# Patient Record
Sex: MALE | Race: WHITE | NOT HISPANIC OR LATINO | Employment: UNEMPLOYED | URBAN - METROPOLITAN AREA
[De-identification: names, ages, dates, MRNs, and addresses within clinical notes are randomized per-mention and may not be internally consistent; named-entity substitution may affect disease eponyms.]

---

## 2021-09-28 ENCOUNTER — APPOINTMENT (OUTPATIENT)
Dept: WOUND CARE | Facility: HOSPITAL | Age: 41
End: 2021-09-28

## 2021-10-06 ENCOUNTER — OFFICE VISIT (OUTPATIENT)
Dept: INTERNAL MEDICINE | Facility: CLINIC | Age: 41
End: 2021-10-06

## 2021-10-06 VITALS
HEART RATE: 64 BPM | DIASTOLIC BLOOD PRESSURE: 70 MMHG | SYSTOLIC BLOOD PRESSURE: 130 MMHG | OXYGEN SATURATION: 98 % | RESPIRATION RATE: 18 BRPM | TEMPERATURE: 97.9 F

## 2021-10-06 DIAGNOSIS — Z79.899 HIGH RISK MEDICATION USE: ICD-10-CM

## 2021-10-06 DIAGNOSIS — S32.019A: ICD-10-CM

## 2021-10-06 DIAGNOSIS — G89.4 CHRONIC PAIN SYNDROME: ICD-10-CM
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
## 2021-10-06 DIAGNOSIS — R32 BOWEL AND BLADDER INCONTINENCE: Primary | ICD-10-CM

## 2021-10-06 DIAGNOSIS — L89.109 PRESSURE INJURY OF SKIN OF BACK, UNSPECIFIED INJURY STAGE: ICD-10-CM

## 2021-10-06 DIAGNOSIS — R15.9 BOWEL AND BLADDER INCONTINENCE: Primary | ICD-10-CM

## 2021-10-06 DIAGNOSIS — Z98.890 HISTORY OF UROSTOMY: ICD-10-CM

## 2021-10-06 DIAGNOSIS — G82.20 PARALYSIS OF BOTH LOWER LIMBS (HCC): ICD-10-CM

## 2021-10-06 DIAGNOSIS — G89.29 CHRONIC ABDOMINAL PAIN: ICD-10-CM

## 2021-10-06 DIAGNOSIS — B36.0 TINEA VERSICOLOR: ICD-10-CM

## 2021-10-06 DIAGNOSIS — R10.9 CHRONIC ABDOMINAL PAIN: ICD-10-CM

## 2021-10-06 DIAGNOSIS — S24.103A: ICD-10-CM

## 2021-10-06 DIAGNOSIS — S91.309A WOUND OF FOOT: ICD-10-CM

## 2021-10-06 DIAGNOSIS — S22.089A: ICD-10-CM

## 2021-10-06 PROCEDURE — 99204 OFFICE O/P NEW MOD 45 MIN: CPT | Performed by: NURSE PRACTITIONER

## 2021-10-06 RX ORDER — GABAPENTIN 600 MG/1
600 TABLET ORAL 3 TIMES DAILY
COMMUNITY
End: 2021-10-06 | Stop reason: SDUPTHER

## 2021-10-06 RX ORDER — VENLAFAXINE HYDROCHLORIDE 75 MG/1
75 CAPSULE, EXTENDED RELEASE ORAL DAILY
COMMUNITY
End: 2021-10-07 | Stop reason: SDUPTHER

## 2021-10-06 RX ORDER — GABAPENTIN 600 MG/1
600 TABLET ORAL 3 TIMES DAILY
Qty: 30 TABLET | Refills: 0 | Status: SHIPPED | OUTPATIENT
Start: 2021-10-06 | End: 2021-10-14 | Stop reason: SDUPTHER

## 2021-10-06 RX ORDER — AMITRIPTYLINE HYDROCHLORIDE 75 MG/1
TABLET, FILM COATED ORAL 2 TIMES DAILY
COMMUNITY
End: 2021-10-07 | Stop reason: SDUPTHER
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
## 2021-10-07 ENCOUNTER — TELEPHONE (OUTPATIENT)
Dept: INTERNAL MEDICINE | Facility: CLINIC | Age: 41
End: 2021-10-07

## 2021-10-07 LAB
ALBUMIN SERPL-MCNC: 4.4 G/DL (ref 3.5–5.2)
ALBUMIN/GLOB SERPL: 1.3 G/DL
ALP SERPL-CCNC: 103 U/L (ref 39–117)
ALT SERPL-CCNC: 18 U/L (ref 1–41)
AST SERPL-CCNC: 16 U/L (ref 1–40)
BASOPHILS # BLD AUTO: 0.04 10*3/MM3 (ref 0–0.2)
BASOPHILS NFR BLD AUTO: 0.7 % (ref 0–1.5)
BILIRUB SERPL-MCNC: 0.3 MG/DL (ref 0–1.2)
BUN SERPL-MCNC: 15 MG/DL (ref 6–20)
BUN/CREAT SERPL: 24.6 (ref 7–25)
CALCIUM SERPL-MCNC: 9.3 MG/DL (ref 8.6–10.5)
CHLORIDE SERPL-SCNC: 103 MMOL/L (ref 98–107)
CO2 SERPL-SCNC: 25.2 MMOL/L (ref 22–29)
CREAT SERPL-MCNC: 0.61 MG/DL (ref 0.76–1.27)
EOSINOPHIL # BLD AUTO: 0.04 10*3/MM3 (ref 0–0.4)
EOSINOPHIL NFR BLD AUTO: 0.7 % (ref 0.3–6.2)
ERYTHROCYTE [DISTWIDTH] IN BLOOD BY AUTOMATED COUNT: 12.8 % (ref 12.3–15.4)
GLOBULIN SER CALC-MCNC: 3.5 GM/DL
GLUCOSE SERPL-MCNC: 89 MG/DL (ref 65–99)
HCT VFR BLD AUTO: 42.1 % (ref 37.5–51)
HGB BLD-MCNC: 14.4 G/DL (ref 13–17.7)
IMM GRANULOCYTES # BLD AUTO: 0.01 10*3/MM3 (ref 0–0.05)
IMM GRANULOCYTES NFR BLD AUTO: 0.2 % (ref 0–0.5)
LYMPHOCYTES # BLD AUTO: 1.71 10*3/MM3 (ref 0.7–3.1)
LYMPHOCYTES NFR BLD AUTO: 29.2 % (ref 19.6–45.3)
MCH RBC QN AUTO: 30 PG (ref 26.6–33)
MCHC RBC AUTO-ENTMCNC: 34.2 G/DL (ref 31.5–35.7)
MCV RBC AUTO: 87.7 FL (ref 79–97)
MONOCYTES # BLD AUTO: 0.44 10*3/MM3 (ref 0.1–0.9)
MONOCYTES NFR BLD AUTO: 7.5 % (ref 5–12)
NEUTROPHILS # BLD AUTO: 3.62 10*3/MM3 (ref 1.7–7)
NEUTROPHILS NFR BLD AUTO: 61.7 % (ref 42.7–76)
NRBC BLD AUTO-RTO: 0 /100 WBC (ref 0–0.2)
PLATELET # BLD AUTO: 332 10*3/MM3 (ref 140–450)
POTASSIUM SERPL-SCNC: 4.7 MMOL/L (ref 3.5–5.2)
PROT SERPL-MCNC: 7.9 G/DL (ref 6–8.5)
RBC # BLD AUTO: 4.8 10*6/MM3 (ref 4.14–5.8)
SODIUM SERPL-SCNC: 140 MMOL/L (ref 136–145)
WBC # BLD AUTO: 5.86 10*3/MM3 (ref 3.4–10.8)

## 2021-10-07 RX ORDER — VENLAFAXINE HYDROCHLORIDE 75 MG/1
75 CAPSULE, EXTENDED RELEASE ORAL DAILY
Qty: 30 CAPSULE | Refills: 2 | Status: SHIPPED | OUTPATIENT
Start: 2021-10-07 | End: 2022-03-09 | Stop reason: SDUPTHER

## 2021-10-07 RX ORDER — AMITRIPTYLINE HYDROCHLORIDE 75 MG/1
75 TABLET, FILM COATED ORAL 2 TIMES DAILY
Qty: 60 TABLET | Refills: 2 | Status: SHIPPED | OUTPATIENT
Start: 2021-10-07 | End: 2022-03-09 | Stop reason: SDUPTHER

## 2021-10-07 NOTE — TELEPHONE ENCOUNTER
PATIENT CALLING BECAUSE MEDICATIONS FROM YESTERDAYS APPT WERE NOT SENT TO THE PHARMACY. [facility] #[id number] - [place], [place] - [address] AT [facility] [address] [facility] - [phone] [facility] [phone]   [phone]    PLEASE LET HIM KNOW WHEN THEY ARE BEING SENT.   [phone]

## 2021-10-07 NOTE — TELEPHONE ENCOUNTER
----- Message from NELLY Velasquez sent at 10/7/2021  8:37 AM EDT -----  Please notify patient labs look good , UDS is pending

## 2021-10-08 ENCOUNTER — TELEPHONE (OUTPATIENT)
Dept: INTERNAL MEDICINE | Facility: CLINIC | Age: 41
End: 2021-10-08

## 2021-10-08 NOTE — TELEPHONE ENCOUNTER
Caller: JAJA MOTTA    Relationship: Emergency Contact    Best call back number: [phone]     What is the best time to reach you: ANY    Who are you requesting to speak with (clinical staff, provider,  specific staff member): CLINICAL    What was the call regarding:   PATIENTS WIFE STATED THAT [facility] ON [address] WAS IN THE PROCESS OF FILLING DIAZEPAM.  HOWEVER THE [facility] ON [address] IS CLOSED.  THEY ARE REQUESTING THE MEDICATION BE TRANSFERRED TO:[facility] #[id number] - [place], [place] - [address]  AT [facility] [phone] [facility] [phone] FX    PLEASE NOTE I DO NOT SHOW THIS MEDICATION IN PATIENTS PROFILE.     Do you require a callback: YES

## 2021-10-08 NOTE — TELEPHONE ENCOUNTER
PATIENTS WIFE STATES THAT [facility] #[id number] - [place], [place] - [address] AT [facility] OF [address] [facility] - [phone] [facility] [phone] FX    IS CLOSED TODAY.  PATIENT NEEDS THE FOLLOWING MEDICATION [facility].    gabapentin (NEURONTIN) 600 MG tablet [[id number]] (Order [id number])    PLEASE SEND TO :  [facility] #[id number] - [place], [place] - [address] [facility] AT [facility] [address] - [phone] [facility] [phone] FX    PATIENT CALL BACK: [phone]

## 2021-10-08 NOTE — TELEPHONE ENCOUNTER
Called and spoke with Carl.  Diazepam was not on his medication list, they did not mention him taking it when he was here.   It's a controlled substance and [facility] can not write for it.     She understood.

## 2021-10-13 ENCOUNTER — TELEPHONE (OUTPATIENT)
Dept: INTERNAL MEDICINE | Facility: CLINIC | Age: 41
End: 2021-10-13

## 2021-10-13 LAB
AMPHETAMINES UR QL SCN: NEGATIVE NG/ML
BARBITURATES UR QL SCN: NEGATIVE NG/ML
BENZODIAZ UR QL SCN: NEGATIVE NG/ML
BZE UR QL SCN: NEGATIVE NG/ML
CANNABINOIDS UR QL SCN: POSITIVE NG/ML
CREAT UR-MCNC: 81.3 MG/DL (ref 20–300)
LABORATORY COMMENT REPORT: ABNORMAL
METHADONE UR QL SCN: NEGATIVE NG/ML
OPIATES UR QL SCN: NEGATIVE NG/ML
OXYCODONE+OXYMORPHONE UR QL SCN: NEGATIVE NG/ML
PCP UR QL: NEGATIVE NG/ML
PH UR: 8.9 [PH] (ref 4.5–8.9)
PROPOXYPH UR QL SCN: NEGATIVE NG/ML

## 2021-10-13 NOTE — TELEPHONE ENCOUNTER
LEVI FROM [facility] IS CALLING IN SHE STATES SHE NEEDS TO GET     OFFICE NOTES FROM 10/06/21 VISIT   AND ORDER THAT SHE FAXED OVER FOR PT EVALUATION VISIT.        CALLBACK NUMBER IS  [phone]      EXT:[id number]

## 2021-10-13 NOTE — TELEPHONE ENCOUNTER
FORM PRINTED AND OFFICE NOTE FAXED WITH IT.   PATIENT WAS NOT HERE FOR MOBILITY EXAM.     MAY NEED ANOTHER APPOINTMENT SINCE IT WAS NOT ADDRESSED.

## 2021-10-14 ENCOUNTER — TELEPHONE (OUTPATIENT)
Dept: INTERNAL MEDICINE | Facility: CLINIC | Age: 41
End: 2021-10-14

## 2021-10-14 DIAGNOSIS — G89.4 CHRONIC PAIN SYNDROME: ICD-10-CM

## 2021-10-14 RX ORDER — GABAPENTIN 600 MG/1
600 TABLET ORAL 3 TIMES DAILY
Qty: 180 TABLET | Refills: 1 | Status: SHIPPED | OUTPATIENT
Start: 2021-10-14 | End: 2022-03-09

## 2021-10-14 NOTE — TELEPHONE ENCOUNTER
Patient called and stated that the Gabapentin dosing was written incorrectly. He takes this medication 2-3 times daily. He also stated that he is almost out because when he picked up the prescription he only had 10 pills in the bottle.   Please advise

## 2021-10-14 NOTE — TELEPHONE ENCOUNTER
CALLED AND SPOKE WITH PATIENT.  ADVISED HIM RX WAS SENT FOR 10 DAYS BECAUSE WE WERE AWAITING RESULTS ON URINE DRUG SCREEN.  NEW RX WAS SENT TO HIS PHARMACY TODAY FOR 1 MONTH SUPPLY.  HE WAS ALSO NOTIFIED THAT HE WILL NEED AN APPOINTMENT FOR NEW WHEELCHAIR AS THEY REQUIRE MOBILITY EXAM AND THAT WAS NOT ADDRESSED AT PREVIOUS OFFICE VISIT.   PATIENT UNDERSTOOD/

## 2021-10-14 NOTE — TELEPHONE ENCOUNTER
Please call the patient and let him know that I gave him 10 days until his drug screen came back since he was out. I sent a new rx into the pharmacy

## 2021-10-14 NOTE — TELEPHONE ENCOUNTER
PATIENT WOULD LIKE A SMART DRIVE WHEELCHAIR AS THAT WOULD BE EASIER ON HIS SHOULDERS THE ORDERS FOR WHEEL CHAIR NEED TO BE MORE DETAILED. [employer] IS THE COMPANY FOR THE WHEELCHAIR.     PATIENT CALLBACK: [phone]

## 2021-10-19 ENCOUNTER — TELEPHONE (OUTPATIENT)
Dept: INTERNAL MEDICINE | Facility: CLINIC | Age: 41
End: 2021-10-19

## 2021-10-19 NOTE — TELEPHONE ENCOUNTER
Caller: Pk May    Relationship: Self    Best call back number: [phone]    Who are you requesting to speak with (clinical staff, provider,  specific staff member): NURSE    What was the call regarding: PATIENT ONLY RECEIVED 30 GABAPENTIN AND HE TAKES 6 PER DAY. HE CAN'T GET THRU TO THE PHARMACY TO SEE IF IT'S AN ERROR OR NOT. PLEASE CALL AND ADVISE.     Do you require a callback: YES    [facility] #[id number] - [facility] [address] [facility] [facility] - [phone] [facility] [phone]   [phone]

## 2021-10-20 ENCOUNTER — OFFICE VISIT (OUTPATIENT)
Dept: INTERNAL MEDICINE | Facility: CLINIC | Age: 41
End: 2021-10-20

## 2021-10-20 VITALS
HEART RATE: 96 BPM | OXYGEN SATURATION: 98 % | DIASTOLIC BLOOD PRESSURE: 100 MMHG | SYSTOLIC BLOOD PRESSURE: 130 MMHG | WEIGHT: 200 LBS | RESPIRATION RATE: 18 BRPM | TEMPERATURE: 97.5 F

## 2021-10-20 DIAGNOSIS — S24.103A: ICD-10-CM

## 2021-10-20 DIAGNOSIS — Z99.3 DEPENDENT FOR WHEELCHAIR MOBILITY: ICD-10-CM

## 2021-10-20 DIAGNOSIS — G82.20 PARALYSIS OF BOTH LOWER LIMBS (HCC): Primary | ICD-10-CM

## 2021-10-20 PROCEDURE — 99213 OFFICE O/P EST LOW 20 MIN: CPT | Performed by: NURSE PRACTITIONER
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
## 2021-10-20 NOTE — TELEPHONE ENCOUNTER
Patient has appointment today.   We will address.   I spoke with his girlfriend  days ago and notified her that the rx was sent for #180

## 2021-11-01 ENCOUNTER — OFFICE VISIT (OUTPATIENT)
Dept: WOUND CARE | Facility: HOSPITAL | Age: 41
End: 2021-11-01

## 2021-11-01 ENCOUNTER — LAB REQUISITION (OUTPATIENT)
Dept: LAB | Facility: HOSPITAL | Age: 41
End: 2021-11-01

## 2021-11-01 DIAGNOSIS — T81.32XA DISRUPTION OF INTERNAL OPERATION (SURGICAL) WOUND, NOT ELSEWHERE CLASSIFIED, INITIAL ENCOUNTER: ICD-10-CM

## 2021-11-01 PROCEDURE — 87070 CULTURE OTHR SPECIMN AEROBIC: CPT | Performed by: NURSE PRACTITIONER

## 2021-11-01 PROCEDURE — 87205 SMEAR GRAM STAIN: CPT | Performed by: NURSE PRACTITIONER

## 2021-11-01 PROCEDURE — G0463 HOSPITAL OUTPT CLINIC VISIT: HCPCS

## 2021-11-01 PROCEDURE — 87186 SC STD MICRODIL/AGAR DIL: CPT | Performed by: NURSE PRACTITIONER

## 2021-11-01 PROCEDURE — 87075 CULTR BACTERIA EXCEPT BLOOD: CPT | Performed by: NURSE PRACTITIONER

## 2021-11-01 PROCEDURE — 87147 CULTURE TYPE IMMUNOLOGIC: CPT | Performed by: NURSE PRACTITIONER

## 2021-11-05 LAB
BACTERIA SPEC AEROBE CULT: ABNORMAL
BACTERIA SPEC AEROBE CULT: ABNORMAL
GRAM STN SPEC: ABNORMAL

## 2021-11-06 LAB — BACTERIA SPEC ANAEROBE CULT: NORMAL

## 2021-11-08 ENCOUNTER — TRANSCRIBE ORDERS (OUTPATIENT)
Dept: LAB | Facility: HOSPITAL | Age: 41
End: 2021-11-08

## 2021-11-08 ENCOUNTER — OFFICE VISIT (OUTPATIENT)
Dept: WOUND CARE | Facility: HOSPITAL | Age: 41
End: 2021-11-08

## 2021-11-08 ENCOUNTER — LAB (OUTPATIENT)
Dept: LAB | Facility: HOSPITAL | Age: 41
End: 2021-11-08

## 2021-11-08 DIAGNOSIS — T81.32XA DEEP DISRUPTION OF OPERATION WOUND, INITIAL ENCOUNTER: ICD-10-CM

## 2021-11-08 DIAGNOSIS — T81.32XA DEEP DISRUPTION OF OPERATION WOUND, INITIAL ENCOUNTER: Primary | ICD-10-CM

## 2021-11-08 LAB
ALBUMIN SERPL-MCNC: 4.2 G/DL (ref 3.5–5.2)
ALBUMIN/GLOB SERPL: 1 G/DL
ALP SERPL-CCNC: 121 U/L (ref 39–117)
ALT SERPL W P-5'-P-CCNC: 20 U/L (ref 1–41)
ANION GAP SERPL CALCULATED.3IONS-SCNC: 9 MMOL/L (ref 5–15)
AST SERPL-CCNC: 18 U/L (ref 1–40)
BASOPHILS # BLD AUTO: 0.06 10*3/MM3 (ref 0–0.2)
BASOPHILS NFR BLD AUTO: 0.6 % (ref 0–1.5)
BILIRUB SERPL-MCNC: 0.2 MG/DL (ref 0–1.2)
BUN SERPL-MCNC: 11 MG/DL (ref 6–20)
BUN/CREAT SERPL: 16.4 (ref 7–25)
CALCIUM SPEC-SCNC: 9.2 MG/DL (ref 8.6–10.5)
CHLORIDE SERPL-SCNC: 100 MMOL/L (ref 98–107)
CO2 SERPL-SCNC: 29 MMOL/L (ref 22–29)
CREAT SERPL-MCNC: 0.67 MG/DL (ref 0.76–1.27)
CRP SERPL-MCNC: 0.91 MG/DL (ref 0–0.5)
DEPRECATED RDW RBC AUTO: 42 FL (ref 37–54)
EOSINOPHIL # BLD AUTO: 0.08 10*3/MM3 (ref 0–0.4)
EOSINOPHIL NFR BLD AUTO: 0.9 % (ref 0.3–6.2)
ERYTHROCYTE [DISTWIDTH] IN BLOOD BY AUTOMATED COUNT: 12.8 % (ref 12.3–15.4)
ERYTHROCYTE [SEDIMENTATION RATE] IN BLOOD: 58 MM/HR (ref 0–15)
GFR SERPL CREATININE-BSD FRML MDRD: 132 ML/MIN/1.73
GLOBULIN UR ELPH-MCNC: 4.3 GM/DL
GLUCOSE SERPL-MCNC: 99 MG/DL (ref 65–99)
HBA1C MFR BLD: 5.04 % (ref 4.8–5.6)
HCT VFR BLD AUTO: 44.3 % (ref 37.5–51)
HGB BLD-MCNC: 14.5 G/DL (ref 13–17.7)
IMM GRANULOCYTES # BLD AUTO: 0.03 10*3/MM3 (ref 0–0.05)
IMM GRANULOCYTES NFR BLD AUTO: 0.3 % (ref 0–0.5)
LYMPHOCYTES # BLD AUTO: 1.9 10*3/MM3 (ref 0.7–3.1)
LYMPHOCYTES NFR BLD AUTO: 20.2 % (ref 19.6–45.3)
MCH RBC QN AUTO: 29.1 PG (ref 26.6–33)
MCHC RBC AUTO-ENTMCNC: 32.7 G/DL (ref 31.5–35.7)
MCV RBC AUTO: 89 FL (ref 79–97)
MONOCYTES # BLD AUTO: 0.53 10*3/MM3 (ref 0.1–0.9)
MONOCYTES NFR BLD AUTO: 5.6 % (ref 5–12)
NEUTROPHILS NFR BLD AUTO: 6.81 10*3/MM3 (ref 1.7–7)
NEUTROPHILS NFR BLD AUTO: 72.4 % (ref 42.7–76)
NRBC BLD AUTO-RTO: 0 /100 WBC (ref 0–0.2)
PLATELET # BLD AUTO: 398 10*3/MM3 (ref 140–450)
PMV BLD AUTO: 9.1 FL (ref 6–12)
POTASSIUM SERPL-SCNC: 3.4 MMOL/L (ref 3.5–5.2)
PREALB SERPL-MCNC: 21.7 MG/DL (ref 20–40)
PROT SERPL-MCNC: 8.5 G/DL (ref 6–8.5)
RBC # BLD AUTO: 4.98 10*6/MM3 (ref 4.14–5.8)
SODIUM SERPL-SCNC: 138 MMOL/L (ref 136–145)
WBC # BLD AUTO: 9.41 10*3/MM3 (ref 3.4–10.8)

## 2021-11-08 PROCEDURE — 85025 COMPLETE CBC W/AUTO DIFF WBC: CPT

## 2021-11-08 PROCEDURE — 80053 COMPREHEN METABOLIC PANEL: CPT

## 2021-11-08 PROCEDURE — 36415 COLL VENOUS BLD VENIPUNCTURE: CPT

## 2021-11-08 PROCEDURE — G0463 HOSPITAL OUTPT CLINIC VISIT: HCPCS

## 2021-11-08 PROCEDURE — 86140 C-REACTIVE PROTEIN: CPT

## 2021-11-08 PROCEDURE — 83036 HEMOGLOBIN GLYCOSYLATED A1C: CPT

## 2021-11-08 PROCEDURE — 84134 ASSAY OF PREALBUMIN: CPT

## 2021-11-08 PROCEDURE — 85652 RBC SED RATE AUTOMATED: CPT

## 2021-11-15 ENCOUNTER — APPOINTMENT (OUTPATIENT)
Dept: WOUND CARE | Facility: HOSPITAL | Age: 41
End: 2021-11-15

## 2021-11-22 ENCOUNTER — HOSPITAL ENCOUNTER (OUTPATIENT)
Dept: GENERAL RADIOLOGY | Facility: HOSPITAL | Age: 41
Discharge: HOME | End: 2021-11-22

## 2021-11-22 ENCOUNTER — OFFICE VISIT (OUTPATIENT)
Dept: WOUND CARE | Facility: HOSPITAL | Age: 41
End: 2021-11-22

## 2021-11-22 DIAGNOSIS — T81.32XD DEHISCENCE OF INTERNAL SURGICAL WOUND, SUBSEQUENT ENCOUNTER: ICD-10-CM

## 2021-11-22 PROCEDURE — 97602 WOUND(S) CARE NON-SELECTIVE: CPT

## 2021-11-22 PROCEDURE — 72072 X-RAY EXAM THORAC SPINE 3VWS: CPT

## 2021-11-23 ENCOUNTER — TRANSCRIBE ORDERS (OUTPATIENT)
Dept: ADMINISTRATIVE | Facility: HOSPITAL | Age: 41
End: 2021-11-23

## 2021-11-23 DIAGNOSIS — T81.32XA DISRUPTION OF INTERNAL OPERATION (SURGICAL) WOUND, NOT ELSEWHERE CLASSIFIED, INITIAL ENCOUNTER: Primary | ICD-10-CM

## 2021-11-29 ENCOUNTER — APPOINTMENT (OUTPATIENT)
Dept: CT IMAGING | Facility: HOSPITAL | Age: 41
End: 2021-11-29

## 2021-11-30 ENCOUNTER — HOSPITAL ENCOUNTER (OUTPATIENT)
Dept: CT IMAGING | Facility: HOSPITAL | Age: 41
Discharge: HOME | End: 2021-11-30
Admitting: SURGERY

## 2021-11-30 DIAGNOSIS — T81.32XA DISRUPTION OF INTERNAL OPERATION (SURGICAL) WOUND, NOT ELSEWHERE CLASSIFIED, INITIAL ENCOUNTER: ICD-10-CM

## 2021-11-30 PROCEDURE — 72128 CT CHEST SPINE W/O DYE: CPT

## 2021-11-30 PROCEDURE — 72131 CT LUMBAR SPINE W/O DYE: CPT

## 2021-12-06 ENCOUNTER — APPOINTMENT (OUTPATIENT)
Dept: WOUND CARE | Facility: HOSPITAL | Age: 41
End: 2021-12-06

## 2021-12-17 ENCOUNTER — TELEPHONE (OUTPATIENT)
Dept: INTERNAL MEDICINE | Facility: CLINIC | Age: 41
End: 2021-12-17

## 2021-12-17 NOTE — TELEPHONE ENCOUNTER
Caller: JAJA MOTTA ()    Relationship: SPOUSE     Best call back number: [phone] (H)    What is the best time to reach you: ANY TIME     Who are you requesting to speak with (clinical staff, provider,  specific staff member): CLINICAL STAFF     What was the call regarding: PATIENT'S GIRLFRIEND CALLED STATING THEY WENT TO [facility] RECENTLY AND THEY ARE TRYING TO GET SET UP FOR A SCHOLARSHIP THERE TO A HANDICAP GYM.    [facility] SENT OVER A FAX FOR THE PATEINT FOR A MEDICAL RELEASE, YESTERDAY- AND THEY ARE JUST FOLLOWING UP ABOUT THIS.      Do you require a callback: YES PLEASE

## 2021-12-17 NOTE — TELEPHONE ENCOUNTER
CALLED PATIENT'S SPOUSE AND NOTIFIED HER THAT WE GOT THE FAX THIS MORNING. RASHIDA JUST SIGNED OFF ON THEM AND I WILL FAX BACK SHORTLY. SHE UNDERSTOOD.

## 2022-01-10 ENCOUNTER — TELEMEDICINE (OUTPATIENT)
Dept: INTERNAL MEDICINE | Facility: CLINIC | Age: 42
End: 2022-01-10

## 2022-01-10 DIAGNOSIS — K08.89 PAIN, DENTAL: Primary | ICD-10-CM

## 2022-01-10 PROCEDURE — 99212 OFFICE O/P EST SF 10 MIN: CPT | Performed by: INTERNAL MEDICINE

## 2022-01-10 RX ORDER — AMOXICILLIN 500 MG/1
CAPSULE ORAL
Qty: 4 CAPSULE | Refills: 11 | Status: SHIPPED | OUTPATIENT
Start: 2022-01-10 | End: 2022-01-24
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
## 2022-01-11 ENCOUNTER — TELEPHONE (OUTPATIENT)
Dept: INTERNAL MEDICINE | Facility: CLINIC | Age: 42
End: 2022-01-11

## 2022-01-11 DIAGNOSIS — K04.7 ABSCESSED TOOTH: Primary | ICD-10-CM

## 2022-01-11 RX ORDER — AMOXICILLIN AND CLAVULANATE POTASSIUM 875; 125 MG/1; MG/1
1 TABLET, FILM COATED ORAL 2 TIMES DAILY
Qty: 20 TABLET | Refills: 0 | Status: SHIPPED | OUTPATIENT
Start: 2022-01-11 | End: 2022-01-21

## 2022-01-11 NOTE — TELEPHONE ENCOUNTER
PATIENTS WIFE CALLED STATED THAT PATIENT HAS SEVERE PAIN AND SWELLING IN HIS FACE DUE TO AN ABCESS IN HIS MOUTH.  REQUESTING URGENT REFERRAL [facility].  PLEASE ADVISE    CALL BACK #: [phone]

## 2022-01-11 NOTE — TELEPHONE ENCOUNTER
Call patient.  I called in Augmentin for tooth abscess.  The other is to take one hour prior to dental procedures.  Referral is placed.  ANTONY

## 2022-01-12 ENCOUNTER — APPOINTMENT (OUTPATIENT)
Dept: WOUND CARE | Facility: HOSPITAL | Age: 42
End: 2022-01-12

## 2022-01-24 ENCOUNTER — OFFICE VISIT (OUTPATIENT)
Dept: INTERNAL MEDICINE | Facility: CLINIC | Age: 42
End: 2022-01-24

## 2022-01-24 VITALS
HEART RATE: 113 BPM | SYSTOLIC BLOOD PRESSURE: 122 MMHG | RESPIRATION RATE: 16 BRPM | DIASTOLIC BLOOD PRESSURE: 70 MMHG | OXYGEN SATURATION: 98 %

## 2022-01-24 DIAGNOSIS — M54.2 NECK PAIN: ICD-10-CM
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
## 2022-01-24 PROCEDURE — 99213 OFFICE O/P EST LOW 20 MIN: CPT | Performed by: NURSE PRACTITIONER
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
## 2022-03-09 DIAGNOSIS — G89.4 CHRONIC PAIN SYNDROME: ICD-10-CM

## 2022-03-09 RX ORDER — VENLAFAXINE HYDROCHLORIDE 75 MG/1
75 CAPSULE, EXTENDED RELEASE ORAL DAILY
Qty: 30 CAPSULE | Refills: 2 | Status: SHIPPED | OUTPATIENT
Start: 2022-03-09 | End: 2022-04-20 | Stop reason: SDUPTHER

## 2022-03-09 RX ORDER — GABAPENTIN 600 MG/1
TABLET ORAL
Qty: 180 TABLET | Refills: 0 | Status: SHIPPED | OUTPATIENT
Start: 2022-03-09 | End: 2022-06-03 | Stop reason: SDUPTHER

## 2022-03-09 RX ORDER — AMITRIPTYLINE HYDROCHLORIDE 75 MG/1
75 TABLET, FILM COATED ORAL NIGHTLY
Qty: 60 TABLET | Refills: 0 | Status: SHIPPED | OUTPATIENT
Start: 2022-03-09 | End: 2022-04-20 | Stop reason: SDUPTHER

## 2022-03-09 NOTE — TELEPHONE ENCOUNTER
Caller: JAJA MOTTA    Relationship: Emergency Contact    Best call back number: [phone]    Requested Prescriptions:   Requested Prescriptions     Pending Prescriptions Disp Refills   • amitriptyline (ELAVIL) 75 MG tablet 60 tablet 2     Sig: Take 1 tablet by mouth.   • venlafaxine XR (EFFEXOR-XR) 75 MG 24 hr capsule 30 capsule 2     Sig: Take 1 capsule by mouth Daily.        Pharmacy where request should be sent: [id number] [place] [address] [address] AT [facility] [facility] - [phone] [facility] [phone] FX     Additional details provided by patient: PATIENT HAS 2 DAYS LEFT    Does the patient have less than a 3 day supply:  [x] Yes  [] No    Surendra Pizano Rep   03/09/22 13:15 EST

## 2022-04-20 ENCOUNTER — TELEPHONE (OUTPATIENT)
Dept: INTERNAL MEDICINE | Facility: CLINIC | Age: 42
End: 2022-04-20

## 2022-04-20 RX ORDER — VENLAFAXINE HYDROCHLORIDE 75 MG/1
75 CAPSULE, EXTENDED RELEASE ORAL DAILY
Qty: 90 CAPSULE | Refills: 0 | Status: SHIPPED | OUTPATIENT
Start: 2022-04-20 | End: 2022-05-11

## 2022-04-20 RX ORDER — AMITRIPTYLINE HYDROCHLORIDE 75 MG/1
75 TABLET, FILM COATED ORAL NIGHTLY
Qty: 90 TABLET | Refills: 0 | Status: SHIPPED | OUTPATIENT
Start: 2022-04-20 | End: 2022-09-08

## 2022-05-11 ENCOUNTER — TELEMEDICINE (OUTPATIENT)
Dept: INTERNAL MEDICINE | Facility: CLINIC | Age: 42
End: 2022-05-11

## 2022-05-11 DIAGNOSIS — F41.9 ANXIETY: ICD-10-CM

## 2022-05-11 DIAGNOSIS — F32.9 REACTIVE DEPRESSION: Primary | ICD-10-CM

## 2022-05-11 DIAGNOSIS — R45.4 IRRITABILITY AND ANGER: ICD-10-CM

## 2022-05-11 PROBLEM — F43.10 POSTTRAUMATIC STRESS DISORDER: Status: ACTIVE | Noted: 2022-05-09

## 2022-05-11 PROBLEM — M84.48XA PATHOLOGICAL FRACTURE OF VERTEBRA: Status: ACTIVE | Noted: 2022-05-09

## 2022-05-11 PROBLEM — B36.0 PITYRIASIS VERSICOLOR: Status: ACTIVE | Noted: 2022-05-09

## 2022-05-11 PROBLEM — G82.20 PARAPLEGIA: Status: ACTIVE | Noted: 2021-10-06

## 2022-05-11 PROBLEM — S22.089A: Status: ACTIVE | Noted: 2021-10-06

## 2022-05-11 PROBLEM — K59.2 NEUROGENIC BOWEL: Status: ACTIVE | Noted: 2022-03-02

## 2022-05-11 PROBLEM — S32.019A: Status: ACTIVE | Noted: 2021-10-06

## 2022-05-11 PROBLEM — N31.9 NEUROGENIC BLADDER: Status: ACTIVE | Noted: 2022-03-02

## 2022-05-11 PROCEDURE — 99214 OFFICE O/P EST MOD 30 MIN: CPT | Performed by: NURSE PRACTITIONER

## 2022-05-11 RX ORDER — VENLAFAXINE HYDROCHLORIDE 150 MG/1
150 CAPSULE, EXTENDED RELEASE ORAL DAILY
Qty: 90 CAPSULE | Refills: 1 | Status: SHIPPED | OUTPATIENT
Start: 2022-05-11

## 2022-05-11 NOTE — PROGRESS NOTES
Subjective   Pk May is a 41 y.o. male. Patient is here today for   Chief Complaint   Patient presents with   • Anxiety          There were no vitals filed for this visit.  There is no height or weight on file to calculate BMI.  The following portions of the patient's history were reviewed and updated as appropriate: allergies, current medications, past family history, past medical history, past social history, past surgical history and problem list.    Past Medical History:   Diagnosis Date   • Paraplegia (HCC)       Allergies   Allergen Reactions   • Codeine Itching      Social History     Socioeconomic History   • Marital status: Significant Other   Tobacco Use   • Smoking status: Current Every Day Smoker     Packs/day: 1.00     Years: 24.00     Pack years: 24.00     Types: Cigarettes   Substance and Sexual Activity   • Alcohol use: Not Currently   • Drug use: Defer        Current Outpatient Medications:   •  venlafaxine XR (EFFEXOR-XR) 150 MG 24 hr capsule, Take 1 capsule by mouth Daily., Disp: 90 capsule, Rfl: 1  •  amitriptyline (ELAVIL) 75 MG tablet, Take 1 tablet by mouth Every Night., Disp: 90 tablet, Rfl: 0  •  gabapentin (NEURONTIN) 600 MG tablet, TAKE 1 TABLET BY MOUTH THREE TIMES DAILY, Disp: 180 tablet, Rfl: 0     Luciano Whittington is a 41 year old male patient who is being seen via telemedicine for anxiety. Both the patient and the provider are located in [place], [place]. He c/o worsening anxiety and depression. He has is on venlafaxine . He would also like a referral to psychiatry . He denies self harm, SI, or HI.        Review of Systems   Respiratory: Negative.    Cardiovascular: Negative.    Psychiatric/Behavioral: Positive for agitation and dysphoric mood. Negative for self-injury, sleep disturbance and suicidal ideas. The patient is nervous/anxious.        Physical Exam  Constitutional:       General: He is not in acute distress.     Appearance: Normal appearance.   Neurological:       Mental Status: He is alert and oriented to person, place, and time.   Psychiatric:         Mood and Affect: Mood normal.         Speech: Speech normal.         Behavior: Behavior normal.         ASSESSMENT     Problems Addressed this Visit    None     Visit Diagnoses     Reactive depression    -  Primary    Relevant Medications    venlafaxine XR (EFFEXOR-XR) 150 MG 24 hr capsule    Other Relevant Orders    Ambulatory Referral to Psychiatry    Anxiety        Relevant Orders    Ambulatory Referral to Psychiatry    Irritability and anger        Relevant Orders    Ambulatory Referral to Psychiatry      Diagnoses       Codes Comments    Reactive depression    -  Primary ICD-10-CM: F32.9  ICD-9-CM: 300.4     Anxiety     ICD-10-CM: F41.9  ICD-9-CM: 300.00     Irritability and anger     ICD-10-CM: R45.4  ICD-9-CM: 799.22           PLAN    Will increase venlafaxine to 150mg daily. Pt advised to go to the ER if SI or HI.   Will refer to psychiatry for further evaluation  Follow up as needed and for continual care

## 2022-06-03 ENCOUNTER — OFFICE VISIT (OUTPATIENT)
Dept: INTERNAL MEDICINE | Facility: CLINIC | Age: 42
End: 2022-06-03

## 2022-06-03 VITALS — RESPIRATION RATE: 16 BRPM | SYSTOLIC BLOOD PRESSURE: 122 MMHG | WEIGHT: 210 LBS | DIASTOLIC BLOOD PRESSURE: 80 MMHG

## 2022-06-03 DIAGNOSIS — G89.4 CHRONIC PAIN SYNDROME: ICD-10-CM

## 2022-06-03 DIAGNOSIS — G82.20 PARALYSIS OF BOTH LOWER LIMBS: Primary | ICD-10-CM

## 2022-06-03 DIAGNOSIS — S24.103A: ICD-10-CM

## 2022-06-03 DIAGNOSIS — G82.20 PARAPLEGIA: ICD-10-CM

## 2022-06-03 PROCEDURE — 99213 OFFICE O/P EST LOW 20 MIN: CPT | Performed by: NURSE PRACTITIONER

## 2022-06-03 RX ORDER — BACLOFEN 10 MG/1
10 TABLET ORAL 3 TIMES DAILY
Qty: 90 TABLET | Refills: 1 | Status: SHIPPED | OUTPATIENT
Start: 2022-06-03 | End: 2022-07-06

## 2022-06-03 RX ORDER — CYCLOBENZAPRINE HCL 5 MG
TABLET ORAL
COMMUNITY
Start: 2022-04-26 | End: 2022-06-03 | Stop reason: ALTCHOICE

## 2022-06-03 RX ORDER — GABAPENTIN 600 MG/1
600 TABLET ORAL 3 TIMES DAILY
Qty: 180 TABLET | Refills: 3 | Status: SHIPPED | OUTPATIENT
Start: 2022-06-03 | End: 2022-07-15
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
## 2022-07-04 RX ORDER — VENLAFAXINE HYDROCHLORIDE 75 MG/1
CAPSULE, EXTENDED RELEASE ORAL
Qty: 90 CAPSULE | Refills: 1 | OUTPATIENT
Start: 2022-07-04

## 2022-07-06 RX ORDER — BACLOFEN 10 MG/1
TABLET ORAL
Qty: 90 TABLET | Refills: 1 | Status: SHIPPED | OUTPATIENT
Start: 2022-07-06 | End: 2022-08-29

## 2022-07-15 ENCOUNTER — TELEMEDICINE (OUTPATIENT)
Dept: INTERNAL MEDICINE | Facility: CLINIC | Age: 42
End: 2022-07-15

## 2022-07-15 DIAGNOSIS — G89.4 CHRONIC PAIN SYNDROME: ICD-10-CM

## 2022-07-15 PROCEDURE — 99213 OFFICE O/P EST LOW 20 MIN: CPT | Performed by: NURSE PRACTITIONER

## 2022-07-15 RX ORDER — GABAPENTIN 600 MG/1
1200 TABLET ORAL 3 TIMES DAILY
Qty: 180 TABLET | Refills: 2 | Status: SHIPPED | OUTPATIENT
Start: 2022-07-15 | End: 2022-11-14

## 2022-07-15 RX ORDER — AMOXICILLIN 875 MG/1
875 TABLET, COATED ORAL 2 TIMES DAILY
Qty: 20 TABLET | Refills: 0 | Status: SHIPPED | OUTPATIENT
Start: 2022-07-15 | End: 2022-07-25

## 2022-07-15 NOTE — PROGRESS NOTES
Subjective   Pk May is a 41 y.o. male.   Chief Complaint   Patient presents with   • Dental Pain   • Med Refill     There were no vitals filed for this visit.  No LMP for male patient.    Pk is a 41 year old male patient who is being seen via telemedicine for an acute visit. Both the patient and the provider are located in [place] .  He c/o dental pain . He also would like to discuss increasing gabapentin. He states before he moved to [place] he was on 1200 mg three times daily which worked well for him. He has been on 600 mg three times daily    Dental Pain   This is a new problem. The current episode started in the past 7 days. The problem occurs constantly. The problem has been unchanged. The pain is moderate. Associated symptoms include facial pain and thermal sensitivity. Pertinent negatives include no difficulty swallowing, fever, oral bleeding or sinus pressure. He has tried acetaminophen for the symptoms. The treatment provided no relief.        The following portions of the patient's history were reviewed and updated as appropriate: allergies, current medications, past family history, past medical history, past social history, past surgical history and problem list.    Review of Systems   Constitutional: Negative for fever.   HENT: Negative for sinus pressure.        Objective   Physical Exam  Vitals reviewed.   Constitutional:       General: He is not in acute distress.     Appearance: Normal appearance. He is well-developed and well-groomed.   Pulmonary:      Effort: Pulmonary effort is normal.   Skin:     General: Skin is warm and dry.         Assessment & Plan   Diagnoses and all orders for this visit:    1. Chronic pain syndrome  -     gabapentin (NEURONTIN) 600 MG tablet; Take 2 tablets by mouth 3 (Three) Times a Day for 30 days.  Dispense: 180 tablet; Refill: 2    Other orders  -     amoxicillin (AMOXIL) 875 MG tablet; Take 1 tablet by mouth 2 (Two) Times a Day for 10 days.  Dispense: 20 tablet;  Refill: 0      Start amoxil   Tylenol as needed for pain  He will schedule an appt with a dentist for recheck   Discussed good oral hygiene   Will increase lali nesbitt reviewed  Follow up as needed and for continual care

## 2022-08-29 RX ORDER — BACLOFEN 10 MG/1
TABLET ORAL
Qty: 90 TABLET | Refills: 1 | Status: SHIPPED | OUTPATIENT
Start: 2022-08-29 | End: 2023-01-23 | Stop reason: SDUPTHER

## 2022-09-08 RX ORDER — AMITRIPTYLINE HYDROCHLORIDE 75 MG/1
75 TABLET, FILM COATED ORAL NIGHTLY
Qty: 90 TABLET | Refills: 0 | Status: SHIPPED | OUTPATIENT
Start: 2022-09-08 | End: 2022-09-30

## 2022-09-30 RX ORDER — AMITRIPTYLINE HYDROCHLORIDE 75 MG/1
75 TABLET, FILM COATED ORAL NIGHTLY
Qty: 60 TABLET | Refills: 1 | Status: SHIPPED | OUTPATIENT
Start: 2022-09-30 | End: 2022-09-30 | Stop reason: SDUPTHER

## 2022-09-30 RX ORDER — AMITRIPTYLINE HYDROCHLORIDE 75 MG/1
75 TABLET, FILM COATED ORAL NIGHTLY
Qty: 60 TABLET | Refills: 1 | Status: SHIPPED | OUTPATIENT
Start: 2022-09-30 | End: 2022-10-12

## 2022-09-30 NOTE — TELEPHONE ENCOUNTER
Caller: JAJA MOTTA    Relationship: Emergency Contact    Best call back number: [phone]     Requested Prescriptions:   Requested Prescriptions     Pending Prescriptions Disp Refills   • amitriptyline (ELAVIL) 75 MG tablet 60 tablet 1     Sig: Take 1 tablet by mouth Every Night.        Pharmacy where request should be sent: [facility] #[id number] [place] [address] [address] AT [facility] [facility] - [phone] [facility] [phone] FX     Additional details provided by patient:     Does the patient have less than a 3 day supply:  [x] Yes  [] No    Surendra Petersen Rep   09/30/22 15:40 EDT

## 2022-10-11 ENCOUNTER — TELEPHONE (OUTPATIENT)
Dept: INTERNAL MEDICINE | Facility: CLINIC | Age: 42
End: 2022-10-11

## 2022-10-11 NOTE — TELEPHONE ENCOUNTER
Patient called and stated that he has not received his amitriptyline (ELAVIL). Can you please re-send to pharmacy.

## 2022-10-11 NOTE — TELEPHONE ENCOUNTER
Caller: Pk May    Relationship: Self    Best call back number: [phone] (Mobile)    Requested Prescriptions:   amitriptyline (ELAVIL) 75 MG tablet     Pharmacy where request should be sent:  [facility] #[id number] - [place] [address] [address] AT [facility] - [phone]  - [phone] FX      Additional details provided by patient: PATIENT CALLED TO REQUEST A MEDICATION REFILL ON HIS MEDICATION. PATIENT STATES THAT HE IS COMPLETELY OUT MEDICATION. PATIENT STATES THAT RASHIDA CALDERON ADVISED HIM TO START TAKING 2 PILLS PER DAY. PATIENT STATES THAT HE HAS NOT HAD HIS MEDICATION FOR OVER A WEEK NOW AND HE STATES THAT HE IS STARTING TO LOSE HIS MIND, BECOMING MEAN AND ANGRY DUE TO HIM NOT HAVING HIS MEDICATION. PATIENT STATES THAT HE IS PARA       Does the patient have less than a 3 day supply:  [] Yes  [] No    Surendra Leggett Rep   10/11/22 11:14 EDT         THANKS

## 2022-10-12 ENCOUNTER — TELEMEDICINE (OUTPATIENT)
Dept: INTERNAL MEDICINE | Facility: CLINIC | Age: 42
End: 2022-10-12

## 2022-10-12 ENCOUNTER — TELEPHONE (OUTPATIENT)
Dept: INTERNAL MEDICINE | Facility: CLINIC | Age: 42
End: 2022-10-12

## 2022-10-12 DIAGNOSIS — K12.0 APHTHOUS ULCER: Primary | ICD-10-CM

## 2022-10-12 PROBLEM — F63.81 INTERMITTENT EXPLOSIVE DISORDER: Status: ACTIVE | Noted: 2022-10-12

## 2022-10-12 PROCEDURE — 99213 OFFICE O/P EST LOW 20 MIN: CPT | Performed by: NURSE PRACTITIONER

## 2022-10-12 RX ORDER — TRIAMCINOLONE ACETONIDE 0.1 %
PASTE (GRAM) DENTAL
Qty: 5 G | Refills: 1 | Status: SHIPPED | OUTPATIENT
Start: 2022-10-12

## 2022-10-12 RX ORDER — GABAPENTIN 600 MG/1
1200 TABLET ORAL 3 TIMES DAILY
COMMUNITY
Start: 2022-04-19 | End: 2023-01-04 | Stop reason: SDUPTHER

## 2022-10-12 RX ORDER — AMITRIPTYLINE HYDROCHLORIDE 75 MG/1
75 TABLET, FILM COATED ORAL 2 TIMES DAILY
Qty: 180 TABLET | Refills: 3 | Status: SHIPPED | OUTPATIENT
Start: 2022-10-12

## 2022-10-12 NOTE — TELEPHONE ENCOUNTER
Fredy from [employer] calling regarding a form they had faxed over for this pt. - Fredy states she needs the CMN sent back because the dates that are prescribed can't be before the pt has been seen last; it can be on the same day or after but not before the last visit. - Also, right above the dates prescribed the ambulatory inside home or outside of the home needs to be checked( one or both)- Any corrections that are made also need to be initialed.    Best call back number for Fredy is [phone]

## 2022-10-12 NOTE — PROGRESS NOTES
Subjective   Pk May is a 41 y.o. male.   Chief Complaint   Patient presents with   • Oral Pain     There were no vitals filed for this visit.  No LMP for male patient.  Mode of Visit: Video  Location of patient: home  Location of provider: [facility]  You have chosen to receive care through a telehealth visit.  Does the patient consent to use a video/audio connection their medical care today? yes  The visit included audio and video interaction. No technical issues occurred during this visit.     History of Present Illness   Pk is a 41 year old male patient who is being seen via telemedicine for an acute visit. He is unable to leave his house due to mobility and transportation.   He has an ulcer on his tongue that has been there for a few weeks. He has used otc oragel, mouth wash and salt water gargles. He has a broken tooth that has been rubbing against his tongue and is planning on scheduling an appt with a dentist   He also has been taking two amitriptyline daily instead of one and ran out. He needs a refill. He has an appt set up through [address] [place] [address] for psychiatry     The following portions of the patient's history were reviewed and updated as appropriate: allergies, current medications, past family history, past medical history, past social history, past surgical history and problem list.    Review of Systems   Constitutional: Negative for fever.   HENT: Positive for dental problem and mouth sores. Negative for sore throat.    Respiratory: Negative.    Cardiovascular: Negative.        Objective   Physical Exam  HENT:      Mouth/Throat:      Comments: Ulcer on tongue noted   Neurological:      Mental Status: He is alert.         Assessment & Plan   Diagnoses and all orders for this visit:    1. Aphthous ulcer (Primary)    Other orders  -     amitriptyline (ELAVIL) 75 MG tablet; Take 1 tablet by mouth 2 (Two) Times a Day.  Dispense: 180 tablet; Refill: 3  -     triamcinolone (KENALOG) 0.1 % paste; Apply  to oral ulcer twice daily until healed  Dispense: 5 g; Refill: 1      He will schedule an appt with dentistry   Kenalog paste twice daily   Follow up in no improvement  Can continue using oragel as needed and using salt water gargles  New rx sent in for amitriptyline

## 2022-10-19 ENCOUNTER — TELEPHONE (OUTPATIENT)
Dept: INTERNAL MEDICINE | Facility: CLINIC | Age: 42
End: 2022-10-19

## 2022-10-19 NOTE — TELEPHONE ENCOUNTER
Caller: Pk May    Relationship: Self    Best call back number: [phone]    What medication are you requesting: ANTIBIOTIC TO HELP WITH SYMPTOMS    What are your current symptoms: TOOTH PAIN WITH SWELLING    How long have you been experiencing symptoms: A FEW DAYS    Have you had these symptoms before:    [] Yes  [x] No    Have you been treated for these symptoms before:   [] Yes  [x] No    If a prescription is needed, what is your preferred pharmacy and phone number: [facility] #[id number] - [facility] [address] AT [facility] [facility] - [phone] [facility] [phone]      Additional notes: PATIENT STATED HIS DENTIST WILL NOT TOUCH THIS TOOTH UNTIL HE IS TAKING AN ANTIBIOTIC.    PATIENT STATED HE HAS AN APPOINTMENT WITH HIS DENTIST ON Monday 10/24/22, BUT HE CANNOT WAIT THAT LONG IN PAIN.

## 2022-10-19 NOTE — TELEPHONE ENCOUNTER
Called and spoke with patient.   He has an appointment with Dentist on Monday, he will see if he can get transportation and schedule an office visit or go to Urgent care./

## 2022-11-14 DIAGNOSIS — G89.4 CHRONIC PAIN SYNDROME: ICD-10-CM

## 2022-11-14 RX ORDER — GABAPENTIN 600 MG/1
TABLET ORAL
Qty: 180 TABLET | Refills: 0 | Status: ON HOLD | OUTPATIENT
Start: 2022-11-14 | End: 2022-12-29

## 2022-12-29 ENCOUNTER — HOSPITAL ENCOUNTER (OUTPATIENT)
Facility: HOSPITAL | Age: 42
LOS: 2 days | Discharge: HOME | End: 2022-12-31
Attending: EMERGENCY MEDICINE | Admitting: INTERNAL MEDICINE
Payer: MEDICARE

## 2022-12-29 ENCOUNTER — APPOINTMENT (OUTPATIENT)
Dept: GENERAL RADIOLOGY | Facility: HOSPITAL | Age: 42
End: 2022-12-29
Payer: MEDICARE

## 2022-12-29 DIAGNOSIS — L03.116 CELLULITIS OF LEFT LOWER EXTREMITY: Primary | ICD-10-CM

## 2022-12-29 DIAGNOSIS — G82.20 PARAPLEGIA: ICD-10-CM

## 2022-12-29 PROBLEM — Z98.890 HISTORY OF UROSTOMY: Status: ACTIVE | Noted: 2022-12-29

## 2022-12-29 PROBLEM — Z93.3 COLOSTOMY IN PLACE: Status: ACTIVE | Noted: 2022-12-29

## 2022-12-29 PROBLEM — Z72.0 TOBACCO USE: Status: ACTIVE | Noted: 2022-12-29

## 2022-12-29 PROBLEM — S91.302A OPEN WOUND OF LEFT HEEL: Status: ACTIVE | Noted: 2022-12-29

## 2022-12-29 PROBLEM — S91.302A OPEN WOUND OF LEFT FOOT: Status: ACTIVE | Noted: 2022-12-29

## 2022-12-29 PROBLEM — S91.301A OPEN WOUND OF PLANTAR ASPECT OF RIGHT FOOT: Status: ACTIVE | Noted: 2022-12-29

## 2022-12-29 LAB
ALBUMIN SERPL-MCNC: 3.2 G/DL (ref 3.5–5.2)
ALBUMIN/GLOB SERPL: 0.7 G/DL
ALP SERPL-CCNC: 127 U/L (ref 39–117)
ALT SERPL W P-5'-P-CCNC: 18 U/L (ref 1–41)
ANION GAP SERPL CALCULATED.3IONS-SCNC: 5 MMOL/L (ref 5–15)
AST SERPL-CCNC: 14 U/L (ref 1–40)
BASOPHILS # BLD AUTO: 0.05 10*3/MM3 (ref 0–0.2)
BASOPHILS NFR BLD AUTO: 0.8 % (ref 0–1.5)
BILIRUB SERPL-MCNC: 0.2 MG/DL (ref 0–1.2)
BUN SERPL-MCNC: 9 MG/DL (ref 6–20)
BUN/CREAT SERPL: 14.3 (ref 7–25)
CALCIUM SPEC-SCNC: 8.6 MG/DL (ref 8.6–10.5)
CHLORIDE SERPL-SCNC: 105 MMOL/L (ref 98–107)
CO2 SERPL-SCNC: 30 MMOL/L (ref 22–29)
CREAT SERPL-MCNC: 0.63 MG/DL (ref 0.76–1.27)
CRP SERPL-MCNC: 5.17 MG/DL (ref 0–0.5)
D-LACTATE SERPL-SCNC: 1.4 MMOL/L (ref 0.5–2)
DEPRECATED RDW RBC AUTO: 44.8 FL (ref 37–54)
EGFRCR SERPLBLD CKD-EPI 2021: 122.6 ML/MIN/1.73
EOSINOPHIL # BLD AUTO: 0.16 10*3/MM3 (ref 0–0.4)
EOSINOPHIL NFR BLD AUTO: 2.4 % (ref 0.3–6.2)
ERYTHROCYTE [DISTWIDTH] IN BLOOD BY AUTOMATED COUNT: 13.8 % (ref 12.3–15.4)
GLOBULIN UR ELPH-MCNC: 4.4 GM/DL
GLUCOSE SERPL-MCNC: 69 MG/DL (ref 65–99)
HCT VFR BLD AUTO: 35 % (ref 37.5–51)
HGB BLD-MCNC: 11.3 G/DL (ref 13–17.7)
IMM GRANULOCYTES # BLD AUTO: 0.07 10*3/MM3 (ref 0–0.05)
IMM GRANULOCYTES NFR BLD AUTO: 1.1 % (ref 0–0.5)
LYMPHOCYTES # BLD AUTO: 1.51 10*3/MM3 (ref 0.7–3.1)
LYMPHOCYTES NFR BLD AUTO: 23.1 % (ref 19.6–45.3)
MCH RBC QN AUTO: 28.5 PG (ref 26.6–33)
MCHC RBC AUTO-ENTMCNC: 32.3 G/DL (ref 31.5–35.7)
MCV RBC AUTO: 88.4 FL (ref 79–97)
MONOCYTES # BLD AUTO: 0.53 10*3/MM3 (ref 0.1–0.9)
MONOCYTES NFR BLD AUTO: 8.1 % (ref 5–12)
NEUTROPHILS NFR BLD AUTO: 4.23 10*3/MM3 (ref 1.7–7)
NEUTROPHILS NFR BLD AUTO: 64.5 % (ref 42.7–76)
NRBC BLD AUTO-RTO: 0 /100 WBC (ref 0–0.2)
PLATELET # BLD AUTO: 368 10*3/MM3 (ref 140–450)
PMV BLD AUTO: 8.6 FL (ref 6–12)
POTASSIUM SERPL-SCNC: 3.5 MMOL/L (ref 3.5–5.2)
PROT SERPL-MCNC: 7.6 G/DL (ref 6–8.5)
RBC # BLD AUTO: 3.96 10*6/MM3 (ref 4.14–5.8)
SODIUM SERPL-SCNC: 140 MMOL/L (ref 136–145)
WBC NRBC COR # BLD: 6.55 10*3/MM3 (ref 3.4–10.8)

## 2022-12-29 PROCEDURE — 87641 MR-STAPH DNA AMP PROBE: CPT | Performed by: INTERNAL MEDICINE

## 2022-12-29 PROCEDURE — 80053 COMPREHEN METABOLIC PANEL: CPT | Performed by: EMERGENCY MEDICINE

## 2022-12-29 PROCEDURE — 25010000002 PIPERACILLIN SOD-TAZOBACTAM PER 1 G: Performed by: EMERGENCY MEDICINE

## 2022-12-29 PROCEDURE — 99284 EMERGENCY DEPT VISIT MOD MDM: CPT

## 2022-12-29 PROCEDURE — 73630 X-RAY EXAM OF FOOT: CPT

## 2022-12-29 PROCEDURE — 85025 COMPLETE CBC W/AUTO DIFF WBC: CPT | Performed by: EMERGENCY MEDICINE

## 2022-12-29 PROCEDURE — 25010000002 ENOXAPARIN PER 10 MG: Performed by: INTERNAL MEDICINE

## 2022-12-29 PROCEDURE — 96372 THER/PROPH/DIAG INJ SC/IM: CPT

## 2022-12-29 PROCEDURE — 25010000002 VANCOMYCIN 5 G RECONSTITUTED SOLUTION: Performed by: EMERGENCY MEDICINE

## 2022-12-29 PROCEDURE — 83605 ASSAY OF LACTIC ACID: CPT | Performed by: EMERGENCY MEDICINE

## 2022-12-29 PROCEDURE — 36415 COLL VENOUS BLD VENIPUNCTURE: CPT

## 2022-12-29 PROCEDURE — 86140 C-REACTIVE PROTEIN: CPT | Performed by: INTERNAL MEDICINE

## 2022-12-29 PROCEDURE — 87040 BLOOD CULTURE FOR BACTERIA: CPT | Performed by: EMERGENCY MEDICINE

## 2022-12-29 RX ORDER — BACLOFEN 10 MG/1
10 TABLET ORAL 3 TIMES DAILY
Status: DISCONTINUED | OUTPATIENT
Start: 2022-12-30 | End: 2022-12-31 | Stop reason: HOSPADM

## 2022-12-29 RX ORDER — ONDANSETRON 2 MG/ML
4 INJECTION INTRAMUSCULAR; INTRAVENOUS EVERY 6 HOURS PRN
Status: DISCONTINUED | OUTPATIENT
Start: 2022-12-29 | End: 2022-12-31 | Stop reason: HOSPADM

## 2022-12-29 RX ORDER — ONDANSETRON 4 MG/1
4 TABLET, FILM COATED ORAL EVERY 6 HOURS PRN
Status: DISCONTINUED | OUTPATIENT
Start: 2022-12-29 | End: 2022-12-31 | Stop reason: HOSPADM

## 2022-12-29 RX ORDER — SODIUM CHLORIDE 0.9 % (FLUSH) 0.9 %
3 SYRINGE (ML) INJECTION EVERY 12 HOURS SCHEDULED
Status: DISCONTINUED | OUTPATIENT
Start: 2022-12-29 | End: 2022-12-31 | Stop reason: HOSPADM

## 2022-12-29 RX ORDER — FAMOTIDINE 20 MG/1
20 TABLET, FILM COATED ORAL 2 TIMES DAILY PRN
Status: DISCONTINUED | OUTPATIENT
Start: 2022-12-29 | End: 2022-12-31 | Stop reason: HOSPADM

## 2022-12-29 RX ORDER — NICOTINE 21 MG/24HR
1 PATCH, TRANSDERMAL 24 HOURS TRANSDERMAL EVERY 24 HOURS
Status: DISCONTINUED | OUTPATIENT
Start: 2022-12-30 | End: 2022-12-31 | Stop reason: HOSPADM

## 2022-12-29 RX ORDER — POTASSIUM CHLORIDE 750 MG/1
40 TABLET, FILM COATED, EXTENDED RELEASE ORAL AS NEEDED
Status: DISCONTINUED | OUTPATIENT
Start: 2022-12-29 | End: 2022-12-31 | Stop reason: HOSPADM

## 2022-12-29 RX ORDER — SODIUM CHLORIDE 9 MG/ML
40 INJECTION, SOLUTION INTRAVENOUS AS NEEDED
Status: DISCONTINUED | OUTPATIENT
Start: 2022-12-29 | End: 2022-12-31 | Stop reason: HOSPADM

## 2022-12-29 RX ORDER — SODIUM CHLORIDE 0.9 % (FLUSH) 0.9 %
3-10 SYRINGE (ML) INJECTION AS NEEDED
Status: DISCONTINUED | OUTPATIENT
Start: 2022-12-29 | End: 2022-12-31 | Stop reason: HOSPADM

## 2022-12-29 RX ORDER — BISACODYL 10 MG
10 SUPPOSITORY, RECTAL RECTAL DAILY PRN
Status: DISCONTINUED | OUTPATIENT
Start: 2022-12-29 | End: 2022-12-31 | Stop reason: HOSPADM

## 2022-12-29 RX ORDER — POLYETHYLENE GLYCOL 3350 17 G/17G
17 POWDER, FOR SOLUTION ORAL DAILY PRN
Status: DISCONTINUED | OUTPATIENT
Start: 2022-12-29 | End: 2022-12-31 | Stop reason: HOSPADM

## 2022-12-29 RX ORDER — POTASSIUM CHLORIDE 1.5 G/1.77G
40 POWDER, FOR SOLUTION ORAL AS NEEDED
Status: DISCONTINUED | OUTPATIENT
Start: 2022-12-29 | End: 2022-12-31 | Stop reason: HOSPADM

## 2022-12-29 RX ORDER — SODIUM CHLORIDE 0.9 % (FLUSH) 0.9 %
10 SYRINGE (ML) INJECTION AS NEEDED
Status: DISCONTINUED | OUTPATIENT
Start: 2022-12-29 | End: 2022-12-31 | Stop reason: HOSPADM

## 2022-12-29 RX ORDER — ACETAMINOPHEN 160 MG/5ML
650 SOLUTION ORAL EVERY 4 HOURS PRN
Status: DISCONTINUED | OUTPATIENT
Start: 2022-12-29 | End: 2022-12-31 | Stop reason: HOSPADM

## 2022-12-29 RX ORDER — ACETAMINOPHEN 325 MG/1
650 TABLET ORAL EVERY 4 HOURS PRN
Status: DISCONTINUED | OUTPATIENT
Start: 2022-12-29 | End: 2022-12-31 | Stop reason: HOSPADM

## 2022-12-29 RX ORDER — BISACODYL 5 MG/1
5 TABLET, DELAYED RELEASE ORAL DAILY PRN
Status: DISCONTINUED | OUTPATIENT
Start: 2022-12-29 | End: 2022-12-31 | Stop reason: HOSPADM

## 2022-12-29 RX ORDER — VENLAFAXINE HYDROCHLORIDE 150 MG/1
150 CAPSULE, EXTENDED RELEASE ORAL DAILY
Status: DISCONTINUED | OUTPATIENT
Start: 2022-12-30 | End: 2022-12-31 | Stop reason: HOSPADM

## 2022-12-29 RX ORDER — AMOXICILLIN 250 MG
2 CAPSULE ORAL 2 TIMES DAILY
Status: DISCONTINUED | OUTPATIENT
Start: 2022-12-29 | End: 2022-12-31 | Stop reason: HOSPADM

## 2022-12-29 RX ORDER — ENOXAPARIN SODIUM 100 MG/ML
40 INJECTION SUBCUTANEOUS DAILY
Status: DISCONTINUED | OUTPATIENT
Start: 2022-12-29 | End: 2022-12-31 | Stop reason: HOSPADM

## 2022-12-29 RX ORDER — UREA 10 %
3 LOTION (ML) TOPICAL NIGHTLY PRN
Status: DISCONTINUED | OUTPATIENT
Start: 2022-12-29 | End: 2022-12-31 | Stop reason: HOSPADM

## 2022-12-29 RX ORDER — TRAMADOL HYDROCHLORIDE 50 MG/1
50 TABLET ORAL EVERY 8 HOURS PRN
Status: DISCONTINUED | OUTPATIENT
Start: 2022-12-29 | End: 2022-12-31 | Stop reason: HOSPADM

## 2022-12-29 RX ORDER — ACETAMINOPHEN 650 MG/1
650 SUPPOSITORY RECTAL EVERY 4 HOURS PRN
Status: DISCONTINUED | OUTPATIENT
Start: 2022-12-29 | End: 2022-12-31 | Stop reason: HOSPADM

## 2022-12-29 RX ORDER — GABAPENTIN 400 MG/1
1200 CAPSULE ORAL EVERY 8 HOURS SCHEDULED
Status: DISCONTINUED | OUTPATIENT
Start: 2022-12-30 | End: 2022-12-31 | Stop reason: HOSPADM

## 2022-12-29 RX ADMIN — TAZOBACTAM SODIUM AND PIPERACILLIN SODIUM 4.5 G: 500; 4 INJECTION, SOLUTION INTRAVENOUS at 18:56

## 2022-12-29 RX ADMIN — TAZOBACTAM SODIUM AND PIPERACILLIN SODIUM 3.38 G: 375; 3 INJECTION, SOLUTION INTRAVENOUS at 23:39

## 2022-12-29 RX ADMIN — ENOXAPARIN SODIUM 40 MG: 100 INJECTION SUBCUTANEOUS at 23:38

## 2022-12-29 RX ADMIN — Medication 2000 MG: at 20:17

## 2022-12-29 RX ADMIN — Medication 3 ML: at 23:39

## 2022-12-29 RX ADMIN — Medication: at 20:18
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
## 2022-12-29 NOTE — ED NOTES
Presents for non healing and necrotic wound on LEFT foot. Patient is paralyized torso down. Swelling up left leg.

## 2022-12-30 LAB
ANION GAP SERPL CALCULATED.3IONS-SCNC: 5.1 MMOL/L (ref 5–15)
BUN SERPL-MCNC: 9 MG/DL (ref 6–20)
BUN/CREAT SERPL: 15.5 (ref 7–25)
CALCIUM SPEC-SCNC: 8.6 MG/DL (ref 8.6–10.5)
CHLORIDE SERPL-SCNC: 107 MMOL/L (ref 98–107)
CO2 SERPL-SCNC: 25.9 MMOL/L (ref 22–29)
CREAT SERPL-MCNC: 0.58 MG/DL (ref 0.76–1.27)
DEPRECATED RDW RBC AUTO: 44.4 FL (ref 37–54)
EGFRCR SERPLBLD CKD-EPI 2021: 125.7 ML/MIN/1.73
ERYTHROCYTE [DISTWIDTH] IN BLOOD BY AUTOMATED COUNT: 13.7 % (ref 12.3–15.4)
GLUCOSE SERPL-MCNC: 97 MG/DL (ref 65–99)
HCT VFR BLD AUTO: 32.1 % (ref 37.5–51)
HGB BLD-MCNC: 10.1 G/DL (ref 13–17.7)
MCH RBC QN AUTO: 27.9 PG (ref 26.6–33)
MCHC RBC AUTO-ENTMCNC: 31.5 G/DL (ref 31.5–35.7)
MCV RBC AUTO: 88.7 FL (ref 79–97)
MRSA DNA SPEC QL NAA+PROBE: NORMAL
PLATELET # BLD AUTO: 373 10*3/MM3 (ref 140–450)
PMV BLD AUTO: 8.7 FL (ref 6–12)
POTASSIUM SERPL-SCNC: 4 MMOL/L (ref 3.5–5.2)
RBC # BLD AUTO: 3.62 10*6/MM3 (ref 4.14–5.8)
SODIUM SERPL-SCNC: 138 MMOL/L (ref 136–145)
WBC NRBC COR # BLD: 6.78 10*3/MM3 (ref 3.4–10.8)

## 2022-12-30 PROCEDURE — 25010000002 PIPERACILLIN SOD-TAZOBACTAM PER 1 G: Performed by: INTERNAL MEDICINE

## 2022-12-30 PROCEDURE — 25010000002 ENOXAPARIN PER 10 MG: Performed by: INTERNAL MEDICINE

## 2022-12-30 PROCEDURE — 96372 THER/PROPH/DIAG INJ SC/IM: CPT

## 2022-12-30 PROCEDURE — 85027 COMPLETE CBC AUTOMATED: CPT | Performed by: INTERNAL MEDICINE

## 2022-12-30 PROCEDURE — 25010000002 CEFAZOLIN IN DEXTROSE 2-4 GM/100ML-% SOLUTION: Performed by: HOSPITALIST

## 2022-12-30 PROCEDURE — 80048 BASIC METABOLIC PNL TOTAL CA: CPT | Performed by: INTERNAL MEDICINE

## 2022-12-30 PROCEDURE — 25010000002 VANCOMYCIN 10 G RECONSTITUTED SOLUTION: Performed by: INTERNAL MEDICINE

## 2022-12-30 RX ORDER — BUSPIRONE HYDROCHLORIDE 15 MG/1
30 TABLET ORAL
COMMUNITY

## 2022-12-30 RX ORDER — CEFAZOLIN SODIUM 2 G/100ML
2 INJECTION, SOLUTION INTRAVENOUS EVERY 8 HOURS
Status: DISCONTINUED | OUTPATIENT
Start: 2022-12-30 | End: 2022-12-31 | Stop reason: HOSPADM

## 2022-12-30 RX ORDER — ARIPIPRAZOLE 5 MG/1
5 TABLET ORAL DAILY
COMMUNITY

## 2022-12-30 RX ADMIN — VANCOMYCIN HYDROCHLORIDE 1250 MG: 10 INJECTION, POWDER, LYOPHILIZED, FOR SOLUTION INTRAVENOUS at 07:45

## 2022-12-30 RX ADMIN — GABAPENTIN 1200 MG: 400 CAPSULE ORAL at 14:05

## 2022-12-30 RX ADMIN — VENLAFAXINE HYDROCHLORIDE 150 MG: 150 CAPSULE, EXTENDED RELEASE ORAL at 08:03

## 2022-12-30 RX ADMIN — Medication 3 ML: at 08:03

## 2022-12-30 RX ADMIN — CEFAZOLIN SODIUM 2 G: 2 INJECTION, SOLUTION INTRAVENOUS at 16:27

## 2022-12-30 RX ADMIN — BACLOFEN 10 MG: 10 TABLET ORAL at 21:09

## 2022-12-30 RX ADMIN — ENOXAPARIN SODIUM 40 MG: 100 INJECTION SUBCUTANEOUS at 08:02

## 2022-12-30 RX ADMIN — AMITRIPTYLINE HYDROCHLORIDE 75 MG: 50 TABLET, FILM COATED ORAL at 21:09

## 2022-12-30 RX ADMIN — TAZOBACTAM SODIUM AND PIPERACILLIN SODIUM 3.38 G: 375; 3 INJECTION, SOLUTION INTRAVENOUS at 07:46

## 2022-12-30 RX ADMIN — BACLOFEN 10 MG: 10 TABLET ORAL at 08:03

## 2022-12-30 RX ADMIN — GABAPENTIN 1200 MG: 400 CAPSULE ORAL at 05:59

## 2022-12-30 RX ADMIN — AMITRIPTYLINE HYDROCHLORIDE 75 MG: 50 TABLET, FILM COATED ORAL at 08:03

## 2022-12-30 RX ADMIN — GABAPENTIN 1200 MG: 400 CAPSULE ORAL at 21:09

## 2022-12-30 RX ADMIN — Medication 3 ML: at 21:10

## 2022-12-30 RX ADMIN — BACLOFEN 10 MG: 10 TABLET ORAL at 15:05

## 2022-12-30 RX ADMIN — Medication 1 PATCH: at 08:01
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
## 2022-12-30 NOTE — PROGRESS NOTES
[facility] Clinical Pharmacy Services: Vancomycin Pharmacokinetic Initial Consult Note    Pk May is a 42 y.o. male who is on day 1 of pharmacy to dose vancomycin.    Indication: Skin and Soft Tissue  Consulting Provider: Dr. Perez   Planned Duration of Therapy: 7 days  Loading Dose Ordered or Given: 2000 mg on 12/29 at 2017  MRSA PCR performed: no; Result:   Culture/Source: SSTI  Target: -600 mg/L.hr   Other Antimicrobials: Zoyn     Vitals/Labs  Ht: 177.8 cm (70\"); Wt: 94.3 kg (208 lb)  Temp Readings from Last 1 Encounters:   12/29/22 97 °F (36.1 °C) (Oral)    Estimated Creatinine Clearance: 177.9 mL/min (A) (by C-G formula based on SCr of 0.63 mg/dL (L)).        Results from last 7 days   Lab Units 12/29/22  1738   CREATININE mg/dL 0.63*   WBC 10*3/mm3 6.55     Assessment/Plan:    Vancomycin Dose:   1250 mg IV every  12  hours  Predictive AUC level for the dose ordered is 448 mg/L.hr, which is within the target of 400-600 mg/L.hr  Vanc Trough has been ordered for 12/31 at 0730     Pharmacy will follow patient's kidney function and will adjust doses and obtain levels as necessary. Thank you for involving pharmacy in this patient's care. Please contact pharmacy with any questions or concerns.                           Lidia Mike [facility]  Clinical Pharmacist

## 2022-12-30 NOTE — NURSING NOTE
12/30/22 1040   Wound 12/29/22 1729 Left anterior ankle Pressure Injury   Placement Date/Time: 12/29/22 1729   Present on Hospital Admission: Yes  Side: Left  Orientation: anterior  Location: ankle  Primary Wound Type: Pressure Injury   Pressure Injury Stage 3   Base moist;pink;yellow  (wound was debrided of eschar by vascular prior to my arrival)   Yellow (%), Wound Tissue Color 10   Periwound dry;pink;intact   Periwound Temperature warm   Wound Length (cm) 3 cm   Wound Width (cm) 6 cm   Wound Depth (cm) 0.2 cm   Wound Surface Area (cm^2) 18 cm^2   Wound Volume (cm^3) 3.6 cm^3   Drainage Characteristics/Odor serosanguineous;yellow   Drainage Amount scant   Care, Wound cleansed with;sterile normal saline;antimicrobial agent applied   Dressing Care dressing changed  (betadine wet to moist dressing placed, until Silvasorb gel can be obtained from distribution.)   Wound 12/29/22 1731 Left posterior heel Blisters   Placement Date/Time: 12/29/22 1731   Present on Hospital Admission: Yes  Side: Left  Orientation: posterior  Location: heel  Primary Wound Type: Blisters   Pressure Injury Stage 3   Base moist;pink;yellow;slough   Periwound intact;dry   Periwound Temperature warm   Wound Length (cm) 1 cm   Wound Width (cm) 2 cm   Wound Surface Area (cm^2) 2 cm^2   Drainage Characteristics/Odor serosanguineous;yellow   Drainage Amount small   Care, Wound cleansed with;sterile normal saline;antimicrobial agent applied   Dressing Care dressing changed  (betadine wet to moist dressing placed, until Silvasorb gel can be obtained from distribution.)   Wound 12/29/22 2020 Right posterior plantar Blisters   Placement Date/Time: 12/29/22 2020   Side: Right  Orientation: posterior  Location: plantar  Primary Wound Type: Blisters   Base moist;yellow   Periwound intact;dry  (callous)   Periwound Temperature warm   Periwound Skin Turgor soft   Drainage Amount none   Care, Wound cleansed with;sterile normal saline;antimicrobial agent  applied   Dressing Care dressing changed  (betadine wet to moist dressing placed, until Silvasorb gel can be obtained from distribution.)   Periwound Care dry periwound area maintained   Skin Interventions   Pressure Reduction Devices specialty bed utilized   Pressure Reduction Techniques heels elevated off bed;positioned off wounds;pressure points protected     CWON note: pt seen for evaluation of foot wounds POA. Pt with Hx of paraplegia, W/C bound, very active and engaged in care. He is independent in management of his colostomy and urostomy and brought his home supplies. He was seen by Vascular surgery prior to my arrival for debridement of left anterior ankle/ foot wound. He would like to follow up with Dr Davis at [facility] after D/C. Wound assessment as stated above. Will begin Silvasorb gel daily to his wounds. I have discussed with RN and she will obtain from distribution.     Pt is on a low air loss mattress from [employer] for adequate pressure redistribution, his sacral skin is without erythema or breakdown. Wound care and prevention standing orders have been placed into [facility]. Please re-consult for any additional needs.

## 2022-12-30 NOTE — PROGRESS NOTES
Name: Pk May ADMIT: 2022   : 1980  PCP: Chasity Ruggiero APRN    MRN: [id number] LOS: 1 days   AGE/SEX: 42 y.o. male  ROOM: hospitals     Subjective   Subjective   Feels okay.  He cannot feel his foot but says his leg is looking better    Review of Systems     Objective   Objective   Vital Signs  Temp:  [97 °F (36.1 °C)-97.7 °F (36.5 °C)] 97.6 °F (36.4 °C)  Heart Rate:  [] 90  Resp:  [18] 18  BP: (118-145)/(61-80) 118/72  SpO2:  [95 %-100 %] 98 %  on   ;      Body mass index is 29.84 kg/m².  Physical Exam  Vitals reviewed.   Constitutional:       General: He is not in acute distress.     Appearance: He is obese.   HENT:      Head: Normocephalic and atraumatic.   Eyes:      General: No scleral icterus.  Cardiovascular:      Rate and Rhythm: Normal rate and regular rhythm.      Heart sounds: No murmur heard.  Pulmonary:      Effort: No respiratory distress.      Breath sounds: Normal breath sounds.   Abdominal:      Palpations: Abdomen is soft.      Tenderness: There is no abdominal tenderness.   Skin:     General: Skin is warm.      Comments: Erythema left inner thigh and left anterior shin.  Wounds of his foot are dressed.  Dry flaky skin otherwise   Neurological:      Mental Status: He is alert and oriented to person, place, and time.   Psychiatric:         Mood and Affect: Mood normal.       Results Review     I reviewed the patient's new clinical results.  Results from last 7 days   Lab Units 22  0937 22  1738   WBC 10*3/mm3 6.78 6.55   HEMOGLOBIN g/dL 10.1* 11.3*   PLATELETS 10*3/mm3 373 368     Results from last 7 days   Lab Units 22  0937 22  1738   SODIUM mmol/L 138 140   POTASSIUM mmol/L 4.0 3.5   CHLORIDE mmol/L 107 105   CO2 mmol/L 25.9 30.0*   BUN mg/dL 9 9   CREATININE mg/dL 0.58* 0.63*   GLUCOSE mg/dL 97 69   EGFR mL/min/1.73 125.7 122.6     Results from last 7 days   Lab Units 22  1738   ALBUMIN g/dL 3.2*   BILIRUBIN mg/dL 0.2   ALK PHOS U/L  127*   AST (SGOT) U/L 14   ALT (SGPT) U/L 18     Results from last 7 days   Lab Units 12/30/22  0937 12/29/22  1738   CALCIUM mg/dL 8.6 8.6   ALBUMIN g/dL  --  3.2*     Results from last 7 days   Lab Units 12/29/22  1738   LACTATE mmol/L 1.4     No results found for: HGBA1C, POCGLU    No radiology results for the last day  Scheduled Medications  amitriptyline, 75 mg, Oral, BID  baclofen, 10 mg, Oral, TID  ceFAZolin, 2 g, Intravenous, Q8H  enoxaparin, 40 mg, Subcutaneous, Daily  gabapentin, 1,200 mg, Oral, Q8H  nicotine, 1 patch, Transdermal, Q24H  senna-docusate sodium, 2 tablet, Oral, BID  sodium chloride, 3 mL, Intravenous, Q12H  venlafaxine XR, 150 mg, Oral, Daily    Infusions  Pharmacy to dose vancomycin,     Diet  Diet: Regular/House Diet; Texture: Regular Texture (IDDSI 7); Fluid Consistency: Thin (IDDSI 0)       Assessment/Plan     Active Hospital Problems    Diagnosis  POA   • **Cellulitis of left lower extremity [L03.116]  Yes   • History of urostomy [Z98.890]  Not Applicable   • Colostomy in place ([facility]) [Z93.3]  Not Applicable   • Open wound of left dorsal foot, reportedly related to home compression stockings [S91.302A]  Yes   • Open wound of plantar aspect of right foot [S91.301A]  Yes   • Open wound of left heel [S91.302A]  Yes   • Tobacco use [Z72.0]  Yes   • Posttraumatic stress disorder [F43.10]  Yes   • Neurogenic bowel [K59.2]  Yes   • Neurogenic bladder [N31.9]  Yes   • Paraplegia ([facility]) [G82.20]  Yes      Resolved Hospital Problems   No resolved problems to display.       42 y.o. male with history of spinal cord injury/paraplegia admitted with Cellulitis of left lower extremity and small pressure wound left foot status post bedside debridement    Cellulitis apparently improving though still persistent erythema throughout most of the left leg though looks to be fading and swelling going down.  - Continue antibiotics but will transition to cefazolin since MRSA PCR negative.  Not sure there is any  indication for gram-negative coverage  - Wound care as per vascular recs  - Follow-up cultures    Continue chronic management for his paraplegia/colostomy/urostomy etc.    · Lovenox 40 mg SC daily for DVT prophylaxis.  · Disposition: Potentially home over the weekend if cellulitis improved    Andrea Valdez MD  [facility]  12/30/22  15:56 EST

## 2022-12-30 NOTE — SIGNIFICANT NOTE
12/30/22 1330   OTHER   Discipline physical therapist   Rehab Time/Intention   Session Not Performed other (see comments)  (Pt with history of T7 SCI. He completes transfers to his manual WC using a slide board ind at baseline. Pt states he is at his baseline, is mod (I) for all mobility and is without need for skilled PT in acute care setting. PT to sign off at this time.)   Therapy Assessment/Plan (PT)   Criteria for Skilled Interventions Met (PT) no problems identified which require skilled intervention
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
## 2022-12-30 NOTE — SIGNIFICANT NOTE
12/30/22 3835   OTHER   Discipline occupational therapist   Rehab Time/Intention   Session Not Performed other (see comments)  (The pt reports he is independent with ADL's and slide transfers. Declines the need for OT/PT in this hospital setting. Education provded at bedside for pressure relief/skin integrity.)
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
## 2022-12-30 NOTE — NURSING NOTE
Dr Perez at bedside. Rn added photos of x3 wounds to medical record. All 3 painted w iodine and covered w nonadherent bandages, then wrapped in kerlix      Nursing report ED to floor  Pk May  42 y.o.  male    HPI :   Chief Complaint   Patient presents with   • Wound Check     LEFT FOOT       Admitting doctor:   Irvin Perez MD    Admitting diagnosis:   The primary encounter diagnosis was Cellulitis of left lower extremity. A diagnosis of Paraplegia (HCC) was also pertinent to this visit.    Code status:   Current Code Status     Date Active Code Status Order ID Comments User Context       Not on file          Allergies:   Codeine    Isolation:   No active isolations    Intake and Output  No intake or output data in the 24 hours ending 12/29/22 1944    Weight:       12/29/22 1751   Weight: 95.3 kg (210 lb)       Most recent vitals:   Vitals:    12/29/22 1357 12/29/22 1751   BP: 145/80    Pulse: 90    Resp: 16    Temp: 97.5 °F (36.4 °C)    SpO2: 100%    Weight:  95.3 kg (210 lb)   Height:  157.5 cm (62\")       Active LDAs/IV Access:   Lines, Drains & Airways     Active LDAs     Name Placement date Placement time Site Days    Peripheral IV 12/29/22 1750 Right Antecubital 12/29/22 1750  Antecubital  less than 1                Labs (abnormal labs have a star):   Labs Reviewed   COMPREHENSIVE METABOLIC PANEL - Abnormal; Notable for the following components:       Result Value    Creatinine 0.63 (*)     CO2 30.0 (*)     Albumin 3.2 (*)     Alkaline Phosphatase 127 (*)     All other components within normal limits    Narrative:     GFR Normal >60  Chronic Kidney Disease <60  Kidney Failure <15     CBC WITH AUTO DIFFERENTIAL - Abnormal; Notable for the following components:    RBC 3.96 (*)     Hemoglobin 11.3 (*)     Hematocrit 35.0 (*)     Immature Grans % 1.1 (*)     Immature Grans, Absolute 0.07 (*)     All other components within normal limits   LACTIC ACID, PLASMA - Normal   BLOOD CULTURE   BLOOD  CULTURE   CBC AND DIFFERENTIAL    Narrative:     The following orders were created for panel order CBC & Differential.  Procedure                               Abnormality         Status                     ---------                               -----------         ------                     CBC Auto Differential[[id number]]        Abnormal            Final result                 Please view results for these tests on the individual orders.       EKG:   No orders to display       Meds given in ED:   Medications   sodium chloride 0.9 % flush 10 mL (has no administration in time range)   Pharmacy to dose vancomycin (has no administration in time range)   vancomycin 2000 mg/500 mL 0.9% NS IVPB ([facility]) (has no administration in time range)   piperacillin-tazobactam (ZOSYN) 4.5 g in iso-osmotic dextrose 100 mL IVPB (premix) (4.5 g Intravenous New Bag 12/29/22 7233)       Imaging results:  No radiology results for the last day    Ambulatory status:   - w/c, paralysis from waist down    Social issues:   Social History     Socioeconomic History   • Marital status: Significant Other   Tobacco Use   • Smoking status: Every Day     Packs/day: 1.00     Years: 24.00     Pack years: 24.00     Types: Cigarettes   Substance and Sexual Activity   • Alcohol use: Not Currently   • Drug use: Defer       NIH Stroke Scale:         Tony Farah RN  12/29/22 19:44 EST
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
## 2022-12-30 NOTE — CASE MANAGEMENT/SOCIAL WORK
Discharge Planning Assessment  [facility]     Patient Name: Pk May  MRN: [id number]  Today's Date: 12/30/2022    Admit Date: 12/29/2022    Plan: Return home with family   Discharge Needs Assessment     Row Name 12/30/22 1251       Living Environment    People in Home significant other    Current Living Arrangements apartment    Primary Care Provided by self    Provides Primary Care For no one    Family Caregiver if Needed significant other    Family Caregiver Names Carl Santana [phone]    Able to Return to Prior Arrangements yes       Transition Planning    Patient/Family Anticipates Transition to home with family    Patient/Family Anticipated Services at Transition none    Transportation Anticipated health plan transportation       Discharge Needs Assessment    Readmission Within the Last 30 Days no previous admission in last 30 days    Equipment Currently Used at Home wheelchair;other (see comments)  Ostomy supplies    Concerns to be Addressed discharge planning    Anticipated Changes Related to Illness none    Equipment Needed After Discharge none               Discharge Plan     Row Name 12/30/22 1257       Plan    Plan Return home with family    Patient/Family in Agreement with Plan yes    Plan Comments Met with patient at bedside, introduced self and explained CCP role. Verified facesheet and PCP- Chasity Ruggiero. Patient stated he lives at an extended stay hotel with his S.O. and her son. Patient stated he was Ind with ADLs including care of ostomies. Patient has used HH but does not know which company in the past and denies h/o SNF. Patient has a WC and ostomy supplies through [facility]. Patient uses [facility] [address] and reports no difficulty affording medications. Paitent [place] need [place] van at [place]. CCP to follow. Terry BOSTON RN              Continued Care and Services - Admitted Since 12/29/2022    Coordination has not been started for this encounter.          Demographic Summary      Row Name 12/30/22 1251       General Information    Admission Type inpatient    Reason for Consult discharge planning    Preferred Language English               Functional Status     Row Name 12/30/22 1251       Functional Status    Usual Activity Tolerance moderate    Current Activity Tolerance moderate       Functional Status, IADL    Medications independent    Meal Preparation independent    Housekeeping independent    Laundry independent    Shopping independent       Mental Status    General Appearance WDL WDL               Psychosocial    No documentation.                Abuse/Neglect    No documentation.                Legal    No documentation.                Substance Abuse    No documentation.                Patient Forms    No documentation.                   Terry Euceda RN

## 2022-12-31 ENCOUNTER — READMISSION MANAGEMENT (OUTPATIENT)
Dept: CALL CENTER | Facility: HOSPITAL | Age: 42
End: 2022-12-31

## 2022-12-31 VITALS
HEIGHT: 70 IN | HEART RATE: 85 BPM | WEIGHT: 208 LBS | DIASTOLIC BLOOD PRESSURE: 74 MMHG | OXYGEN SATURATION: 99 % | BODY MASS INDEX: 29.78 KG/M2 | RESPIRATION RATE: 16 BRPM | SYSTOLIC BLOOD PRESSURE: 111 MMHG | TEMPERATURE: 97 F

## 2022-12-31 PROCEDURE — 25010000002 ENOXAPARIN PER 10 MG: Performed by: INTERNAL MEDICINE

## 2022-12-31 PROCEDURE — 96365 THER/PROPH/DIAG IV INF INIT: CPT

## 2022-12-31 PROCEDURE — G0378 HOSPITAL OBSERVATION PER HR: HCPCS

## 2022-12-31 PROCEDURE — 96372 THER/PROPH/DIAG INJ SC/IM: CPT

## 2022-12-31 PROCEDURE — 25010000002 CEFAZOLIN IN DEXTROSE 2-4 GM/100ML-% SOLUTION: Performed by: HOSPITALIST

## 2022-12-31 RX ORDER — CEPHALEXIN 500 MG/1
500 CAPSULE ORAL 4 TIMES DAILY
Qty: 28 CAPSULE | Refills: 0 | Status: SHIPPED | OUTPATIENT
Start: 2022-12-31 | End: 2023-01-07

## 2022-12-31 RX ORDER — BUSPIRONE HYDROCHLORIDE 15 MG/1
15 TABLET ORAL
Status: DISCONTINUED | OUTPATIENT
Start: 2022-12-31 | End: 2022-12-31 | Stop reason: HOSPADM

## 2022-12-31 RX ORDER — ARIPIPRAZOLE 5 MG/1
5 TABLET ORAL DAILY
Status: DISCONTINUED | OUTPATIENT
Start: 2022-12-31 | End: 2022-12-31 | Stop reason: HOSPADM

## 2022-12-31 RX ADMIN — Medication 3 ML: at 09:17

## 2022-12-31 RX ADMIN — ENOXAPARIN SODIUM 40 MG: 100 INJECTION SUBCUTANEOUS at 09:18

## 2022-12-31 RX ADMIN — BACLOFEN 10 MG: 10 TABLET ORAL at 09:16

## 2022-12-31 RX ADMIN — CEFAZOLIN SODIUM 2 G: 2 INJECTION, SOLUTION INTRAVENOUS at 09:19

## 2022-12-31 RX ADMIN — AMITRIPTYLINE HYDROCHLORIDE 75 MG: 50 TABLET, FILM COATED ORAL at 09:15

## 2022-12-31 RX ADMIN — Medication 1 PATCH: at 09:17

## 2022-12-31 RX ADMIN — ARIPIPRAZOLE 5 MG: 5 TABLET ORAL at 10:06

## 2022-12-31 RX ADMIN — BUSPIRONE HYDROCHLORIDE 15 MG: 15 TABLET ORAL at 10:06

## 2022-12-31 RX ADMIN — GABAPENTIN 1200 MG: 400 CAPSULE ORAL at 05:53

## 2022-12-31 RX ADMIN — VENLAFAXINE HYDROCHLORIDE 150 MG: 150 CAPSULE, EXTENDED RELEASE ORAL at 09:15

## 2022-12-31 RX ADMIN — CEFAZOLIN SODIUM 2 G: 2 INJECTION, SOLUTION INTRAVENOUS at 01:13

## 2022-12-31 NOTE — PROGRESS NOTES
Continued Stay Note  [facility]     Patient Name: Pk May  MRN: [id number]  Today's Date: 12/31/2022    Admit Date: 12/29/2022    Plan: Pt. will return to [facility] @ [address] [facility] [address], [place] [address]. [employer] special transport ride requested, dispatcher will call nursing unit back with pick-up time.......Charisse RN   Discharge Plan     Row Name 12/31/22 1134       Plan    Plan Pt. will return to [facility] @ [address] [place] [address]. [employer] special transport ride requested, dispatcher will call nursing unit back with pick-up time.......Charisse ROBERTSON    Patient/Family in Agreement with Plan yes    Plan Comments Call from RN requesting transportation for patient to home. S/W Patient, agreeable to [employer] special transport and has his personal standard WC at [facility]sd. S/W Fatimah/[employer] Transport, will call nurse's station back with time for . Update to RN. Voucher to floor.............Charisse ROBERTSON               Discharge Codes    No documentation.               Expected Discharge Date and Time     Expected Discharge Date Expected Discharge Time    Dec 31, 2022             Chasity Venegas, MARCELO

## 2022-12-31 NOTE — OUTREACH NOTE
Prep Survey    Flowsheet Row Responses   [facility] patient discharged from? [place]   Is LACE score < 7 ? No   Eligibility [facility]   Date of Admission 12/29/22   Date of Discharge 12/31/22   Discharge Disposition Home or Self Care   Discharge diagnosis Cellulitis of left lower extremity   Does the patient have one of the following disease processes/diagnoses(primary or secondary)? Other   Does the patient have Home health ordered? No   Is there a DME ordered? No   General alerts for this patient [facility]    Prep survey completed? Yes          DARLIN EWING - Registered Nurse
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
## 2022-12-31 NOTE — PLAN OF CARE
Goal Outcome Evaluation:  Plan of Care Reviewed With: patient        Progress: no change  Outcome Evaluation: VSS, RA, A&O. ABX given. Dressings in place, heel boots worn.

## 2023-01-02 ENCOUNTER — TRANSITIONAL CARE MANAGEMENT TELEPHONE ENCOUNTER (OUTPATIENT)
Dept: CALL CENTER | Facility: HOSPITAL | Age: 43
End: 2023-01-02
Payer: MEDICARE

## 2023-01-02 NOTE — OUTREACH NOTE
Call Center TCM Note    Flowsheet Row Responses   [facility] patient discharged from? [place]   Does the patient have one of the following disease processes/diagnoses(primary or secondary)? Other   TCM attempt successful? No   Unsuccessful attempts Attempt 2          Mariah Snow RN    1/2/2023, 16:48 EST

## 2023-01-02 NOTE — CASE MANAGEMENT/SOCIAL WORK
Case Management Discharge Note      Final Note: DC'd home. Terry BOSTON RN         Selected Continued Care - Discharged on 12/31/2022 Admission date: 12/29/2022 - Discharge disposition: Home or Self Care    Destination    No services have been selected for the patient.              Durable Medical Equipment    No services have been selected for the patient.              Dialysis/Infusion    No services have been selected for the patient.              Home Medical Care    No services have been selected for the patient.              Therapy    No services have been selected for the patient.              Community Resources    No services have been selected for the patient.              Community & DME    No services have been selected for the patient.                  Transportation Services  Private: Car    Final Discharge Disposition Code: 01 - home or self-care

## 2023-01-02 NOTE — OUTREACH NOTE
Call Center TCM Note    Flowsheet Row Responses   [facility] patient discharged from? [place]   Does the patient have one of the following disease processes/diagnoses(primary or secondary)? Other   TCM attempt successful? No   Unsuccessful attempts Attempt 1          Tracy Russell MA    1/2/2023, 14:35 EST

## 2023-01-03 ENCOUNTER — TRANSITIONAL CARE MANAGEMENT TELEPHONE ENCOUNTER (OUTPATIENT)
Dept: CALL CENTER | Facility: HOSPITAL | Age: 43
End: 2023-01-03
Payer: MEDICARE

## 2023-01-03 LAB
BACTERIA SPEC AEROBE CULT: NORMAL
BACTERIA SPEC AEROBE CULT: NORMAL

## 2023-01-03 RX ORDER — GABAPENTIN 600 MG/1
1200 TABLET ORAL 3 TIMES DAILY
Status: CANCELLED | OUTPATIENT
Start: 2023-01-03

## 2023-01-03 NOTE — OUTREACH NOTE
Call Center TCM Note    Flowsheet Row Responses   [facility] [place] patient discharged from? [place]   Does the patient have one of the following disease processes/diagnoses(primary or secondary)? Other   TCM attempt successful? No   Unsuccessful attempts Attempt 3          Nilsa Reece RN    1/3/2023, 14:58 EST

## 2023-01-03 NOTE — TELEPHONE ENCOUNTER
Caller: JAJA MOTTA    Relationship: Emergency Contact    Best call back number: [phone]    Requested Prescriptions:   Requested Prescriptions     Pending Prescriptions Disp Refills   • gabapentin (NEURONTIN) 600 MG tablet       Sig: Take 2 tablets by mouth 3 (Three) Times a Day.        Pharmacy where request should be sent: [id number] [place], [place] - [address] [facility] - [phone]  - [phone] FX     Additional details provided by patient: PATIENTS SPOUSE STATES THE PATIENT HAS 2-3 DAYS LEFT ON HIS CURRENT PRESCRIPTION.     Does the patient have less than a 3 day supply:  [x] Yes  [] No    Would you like a call back once the refill request has been completed: [x] Yes [] No    If the office needs to give you a call back, can they leave a voicemail: [x] Yes [] No    Leatha Gaming, PCT   01/03/23 14:13 EST

## 2023-01-04 DIAGNOSIS — G89.4 CHRONIC PAIN SYNDROME: Primary | ICD-10-CM

## 2023-01-04 RX ORDER — BACLOFEN 10 MG/1
20 TABLET ORAL
COMMUNITY
Start: 2022-11-25 | End: 2023-01-23 | Stop reason: SDUPTHER

## 2023-01-04 RX ORDER — BUSPIRONE HYDROCHLORIDE 10 MG/1
10 TABLET ORAL 2 TIMES DAILY
COMMUNITY
Start: 2022-11-25 | End: 2023-03-06

## 2023-01-04 RX ORDER — GABAPENTIN 600 MG/1
1200 TABLET ORAL 3 TIMES DAILY
Qty: 180 TABLET | Refills: 3 | Status: SHIPPED | OUTPATIENT
Start: 2023-01-04 | End: 2023-03-06

## 2023-01-04 RX ORDER — ARIPIPRAZOLE 5 MG/1
5 TABLET ORAL
COMMUNITY
Start: 2022-11-25

## 2023-01-04 RX ORDER — AMITRIPTYLINE HYDROCHLORIDE 75 MG/1
TABLET, FILM COATED ORAL
COMMUNITY
Start: 2022-11-18 | End: 2023-03-06

## 2023-01-23 RX ORDER — BACLOFEN 10 MG/1
TABLET ORAL
Qty: 90 TABLET | Refills: 1 | Status: SHIPPED | OUTPATIENT
Start: 2023-01-23

## 2023-01-24 ENCOUNTER — OFFICE VISIT (OUTPATIENT)
Dept: WOUND CARE | Facility: HOSPITAL | Age: 43
End: 2023-01-24
Payer: MEDICARE

## 2023-01-24 ENCOUNTER — TRANSCRIBE ORDERS (OUTPATIENT)
Dept: HOME HEALTH SERVICES | Facility: HOME HEALTHCARE | Age: 43
End: 2023-01-24
Payer: MEDICARE

## 2023-01-24 ENCOUNTER — HOME HEALTH ADMISSION (OUTPATIENT)
Dept: HOME HEALTH SERVICES | Facility: HOME HEALTHCARE | Age: 43
End: 2023-01-24
Payer: MEDICARE

## 2023-01-24 DIAGNOSIS — L97.522 ULCER OF LEFT FOOT, WITH FAT LAYER EXPOSED: Primary | ICD-10-CM

## 2023-01-24 PROCEDURE — 97602 WOUND(S) CARE NON-SELECTIVE: CPT

## 2023-01-24 PROCEDURE — G0463 HOSPITAL OUTPT CLINIC VISIT: HCPCS

## 2023-01-24 RX ORDER — SULFAMETHOXAZOLE AND TRIMETHOPRIM 800; 160 MG/1; MG/1
1 TABLET ORAL 2 TIMES DAILY
Qty: 20 TABLET | Refills: 0 | Status: SHIPPED | OUTPATIENT
Start: 2023-01-24 | End: 2023-02-03

## 2023-02-06 ENCOUNTER — OFFICE VISIT (OUTPATIENT)
Dept: WOUND CARE | Facility: HOSPITAL | Age: 43
End: 2023-02-06
Payer: MEDICARE

## 2023-02-06 PROCEDURE — G0463 HOSPITAL OUTPT CLINIC VISIT: HCPCS

## 2023-03-06 ENCOUNTER — APPOINTMENT (OUTPATIENT)
Dept: GENERAL RADIOLOGY | Facility: HOSPITAL | Age: 43
End: 2023-03-06
Payer: MEDICARE

## 2023-03-06 ENCOUNTER — HOSPITAL ENCOUNTER (OUTPATIENT)
Facility: HOSPITAL | Age: 43
Setting detail: OBSERVATION
Discharge: HOME | End: 2023-03-09
Attending: EMERGENCY MEDICINE | Admitting: INTERNAL MEDICINE
Payer: MEDICARE

## 2023-03-06 ENCOUNTER — OFFICE VISIT (OUTPATIENT)
Dept: WOUND CARE | Facility: HOSPITAL | Age: 43
End: 2023-03-06
Payer: MEDICARE

## 2023-03-06 DIAGNOSIS — A41.9 SEPSIS WITHOUT ACUTE ORGAN DYSFUNCTION, DUE TO UNSPECIFIED ORGANISM: ICD-10-CM

## 2023-03-06 DIAGNOSIS — L03.116 CELLULITIS OF LEFT FOOT: Primary | ICD-10-CM

## 2023-03-06 DIAGNOSIS — E87.20 LACTIC ACIDOSIS: ICD-10-CM

## 2023-03-06 LAB
ALBUMIN SERPL-MCNC: 4 G/DL (ref 3.5–5.2)
ALBUMIN/GLOB SERPL: 0.9 G/DL
ALP SERPL-CCNC: 113 U/L (ref 39–117)
ALT SERPL W P-5'-P-CCNC: 21 U/L (ref 1–41)
ANION GAP SERPL CALCULATED.3IONS-SCNC: 9 MMOL/L (ref 5–15)
AST SERPL-CCNC: 26 U/L (ref 1–40)
BASOPHILS # BLD AUTO: 0.03 10*3/MM3 (ref 0–0.2)
BASOPHILS NFR BLD AUTO: 0.5 % (ref 0–1.5)
BILIRUB SERPL-MCNC: 0.3 MG/DL (ref 0–1.2)
BUN SERPL-MCNC: 11 MG/DL (ref 6–20)
BUN/CREAT SERPL: 15.7 (ref 7–25)
CALCIUM SPEC-SCNC: 9.1 MG/DL (ref 8.6–10.5)
CHLORIDE SERPL-SCNC: 106 MMOL/L (ref 98–107)
CO2 SERPL-SCNC: 25 MMOL/L (ref 22–29)
CREAT SERPL-MCNC: 0.7 MG/DL (ref 0.76–1.27)
CRP SERPL-MCNC: 0.41 MG/DL (ref 0–0.5)
D-LACTATE SERPL-SCNC: 2 MMOL/L (ref 0.5–2)
D-LACTATE SERPL-SCNC: 2.9 MMOL/L (ref 0.5–2)
DEPRECATED RDW RBC AUTO: 45.9 FL (ref 37–54)
EGFRCR SERPLBLD CKD-EPI 2021: 118.7 ML/MIN/1.73
EOSINOPHIL # BLD AUTO: 0.13 10*3/MM3 (ref 0–0.4)
EOSINOPHIL NFR BLD AUTO: 2 % (ref 0.3–6.2)
ERYTHROCYTE [DISTWIDTH] IN BLOOD BY AUTOMATED COUNT: 14.2 % (ref 12.3–15.4)
ERYTHROCYTE [SEDIMENTATION RATE] IN BLOOD: 45 MM/HR (ref 0–15)
GLOBULIN UR ELPH-MCNC: 4.4 GM/DL
GLUCOSE SERPL-MCNC: 107 MG/DL (ref 65–99)
HCT VFR BLD AUTO: 38.6 % (ref 37.5–51)
HGB BLD-MCNC: 13.2 G/DL (ref 13–17.7)
HOLD SPECIMEN: NORMAL
IMM GRANULOCYTES # BLD AUTO: 0.02 10*3/MM3 (ref 0–0.05)
IMM GRANULOCYTES NFR BLD AUTO: 0.3 % (ref 0–0.5)
LYMPHOCYTES # BLD AUTO: 1.95 10*3/MM3 (ref 0.7–3.1)
LYMPHOCYTES NFR BLD AUTO: 29.5 % (ref 19.6–45.3)
MCH RBC QN AUTO: 30.1 PG (ref 26.6–33)
MCHC RBC AUTO-ENTMCNC: 34.2 G/DL (ref 31.5–35.7)
MCV RBC AUTO: 87.9 FL (ref 79–97)
MONOCYTES # BLD AUTO: 0.39 10*3/MM3 (ref 0.1–0.9)
MONOCYTES NFR BLD AUTO: 5.9 % (ref 5–12)
NEUTROPHILS NFR BLD AUTO: 4.09 10*3/MM3 (ref 1.7–7)
NEUTROPHILS NFR BLD AUTO: 61.8 % (ref 42.7–76)
NRBC BLD AUTO-RTO: 0 /100 WBC (ref 0–0.2)
PLATELET # BLD AUTO: 266 10*3/MM3 (ref 140–450)
PMV BLD AUTO: 8.7 FL (ref 6–12)
POTASSIUM SERPL-SCNC: 3.8 MMOL/L (ref 3.5–5.2)
PROT SERPL-MCNC: 8.4 G/DL (ref 6–8.5)
RBC # BLD AUTO: 4.39 10*6/MM3 (ref 4.14–5.8)
SODIUM SERPL-SCNC: 140 MMOL/L (ref 136–145)
WBC NRBC COR # BLD: 6.61 10*3/MM3 (ref 3.4–10.8)
WHOLE BLOOD HOLD SPECIMEN: NORMAL

## 2023-03-06 PROCEDURE — 25010000002 CEFAZOLIN IN DEXTROSE 2-4 GM/100ML-% SOLUTION: Performed by: EMERGENCY MEDICINE

## 2023-03-06 PROCEDURE — 86140 C-REACTIVE PROTEIN: CPT | Performed by: EMERGENCY MEDICINE

## 2023-03-06 PROCEDURE — G0463 HOSPITAL OUTPT CLINIC VISIT: HCPCS

## 2023-03-06 PROCEDURE — 85025 COMPLETE CBC W/AUTO DIFF WBC: CPT | Performed by: EMERGENCY MEDICINE

## 2023-03-06 PROCEDURE — G0378 HOSPITAL OBSERVATION PER HR: HCPCS

## 2023-03-06 PROCEDURE — 96365 THER/PROPH/DIAG IV INF INIT: CPT

## 2023-03-06 PROCEDURE — 25010000002 VANCOMYCIN 10 G RECONSTITUTED SOLUTION: Performed by: INTERNAL MEDICINE

## 2023-03-06 PROCEDURE — 99284 EMERGENCY DEPT VISIT MOD MDM: CPT

## 2023-03-06 PROCEDURE — 80053 COMPREHEN METABOLIC PANEL: CPT | Performed by: EMERGENCY MEDICINE

## 2023-03-06 PROCEDURE — 73630 X-RAY EXAM OF FOOT: CPT

## 2023-03-06 PROCEDURE — 87040 BLOOD CULTURE FOR BACTERIA: CPT | Performed by: EMERGENCY MEDICINE

## 2023-03-06 PROCEDURE — 96361 HYDRATE IV INFUSION ADD-ON: CPT

## 2023-03-06 PROCEDURE — 83605 ASSAY OF LACTIC ACID: CPT | Performed by: EMERGENCY MEDICINE

## 2023-03-06 PROCEDURE — 85652 RBC SED RATE AUTOMATED: CPT | Performed by: EMERGENCY MEDICINE

## 2023-03-06 RX ORDER — LOPERAMIDE HYDROCHLORIDE 2 MG/1
2 CAPSULE ORAL ONCE
Status: COMPLETED | OUTPATIENT
Start: 2023-03-06 | End: 2023-03-07

## 2023-03-06 RX ORDER — VENLAFAXINE HYDROCHLORIDE 150 MG/1
150 CAPSULE, EXTENDED RELEASE ORAL DAILY
Status: DISCONTINUED | OUTPATIENT
Start: 2023-03-07 | End: 2023-03-09 | Stop reason: HOSPADM

## 2023-03-06 RX ORDER — BUSPIRONE HYDROCHLORIDE 15 MG/1
30 TABLET ORAL
Status: DISCONTINUED | OUTPATIENT
Start: 2023-03-06 | End: 2023-03-09 | Stop reason: HOSPADM

## 2023-03-06 RX ORDER — CEFAZOLIN SODIUM 2 G/100ML
2 INJECTION, SOLUTION INTRAVENOUS EVERY 8 HOURS
Status: DISCONTINUED | OUTPATIENT
Start: 2023-03-07 | End: 2023-03-09 | Stop reason: HOSPADM

## 2023-03-06 RX ORDER — CEFAZOLIN SODIUM 2 G/100ML
2 INJECTION, SOLUTION INTRAVENOUS ONCE
Status: COMPLETED | OUTPATIENT
Start: 2023-03-06 | End: 2023-03-06

## 2023-03-06 RX ORDER — ARIPIPRAZOLE 5 MG/1
5 TABLET ORAL DAILY
Status: DISCONTINUED | OUTPATIENT
Start: 2023-03-07 | End: 2023-03-09 | Stop reason: HOSPADM

## 2023-03-06 RX ORDER — BACLOFEN 10 MG/1
10 TABLET ORAL 3 TIMES DAILY
Status: DISCONTINUED | OUTPATIENT
Start: 2023-03-06 | End: 2023-03-09 | Stop reason: HOSPADM

## 2023-03-06 RX ORDER — ONDANSETRON 2 MG/ML
4 INJECTION INTRAMUSCULAR; INTRAVENOUS EVERY 6 HOURS PRN
Status: DISCONTINUED | OUTPATIENT
Start: 2023-03-06 | End: 2023-03-09 | Stop reason: HOSPADM

## 2023-03-06 RX ORDER — ACETAMINOPHEN 160 MG/5ML
650 SOLUTION ORAL EVERY 4 HOURS PRN
Status: DISCONTINUED | OUTPATIENT
Start: 2023-03-06 | End: 2023-03-09 | Stop reason: HOSPADM

## 2023-03-06 RX ORDER — ACETAMINOPHEN 650 MG/1
650 SUPPOSITORY RECTAL EVERY 4 HOURS PRN
Status: DISCONTINUED | OUTPATIENT
Start: 2023-03-06 | End: 2023-03-09 | Stop reason: HOSPADM

## 2023-03-06 RX ORDER — GABAPENTIN 300 MG/1
600 CAPSULE ORAL EVERY 8 HOURS SCHEDULED
Status: DISCONTINUED | OUTPATIENT
Start: 2023-03-06 | End: 2023-03-09 | Stop reason: HOSPADM

## 2023-03-06 RX ORDER — ACETAMINOPHEN 325 MG/1
650 TABLET ORAL EVERY 4 HOURS PRN
Status: DISCONTINUED | OUTPATIENT
Start: 2023-03-06 | End: 2023-03-09 | Stop reason: HOSPADM

## 2023-03-06 RX ORDER — SODIUM CHLORIDE 0.9 % (FLUSH) 0.9 %
10 SYRINGE (ML) INJECTION AS NEEDED
Status: DISCONTINUED | OUTPATIENT
Start: 2023-03-06 | End: 2023-03-09 | Stop reason: HOSPADM

## 2023-03-06 RX ORDER — SODIUM CHLORIDE 9 MG/ML
125 INJECTION, SOLUTION INTRAVENOUS CONTINUOUS
Status: DISCONTINUED | OUTPATIENT
Start: 2023-03-06 | End: 2023-03-09 | Stop reason: HOSPADM

## 2023-03-06 RX ADMIN — VANCOMYCIN HYDROCHLORIDE 1750 MG: 10 INJECTION, POWDER, LYOPHILIZED, FOR SOLUTION INTRAVENOUS at 22:36

## 2023-03-06 RX ADMIN — AMITRIPTYLINE HYDROCHLORIDE 75 MG: 50 TABLET, FILM COATED ORAL at 22:40

## 2023-03-06 RX ADMIN — SODIUM CHLORIDE 125 ML/HR: 9 INJECTION, SOLUTION INTRAVENOUS at 17:31

## 2023-03-06 RX ADMIN — CEFAZOLIN SODIUM 2 G: 2 INJECTION, SOLUTION INTRAVENOUS at 17:35

## 2023-03-06 RX ADMIN — BUSPIRONE HYDROCHLORIDE 30 MG: 15 TABLET ORAL at 22:40

## 2023-03-06 RX ADMIN — GABAPENTIN 600 MG: 300 CAPSULE ORAL at 22:40

## 2023-03-06 RX ADMIN — SODIUM CHLORIDE 500 ML: 9 INJECTION, SOLUTION INTRAVENOUS at 17:28

## 2023-03-06 RX ADMIN — BACLOFEN 10 MG: 10 TABLET ORAL at 22:40

## 2023-03-06 RX ADMIN — SODIUM CHLORIDE 125 ML/HR: 9 INJECTION, SOLUTION INTRAVENOUS at 22:42
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
## 2023-03-06 NOTE — ED TRIAGE NOTES
Pt was seen in wound care today and sent to ER - his left foot is red and warm to touch.  He was sent for IV abx    Patient was placed in face mask during first look triage.  Patient was wearing a face mask throughout encounter.  I wore personal protective equipment throughout the encounter.  Hand hygiene was performed before and after patient encounter.

## 2023-03-07 ENCOUNTER — APPOINTMENT (OUTPATIENT)
Dept: CT IMAGING | Facility: HOSPITAL | Age: 43
End: 2023-03-07
Payer: MEDICARE

## 2023-03-07 LAB
ANION GAP SERPL CALCULATED.3IONS-SCNC: 6 MMOL/L (ref 5–15)
BASOPHILS # BLD AUTO: 0.04 10*3/MM3 (ref 0–0.2)
BASOPHILS NFR BLD AUTO: 0.8 % (ref 0–1.5)
BUN SERPL-MCNC: 11 MG/DL (ref 6–20)
BUN/CREAT SERPL: 16.4 (ref 7–25)
CALCIUM SPEC-SCNC: 8.6 MG/DL (ref 8.6–10.5)
CHLORIDE SERPL-SCNC: 110 MMOL/L (ref 98–107)
CO2 SERPL-SCNC: 25 MMOL/L (ref 22–29)
CREAT SERPL-MCNC: 0.67 MG/DL (ref 0.76–1.27)
DEPRECATED RDW RBC AUTO: 44.7 FL (ref 37–54)
EGFRCR SERPLBLD CKD-EPI 2021: 120.3 ML/MIN/1.73
EOSINOPHIL # BLD AUTO: 0.1 10*3/MM3 (ref 0–0.4)
EOSINOPHIL NFR BLD AUTO: 2 % (ref 0.3–6.2)
ERYTHROCYTE [DISTWIDTH] IN BLOOD BY AUTOMATED COUNT: 13.9 % (ref 12.3–15.4)
GLUCOSE SERPL-MCNC: 91 MG/DL (ref 65–99)
HCT VFR BLD AUTO: 34.3 % (ref 37.5–51)
HGB BLD-MCNC: 11.6 G/DL (ref 13–17.7)
IMM GRANULOCYTES # BLD AUTO: 0.01 10*3/MM3 (ref 0–0.05)
IMM GRANULOCYTES NFR BLD AUTO: 0.2 % (ref 0–0.5)
LYMPHOCYTES # BLD AUTO: 1.28 10*3/MM3 (ref 0.7–3.1)
LYMPHOCYTES NFR BLD AUTO: 26 % (ref 19.6–45.3)
MCH RBC QN AUTO: 29.4 PG (ref 26.6–33)
MCHC RBC AUTO-ENTMCNC: 33.8 G/DL (ref 31.5–35.7)
MCV RBC AUTO: 87.1 FL (ref 79–97)
MONOCYTES # BLD AUTO: 0.54 10*3/MM3 (ref 0.1–0.9)
MONOCYTES NFR BLD AUTO: 11 % (ref 5–12)
NEUTROPHILS NFR BLD AUTO: 2.96 10*3/MM3 (ref 1.7–7)
NEUTROPHILS NFR BLD AUTO: 60 % (ref 42.7–76)
NRBC BLD AUTO-RTO: 0 /100 WBC (ref 0–0.2)
PLATELET # BLD AUTO: 254 10*3/MM3 (ref 140–450)
PMV BLD AUTO: 9 FL (ref 6–12)
POTASSIUM SERPL-SCNC: 4.1 MMOL/L (ref 3.5–5.2)
RBC # BLD AUTO: 3.94 10*6/MM3 (ref 4.14–5.8)
SODIUM SERPL-SCNC: 141 MMOL/L (ref 136–145)
WBC NRBC COR # BLD: 4.93 10*3/MM3 (ref 3.4–10.8)

## 2023-03-07 PROCEDURE — 96361 HYDRATE IV INFUSION ADD-ON: CPT

## 2023-03-07 PROCEDURE — 99223 1ST HOSP IP/OBS HIGH 75: CPT | Performed by: INTERNAL MEDICINE

## 2023-03-07 PROCEDURE — 25510000001 IOPAMIDOL 61 % SOLUTION: Performed by: INTERNAL MEDICINE

## 2023-03-07 PROCEDURE — 80048 BASIC METABOLIC PNL TOTAL CA: CPT | Performed by: INTERNAL MEDICINE

## 2023-03-07 PROCEDURE — 96366 THER/PROPH/DIAG IV INF ADDON: CPT

## 2023-03-07 PROCEDURE — G0378 HOSPITAL OBSERVATION PER HR: HCPCS

## 2023-03-07 PROCEDURE — 85025 COMPLETE CBC W/AUTO DIFF WBC: CPT | Performed by: INTERNAL MEDICINE

## 2023-03-07 PROCEDURE — 25010000002 CEFAZOLIN IN DEXTROSE 2-4 GM/100ML-% SOLUTION: Performed by: INTERNAL MEDICINE

## 2023-03-07 PROCEDURE — 36415 COLL VENOUS BLD VENIPUNCTURE: CPT | Performed by: INTERNAL MEDICINE

## 2023-03-07 PROCEDURE — 73701 CT LOWER EXTREMITY W/DYE: CPT

## 2023-03-07 RX ADMIN — CEFAZOLIN SODIUM 2 G: 2 INJECTION, SOLUTION INTRAVENOUS at 00:07

## 2023-03-07 RX ADMIN — CEFAZOLIN SODIUM 2 G: 2 INJECTION, SOLUTION INTRAVENOUS at 12:49

## 2023-03-07 RX ADMIN — AMITRIPTYLINE HYDROCHLORIDE 75 MG: 50 TABLET, FILM COATED ORAL at 22:41

## 2023-03-07 RX ADMIN — BUSPIRONE HYDROCHLORIDE 30 MG: 15 TABLET ORAL at 12:45

## 2023-03-07 RX ADMIN — GABAPENTIN 600 MG: 300 CAPSULE ORAL at 15:37

## 2023-03-07 RX ADMIN — ARIPIPRAZOLE 5 MG: 5 TABLET ORAL at 12:45

## 2023-03-07 RX ADMIN — CEFAZOLIN SODIUM 2 G: 2 INJECTION, SOLUTION INTRAVENOUS at 16:46

## 2023-03-07 RX ADMIN — SODIUM CHLORIDE 125 ML/HR: 9 INJECTION, SOLUTION INTRAVENOUS at 07:57

## 2023-03-07 RX ADMIN — SODIUM CHLORIDE 125 ML/HR: 9 INJECTION, SOLUTION INTRAVENOUS at 05:34

## 2023-03-07 RX ADMIN — BACLOFEN 10 MG: 10 TABLET ORAL at 22:41

## 2023-03-07 RX ADMIN — BACLOFEN 10 MG: 10 TABLET ORAL at 16:46

## 2023-03-07 RX ADMIN — BACLOFEN 10 MG: 10 TABLET ORAL at 12:53

## 2023-03-07 RX ADMIN — GABAPENTIN 600 MG: 300 CAPSULE ORAL at 05:33

## 2023-03-07 RX ADMIN — LOPERAMIDE HYDROCHLORIDE 2 MG: 2 CAPSULE ORAL at 00:06

## 2023-03-07 RX ADMIN — SODIUM CHLORIDE 125 ML/HR: 9 INJECTION, SOLUTION INTRAVENOUS at 17:55

## 2023-03-07 RX ADMIN — IOPAMIDOL 85 ML: 612 INJECTION, SOLUTION INTRAVENOUS at 15:27

## 2023-03-07 RX ADMIN — BUSPIRONE HYDROCHLORIDE 30 MG: 15 TABLET ORAL at 22:41

## 2023-03-07 RX ADMIN — AMITRIPTYLINE HYDROCHLORIDE 75 MG: 50 TABLET, FILM COATED ORAL at 12:45

## 2023-03-07 RX ADMIN — GABAPENTIN 600 MG: 300 CAPSULE ORAL at 22:41

## 2023-03-07 RX ADMIN — ACETAMINOPHEN 650 MG: 325 TABLET, FILM COATED ORAL at 12:45

## 2023-03-07 RX ADMIN — VENLAFAXINE HYDROCHLORIDE 150 MG: 150 CAPSULE, EXTENDED RELEASE ORAL at 12:45

## 2023-03-07 NOTE — NURSING NOTE
"   03/07/23 1046   Colostomy LUQ   Placement date: If unknown, DO NOT use \"Add Comment\" note: (c)    Present on Admission? Select all that apply: Unknown placement date/time  Location: LUQ   Stomal Appliance 2 piece;Clean;Dry;Intact;Drainable   Stoma Appearance   (unable to view/ opaque pouch in place)   Peristomal Assessment VENKAT   Urostomy RUQ   Placement date: If unknown, DO NOT use \"Add Comment\" note: (c)    Present on Admission? Select all that apply: Unknown placement date/time  Location: RUQ   Stomal Appliance 1 piece;Clean;Dry;Intact   Stoma Appearance moist;red   Peristomal Assessment VENKAT     WOCN consult: Patient with urostomy and colostomy. Pouches intact. Urostomy to BSD.  Patient changes pouches per self, xtra supplies taken to room. Is wearing a stealth belt. States sometimes I can go a week without changing pouches. Bilateral feet edematous / pink. Scabs noted left anterior foot and toes. Has previously been treated with betadine. Vascular has been consulted. Will follow prn. Low air loss mattress order for skin prevention.   "
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
## 2023-03-07 NOTE — CASE MANAGEMENT/SOCIAL WORK
Discharge Planning Assessment  [facility]     Patient Name: Pk May  MRN: [id number]  Today's Date: 3/7/2023    Admit Date: 3/6/2023    Plan: Return home with significant other and [facility]   Discharge Needs Assessment     Row Name 03/07/23 1601       Living Environment    People in Home significant other    Name(s) of People in Home Carl CRUZ [phone]    Current Living Arrangements apartment    Potentially Unsafe Housing Conditions none    Primary Care Provided by self;spouse/significant other    Provides Primary Care For no one    Family Caregiver if Needed significant other    Family Caregiver Names Carl Santana -[phone]    Quality of Family Relationships supportive    Able to Return to Prior Arrangements yes       Resource/Environmental Concerns    Resource/Environmental Concerns reliable transportation    Transportation Concerns no car       Discharge Needs Assessment    Readmission Within the Last 30 Days no previous admission in last 30 days    Equipment Currently Used at Home colostomy/ostomy supplies;wheelchair    Concerns to be Addressed discharge planning    Anticipated Changes Related to Illness none    Provided Post Acute Provider List? N/A    N/A Provider List Comment No needs identified at this time    Provided Post Acute Provider Quality & Resource List? N/A    Current Discharge Risk chronically ill;physical impairment               Discharge Plan     Row Name 03/07/23 1606       Plan    Plan Return home with significant other and [facility]    Plan Comments MOON 3/6/2023. Met with patient at bedside. Face sheet verified and updated accordingly. Prior to admission patient was living with Carl Santana -[phone] in their own apartment. He states that he manages his own ADL’s. Patient has wheelchair and ostomy supplies. Patient states he goes to [facility], every other week. Patient uses the [facility] on [address], denies any issues  affording or remembering to take his medications. Patient states he does not have a living will. Discharge plans discussed, plan is to return home with Carl Santana -[phone].  Patient will need wheelchair transport home. Will continue to support patient with discharge planning needs. Kerri MILLER RN CCP              Continued Care and Services - Admitted Since 3/6/2023    Coordination has not been started for this encounter.       Expected Discharge Date and Time     Expected Discharge Date Expected Discharge Time    Mar 9, 2023          Demographic Summary     Row Name 03/07/23 1600       General Information    Admission Type observation    Arrived From emergency department    Required Notices Provided Observation Status Notice    Referral Source admission list    Reason for Consult discharge planning    Preferred Language English       Contact Information    Permission Granted to Share Info With family/designee  Carl Santana -[phone]               Functional Status     Row Name 03/07/23 1600       Functional Status    Usual Activity Tolerance moderate    Current Activity Tolerance moderate       Functional Status, IADL    Medications independent    Meal Preparation assistive person    Housekeeping assistive person    Laundry assistive person    Shopping assistive person       Mental Status    General Appearance WDL WDL       Mental Status Summary    Recent Changes in Mental Status/Cognitive Functioning no changes       Employment/    Employment Status unemployed               Psychosocial    No documentation.                Abuse/Neglect    No documentation.                Legal    No documentation.                Substance Abuse    No documentation.                Patient Forms    No documentation.                   Kerri Swanson RN
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
## 2023-03-07 NOTE — PROGRESS NOTES
"[facility] Clinical Pharmacy Services: Vancomycin Pharmacokinetic Initial Consult Note    Pk May is a 42 y.o. male who is on day 1 of pharmacy to dose vancomycin.    Indication: Skin and Soft Tissue  Consulting Provider: Dr Amezquita  Planned Duration of Therapy: 5 days  Loading Dose Ordered: 1750 mg on 03/06/2023 at 2200  Target: -600 mg/L.hr   Other Antimicrobials: Cefazolin    Vitals/Labs  Ht: 177.8 cm (70\"); Wt: 90.7 kg (200 lb)  Temp Readings from Last 1 Encounters:   03/06/23 97.7 °F (36.5 °C) (Oral)    Estimated Creatinine Clearance: 157.3 mL/min (A) (by C-G formula based on SCr of 0.7 mg/dL (L)).        Results from last 7 days   Lab Units 03/06/23  1614   CREATININE mg/dL 0.70*   WBC 10*3/mm3 6.61     Assessment/Plan:    1. Vancomycin Dose:   1250 mg IV every  12  hours  2. Predictive AUC level for the dose ordered is 448 mg/L.hr, which is within the target of 400-600 mg/L.hr  3. Vanc Trough has been ordered for 03/08/23 at 0900     Pharmacy will follow patient's kidney function and will adjust doses and obtain levels as necessary. Thank you for involving pharmacy in this patient's care. Please contact pharmacy with any questions or concerns.                           Mychal Torres, [facility]  Clinical Pharmacist      "

## 2023-03-07 NOTE — CONSULTS
Name: Pk May ADMIT: 3/6/2023   : 1980  PCP: Chasity Ruggiero APRN    MRN: [id number] LOS: 0 days   AGE/SEX: 42 y.o. male  ROOM: [facility]     Inpatient Vascular Surgery Consult  Consult performed by: Jennifer Parada MD  Consult ordered by: Isatu Amezquita MD Ashlee Ann Vinyard, MD     LOS: 0 days   Patient Care Team:  Chasity Ruggiero APRN as PCP - General (Family Medicine)    Subjective     History of Present Illness  42 y.o. male with a history of paraplegia who presented to the [facility] ER with left foot redness.  He has had left foot wounds for several weeks due to his compression stockings bundling up around his ankle.  He denies fevers and chills.      Review of Systems   Constitutional: Negative for chills and fever.   All other systems reviewed and are negative.      Past Medical History:   Diagnosis Date   • Paraplegia (HCC)    • Wound infection        Past Surgical History:   Procedure Laterality Date   • SPINAL FUSION         Family History   Problem Relation Age of Onset   • No Known Problems Mother    • No Known Problems Father        Social History     Tobacco Use   • Smoking status: Every Day     Packs/day: 1.00     Years: 24.00     Pack years: 24.00     Types: Cigarettes   Vaping Use   • Vaping Use: Every day   • Substances: Nicotine   Substance Use Topics   • Alcohol use: Not Currently   • Drug use: Yes     Types: Marijuana       Allergies: Codeine    Medications Prior to Admission   Medication Sig Dispense Refill Last Dose   • amitriptyline (ELAVIL) 75 MG tablet Take 1 tablet by mouth 2 (Two) Times a Day. 180 tablet 3 3/6/2023   • ARIPiprazole (ABILIFY) 5 MG tablet Take 1 tablet by mouth Daily.   3/6/2023   • ARIPiprazole (ABILIFY) 5 MG tablet 1 tablet.      • baclofen (LIORESAL) 10 MG tablet TAKE 1 TABLET BY MOUTH THREE TIMES DAILY 90 tablet 1 3/6/2023   • busPIRone (BUSPAR) 15 MG tablet Take 2 tablets by mouth 2 (Two) Times a Day.   3/6/2023   •  gabapentin (NEURONTIN) 600 MG tablet Take 2 tablets by mouth 3 (Three) Times a Day for 30 days. 180 tablet 3 3/6/2023   • triamcinolone (KENALOG) 0.1 % paste Apply to oral ulcer twice daily until healed 5 g 1 Past Month   • venlafaxine XR (EFFEXOR-XR) 150 MG 24 hr capsule Take 1 capsule by mouth Daily. 90 capsule 1 3/6/2023     amitriptyline, 75 mg, Oral, BID  ARIPiprazole, 5 mg, Oral, Daily  baclofen, 10 mg, Oral, TID  busPIRone, 30 mg, Oral, BID - RT  ceFAZolin, 2 g, Intravenous, Q8H  gabapentin, 600 mg, Oral, Q8H  vancomycin, 12.5 mg/kg, Intravenous, Q12H  venlafaxine XR, 150 mg, Oral, Daily      Pharmacy to dose vancomycin,   sodium chloride, 125 mL/hr, Last Rate: 125 mL/hr (03/07/23 0534)      •  acetaminophen **OR** acetaminophen **OR** acetaminophen  •  ondansetron  •  Pharmacy to dose vancomycin  •  [COMPLETED] Insert Peripheral IV **AND** sodium chloride      Objective   Temp:  [96.5 °F (35.8 °C)-98.5 °F (36.9 °C)] 97.7 °F (36.5 °C)  Heart Rate:  [] 96  Resp:  [15-18] 16  BP: (110-146)/(74-95) 116/75    No intake/output data recorded.    Physical Exam  Vitals reviewed.   Constitutional:       General: He is not in acute distress.     Appearance: Normal appearance.   HENT:      Head: Normocephalic and atraumatic.      Right Ear: External ear normal.      Left Ear: External ear normal.      Nose: Nose normal.      Mouth/Throat:      Mouth: Mucous membranes are moist.      Pharynx: Oropharynx is clear.   Eyes:      Extraocular Movements: Extraocular movements intact.      Conjunctiva/sclera: Conjunctivae normal.      Pupils: Pupils are equal, round, and reactive to light.   Cardiovascular:      Rate and Rhythm: Normal rate and regular rhythm.      Pulses: Normal pulses.      Comments: Palpable femoral and pedal pulses bilaterally  Pulmonary:      Effort: Pulmonary effort is normal. No respiratory distress.   Abdominal:      General: Abdomen is flat.      Palpations: Abdomen is soft.   Musculoskeletal:          General: Swelling present.      Cervical back: Normal range of motion. No rigidity.      Right lower leg: Edema present.      Comments: Left ankle eschar, left foot cellulitis  Several small right foot ulcerations   Skin:     General: Skin is warm and dry.      Capillary Refill: Capillary refill takes less than 2 seconds.   Neurological:      General: No focal deficit present.      Mental Status: He is alert and oriented to person, place, and time.   Psychiatric:         Mood and Affect: Mood normal.         Behavior: Behavior normal.         Results from last 7 days   Lab Units 03/06/23  1614   WBC 10*3/mm3 6.61   HEMOGLOBIN g/dL 13.2   PLATELETS 10*3/mm3 266     Results from last 7 days   Lab Units 03/06/23  1614   SODIUM mmol/L 140   POTASSIUM mmol/L 3.8   CHLORIDE mmol/L 106   CO2 mmol/L 25.0   BUN mg/dL 11   CREATININE mg/dL 0.70*   GLUCOSE mg/dL 107*   Estimated Creatinine Clearance: 157.3 mL/min (A) (by C-G formula based on SCr of 0.7 mg/dL (L)).      Imaging Studies:  CT scan pending      Active Hospital Problems    Diagnosis  POA   • **Cellulitis of left foot [L03.116]  Yes      Resolved Hospital Problems   No resolved problems to display.     Problem Points:  4:  Patient has a new problem, with additional work-up planned  Total problem points:4 or more    Data Points:  1:  I have reviewed or order clinical lab test  1:  I have reviewed or order radiology test (except heart catheterization or echo)  2:  I have personally and independently review of image, tracing, or specimen  Total data points:4 or more    Risk Points:  Moderate:  Acute compliated injury    [facility] Prob point Data point Risk   SF 1 1 Minimal   Low 2 2 Low   Mod 3 3 Moderate   High 4 4 High     Code MDM History Exam Time   84739 SF/Low Detailed Detailed 30   67752 Mod Comprehensive Comprehensive 50   49385 High Comprehensive Comprehensive 70     Detailed history:  4 elements HPI or status of 3 chronic problems; 2-9 system  ROS  Comprehensive:  4 elements HPI or status of 3 chronic problems;  10 system ROS    Detailed Exam:  12 findings from any organ system  Comprehensive Exam:  2 findings from each of 9 systems.     [place]illin    Assessment & Plan       Cellulitis of left foot        42 y.o. male paraplegic with left foot superficial eschars and cellulitis, normal pulse exam and he is not diabetic.    -ID consulted for management of cellulitis, on antibiotics.  No leukocytosis or fevers  -I will obtain CT scan of the extremities to evaluate for any deep space infection that would require surgical drainage.  If this is negative, would recommend treatment with antibiotics for cellulitis and local wound care    Addendum 3/7/23 at 1850: CT of the lower extremities reviewed, no fluid collection that would require surgical drainage.  Wounds are superficial.  Continue local wound care per wound care nursing while inpatient and outpatient follow up with the wound care center.  Absolutely NO COMPRESSION TO EITHER FOOT while these are healing as these are pressure related injuries.        I discussed the patients findings and my recommendations with patient.  Please call my office with any question: [phone]    Jennifer Parada MD  23  07:50 EST

## 2023-03-07 NOTE — PLAN OF CARE
Alert, vss. 1L NC. Scheduled gabapentin given. No pain noted. IVF infusing. Vanc and ancef, dosing per pharmacy. Colostomy, urostomy. Belt in place. WC at bedside. Vascular and ID consults. Bilateral foot wounds wrapped.      Problem: Infection  Goal: Absence of Infection Signs and Symptoms  Outcome: Ongoing, Progressing     Problem: Adult Inpatient Plan of Care  Goal: Absence of Hospital-Acquired Illness or Injury  Intervention: Identify and Manage Fall Risk  Recent Flowsheet Documentation  Taken 3/7/2023 0145 by Zainab Morales RN  Safety Promotion/Fall Prevention: safety round/check completed  Taken 3/7/2023 0005 by Zainab Morales RN  Safety Promotion/Fall Prevention: safety round/check completed  Taken 3/6/2023 2150 by Zainab Morales RN  Safety Promotion/Fall Prevention: safety round/check completed  Taken 3/6/2023 1942 by Zainab Morales RN  Safety Promotion/Fall Prevention: safety round/check completed  Intervention: Prevent Skin Injury  Recent Flowsheet Documentation  Taken 3/7/2023 0145 by Zainab Morales RN  Body Position:  • turned  • left  Taken 3/7/2023 0005 by Zainab Morales RN  Body Position: sitting up in bed  Taken 3/6/2023 2150 by Zainab Morales RN  Body Position:  • turned  • left  Taken 3/6/2023 1942 by Zainab Morales RN  Body Position: sitting up in bed  Intervention: Prevent and Manage VTE (Venous Thromboembolism) Risk  Recent Flowsheet Documentation  Taken 3/7/2023 0005 by Zainab Morales RN  Activity Management: activity adjusted per tolerance  Taken 3/6/2023 1942 by Zainab Morales RN  Activity Management: activity adjusted per tolerance   Goal Outcome Evaluation:

## 2023-03-07 NOTE — CONSULTS
"Nutrition Services    Patient Name:  Pk May  YOB: 1980  MRN: [id number]  Admit Date:  3/6/2023    Assessment Date:  03/07/23    Comment:  Wound assessment d/t pt with worsening wound/cellulitis of L foot. Per H&P, wound has been present x 2mo with increased redness.   Visited pt in room, who reported good appetite PTA as well as in hospital. When asked about wt hx, pt said he has gained 20# x 6 mo. When asked about his wound, pt stated that it has dried out and he suspects he will need debridement. Pt reported never trying ONS in the past and does not like milk-based products, but was agreeable to Margarito BID, for wound healing; will add today.     RD will continue to follow-up, per protocol.      CLINICAL NUTRITION ASSESSMENT      Reason for Assessment Pressure Injury and/or Non-Healing Wound     Diagnosis/Problem   Cellulitis of L foot   Medical/Surgical History Past Medical History:   Diagnosis Date   • Paraplegia (HCC)    • Wound infection        Past Surgical History:   Procedure Laterality Date   • SPINAL FUSION          Encounter Information        Nutrition History:  Eats well PTA (2 meals/d is normal for pt)   Food Preferences:    Supplements:    Factors Affecting Intake: No factors at this time     Anthropometrics        Current Height  Current Weight  BMI kg/m2 Height: 177.8 cm (70\")  Weight: 90.7 kg (200 lb) (03/06/23 1606)  Body mass index is 28.7 kg/m².   Adjusted BMI (if applicable)        Admission Weight 200#       Ideal Body Weight (IBW) 160#   Adjusted IBW (if applicable)        Usual Body Weight (UBW) 200-210# range   Weight Change/Trend Gain, 20# x 6 mo, per pt report. Fairly stable, per wt hx.        Weight History Wt Readings from Last 30 Encounters:   03/06/23 1606 90.7 kg (200 lb)   12/29/22 2044 94.3 kg (208 lb)   12/29/22 1751 95.3 kg (210 lb)   06/03/22 1544 95.3 kg (210 lb)   10/20/21 1423 90.7 kg (200 lb)             Estimated/Assessed Needs       Energy " "Requirements    Height for Calculation  Height: 177.8 cm (70\")   Weight for Calculation 90.7 kg   Method for Estimation  20 kcal/kg, 22 kcal/kg   EST Needs (kcal/day) 8878-8517 kcal       Protein Requirements    Weight for Calculation 90.7 kg   EST Protein Needs (g/kg) 1.2 - 1.5 gm/kg   EST Daily Needs (g/day) 108-136 g       Fluid Requirements     Method for Estimation 1 mL/kcal    Estimated Needs (mL/day) 0648-5749 mL     Tests/Procedures        Tests/Procedures CT scan being obtained to rule out deep space infection.     Labs       Pertinent Labs    Results from last 7 days   Lab Units 03/07/23  0645 03/06/23  1614   SODIUM mmol/L 141 140   POTASSIUM mmol/L 4.1 3.8   CHLORIDE mmol/L 110* 106   CO2 mmol/L 25.0 25.0   BUN mg/dL 11 11   CREATININE mg/dL 0.67* 0.70*   CALCIUM mg/dL 8.6 9.1   BILIRUBIN mg/dL  --  0.3   ALK PHOS U/L  --  113   ALT (SGPT) U/L  --  21   AST (SGOT) U/L  --  26   GLUCOSE mg/dL 91 107*     Results from last 7 days   Lab Units 03/07/23  0645 03/06/23  1614   HEMOGLOBIN g/dL 11.6* 13.2   HEMATOCRIT % 34.3* 38.6   WBC 10*3/mm3 4.93 6.61   ALBUMIN g/dL  --  4.0     Results from last 7 days   Lab Units 03/07/23  0645 03/06/23  1614   PLATELETS 10*3/mm3 254 266     SARS-CoV-2, PERCY   Date Value Ref Range Status   11/19/2022 NEGATIVE Negative Final     Comment:     The 2019-CoV rRT-PCR Assay is only for use under a Food and Drug Administration Emergency Use Authorization. The performance characteristics of the assay were verified by the Clinical Laboratory at [facility]. Results should be used in   conjunction with the patient's clinical symptoms, medical history, and other clinical/laboratory findings to determine an overall clinical diagnosis. Negative results do not preclude infection with SARS-CoV-2 (COVID-19).    Test parameters have not been validated for screening asymptomatic patients.     Lab Results   Component Value Date    HGBA1C 5.04 11/08/2021          Medications      "      Scheduled Medications amitriptyline, 75 mg, Oral, BID  ARIPiprazole, 5 mg, Oral, Daily  baclofen, 10 mg, Oral, TID  busPIRone, 30 mg, Oral, BID - RT  ceFAZolin, 2 g, Intravenous, Q8H  gabapentin, 600 mg, Oral, Q8H  venlafaxine XR, 150 mg, Oral, Daily       Infusions sodium chloride, 125 mL/hr, Last Rate: 125 mL/hr (03/07/23 6599)       PRN Medications •  acetaminophen **OR** acetaminophen **OR** acetaminophen  •  ondansetron  •  [COMPLETED] Insert Peripheral IV **AND** sodium chloride     Physical Findings          Physical Appearance alert, oriented   Oral/Mouth Cavity WNL   Edema  2+ (mild)   Gastrointestinal colostomy, last bowel movement:3/6   Skin  non-healing wound(s)   Tubes/Drains none   NFPE Not applicable at this time   --  Current Nutrition Orders & Evaluation of Intake       Oral Nutrition     Food Allergies NKFA   Current PO Diet Diet: Regular/House Diet; Texture: Regular Texture (IDDSI 7); Fluid Consistency: Thin (IDDSI 0)   Supplement n/a   PO Evaluation     % PO Intake 100% x 1 snack documented since admission    # of Days Evaluated 1   --  PES STATEMENT / NUTRITION DIAGNOSIS      Nutrition Dx Problem  Problem: Increased Nutrient Needs Protein  Etiology: Medical DiagnosisCellulitis L foot  Signs/Symptoms: Protein and Report/Observation    Comment: Cellulitis of L foot.    --  NUTRITION INTERVENTION / PLAN OF CARE      Intervention Goal(s) Maintain nutrition status, Meet estimated needs, Maintain intake, Maintain weight, No significant weight loss and PO intake goal %: 75%+         RD Intervention/Action Follow Tx Progress and Care plan reviewed         Prescription/Orders:       PO Diet       Supplements Margarito BID mixed with crystal light in water.      Snacks       Enteral Nutrition       Parenteral Nutrition    New Prescription Ordered? Yes   --      Monitor/Evaluation Per protocol, PO intake, Supplement intake, Weight, Symptoms   Discharge Plan/Needs Pending clinical course   Education Will  instruct as appropriate   --    RD to follow per protocol.      Electronically signed by:  Joan Olivas  03/07/23 16:12 EST

## 2023-03-07 NOTE — PLAN OF CARE
Goal Outcome Evaluation:  Plan of Care Reviewed With: patient        Progress: no change  Outcome Evaluation: Pt is alert and oriented x4. Urostomy and colostomy in place. Patient is able to take care of both himself. IV abx given as ordered. x1 tylenol given for c/o headache. NS @ 125. Protective boots applied. Patient is on a specialty bed. CT scan today. No complaints of pain. VSS. Will cont plan of care.
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
## 2023-03-08 LAB
ANION GAP SERPL CALCULATED.3IONS-SCNC: 6 MMOL/L (ref 5–15)
BASOPHILS # BLD AUTO: 0.03 10*3/MM3 (ref 0–0.2)
BASOPHILS NFR BLD AUTO: 0.7 % (ref 0–1.5)
BUN SERPL-MCNC: 6 MG/DL (ref 6–20)
BUN/CREAT SERPL: 11.1 (ref 7–25)
CALCIUM SPEC-SCNC: 8.1 MG/DL (ref 8.6–10.5)
CHLORIDE SERPL-SCNC: 111 MMOL/L (ref 98–107)
CO2 SERPL-SCNC: 26 MMOL/L (ref 22–29)
CREAT SERPL-MCNC: 0.54 MG/DL (ref 0.76–1.27)
DEPRECATED RDW RBC AUTO: 43.9 FL (ref 37–54)
EGFRCR SERPLBLD CKD-EPI 2021: 128.4 ML/MIN/1.73
EOSINOPHIL # BLD AUTO: 0.05 10*3/MM3 (ref 0–0.4)
EOSINOPHIL NFR BLD AUTO: 1.1 % (ref 0.3–6.2)
ERYTHROCYTE [DISTWIDTH] IN BLOOD BY AUTOMATED COUNT: 13.9 % (ref 12.3–15.4)
GLUCOSE SERPL-MCNC: 93 MG/DL (ref 65–99)
HCT VFR BLD AUTO: 32.7 % (ref 37.5–51)
HGB BLD-MCNC: 11 G/DL (ref 13–17.7)
IMM GRANULOCYTES # BLD AUTO: 0.01 10*3/MM3 (ref 0–0.05)
IMM GRANULOCYTES NFR BLD AUTO: 0.2 % (ref 0–0.5)
LYMPHOCYTES # BLD AUTO: 1.33 10*3/MM3 (ref 0.7–3.1)
LYMPHOCYTES NFR BLD AUTO: 29.3 % (ref 19.6–45.3)
MCH RBC QN AUTO: 29.1 PG (ref 26.6–33)
MCHC RBC AUTO-ENTMCNC: 33.6 G/DL (ref 31.5–35.7)
MCV RBC AUTO: 86.5 FL (ref 79–97)
MONOCYTES # BLD AUTO: 0.36 10*3/MM3 (ref 0.1–0.9)
MONOCYTES NFR BLD AUTO: 7.9 % (ref 5–12)
NEUTROPHILS NFR BLD AUTO: 2.76 10*3/MM3 (ref 1.7–7)
NEUTROPHILS NFR BLD AUTO: 60.8 % (ref 42.7–76)
NRBC BLD AUTO-RTO: 0 /100 WBC (ref 0–0.2)
PLATELET # BLD AUTO: 256 10*3/MM3 (ref 140–450)
PMV BLD AUTO: 8.8 FL (ref 6–12)
POTASSIUM SERPL-SCNC: 3.6 MMOL/L (ref 3.5–5.2)
RBC # BLD AUTO: 3.78 10*6/MM3 (ref 4.14–5.8)
SODIUM SERPL-SCNC: 143 MMOL/L (ref 136–145)
WBC NRBC COR # BLD: 4.54 10*3/MM3 (ref 3.4–10.8)

## 2023-03-08 PROCEDURE — 99232 SBSQ HOSP IP/OBS MODERATE 35: CPT | Performed by: INTERNAL MEDICINE

## 2023-03-08 PROCEDURE — 96366 THER/PROPH/DIAG IV INF ADDON: CPT

## 2023-03-08 PROCEDURE — G0378 HOSPITAL OBSERVATION PER HR: HCPCS

## 2023-03-08 PROCEDURE — 96361 HYDRATE IV INFUSION ADD-ON: CPT

## 2023-03-08 PROCEDURE — 25010000002 CEFAZOLIN IN DEXTROSE 2-4 GM/100ML-% SOLUTION: Performed by: INTERNAL MEDICINE

## 2023-03-08 PROCEDURE — 85025 COMPLETE CBC W/AUTO DIFF WBC: CPT | Performed by: INTERNAL MEDICINE

## 2023-03-08 PROCEDURE — 80048 BASIC METABOLIC PNL TOTAL CA: CPT | Performed by: INTERNAL MEDICINE

## 2023-03-08 PROCEDURE — 25010000002 ONDANSETRON PER 1 MG: Performed by: INTERNAL MEDICINE

## 2023-03-08 PROCEDURE — 96375 TX/PRO/DX INJ NEW DRUG ADDON: CPT

## 2023-03-08 RX ORDER — NICOTINE 21 MG/24HR
1 PATCH, TRANSDERMAL 24 HOURS TRANSDERMAL
Status: DISCONTINUED | OUTPATIENT
Start: 2023-03-08 | End: 2023-03-09 | Stop reason: HOSPADM

## 2023-03-08 RX ADMIN — CEFAZOLIN SODIUM 2 G: 2 INJECTION, SOLUTION INTRAVENOUS at 00:36

## 2023-03-08 RX ADMIN — GABAPENTIN 600 MG: 300 CAPSULE ORAL at 15:17

## 2023-03-08 RX ADMIN — CEFAZOLIN SODIUM 2 G: 2 INJECTION, SOLUTION INTRAVENOUS at 09:21

## 2023-03-08 RX ADMIN — VENLAFAXINE HYDROCHLORIDE 150 MG: 150 CAPSULE, EXTENDED RELEASE ORAL at 09:21

## 2023-03-08 RX ADMIN — GABAPENTIN 600 MG: 300 CAPSULE ORAL at 06:03

## 2023-03-08 RX ADMIN — CEFAZOLIN SODIUM 2 G: 2 INJECTION, SOLUTION INTRAVENOUS at 16:22

## 2023-03-08 RX ADMIN — BACLOFEN 10 MG: 10 TABLET ORAL at 20:28

## 2023-03-08 RX ADMIN — AMITRIPTYLINE HYDROCHLORIDE 75 MG: 50 TABLET, FILM COATED ORAL at 20:28

## 2023-03-08 RX ADMIN — SODIUM CHLORIDE 125 ML/HR: 9 INJECTION, SOLUTION INTRAVENOUS at 23:47

## 2023-03-08 RX ADMIN — CEFAZOLIN SODIUM 2 G: 2 INJECTION, SOLUTION INTRAVENOUS at 23:44

## 2023-03-08 RX ADMIN — ARIPIPRAZOLE 5 MG: 5 TABLET ORAL at 09:21

## 2023-03-08 RX ADMIN — NICOTINE 1 PATCH: 21 PATCH, EXTENDED RELEASE TRANSDERMAL at 18:30

## 2023-03-08 RX ADMIN — BUSPIRONE HYDROCHLORIDE 30 MG: 15 TABLET ORAL at 20:28

## 2023-03-08 RX ADMIN — GABAPENTIN 600 MG: 300 CAPSULE ORAL at 21:07

## 2023-03-08 RX ADMIN — ONDANSETRON 4 MG: 2 INJECTION INTRAMUSCULAR; INTRAVENOUS at 00:43

## 2023-03-08 RX ADMIN — BACLOFEN 10 MG: 10 TABLET ORAL at 16:22

## 2023-03-08 RX ADMIN — BACLOFEN 10 MG: 10 TABLET ORAL at 09:21

## 2023-03-08 RX ADMIN — AMITRIPTYLINE HYDROCHLORIDE 75 MG: 50 TABLET, FILM COATED ORAL at 09:21

## 2023-03-08 RX ADMIN — BUSPIRONE HYDROCHLORIDE 30 MG: 15 TABLET ORAL at 09:21

## 2023-03-08 NOTE — PLAN OF CARE
Goal Outcome Evaluation:  Plan of Care Reviewed With: patient        Progress: no changeOutcome Evaluation: Pt is alert and oriented x4. Urostomy and colostomy in place. Patient is able to take care of both himself. IV abx given as ordered. NS @ 125. Protective boots on. Patient is on a specialty bed. No complaints of pain. VSS. Will cont plan of care.
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
## 2023-03-08 NOTE — PLAN OF CARE
Problem: Skin Injury Risk Increased  Goal: Skin Health and Integrity  Outcome: Ongoing, Not Progressing     Problem: Adult Inpatient Plan of Care  Goal: Absence of Hospital-Acquired Illness or Injury  Outcome: Ongoing, Progressing  Intervention: Identify and Manage Fall Risk  Recent Flowsheet Documentation  Taken 3/7/2023 2210 by Adam Alvarado, RN  Safety Promotion/Fall Prevention: safety round/check completed  Goal: Optimal Comfort and Wellbeing  Outcome: Ongoing, Progressing  Goal: Readiness for Transition of Care  Outcome: Ongoing, Progressing   Goal Outcome Evaluation:

## 2023-03-09 ENCOUNTER — READMISSION MANAGEMENT (OUTPATIENT)
Dept: CALL CENTER | Facility: HOSPITAL | Age: 43
End: 2023-03-09
Payer: MEDICARE

## 2023-03-09 VITALS
HEART RATE: 85 BPM | SYSTOLIC BLOOD PRESSURE: 121 MMHG | TEMPERATURE: 98 F | DIASTOLIC BLOOD PRESSURE: 76 MMHG | OXYGEN SATURATION: 96 % | RESPIRATION RATE: 20 BRPM | WEIGHT: 200 LBS | BODY MASS INDEX: 28.63 KG/M2 | HEIGHT: 70 IN

## 2023-03-09 LAB
ANION GAP SERPL CALCULATED.3IONS-SCNC: 8.4 MMOL/L (ref 5–15)
BASOPHILS # BLD AUTO: 0.03 10*3/MM3 (ref 0–0.2)
BASOPHILS NFR BLD AUTO: 0.6 % (ref 0–1.5)
BUN SERPL-MCNC: 5 MG/DL (ref 6–20)
BUN/CREAT SERPL: 9.6 (ref 7–25)
CALCIUM SPEC-SCNC: 8.5 MG/DL (ref 8.6–10.5)
CHLORIDE SERPL-SCNC: 109 MMOL/L (ref 98–107)
CO2 SERPL-SCNC: 24.6 MMOL/L (ref 22–29)
CREAT SERPL-MCNC: 0.52 MG/DL (ref 0.76–1.27)
DEPRECATED RDW RBC AUTO: 46.3 FL (ref 37–54)
EGFRCR SERPLBLD CKD-EPI 2021: 129.9 ML/MIN/1.73
EOSINOPHIL # BLD AUTO: 0.07 10*3/MM3 (ref 0–0.4)
EOSINOPHIL NFR BLD AUTO: 1.4 % (ref 0.3–6.2)
ERYTHROCYTE [DISTWIDTH] IN BLOOD BY AUTOMATED COUNT: 14.5 % (ref 12.3–15.4)
GLUCOSE SERPL-MCNC: 97 MG/DL (ref 65–99)
HCT VFR BLD AUTO: 31.7 % (ref 37.5–51)
HGB BLD-MCNC: 10.9 G/DL (ref 13–17.7)
IMM GRANULOCYTES # BLD AUTO: 0.08 10*3/MM3 (ref 0–0.05)
IMM GRANULOCYTES NFR BLD AUTO: 1.6 % (ref 0–0.5)
LYMPHOCYTES # BLD AUTO: 1.38 10*3/MM3 (ref 0.7–3.1)
LYMPHOCYTES NFR BLD AUTO: 27.9 % (ref 19.6–45.3)
MCH RBC QN AUTO: 30.2 PG (ref 26.6–33)
MCHC RBC AUTO-ENTMCNC: 34.4 G/DL (ref 31.5–35.7)
MCV RBC AUTO: 87.8 FL (ref 79–97)
MONOCYTES # BLD AUTO: 0.4 10*3/MM3 (ref 0.1–0.9)
MONOCYTES NFR BLD AUTO: 8.1 % (ref 5–12)
NEUTROPHILS NFR BLD AUTO: 2.99 10*3/MM3 (ref 1.7–7)
NEUTROPHILS NFR BLD AUTO: 60.4 % (ref 42.7–76)
NRBC BLD AUTO-RTO: 0.2 /100 WBC (ref 0–0.2)
PLATELET # BLD AUTO: 273 10*3/MM3 (ref 140–450)
PMV BLD AUTO: 9.1 FL (ref 6–12)
POTASSIUM SERPL-SCNC: 4.4 MMOL/L (ref 3.5–5.2)
RBC # BLD AUTO: 3.61 10*6/MM3 (ref 4.14–5.8)
SODIUM SERPL-SCNC: 142 MMOL/L (ref 136–145)
WBC NRBC COR # BLD: 4.95 10*3/MM3 (ref 3.4–10.8)

## 2023-03-09 PROCEDURE — 96366 THER/PROPH/DIAG IV INF ADDON: CPT

## 2023-03-09 PROCEDURE — 96361 HYDRATE IV INFUSION ADD-ON: CPT

## 2023-03-09 PROCEDURE — 85025 COMPLETE CBC W/AUTO DIFF WBC: CPT | Performed by: INTERNAL MEDICINE

## 2023-03-09 PROCEDURE — 80048 BASIC METABOLIC PNL TOTAL CA: CPT | Performed by: INTERNAL MEDICINE

## 2023-03-09 PROCEDURE — G0378 HOSPITAL OBSERVATION PER HR: HCPCS

## 2023-03-09 PROCEDURE — 25010000002 CEFAZOLIN IN DEXTROSE 2-4 GM/100ML-% SOLUTION: Performed by: INTERNAL MEDICINE

## 2023-03-09 RX ORDER — CEPHALEXIN 500 MG/1
1000 CAPSULE ORAL EVERY 8 HOURS
Qty: 42 CAPSULE | Refills: 0 | Status: SHIPPED | OUTPATIENT
Start: 2023-03-09 | End: 2023-03-16

## 2023-03-09 RX ADMIN — VENLAFAXINE HYDROCHLORIDE 150 MG: 150 CAPSULE, EXTENDED RELEASE ORAL at 08:46

## 2023-03-09 RX ADMIN — BUSPIRONE HYDROCHLORIDE 30 MG: 15 TABLET ORAL at 08:46

## 2023-03-09 RX ADMIN — AMITRIPTYLINE HYDROCHLORIDE 75 MG: 50 TABLET, FILM COATED ORAL at 08:46

## 2023-03-09 RX ADMIN — ARIPIPRAZOLE 5 MG: 5 TABLET ORAL at 08:46

## 2023-03-09 RX ADMIN — BACLOFEN 10 MG: 10 TABLET ORAL at 08:46

## 2023-03-09 RX ADMIN — GABAPENTIN 600 MG: 300 CAPSULE ORAL at 13:54

## 2023-03-09 RX ADMIN — NICOTINE 1 PATCH: 21 PATCH, EXTENDED RELEASE TRANSDERMAL at 08:46

## 2023-03-09 RX ADMIN — GABAPENTIN 600 MG: 300 CAPSULE ORAL at 05:47

## 2023-03-09 RX ADMIN — CEFAZOLIN SODIUM 2 G: 2 INJECTION, SOLUTION INTRAVENOUS at 08:46

## 2023-03-09 NOTE — PROGRESS NOTES
Case Management Discharge Note      Final Note: Home with [employer] transportation and he is to followup with the [facility]. NIKO Borden RN CCP.    Provided Post Acute Provider List?: N/A  N/A Provider List Comment: No needs identified at this time  Provided Post Acute Provider Quality & Resource List?: N/A    Selected Continued Care - Admitted Since 3/6/2023     Destination    No services have been selected for the patient.              Durable Medical Equipment    No services have been selected for the patient.              Dialysis/Infusion    No services have been selected for the patient.              Home Medical Care    No services have been selected for the patient.              Therapy    No services have been selected for the patient.              Community Resources    No services have been selected for the patient.              Community & DME    No services have been selected for the patient.                  Transportation Services  W/C Van: Other ([employer])    Final Discharge Disposition Code: 01 - home or self-care

## 2023-03-09 NOTE — PLAN OF CARE
Goal Outcome Evaluation:  Pt is alert and oriented x4. Urostomy and colostomy in place. Patient is able to take care of both himself. IV abx given as ordered.NS @ 125. Protective boots applied. Patient is on a specialty bed.  No complaints of pain. VSS. Will cont plan of care.
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
## 2023-03-09 NOTE — NURSING NOTE
03/09/23 1010   Wound 12/29/22 1731 Left posterior heel Blisters   Placement Date/Time: 12/29/22 1731   Present on Hospital Admission: Yes  Side: Left  Orientation: posterior  Location: heel  Primary Wound Type: Blisters   Base black eschar   Drainage Amount none   Care, Wound cleansed with;sterile normal saline   Dressing Care   (therahoney, hydrofiber, gauze, kerlix)   Wound 12/29/22 2020 Right posterior plantar Blisters   Placement Date/Time: 12/29/22 2020   Side: Right  Orientation: posterior  Location: plantar  Primary Wound Type: Blisters   Base black eschar   Drainage Amount none   Care, Wound cleansed with;sterile normal saline   Dressing Care dressing applied  (therahoney, hydrofiber, gauze, kerlix)   Wound 12/29/22 1729 Left anterior ankle Pressure Injury   Placement Date/Time: 12/29/22 1729   Present on Hospital Admission: Yes  Side: Left  Orientation: anterior  Location: ankle  Primary Wound Type: Pressure Injury   Base black eschar   Drainage Amount none   Care, Wound cleansed with;sterile normal saline   Dressing Care   (therahoney, hydrofiber, gauze, ABD pad, kerlix)   Wound 03/09/23 Right posterior heel Pressure Injury   Placement Date: 03/09/23   Present on Hospital Admission: Yes  Side: Right  Orientation: posterior  Location: heel  Primary Wound Type: Pressure Injury   Base black eschar   Drainage Amount none   Care, Wound cleansed with;sterile normal saline   Dressing Care dressing applied  (therahoney, hydrofiber, gauze, kerlix)   Wound 03/09/23 Right great toe Pressure Injury   Placement Date: 03/09/23   Present on Hospital Admission: Yes  Side: Right  Location: great toe  Primary Wound Type: Pressure Injury   Base black eschar   Drainage Amount none   Care, Wound cleansed with;sterile normal saline   Dressing Care dressing applied  (therahoney, hydrofiber, gauze, kerlix)   Wound 03/09/23 Left great toe Pressure Injury   Placement Date: 03/09/23   Present on Hospital Admission: Yes  Side:  Left  Location: great toe  Primary Wound Type: Pressure Injury   Base black eschar   Drainage Amount none   Care, Wound cleansed with;sterile normal saline   Dressing Care dressing applied  (therahoney, hydrofiber, gauze, kerlix)   Skin Interventions   Pressure Reduction Devices heel offloading device utilized     Wound/ostomy - vascular has seen patient and is not recommending any change in treatment to the wounds to patient's feet. Patient is current with [facility], first appt there was on 1/24. Patient has a wound to anterior L ankle area caused by compression hose, scattered wounds to feet/heels related to pressure and trauma to toes from times when foot falls off of w/c and  has dragged on pavement. Discussed no compression hose to be worn at this time, always keep feet protected, he reports that he usually wears crocks shoes and they work well for him but he needs a new pair that has padding on the inside. Note reviewed from [facility] and wounds are treated with therahoney and alginate and wrapped with kerlix every other day. Wound care completed using same regimen as [facility] with exception of hydrofiber due to hospital formulary. Feet and toes were covered and protected with kerlix and bariatric socks applied. Patient and girlfriend is indep with wound care and will continue at home. Wounds stable, no redness or drainage, patient hopes to d/c home today

## 2023-03-10 ENCOUNTER — TRANSITIONAL CARE MANAGEMENT TELEPHONE ENCOUNTER (OUTPATIENT)
Dept: CALL CENTER | Facility: HOSPITAL | Age: 43
End: 2023-03-10
Payer: MEDICARE

## 2023-03-10 NOTE — OUTREACH NOTE
Prep Survey    Flowsheet Row Responses   List of hospitals in [place] facility patient discharged from? [facility]   Is LACE score < 7 ? No   Eligibility [facility]   Date of Admission 03/06/23   Date of Discharge 03/09/23   Discharge Disposition Home or Self Care   Discharge diagnosis Cellulitis of left foot  Paralysis of both lower limbs    Does the patient have one of the following disease processes/diagnoses(primary or secondary)? Other   Does the patient have Home health ordered? No   Is there a DME ordered? No   Prep survey completed? Yes          JULIA PAYNE - Registered Nurse

## 2023-03-10 NOTE — PROGRESS NOTES
I returned call to the patient.  He declines follow up at this time.  He does have an appointment with wound care on 3/13/2023

## 2023-03-10 NOTE — OUTREACH NOTE
Call Center TCM Note    Flowsheet Row Responses   [facility] [place] patient discharged from? [facility]   Does the patient have one of the following disease processes/diagnoses(primary or secondary)? Other   TCM attempt successful? Yes   Call start time 1041   Call end time 1046   Discharge diagnosis Cellulitis of left foot  Paralysis of both lower limbs    Meds reviewed with patient/caregiver? Yes   Is the patient having any side effects they believe may be caused by any medication additions or changes? No   Does the patient have all medications ordered at discharge? Yes   Is the patient taking all medications as directed (includes completed medication regime)? Yes   Does the patient have an appointment with their PCP within 7 days of discharge? No   Nursing Interventions Patient declined scheduling/rescheduling appointment at this time, Routed TCM call to PCP office   Has home health visited the patient within 72 hours of discharge? N/A   Psychosocial issues? No   Did the patient receive a copy of their discharge instructions? Yes   Nursing interventions Reviewed instructions with patient   What is the patient's perception of their health status since discharge? Improving   Is the patient/caregiver able to teach back signs and symptoms related to disease process for when to call PCP? Yes   Is the patient/caregiver able to teach back signs and symptoms related to disease process for when to call 911? Yes   Is the patient/caregiver able to teach back the hierarchy of who to call/visit for symptoms/problems? PCP, Specialist, Home health nurse, Urgent Care, ED, 911 Yes   If the patient is a current smoker, are they able to teach back resources for cessation? Smoking cessation medications  [Patient vapes]   TCM call completed? Yes   Wrap up additional comments Patient doing well, has wound care appt 3/13/23.   Call end time 1046   Would this patient benefit from a Referral to [facility] Social Work? No   Is the patient  interested in additional calls from an ambulatory ?  NOTE:  applies to high risk patients requiring additional follow-up. Marlin Ramirez RN    3/10/2023, 10:47 EST

## 2023-03-11 LAB
BACTERIA SPEC AEROBE CULT: NORMAL
BACTERIA SPEC AEROBE CULT: NORMAL

## 2023-03-13 ENCOUNTER — OFFICE VISIT (OUTPATIENT)
Dept: WOUND CARE | Facility: HOSPITAL | Age: 43
End: 2023-03-13
Payer: MEDICARE

## 2023-03-27 ENCOUNTER — OFFICE VISIT (OUTPATIENT)
Dept: WOUND CARE | Facility: HOSPITAL | Age: 43
End: 2023-03-27
Payer: MEDICARE

## 2023-04-17 ENCOUNTER — OFFICE VISIT (OUTPATIENT)
Dept: WOUND CARE | Facility: HOSPITAL | Age: 43
End: 2023-04-17
Payer: MEDICARE

## 2023-05-03 ENCOUNTER — TELEPHONE (OUTPATIENT)
Dept: INTERNAL MEDICINE | Facility: CLINIC | Age: 43
End: 2023-05-03
Payer: MEDICARE

## 2023-05-03 NOTE — TELEPHONE ENCOUNTER
Caller: AUNDREA JOLLEY    Best call back number: [phone]    Who are you requesting to speak with (clinical staff, provider,  specific staff member): CLINICAL STAFF     What was the call regarding: CALLING FOR STATUS OF PAPERWORK SENT OVER FOR WHEELCHAIR REPAIR     Do you require a callback:YES

## 2023-05-08 ENCOUNTER — OFFICE VISIT (OUTPATIENT)
Dept: WOUND CARE | Facility: HOSPITAL | Age: 43
End: 2023-05-08
Payer: MEDICARE

## 2023-05-08 PROCEDURE — G0463 HOSPITAL OUTPT CLINIC VISIT: HCPCS

## 2023-05-16 ENCOUNTER — TELEMEDICINE (OUTPATIENT)
Dept: FAMILY MEDICINE CLINIC | Facility: TELEHEALTH | Age: 43
End: 2023-05-16
Payer: MEDICARE

## 2023-05-16 DIAGNOSIS — U07.1 COVID-19: Primary | ICD-10-CM

## 2023-05-16 PROBLEM — M84.48XA PATHOLOGICAL FRACTURE OF VERTEBRA: Status: ACTIVE | Noted: 2023-05-16

## 2023-05-16 PROBLEM — F43.10 PTSD (POST-TRAUMATIC STRESS DISORDER): Status: ACTIVE | Noted: 2023-05-16

## 2023-05-16 RX ORDER — BUSPIRONE HYDROCHLORIDE 30 MG/1
TABLET ORAL
COMMUNITY
Start: 2023-04-04

## 2023-05-17 VITALS — WEIGHT: 210 LBS | BODY MASS INDEX: 30.13 KG/M2 | TEMPERATURE: 100 F

## 2023-05-17 NOTE — PROGRESS NOTES
You have chosen to receive care through a telehealth visit.  Do you consent to use a video/audio connection for your medical care today? Yes     CHIEF COMPLAINT  Chief Complaint   Patient presents with    covid 19         HPI  Pk May is a 42 y.o. male  presents with complaint of  had a positive COVID test. Reports symptoms started today. Reports he test 2 hours ago positive with an at home test/ Reports he has had a fever Tmax 101. + chills and sweats. No nausea or vomiting. No CP or SOA. Denies loss of taste or smell. Reports he is paraplegic and is interested in taking antiviral.     Review of Systems   Constitutional:  Positive for chills, diaphoresis and fever.   HENT:  Negative for congestion, ear pain, sinus pain, sneezing and sore throat.    Respiratory:  Negative for cough, chest tightness, shortness of breath and wheezing.    Gastrointestinal:  Negative for abdominal pain, nausea and vomiting.   Musculoskeletal:  Negative for back pain and myalgias.     Past Medical History:   Diagnosis Date    Paraplegia     Wound infection        Family History   Problem Relation Age of Onset    No Known Problems Mother     No Known Problems Father        Social History     Socioeconomic History    Marital status: Significant Other   Tobacco Use    Smoking status: Every Day     Packs/day: 1.00     Years: 24.00     Pack years: 24.00     Types: Cigarettes   Vaping Use    Vaping Use: Every day    Substances: Nicotine   Substance and Sexual Activity    Alcohol use: Not Currently    Drug use: Yes     Types: Marijuana       Pk May  reports that he has been smoking cigarettes. He has a 24.00 pack-year smoking history. He does not have any smokeless tobacco history on file.. I have educated him on the risk of diseases from using tobacco products such as cancer, COPD, and heart disease.     Stop vaping.     I spent  1  minutes counseling the patient.              Wt 95.3 kg (210 lb)   BMI 30.13 kg/m²     PHYSICAL  EXAM  Physical Exam   Constitutional: He is oriented to person, place, and time. He appears well-developed and well-nourished. No distress.   HENT:   Head: Normocephalic and atraumatic.   Right Ear: Hearing normal.   Left Ear: Hearing normal.   Nose: No congestion. Right sinus exhibits no maxillary sinus tenderness. Left sinus exhibits no maxillary sinus tenderness.   Mouth/Throat: Mouth/Lips are normal.Oropharynx is clear and moist.   Patient directed exam   Eyes: Conjunctivae and lids are normal.   Pulmonary/Chest: Effort normal.  No respiratory distress.  Lymphadenopathy:        Right cervical: No anterior cervical adenopathy present.       Left cervical: No anterior cervical adenopathy present.   Neurological: He is alert and oriented to person, place, and time.   Paraplegic no movement to BLE   Psychiatric: He has a normal mood and affect. His speech is normal and behavior is normal.     Results for orders placed or performed during the hospital encounter of 03/06/23   Blood Culture - Blood, Arm, Right    Specimen: Arm, Right; Blood   Result Value Ref Range    Blood Culture No growth at 5 days    Blood Culture - Blood, Arm, Left    Specimen: Arm, Left; Blood   Result Value Ref Range    Blood Culture No growth at 5 days    Comprehensive Metabolic Panel    Specimen: Blood   Result Value Ref Range    Glucose 107 (H) 65 - 99 mg/dL    BUN 11 6 - 20 mg/dL    Creatinine 0.70 (L) 0.76 - 1.27 mg/dL    Sodium 140 136 - 145 mmol/L    Potassium 3.8 3.5 - 5.2 mmol/L    Chloride 106 98 - 107 mmol/L    CO2 25.0 22.0 - 29.0 mmol/L    Calcium 9.1 8.6 - 10.5 mg/dL    Total Protein 8.4 6.0 - 8.5 g/dL    Albumin 4.0 3.5 - 5.2 g/dL    ALT (SGPT) 21 1 - 41 U/L    AST (SGOT) 26 1 - 40 U/L    Alkaline Phosphatase 113 39 - 117 U/L    Total Bilirubin 0.3 0.0 - 1.2 mg/dL    Globulin 4.4 gm/dL    A/G Ratio 0.9 g/dL    BUN/Creatinine Ratio 15.7 7.0 - 25.0    Anion Gap 9.0 5.0 - 15.0 mmol/L    eGFR 118.7 >60.0 mL/min/1.73   Lactic Acid,  Plasma    Specimen: Blood   Result Value Ref Range    Lactate 2.9 (C) 0.5 - 2.0 mmol/L   CBC Auto Differential    Specimen: Blood   Result Value Ref Range    WBC 6.61 3.40 - 10.80 10*3/mm3    RBC 4.39 4.14 - 5.80 10*6/mm3    Hemoglobin 13.2 13.0 - 17.7 g/dL    Hematocrit 38.6 37.5 - 51.0 %    MCV 87.9 79.0 - 97.0 fL    MCH 30.1 26.6 - 33.0 pg    MCHC 34.2 31.5 - 35.7 g/dL    RDW 14.2 12.3 - 15.4 %    RDW-SD 45.9 37.0 - 54.0 fl    MPV 8.7 6.0 - 12.0 fL    Platelets 266 140 - 450 10*3/mm3    Neutrophil % 61.8 42.7 - 76.0 %    Lymphocyte % 29.5 19.6 - 45.3 %    Monocyte % 5.9 5.0 - 12.0 %    Eosinophil % 2.0 0.3 - 6.2 %    Basophil % 0.5 0.0 - 1.5 %    Immature Grans % 0.3 0.0 - 0.5 %    Neutrophils, Absolute 4.09 1.70 - 7.00 10*3/mm3    Lymphocytes, Absolute 1.95 0.70 - 3.10 10*3/mm3    Monocytes, Absolute 0.39 0.10 - 0.90 10*3/mm3    Eosinophils, Absolute 0.13 0.00 - 0.40 10*3/mm3    Basophils, Absolute 0.03 0.00 - 0.20 10*3/mm3    Immature Grans, Absolute 0.02 0.00 - 0.05 10*3/mm3    nRBC 0.0 0.0 - 0.2 /100 WBC   STAT Lactic Acid, Reflex    Specimen: Blood   Result Value Ref Range    Lactate 2.0 0.5 - 2.0 mmol/L   Sedimentation Rate    Specimen: Blood   Result Value Ref Range    Sed Rate 45 (H) 0 - 15 mm/hr   C-reactive Protein    Specimen: Blood   Result Value Ref Range    C-Reactive Protein 0.41 0.00 - 0.50 mg/dL   Basic Metabolic Panel    Specimen: Blood   Result Value Ref Range    Glucose 91 65 - 99 mg/dL    BUN 11 6 - 20 mg/dL    Creatinine 0.67 (L) 0.76 - 1.27 mg/dL    Sodium 141 136 - 145 mmol/L    Potassium 4.1 3.5 - 5.2 mmol/L    Chloride 110 (H) 98 - 107 mmol/L    CO2 25.0 22.0 - 29.0 mmol/L    Calcium 8.6 8.6 - 10.5 mg/dL    BUN/Creatinine Ratio 16.4 7.0 - 25.0    Anion Gap 6.0 5.0 - 15.0 mmol/L    eGFR 120.3 >60.0 mL/min/1.73   CBC Auto Differential    Specimen: Blood   Result Value Ref Range    WBC 4.93 3.40 - 10.80 10*3/mm3    RBC 3.94 (L) 4.14 - 5.80 10*6/mm3    Hemoglobin 11.6 (L) 13.0 - 17.7 g/dL     Hematocrit 34.3 (L) 37.5 - 51.0 %    MCV 87.1 79.0 - 97.0 fL    MCH 29.4 26.6 - 33.0 pg    MCHC 33.8 31.5 - 35.7 g/dL    RDW 13.9 12.3 - 15.4 %    RDW-SD 44.7 37.0 - 54.0 fl    MPV 9.0 6.0 - 12.0 fL    Platelets 254 140 - 450 10*3/mm3    Neutrophil % 60.0 42.7 - 76.0 %    Lymphocyte % 26.0 19.6 - 45.3 %    Monocyte % 11.0 5.0 - 12.0 %    Eosinophil % 2.0 0.3 - 6.2 %    Basophil % 0.8 0.0 - 1.5 %    Immature Grans % 0.2 0.0 - 0.5 %    Neutrophils, Absolute 2.96 1.70 - 7.00 10*3/mm3    Lymphocytes, Absolute 1.28 0.70 - 3.10 10*3/mm3    Monocytes, Absolute 0.54 0.10 - 0.90 10*3/mm3    Eosinophils, Absolute 0.10 0.00 - 0.40 10*3/mm3    Basophils, Absolute 0.04 0.00 - 0.20 10*3/mm3    Immature Grans, Absolute 0.01 0.00 - 0.05 10*3/mm3    nRBC 0.0 0.0 - 0.2 /100 WBC   Basic Metabolic Panel    Specimen: Blood   Result Value Ref Range    Glucose 93 65 - 99 mg/dL    BUN 6 6 - 20 mg/dL    Creatinine 0.54 (L) 0.76 - 1.27 mg/dL    Sodium 143 136 - 145 mmol/L    Potassium 3.6 3.5 - 5.2 mmol/L    Chloride 111 (H) 98 - 107 mmol/L    CO2 26.0 22.0 - 29.0 mmol/L    Calcium 8.1 (L) 8.6 - 10.5 mg/dL    BUN/Creatinine Ratio 11.1 7.0 - 25.0    Anion Gap 6.0 5.0 - 15.0 mmol/L    eGFR 128.4 >60.0 mL/min/1.73   CBC Auto Differential    Specimen: Blood   Result Value Ref Range    WBC 4.54 3.40 - 10.80 10*3/mm3    RBC 3.78 (L) 4.14 - 5.80 10*6/mm3    Hemoglobin 11.0 (L) 13.0 - 17.7 g/dL    Hematocrit 32.7 (L) 37.5 - 51.0 %    MCV 86.5 79.0 - 97.0 fL    MCH 29.1 26.6 - 33.0 pg    MCHC 33.6 31.5 - 35.7 g/dL    RDW 13.9 12.3 - 15.4 %    RDW-SD 43.9 37.0 - 54.0 fl    MPV 8.8 6.0 - 12.0 fL    Platelets 256 140 - 450 10*3/mm3    Neutrophil % 60.8 42.7 - 76.0 %    Lymphocyte % 29.3 19.6 - 45.3 %    Monocyte % 7.9 5.0 - 12.0 %    Eosinophil % 1.1 0.3 - 6.2 %    Basophil % 0.7 0.0 - 1.5 %    Immature Grans % 0.2 0.0 - 0.5 %    Neutrophils, Absolute 2.76 1.70 - 7.00 10*3/mm3    Lymphocytes, Absolute 1.33 0.70 - 3.10 10*3/mm3    Monocytes, Absolute  0.36 0.10 - 0.90 10*3/mm3    Eosinophils, Absolute 0.05 0.00 - 0.40 10*3/mm3    Basophils, Absolute 0.03 0.00 - 0.20 10*3/mm3    Immature Grans, Absolute 0.01 0.00 - 0.05 10*3/mm3    nRBC 0.0 0.0 - 0.2 /100 WBC   Basic Metabolic Panel    Specimen: Blood   Result Value Ref Range    Glucose 97 65 - 99 mg/dL    BUN 5 (L) 6 - 20 mg/dL    Creatinine 0.52 (L) 0.76 - 1.27 mg/dL    Sodium 142 136 - 145 mmol/L    Potassium 4.4 3.5 - 5.2 mmol/L    Chloride 109 (H) 98 - 107 mmol/L    CO2 24.6 22.0 - 29.0 mmol/L    Calcium 8.5 (L) 8.6 - 10.5 mg/dL    BUN/Creatinine Ratio 9.6 7.0 - 25.0    Anion Gap 8.4 5.0 - 15.0 mmol/L    eGFR 129.9 >60.0 mL/min/1.73   CBC Auto Differential    Specimen: Blood   Result Value Ref Range    WBC 4.95 3.40 - 10.80 10*3/mm3    RBC 3.61 (L) 4.14 - 5.80 10*6/mm3    Hemoglobin 10.9 (L) 13.0 - 17.7 g/dL    Hematocrit 31.7 (L) 37.5 - 51.0 %    MCV 87.8 79.0 - 97.0 fL    MCH 30.2 26.6 - 33.0 pg    MCHC 34.4 31.5 - 35.7 g/dL    RDW 14.5 12.3 - 15.4 %    RDW-SD 46.3 37.0 - 54.0 fl    MPV 9.1 6.0 - 12.0 fL    Platelets 273 140 - 450 10*3/mm3    Neutrophil % 60.4 42.7 - 76.0 %    Lymphocyte % 27.9 19.6 - 45.3 %    Monocyte % 8.1 5.0 - 12.0 %    Eosinophil % 1.4 0.3 - 6.2 %    Basophil % 0.6 0.0 - 1.5 %    Immature Grans % 1.6 (H) 0.0 - 0.5 %    Neutrophils, Absolute 2.99 1.70 - 7.00 10*3/mm3    Lymphocytes, Absolute 1.38 0.70 - 3.10 10*3/mm3    Monocytes, Absolute 0.40 0.10 - 0.90 10*3/mm3    Eosinophils, Absolute 0.07 0.00 - 0.40 10*3/mm3    Basophils, Absolute 0.03 0.00 - 0.20 10*3/mm3    Immature Grans, Absolute 0.08 (H) 0.00 - 0.05 10*3/mm3    nRBC 0.2 0.0 - 0.2 /100 WBC   Green Top (Gel)   Result Value Ref Range    Extra Tube Hold for add-ons.    Lavender Top   Result Value Ref Range    Extra Tube hold for add-on        Diagnoses and all orders for this visit:    1. COVID-19 (Primary)    Rest  Fluids  Quarantine for 5 days starting at 1st day of symptoms  Discussed antivirals with patient. Note patient  has multiple medications that will interact with Paxlovid. Sent information on Molnupiravir. Reviewed medications and labs. Patient reports he is still taking gabapentin.   PCP if symptoms persist  ER for worsening symptoms such as high fever, chest pain or SOA      FOLLOW-UP  As discussed during visit with PCP/[facility] if no improvement or Urgent Care/Emergency Department if worsening of symptoms    Patient verbalizes understanding of medication dosage, comfort measures, instructions for treatment and follow-up.    Hlai Us, APRN  05/16/2023  22:10 EDT    The use of a video visit has been reviewed with the patient and verbal informed consent has been obtained. Myself and Pk May participated in this visit. The patient is located in [address] [place] [address].    I am located in [place], [place]. Mychart and Twilio were utilized. I spent 10 minutes in the patient's chart for this visit.

## 2023-05-17 NOTE — PATIENT INSTRUCTIONS
How to Quarantine at Home  Information for Patients and Families    These instructions are for people with confirmed or suspected COVID-19 who do not need to be hospitalized and those with confirmed COVID-19 who were hospitalized and discharged to care for themselves at home.    If you were tested through the Health Department  The Health Department will monitor your wellbeing.  If it is determined that you do not need to be hospitalized and can be isolated at home, you will be monitored by staff from your local or state health department.     If you were tested through a Commercial Lab  You will need to monitor yourself and report changes in your symptoms to your doctor.  See the section below called Monitor Your Symptoms.    Follow these steps until a healthcare provider or local or state health department says you can return to your normal activities.    Stay home except to get medical care  Restrict activities outside your home, except for getting medical care.   Do not go to work, school, or public areas.   Avoid using public transportation, ride-sharing, or taxis.    Separate yourself from other people and animals in your home  People  As much as possible, you should stay in a specific room and away from other people in your home. Also, you should use a separate bathroom, if available.    Animals  You should restrict contact with pets and other animals while you are sick with COVID-19, just like you would around other people. When possible, have another member of your household care for your animals while you are sick. If you are sick with COVID-19, avoid contact with your pet, including petting, snuggling, being kissed or licked, and sharing food. If you must care for your pet or be around animals while you are sick, wash your hands before and after you interact with pets and wear a facemask. See COVID-19 and Animals for more information.    Call ahead before visiting your doctor  If you have a medical  appointment, call the healthcare provider and tell them that you have or may have COVID-19. This information will help the healthcare provider’s office take steps to keep other people from getting infected or exposed.    Wear a facemask  You should wear a facemask when you are around other people (e.g., sharing a room or vehicle) or pets and before you enter a healthcare provider’s office.     If you are not able to wear a facemask (for example, because it causes trouble breathing), then people who live with you should not stay in the same room with you, or they should wear a facemask if they enter your room.    Cover your coughs and sneezes  Cover your mouth and nose with a tissue when you cough or sneeze.   Throw used tissues in a lined trash can.   Immediately wash your hands with soap and water for at least 20 seconds or, if soap and water are not available, clean your hands with an alcohol-based hand  that contains at least 60% alcohol.    Clean your hands often  Wash your hands often with soap and water for at least 20 seconds, especially after blowing your nose, coughing, or sneezing; going to the bathroom; and before eating or preparing food.     If soap and water are not readily available, use an alcohol-based hand  with at least 60% alcohol, covering all surfaces of your hands and rubbing them together until they feel dry.    Soap and water are the best option if hands are visibly dirty. Avoid touching your eyes, nose, and mouth with unwashed hands.    Avoid sharing personal household items  You should not share dishes, drinking glasses, cups, eating utensils, towels, or bedding with other people or pets in your home.   After using these items, they should be washed thoroughly with soap and water.    Clean all “high-touch” surfaces everyday  High touch surfaces include counters, tabletops, doorknobs, bathroom fixtures, toilets, phones, keyboards, tablets, and bedside tables.   Also, clean  any surfaces that may have blood, stool, or body fluids on them.   Use a household cleaning spray or wipe, according to the label instructions. Labels contain instructions for safe and effective use of the cleaning product, including precautions you should take when applying the product, such as wearing gloves and making sure you have good ventilation during use of the product.    Monitor your symptoms  Seek prompt medical attention if your illness is worsening (e.g., difficulty breathing).   Before seeking care, call your healthcare provider and tell them that you have, or are being evaluated for, COVID-19.   Put on a facemask before you enter the facility.     These steps will help the healthcare provider’s office to keep other people in the office or waiting room from getting infected or exposed.   Persons who are placed under active monitoring or facilitated self-monitoring should follow instructions provided by their local health department or occupational health professionals, as appropriate.  If you have a medical emergency and need to call 911, notify the dispatch personnel that you have, or are being evaluated for COVID-19. If possible, put on a facemask before emergency medical services arrive.    Discontinuing home isolation  Patients with confirmed COVID-19 should remain under home isolation precautions until the risk of secondary transmission to others is thought to be low. The decision to discontinue home isolation precautions should be made on a case-by-case basis, in consultation with healthcare providers and state and local health departments.    The below content are for household members, intimate partners, and caregivers of a patient with symptomatic laboratory-confirmed COVID-19 or a patient under investigation:    Household members, intimate partners, and caregivers may have close contact with a person with symptomatic, laboratory-confirmed COVID-19 or a person under investigation.     Close  contacts should monitor their health; they should call their healthcare provider right away if they develop symptoms suggestive of COVID-19 (e.g., fever, cough, shortness of breath)     Close contacts should also follow these recommendations:  Make sure that you understand and can help the patient follow their healthcare provider’s instructions for medication(s) and care. You should help the patient with basic needs in the home and provide support for getting groceries, prescriptions, and other personal needs.  Monitor the patient’s symptoms. If the patient is getting sicker, call his or her healthcare provider and tell them that the patient has laboratory-confirmed COVID-19. This will help the healthcare provider’s office take steps to keep other people in the office or waiting room from getting infected. Ask the healthcare provider to call the local or [facility] health department for additional guidance. If the patient has a medical emergency and you need to call 911, notify the dispatch personnel that the patient has, or is being evaluated for COVID-19.  Household members should stay in another room or be  from the patient as much as possible. Household members should use a separate bedroom and bathroom, if available.  Prohibit visitors who do not have an essential need to be in the home.  Household members should care for any pets in the home. Do not handle pets or other animals while sick.  For more information, see COVID-19 and Animals.  Make sure that shared spaces in the home have good air flow, such as by an air conditioner or an opened window, weather permitting.  Perform hand hygiene frequently. Wash your hands often with soap and water for at least 20 seconds or use an alcohol-based hand  that contains 60 to 95% alcohol, covering all surfaces of your hands and rubbing them together until they feel dry. Soap and water should be used preferentially if hands are visibly dirty.  Avoid touching  your eyes, nose, and mouth with unwashed hands.  The patient should wear a facemask when you are around other people. If the patient is not able to wear a facemask (for example, because it causes trouble breathing), you, as the caregiver, should wear a mask when you are in the same room as the patient.  Wear a disposable facemask and gloves when you touch or have contact with the patient’s blood, stool, or body fluids, such as saliva, sputum, nasal mucus, vomit, or urine.   Throw out disposable facemasks and gloves after using them. Do not reuse.  When removing personal protective equipment, first remove and dispose of gloves. Then, immediately clean your hands with soap and water or alcohol-based hand . Next, remove and dispose of facemask, and immediately clean your hands again with soap and water or alcohol-based hand .  Avoid sharing household items with the patient. You should not share dishes, drinking glasses, cups, eating utensils, towels, bedding, or other items. After the patient uses these items, you should wash them thoroughly (see below “Wash laundry thoroughly”).  Clean all “high-touch” surfaces, such as counters, tabletops, doorknobs, bathroom fixtures, toilets, phones, keyboards, tablets, and bedside tables, every day. Also, clean any surfaces that may have blood, stool, or body fluids on them.   Use a household cleaning spray or wipe, according to the label instructions. Labels contain instructions for safe and effective use of the cleaning product including precautions you should take when applying the product, such as wearing gloves and making sure you have good ventilation during use of the product.  Wash laundry thoroughly.   Immediately remove and wash clothes or bedding that have blood, stool, or body fluids on them.  Wear disposable gloves while handling soiled items and keep soiled items away from your body. Clean your hands (with soap and water or an alcohol-based hand  ) immediately after removing your gloves.  Read and follow directions on labels of laundry or clothing items and detergent. In general, using a normal laundry detergent according to washing machine instructions and dry thoroughly using the warmest temperatures recommended on the clothing label.  Place all used disposable gloves, facemasks, and other contaminated items in a lined container before disposing of them with other household waste. Clean your hands (with soap and water or an alcohol-based hand ) immediately after handling these items. Soap and water should be used preferentially if hands are visibly dirty.  Discuss any additional questions with your state or local health department or healthcare provider.    Adapted from information provided by the Centers for Disease Control and Prevention.  For more information, visit https://www.cdc.gov/coronavirus/2019-ncov/hcp/guidance-prevent-spread.html

## 2023-05-31 RX ORDER — BACLOFEN 10 MG/1
TABLET ORAL
Qty: 90 TABLET | Refills: 0 | Status: SHIPPED | OUTPATIENT
Start: 2023-05-31

## 2023-06-01 ENCOUNTER — OFFICE VISIT (OUTPATIENT)
Dept: WOUND CARE | Facility: HOSPITAL | Age: 43
End: 2023-06-01
Payer: MEDICARE

## 2023-06-01 PROCEDURE — G0463 HOSPITAL OUTPT CLINIC VISIT: HCPCS

## 2023-07-24 ENCOUNTER — OFFICE VISIT (OUTPATIENT)
Dept: INTERNAL MEDICINE | Facility: CLINIC | Age: 43
End: 2023-07-24
Payer: MEDICARE

## 2023-07-24 VITALS — SYSTOLIC BLOOD PRESSURE: 116 MMHG | OXYGEN SATURATION: 95 % | HEART RATE: 95 BPM | DIASTOLIC BLOOD PRESSURE: 72 MMHG

## 2023-07-24 DIAGNOSIS — Z79.899 HIGH RISK MEDICATION USE: ICD-10-CM

## 2023-07-24 DIAGNOSIS — G89.4 CHRONIC PAIN SYNDROME: ICD-10-CM

## 2023-07-24 DIAGNOSIS — Z11.59 NEED FOR HEPATITIS C SCREENING TEST: ICD-10-CM

## 2023-07-24 DIAGNOSIS — Z23 NEED FOR PNEUMOCOCCAL 20-VALENT CONJUGATE VACCINATION: ICD-10-CM

## 2023-07-24 DIAGNOSIS — Z00.00 MEDICARE ANNUAL WELLNESS VISIT, SUBSEQUENT: Primary | ICD-10-CM

## 2023-07-24 PROBLEM — L03.116 CELLULITIS OF LEFT LOWER EXTREMITY: Status: RESOLVED | Noted: 2022-12-29 | Resolved: 2023-07-24

## 2023-07-24 PROBLEM — L03.116 CELLULITIS OF LEFT FOOT: Status: RESOLVED | Noted: 2023-03-06 | Resolved: 2023-07-24

## 2023-07-24 PROCEDURE — 96160 PT-FOCUSED HLTH RISK ASSMT: CPT | Performed by: NURSE PRACTITIONER

## 2023-07-24 PROCEDURE — 99214 OFFICE O/P EST MOD 30 MIN: CPT | Performed by: NURSE PRACTITIONER

## 2023-07-24 PROCEDURE — 1170F FXNL STATUS ASSESSED: CPT | Performed by: NURSE PRACTITIONER

## 2023-07-24 PROCEDURE — G0438 PPPS, INITIAL VISIT: HCPCS | Performed by: NURSE PRACTITIONER

## 2023-07-24 PROCEDURE — 1159F MED LIST DOCD IN RCRD: CPT | Performed by: NURSE PRACTITIONER

## 2023-07-24 PROCEDURE — 90677 PCV20 VACCINE IM: CPT | Performed by: NURSE PRACTITIONER

## 2023-07-24 PROCEDURE — G0009 ADMIN PNEUMOCOCCAL VACCINE: HCPCS | Performed by: NURSE PRACTITIONER

## 2023-07-24 PROCEDURE — 1160F RVW MEDS BY RX/DR IN RCRD: CPT | Performed by: NURSE PRACTITIONER

## 2023-07-24 RX ORDER — GABAPENTIN 600 MG/1
1200 TABLET ORAL 3 TIMES DAILY PRN
Qty: 180 TABLET | Refills: 1 | Status: SHIPPED | OUTPATIENT
Start: 2023-07-24
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
## 2023-07-25 LAB
ALBUMIN SERPL-MCNC: 4.3 G/DL (ref 3.5–5.2)
ALBUMIN/GLOB SERPL: 1.5 G/DL
ALP SERPL-CCNC: 115 U/L (ref 39–117)
ALT SERPL-CCNC: 21 U/L (ref 1–41)
AMPHETAMINES UR QL SCN: NEGATIVE NG/ML
AST SERPL-CCNC: 18 U/L (ref 1–40)
BARBITURATES UR QL SCN: NEGATIVE NG/ML
BASOPHILS # BLD AUTO: 0.05 10*3/MM3 (ref 0–0.2)
BASOPHILS NFR BLD AUTO: 0.9 % (ref 0–1.5)
BENZODIAZ UR QL SCN: NEGATIVE NG/ML
BILIRUB SERPL-MCNC: <0.2 MG/DL (ref 0–1.2)
BUN SERPL-MCNC: 15 MG/DL (ref 6–20)
BUN/CREAT SERPL: 21.7 (ref 7–25)
BZE UR QL SCN: NEGATIVE NG/ML
CALCIUM SERPL-MCNC: 9.6 MG/DL (ref 8.6–10.5)
CANNABINOIDS UR QL SCN: POSITIVE NG/ML
CHLORIDE SERPL-SCNC: 103 MMOL/L (ref 98–107)
CO2 SERPL-SCNC: 28.7 MMOL/L (ref 22–29)
CREAT SERPL-MCNC: 0.69 MG/DL (ref 0.76–1.27)
CREAT UR-MCNC: 40 MG/DL (ref 20–300)
EGFRCR SERPLBLD CKD-EPI 2021: 119.2 ML/MIN/1.73
EOSINOPHIL # BLD AUTO: 0.1 10*3/MM3 (ref 0–0.4)
EOSINOPHIL NFR BLD AUTO: 1.7 % (ref 0.3–6.2)
ERYTHROCYTE [DISTWIDTH] IN BLOOD BY AUTOMATED COUNT: 13.3 % (ref 12.3–15.4)
GLOBULIN SER CALC-MCNC: 2.9 GM/DL
GLUCOSE SERPL-MCNC: 90 MG/DL (ref 65–99)
HCT VFR BLD AUTO: 37.6 % (ref 37.5–51)
HCV IGG SERPL QL IA: NON REACTIVE
HGB BLD-MCNC: 12.8 G/DL (ref 13–17.7)
IMM GRANULOCYTES # BLD AUTO: 0.02 10*3/MM3 (ref 0–0.05)
IMM GRANULOCYTES NFR BLD AUTO: 0.3 % (ref 0–0.5)
LABORATORY COMMENT REPORT: ABNORMAL
LYMPHOCYTES # BLD AUTO: 1.72 10*3/MM3 (ref 0.7–3.1)
LYMPHOCYTES NFR BLD AUTO: 29.4 % (ref 19.6–45.3)
MCH RBC QN AUTO: 30.3 PG (ref 26.6–33)
MCHC RBC AUTO-ENTMCNC: 34 G/DL (ref 31.5–35.7)
MCV RBC AUTO: 88.9 FL (ref 79–97)
METHADONE UR QL SCN: NEGATIVE NG/ML
MONOCYTES # BLD AUTO: 0.37 10*3/MM3 (ref 0.1–0.9)
MONOCYTES NFR BLD AUTO: 6.3 % (ref 5–12)
NEUTROPHILS # BLD AUTO: 3.59 10*3/MM3 (ref 1.7–7)
NEUTROPHILS NFR BLD AUTO: 61.4 % (ref 42.7–76)
NRBC BLD AUTO-RTO: 0 /100 WBC (ref 0–0.2)
OPIATES UR QL SCN: POSITIVE NG/ML
OXYCODONE+OXYMORPHONE UR QL SCN: NEGATIVE NG/ML
PCP UR QL: NEGATIVE NG/ML
PH UR: 8.7 [PH] (ref 4.5–8.9)
PLATELET # BLD AUTO: 297 10*3/MM3 (ref 140–450)
POTASSIUM SERPL-SCNC: 4.6 MMOL/L (ref 3.5–5.2)
PROPOXYPH UR QL SCN: NEGATIVE NG/ML
PROT SERPL-MCNC: 7.2 G/DL (ref 6–8.5)
RBC # BLD AUTO: 4.23 10*6/MM3 (ref 4.14–5.8)
SODIUM SERPL-SCNC: 140 MMOL/L (ref 136–145)
WBC # BLD AUTO: 5.85 10*3/MM3 (ref 3.4–10.8)

## 2023-07-26 ENCOUNTER — TELEPHONE (OUTPATIENT)
Dept: INTERNAL MEDICINE | Facility: CLINIC | Age: 43
End: 2023-07-26
Payer: MEDICARE

## 2023-07-26 NOTE — TELEPHONE ENCOUNTER
Notify d[place] that his labs are stable  Hepatitis C antibody screening is negative  UDS is positive for THC and opiates (this is not on his med list) . With a positive drug screen I can no longer prescribe controlled substances for him and he will need to see pain management for further management of chronic pain. Is he getting opiates from another provider?

## 2023-07-26 NOTE — TELEPHONE ENCOUNTER
I called pt and notified him of lab results  He states that he does not get opiates from any other provider. He was then informed that he will need see pain management for further management of his chronic pain We will no longer roya able to Prescribe Controlled substances.  STEVEB

## 2023-08-20 ENCOUNTER — TELEPHONE (OUTPATIENT)
Dept: INTERNAL MEDICINE | Facility: CLINIC | Age: 43
End: 2023-08-20
Payer: MEDICARE

## 2023-08-20 NOTE — TELEPHONE ENCOUNTER
I received an urgent call from Maile. I called Pk May at [phone] at 18:36 EDT and talked to his girlfriend. She states he has body aches, back pain, smelly urine, and fever up to 104 F. She states they are having issues with transportation. I advised her that based on this information he should really be evaluated in person and I recommended he go to urgent care or the emergency department. I informed her it is against our policy to prescribe an antibiotic without seeing the patient. She voiced understanding.

## 2023-08-21 ENCOUNTER — APPOINTMENT (OUTPATIENT)
Dept: CT IMAGING | Facility: HOSPITAL | Age: 43
End: 2023-08-21
Payer: MEDICARE

## 2023-08-21 ENCOUNTER — HOSPITAL ENCOUNTER (OUTPATIENT)
Facility: HOSPITAL | Age: 43
Setting detail: OBSERVATION
Discharge: HOME | End: 2023-08-24
Attending: EMERGENCY MEDICINE | Admitting: INTERNAL MEDICINE
Payer: MEDICARE

## 2023-08-21 DIAGNOSIS — N39.0 ACUTE UTI: ICD-10-CM

## 2023-08-21 DIAGNOSIS — R50.9 ACUTE FEBRILE ILLNESS: ICD-10-CM

## 2023-08-21 DIAGNOSIS — A41.9 SEPSIS WITHOUT ACUTE ORGAN DYSFUNCTION, DUE TO UNSPECIFIED ORGANISM: Primary | ICD-10-CM

## 2023-08-21 LAB
ALBUMIN SERPL-MCNC: 3.7 G/DL (ref 3.5–5.2)
ALBUMIN/GLOB SERPL: 0.9 G/DL
ALP SERPL-CCNC: 114 U/L (ref 39–117)
ALT SERPL W P-5'-P-CCNC: 14 U/L (ref 1–41)
ANION GAP SERPL CALCULATED.3IONS-SCNC: 10 MMOL/L (ref 5–15)
AST SERPL-CCNC: 22 U/L (ref 1–40)
BACTERIA UR QL AUTO: ABNORMAL /HPF
BASOPHILS # BLD AUTO: 0.04 10*3/MM3 (ref 0–0.2)
BASOPHILS NFR BLD AUTO: 0.3 % (ref 0–1.5)
BILIRUB SERPL-MCNC: 0.5 MG/DL (ref 0–1.2)
BILIRUB UR QL STRIP: NEGATIVE
BUN SERPL-MCNC: 11 MG/DL (ref 6–20)
BUN/CREAT SERPL: 14.7 (ref 7–25)
CALCIUM SPEC-SCNC: 8.9 MG/DL (ref 8.6–10.5)
CHLORIDE SERPL-SCNC: 97 MMOL/L (ref 98–107)
CLARITY UR: ABNORMAL
CO2 SERPL-SCNC: 22 MMOL/L (ref 22–29)
COLOR UR: YELLOW
CREAT SERPL-MCNC: 0.75 MG/DL (ref 0.76–1.27)
D-LACTATE SERPL-SCNC: 1.4 MMOL/L (ref 0.5–2)
DEPRECATED RDW RBC AUTO: 43.8 FL (ref 37–54)
EGFRCR SERPLBLD CKD-EPI 2021: 116.3 ML/MIN/1.73
EOSINOPHIL # BLD AUTO: 0 10*3/MM3 (ref 0–0.4)
EOSINOPHIL NFR BLD AUTO: 0 % (ref 0.3–6.2)
ERYTHROCYTE [DISTWIDTH] IN BLOOD BY AUTOMATED COUNT: 13.5 % (ref 12.3–15.4)
GLOBULIN UR ELPH-MCNC: 3.9 GM/DL
GLUCOSE SERPL-MCNC: 92 MG/DL (ref 65–99)
GLUCOSE UR STRIP-MCNC: NEGATIVE MG/DL
HCT VFR BLD AUTO: 38.8 % (ref 37.5–51)
HGB BLD-MCNC: 13.2 G/DL (ref 13–17.7)
HGB UR QL STRIP.AUTO: ABNORMAL
HYALINE CASTS UR QL AUTO: ABNORMAL /LPF
IMM GRANULOCYTES # BLD AUTO: 0.06 10*3/MM3 (ref 0–0.05)
IMM GRANULOCYTES NFR BLD AUTO: 0.4 % (ref 0–0.5)
KETONES UR QL STRIP: NEGATIVE
LEUKOCYTE ESTERASE UR QL STRIP.AUTO: ABNORMAL
LYMPHOCYTES # BLD AUTO: 1.19 10*3/MM3 (ref 0.7–3.1)
LYMPHOCYTES NFR BLD AUTO: 8.8 % (ref 19.6–45.3)
MCH RBC QN AUTO: 30.2 PG (ref 26.6–33)
MCHC RBC AUTO-ENTMCNC: 34 G/DL (ref 31.5–35.7)
MCV RBC AUTO: 88.8 FL (ref 79–97)
MONOCYTES # BLD AUTO: 1.16 10*3/MM3 (ref 0.1–0.9)
MONOCYTES NFR BLD AUTO: 8.5 % (ref 5–12)
NEUTROPHILS NFR BLD AUTO: 11.15 10*3/MM3 (ref 1.7–7)
NEUTROPHILS NFR BLD AUTO: 82 % (ref 42.7–76)
NITRITE UR QL STRIP: POSITIVE
NRBC BLD AUTO-RTO: 0 /100 WBC (ref 0–0.2)
PH UR STRIP.AUTO: 8 [PH] (ref 5–8)
PLATELET # BLD AUTO: 241 10*3/MM3 (ref 140–450)
PMV BLD AUTO: 9.2 FL (ref 6–12)
POTASSIUM SERPL-SCNC: 4.3 MMOL/L (ref 3.5–5.2)
PROCALCITONIN SERPL-MCNC: 0.21 NG/ML (ref 0–0.25)
PROT SERPL-MCNC: 7.6 G/DL (ref 6–8.5)
PROT UR QL STRIP: ABNORMAL
RBC # BLD AUTO: 4.37 10*6/MM3 (ref 4.14–5.8)
RBC # UR STRIP: ABNORMAL /HPF
REF LAB TEST METHOD: ABNORMAL
SODIUM SERPL-SCNC: 129 MMOL/L (ref 136–145)
SP GR UR STRIP: 1.01 (ref 1–1.03)
SQUAMOUS #/AREA URNS HPF: ABNORMAL /HPF
UROBILINOGEN UR QL STRIP: ABNORMAL
WBC # UR STRIP: ABNORMAL /HPF
WBC NRBC COR # BLD: 13.6 10*3/MM3 (ref 3.4–10.8)

## 2023-08-21 PROCEDURE — 80053 COMPREHEN METABOLIC PANEL: CPT | Performed by: EMERGENCY MEDICINE

## 2023-08-21 PROCEDURE — 87077 CULTURE AEROBIC IDENTIFY: CPT | Performed by: EMERGENCY MEDICINE

## 2023-08-21 PROCEDURE — A9270 NON-COVERED ITEM OR SERVICE: HCPCS | Performed by: INTERNAL MEDICINE

## 2023-08-21 PROCEDURE — G0378 HOSPITAL OBSERVATION PER HR: HCPCS

## 2023-08-21 PROCEDURE — 99284 EMERGENCY DEPT VISIT MOD MDM: CPT

## 2023-08-21 PROCEDURE — 25010000002 PIPERACILLIN SOD-TAZOBACTAM PER 1 G: Performed by: INTERNAL MEDICINE

## 2023-08-21 PROCEDURE — 83605 ASSAY OF LACTIC ACID: CPT | Performed by: EMERGENCY MEDICINE

## 2023-08-21 PROCEDURE — 63710000001 ACETAMINOPHEN 325 MG TABLET: Performed by: INTERNAL MEDICINE

## 2023-08-21 PROCEDURE — 81001 URINALYSIS AUTO W/SCOPE: CPT | Performed by: EMERGENCY MEDICINE

## 2023-08-21 PROCEDURE — P9612 CATHETERIZE FOR URINE SPEC: HCPCS

## 2023-08-21 PROCEDURE — 96366 THER/PROPH/DIAG IV INF ADDON: CPT

## 2023-08-21 PROCEDURE — 96365 THER/PROPH/DIAG IV INF INIT: CPT

## 2023-08-21 PROCEDURE — 87086 URINE CULTURE/COLONY COUNT: CPT | Performed by: EMERGENCY MEDICINE

## 2023-08-21 PROCEDURE — 87040 BLOOD CULTURE FOR BACTERIA: CPT | Performed by: EMERGENCY MEDICINE

## 2023-08-21 PROCEDURE — 36415 COLL VENOUS BLD VENIPUNCTURE: CPT

## 2023-08-21 PROCEDURE — 25010000002 VANCOMYCIN 10 G RECONSTITUTED SOLUTION: Performed by: INTERNAL MEDICINE

## 2023-08-21 PROCEDURE — 96367 TX/PROPH/DG ADDL SEQ IV INF: CPT

## 2023-08-21 PROCEDURE — 63710000001 GABAPENTIN 300 MG CAPSULE: Performed by: INTERNAL MEDICINE

## 2023-08-21 PROCEDURE — 25010000002 CEFTRIAXONE PER 250 MG: Performed by: EMERGENCY MEDICINE

## 2023-08-21 PROCEDURE — 85025 COMPLETE CBC W/AUTO DIFF WBC: CPT | Performed by: EMERGENCY MEDICINE

## 2023-08-21 PROCEDURE — 84145 PROCALCITONIN (PCT): CPT | Performed by: EMERGENCY MEDICINE

## 2023-08-21 PROCEDURE — 73701 CT LOWER EXTREMITY W/DYE: CPT

## 2023-08-21 PROCEDURE — 87186 SC STD MICRODIL/AGAR DIL: CPT | Performed by: EMERGENCY MEDICINE

## 2023-08-21 PROCEDURE — 25510000001 IOPAMIDOL 61 % SOLUTION: Performed by: INTERNAL MEDICINE

## 2023-08-21 RX ORDER — SODIUM CHLORIDE 0.9 % (FLUSH) 0.9 %
10 SYRINGE (ML) INJECTION AS NEEDED
Status: DISCONTINUED | OUTPATIENT
Start: 2023-08-21 | End: 2023-08-24 | Stop reason: HOSPADM

## 2023-08-21 RX ORDER — ARIPIPRAZOLE 5 MG/1
5 TABLET ORAL DAILY
Status: DISCONTINUED | OUTPATIENT
Start: 2023-08-22 | End: 2023-08-24 | Stop reason: HOSPADM

## 2023-08-21 RX ORDER — BISACODYL 5 MG/1
5 TABLET, DELAYED RELEASE ORAL DAILY PRN
Status: DISCONTINUED | OUTPATIENT
Start: 2023-08-21 | End: 2023-08-24 | Stop reason: HOSPADM

## 2023-08-21 RX ORDER — ACETAMINOPHEN 325 MG/1
650 TABLET ORAL EVERY 4 HOURS PRN
Status: DISCONTINUED | OUTPATIENT
Start: 2023-08-21 | End: 2023-08-24 | Stop reason: HOSPADM

## 2023-08-21 RX ORDER — ONDANSETRON 4 MG/1
4 TABLET, FILM COATED ORAL EVERY 6 HOURS PRN
Status: DISCONTINUED | OUTPATIENT
Start: 2023-08-21 | End: 2023-08-24 | Stop reason: HOSPADM

## 2023-08-21 RX ORDER — SODIUM CHLORIDE 9 MG/ML
125 INJECTION, SOLUTION INTRAVENOUS CONTINUOUS
Status: DISCONTINUED | OUTPATIENT
Start: 2023-08-21 | End: 2023-08-24 | Stop reason: HOSPADM

## 2023-08-21 RX ORDER — AMOXICILLIN 250 MG
2 CAPSULE ORAL 2 TIMES DAILY
Status: DISCONTINUED | OUTPATIENT
Start: 2023-08-21 | End: 2023-08-24 | Stop reason: HOSPADM

## 2023-08-21 RX ORDER — BUSPIRONE HYDROCHLORIDE 15 MG/1
15 TABLET ORAL EVERY 12 HOURS SCHEDULED
Status: DISCONTINUED | OUTPATIENT
Start: 2023-08-21 | End: 2023-08-24 | Stop reason: HOSPADM

## 2023-08-21 RX ORDER — ACETAMINOPHEN 500 MG
1000 TABLET ORAL ONCE
Status: COMPLETED | OUTPATIENT
Start: 2023-08-21 | End: 2023-08-21

## 2023-08-21 RX ORDER — ONDANSETRON 2 MG/ML
4 INJECTION INTRAMUSCULAR; INTRAVENOUS EVERY 6 HOURS PRN
Status: DISCONTINUED | OUTPATIENT
Start: 2023-08-21 | End: 2023-08-24 | Stop reason: HOSPADM

## 2023-08-21 RX ORDER — GABAPENTIN 300 MG/1
600 CAPSULE ORAL EVERY 8 HOURS SCHEDULED
Status: DISCONTINUED | OUTPATIENT
Start: 2023-08-21 | End: 2023-08-24 | Stop reason: HOSPADM

## 2023-08-21 RX ORDER — BACLOFEN 10 MG/1
10 TABLET ORAL 3 TIMES DAILY
Status: DISCONTINUED | OUTPATIENT
Start: 2023-08-21 | End: 2023-08-24 | Stop reason: HOSPADM

## 2023-08-21 RX ORDER — UREA 10 %
3 LOTION (ML) TOPICAL NIGHTLY PRN
Status: DISCONTINUED | OUTPATIENT
Start: 2023-08-21 | End: 2023-08-24 | Stop reason: HOSPADM

## 2023-08-21 RX ORDER — VANCOMYCIN/0.9 % SOD CHLORIDE 1.5G/250ML
1500 PLASTIC BAG, INJECTION (ML) INTRAVENOUS EVERY 12 HOURS
Status: DISCONTINUED | OUTPATIENT
Start: 2023-08-21 | End: 2023-08-23

## 2023-08-21 RX ORDER — POLYETHYLENE GLYCOL 3350 17 G/17G
17 POWDER, FOR SOLUTION ORAL DAILY PRN
Status: DISCONTINUED | OUTPATIENT
Start: 2023-08-21 | End: 2023-08-24 | Stop reason: HOSPADM

## 2023-08-21 RX ORDER — BISACODYL 10 MG
10 SUPPOSITORY, RECTAL RECTAL DAILY PRN
Status: DISCONTINUED | OUTPATIENT
Start: 2023-08-21 | End: 2023-08-24 | Stop reason: HOSPADM

## 2023-08-21 RX ADMIN — SODIUM CHLORIDE, POTASSIUM CHLORIDE, SODIUM LACTATE AND CALCIUM CHLORIDE 1000 ML: 600; 310; 30; 20 INJECTION, SOLUTION INTRAVENOUS at 12:43

## 2023-08-21 RX ADMIN — CEFTRIAXONE SODIUM 1000 MG: 1 INJECTION, POWDER, FOR SOLUTION INTRAMUSCULAR; INTRAVENOUS at 12:45

## 2023-08-21 RX ADMIN — VANCOMYCIN HYDROCHLORIDE 1500 MG: 10 INJECTION, POWDER, LYOPHILIZED, FOR SOLUTION INTRAVENOUS at 22:03

## 2023-08-21 RX ADMIN — ACETAMINOPHEN 650 MG: 325 TABLET, FILM COATED ORAL at 21:07

## 2023-08-21 RX ADMIN — IOPAMIDOL 85 ML: 612 INJECTION, SOLUTION INTRAVENOUS at 20:36

## 2023-08-21 RX ADMIN — PIPERACILLIN SODIUM AND TAZOBACTAM SODIUM 3.38 G: 3; .375 INJECTION, SOLUTION INTRAVENOUS at 21:06

## 2023-08-21 RX ADMIN — ACETAMINOPHEN 1000 MG: 500 TABLET, FILM COATED ORAL at 12:58

## 2023-08-21 RX ADMIN — GABAPENTIN 600 MG: 300 CAPSULE ORAL at 23:04

## 2023-08-21 NOTE — NURSING NOTE
Wound/Ostomy: Consult received regarding skin issue on bilateral lower leg. Patient is alert, oriented, able to repositioning. Upon assessment is observed partial full-thickness skin and tissue loss on left dorsal foot, left medial upper lower leg and rt lat distal foot, with yellowish discharge, possibly related to vascular problem.   Xeroform dressing and cover with Optifoam is ordered.   To bilateral lower leg/feet purple discoloration and coldness is noted, more accentuated in the feet and rt great Toe, we think an evaluation by Vascular Dr,  it would be beneficial.  Rn notified.  In addition, the patient has Urostomy and Colostomy, he is independent of Ostomy care, and stated he does not need supplies.  Please re-consult for any additional needs.

## 2023-08-21 NOTE — PLAN OF CARE
Goal Outcome Evaluation:  Plan of Care Reviewed With: patient        Progress: no change  Outcome Evaluation: Admit fro ER with UTI. Multiple wounds. Urostomy and colostomy. Paralyzed from chest down. Able to turn self in bed and get to WC. BLE santiago, cyanotic, WOCN recommend vascular consult. Daily dressing changes ordered. Temp max 99.0.
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
## 2023-08-21 NOTE — PROGRESS NOTES
"[facility] Clinical Pharmacy Services: Vancomycin Pharmacokinetic Initial Consult Note    Pk May is a 42 y.o. male who is on day 1 of pharmacy to dose vancomycin.    Indication: Skin and Soft Tissue  Consulting Provider: Dr. Amezquita  Planned Duration of Therapy: TBD by clinical course  Loading Dose Ordered or Given: Not indicated  MRSA PCR performed: Not ordered  Culture/Source:   8/21: Bcx pending 2/2  Target: -600 mg/L.hr   Pertinent Vanc Dosing History: None  Other Antimicrobials: Zosyn    Vitals/Labs  Ht: 177.8 cm (70\"); Wt: 95.3 kg (210 lb)  Temp Readings from Last 1 Encounters:   08/21/23 99 °F (37.2 °C) (Oral)    Estimated Creatinine Clearance: 150.2 mL/min (A) (by C-G formula based on SCr of 0.75 mg/dL (L)).        Results from last 7 days   Lab Units 08/21/23  1133   CREATININE mg/dL 0.75*   WBC 10*3/mm3 13.60*     Assessment/Plan:    Vancomycin Dose:   1500 mg IV every  12  hours  Predictive AUC level for the dose ordered is 548 mg/L.hr, which is within the target of 400-600 mg/L.hr  Vancomycin level has been ordered for 8/23 at 0600 with AM labs     Pharmacy will follow patient's kidney function and will adjust doses and obtain levels as necessary. Thank you for involving pharmacy in this patient's care. Please contact pharmacy with any questions or concerns.                           Adelfo Loyd, [facility]  Clinical Pharmacist    "
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
## 2023-08-21 NOTE — ED NOTES
Nursing report ED to floor  Pk May  42 y.o.  male    HPI :   Chief Complaint   Patient presents with    Fever    Dysuria       Admitting doctor:   No admitting provider for patient encounter.    Admitting diagnosis:   The encounter diagnosis was Acute UTI.    Code status:   Current Code Status       Date Active Code Status Order ID Comments User Context       Prior            Allergies:   Pholcodine and Codeine    Isolation:   No active isolations    Intake and Output    Intake/Output Summary (Last 24 hours) at 8/21/2023 1323  Last data filed at 8/21/2023 1323  Gross per 24 hour   Intake 100 ml   Output --   Net 100 ml       Weight:       08/21/23  1005   Weight: 95.3 kg (210 lb)       Most recent vitals:   Vitals:    08/21/23 1253 08/21/23 1258 08/21/23 1315 08/21/23 1323   BP:  136/84     Pulse: (!) 123 (!) 126 (!) 124    Resp:  19  16   Temp: (!) 102.3 °F (39.1 °C)   (!) 102.6 °F (39.2 °C)   TempSrc: Tympanic   Tympanic   SpO2:  96% 99%    Weight:       Height:           Active LDAs/IV Access:   Lines, Drains & Airways       Active LDAs       Name Placement date Placement time Site Days    Peripheral IV 03/06/23 1722 Left;Posterior Hand 03/06/23  1722  Hand  167    Peripheral IV 08/21/23 1154 Left Antecubital 08/21/23  1154  Antecubital  less than 1    Colostomy LUQ --  pt stated 2-3 years ago  --  LUQ  --    Urostomy RUQ --  pt stated 2-3 years ago  --  RUQ  --                    Labs (abnormal labs have a star):   Labs Reviewed   COMPREHENSIVE METABOLIC PANEL - Abnormal; Notable for the following components:       Result Value    Creatinine 0.75 (*)     Sodium 129 (*)     Chloride 97 (*)     All other components within normal limits    Narrative:     GFR Normal >60  Chronic Kidney Disease <60  Kidney Failure <15     URINALYSIS W/ MICROSCOPIC IF INDICATED (NO CULTURE) - Abnormal; Notable for the following components:    Appearance, UA Cloudy (*)     Blood, UA Moderate (2+) (*)     Protein, UA 30 mg/dL  "(1+) (*)     Leuk Esterase, UA Large (3+) (*)     Nitrite, UA Positive (*)     All other components within normal limits   CBC WITH AUTO DIFFERENTIAL - Abnormal; Notable for the following components:    WBC 13.60 (*)     Neutrophil % 82.0 (*)     Lymphocyte % 8.8 (*)     Eosinophil % 0.0 (*)     Neutrophils, Absolute 11.15 (*)     Monocytes, Absolute 1.16 (*)     Immature Grans, Absolute 0.06 (*)     All other components within normal limits   URINALYSIS, MICROSCOPIC ONLY - Abnormal; Notable for the following components:    RBC, UA 6-12 (*)     WBC, UA 31-50 (*)     Bacteria, UA 2+ (*)     All other components within normal limits   LACTIC ACID, PLASMA - Normal   PROCALCITONIN - Normal    Narrative:     As a Marker for Sepsis (Non-Neonates):    1. <0.5 ng/mL represents a low risk of severe sepsis and/or septic shock.  2. >2 ng/mL represents a high risk of severe sepsis and/or septic shock.    As a Marker for Lower Respiratory Tract Infections that require antibiotic therapy:    PCT on Admission    Antibiotic Therapy       6-12 Hrs later    >0.5                Strongly Recommended  >0.25 - <0.5        Recommended   0.1 - 0.25          Discouraged              Remeasure/reassess PCT  <0.1                Strongly Discouraged     Remeasure/reassess PCT    As 28 day mortality risk marker: \"Change in Procalcitonin Result\" (>80% or <=80%) if Day 0 (or Day 1) and Day 4 values are available. Refer to http://www.[email or web address]-pct-calculator.[email or web address]    Change in PCT <=80%  A decrease of PCT levels below or equal to 80% defines a positive change in PCT test result representing a higher risk for 28-day all-cause mortality of patients diagnosed with severe sepsis for septic shock.    Change in PCT >80%  A decrease of PCT levels of more than 80% defines a negative change in PCT result representing a lower risk for 28-day all-cause mortality of patients diagnosed with severe sepsis or septic shock.      BLOOD CULTURE   BLOOD CULTURE "   URINALYSIS W/ CULTURE IF INDICATED   CBC AND DIFFERENTIAL    Narrative:     The following orders were created for panel order CBC & Differential.  Procedure                               Abnormality         Status                     ---------                               -----------         ------                     CBC Auto Differential[[id number]]        Abnormal            Final result                 Please view results for these tests on the individual orders.       EKG:   No orders to display       Meds given in ED:   Medications   sodium chloride 0.9 % flush 10 mL (has no administration in time range)   cefTRIAXone (ROCEPHIN) 1,000 mg in sodium chloride 0.9 % 100 mL IVPB-VTB (0 mg Intravenous Stopped 8/21/23 1323)   lactated ringers bolus 1,000 mL (1,000 mL Intravenous New Bag 8/21/23 1243)   acetaminophen (TYLENOL) tablet 1,000 mg (1,000 mg Oral Given 8/21/23 1258)       Imaging results:  No radiology results for the last day    Ambulatory status:   - bedrest, pt is paralyzed from chest down    Social issues:   Social History     Socioeconomic History    Marital status: Significant Other   Tobacco Use    Smoking status: Every Day     Packs/day: 1.00     Years: 24.00     Pack years: 24.00     Types: Cigarettes   Vaping Use    Vaping Use: Every day    Substances: Nicotine   Substance and Sexual Activity    Alcohol use: Not Currently    Drug use: Yes     Types: Marijuana       NIH Stroke Scale:       Padmini Guy RN  08/21/23 13:23 EDT

## 2023-08-21 NOTE — CONSULTS
"Name: Pk May ADMIT: 2023   : 1980  PCP: Chasity Ruggiero APRN    MRN: [id number] LOS: 0 days   AGE/SEX: 42 y.o. male  ROOM: [facility]     Inpatient Vascular Surgery Consult  Consult performed by: Jennifer Parada MD  Consult ordered by: Isatu Amezquita MD Ashlee Ann Vinyard, MD     LOS: 0 days   Patient Care Team:  Chasity Ruggiero APRN as PCP - General (Family Medicine)    Subjective     History of Present Illness  42 y.o. male with a history of paraplegia and bilateral lower extremity wounds that occurred due to his compression stockings that was admitted to the [facility] ER with fevers and a UTI.  He has had worsening wounds of the lower extremities with worsening redness.  He has been febrile to 103 F.  He is insensate to his legs.  He has a urostomy but his urine \"smells bad\" so he thinks he has a UTI.  He has been missing wound care recently due to transportation issues, but prior to that his wounds were healing.  He does get bilateral dependent leg swelling that is chronic.  He thinks it is symmetric but is not certain.  He does not know how long his right leg has been more swollen than the left.      Review of Systems   Constitutional:  Positive for chills and fever.   HENT: Negative.     Eyes: Negative.    Respiratory: Negative.     Cardiovascular: Negative.    Gastrointestinal: Negative.    Endocrine: Negative.    Genitourinary: Negative.    Musculoskeletal: Negative.    Skin: Negative.    Allergic/Immunologic: Negative.    Neurological: Negative.    Hematological: Negative.    Psychiatric/Behavioral: Negative.       Past Medical History:   Diagnosis Date    Paraplegia     Wound infection        Past Surgical History:   Procedure Laterality Date    SPINAL FUSION         Family History   Problem Relation Age of Onset    No Known Problems Mother     No Known Problems Father          Social History     Tobacco Use    Smoking status: Every Day     Packs/day: 1.00 "     Years: 24.00     Pack years: 24.00     Types: Cigarettes   Vaping Use    Vaping Use: Every day    Substances: Nicotine   Substance Use Topics    Alcohol use: Not Currently    Drug use: Yes     Types: Marijuana       Allergies: Pholcodine and Codeine    Medications Prior to Admission   Medication Sig Dispense Refill Last Dose    amitriptyline (ELAVIL) 75 MG tablet Take 1 tablet by mouth 2 (Two) Times a Day. 180 tablet 3     ARIPiprazole (ABILIFY) 5 MG tablet Take 1 tablet by mouth Daily.       baclofen (LIORESAL) 10 MG tablet TAKE 1 TABLET BY MOUTH THREE TIMES DAILY 14 tablet 0     busPIRone (BUSPAR) 30 MG tablet TAKE 0.5 TABLETS BY MOUTH EVERY MORNING AND 1 TABLET EVERY MORNING       gabapentin (NEURONTIN) 600 MG tablet Take 2 tablets by mouth 3 (Three) Times a Day As Needed (pain). 180 tablet 1     triamcinolone (KENALOG) 0.1 % paste Apply to oral ulcer twice daily until healed 5 g 1     venlafaxine XR (EFFEXOR-XR) 150 MG 24 hr capsule Take 1 capsule by mouth Daily. 90 capsule 1      senna-docusate sodium, 2 tablet, Oral, BID           acetaminophen    senna-docusate sodium **AND** polyethylene glycol **AND** bisacodyl **AND** bisacodyl    melatonin    ondansetron **OR** ondansetron    [COMPLETED] Insert Peripheral IV **AND** sodium chloride      Objective   Temp:  [98.3 °F (36.8 °C)-102.6 °F (39.2 °C)] 99 °F (37.2 °C)  Heart Rate:  [115-132] 115  Resp:  [16-19] 16  BP: (117-159)/() 117/72    I/O this shift:  In: 1100 [IV Piggyback:1100]  Out: -     Physical Exam  Vitals reviewed.   Constitutional:       General: He is not in acute distress.     Appearance: Normal appearance. He is ill-appearing.   HENT:      Head: Normocephalic and atraumatic.      Right Ear: External ear normal.      Left Ear: External ear normal.      Nose: Nose normal.      Mouth/Throat:      Mouth: Mucous membranes are moist.      Pharynx: Oropharynx is clear.   Eyes:      Extraocular Movements: Extraocular movements intact.       Conjunctiva/sclera: Conjunctivae normal.      Pupils: Pupils are equal, round, and reactive to light.   Cardiovascular:      Rate and Rhythm: Regular rhythm. Tachycardia present.      Comments: 1+ pedal pulses bilaterally  2+ edema of the right foot  Pulmonary:      Effort: Pulmonary effort is normal. No respiratory distress.   Abdominal:      General: Abdomen is flat.      Palpations: Abdomen is soft.   Musculoskeletal:         General: Swelling present.      Cervical back: Normal range of motion. No rigidity.      Right lower leg: Edema present.      Left lower leg: Edema present.      Comments: Erythema of the right foot and calf  Slight erythema of the left calf  Multiple superficial scabs/eschars on bilateral feet and calves   Skin:     General: Skin is warm and dry.      Capillary Refill: Capillary refill takes less than 2 seconds.   Neurological:      General: No focal deficit present.      Mental Status: He is alert and oriented to person, place, and time.   Psychiatric:         Mood and Affect: Mood normal.         Behavior: Behavior normal.       Results from last 7 days   Lab Units 08/21/23  1133   WBC 10*3/mm3 13.60*   HEMOGLOBIN g/dL 13.2   PLATELETS 10*3/mm3 241     Results from last 7 days   Lab Units 08/21/23  1133   SODIUM mmol/L 129*   POTASSIUM mmol/L 4.3   CHLORIDE mmol/L 97*   CO2 mmol/L 22.0   BUN mg/dL 11   CREATININE mg/dL 0.75*   GLUCOSE mg/dL 92   Estimated Creatinine Clearance: 150.2 mL/min (A) (by C-G formula based on SCr of 0.75 mg/dL (L)).      Imaging Studies:  pending      Active Hospital Problems    Diagnosis  POA    **Acute UTI [N39.0]  Yes      Resolved Hospital Problems   No resolved problems to display.     Problem Points:  4:  Patient has a new problem, with additional work-up planned  Total problem points:4 or more    Data Points:  1:  I have reviewed or order clinical lab test  1:  I have reviewed or order radiology test (except heart catheterization or echo)  2:  I have  reviewed and summation of old records and/or discussed the patients care with another health care provider  Total data points:4 or more    Risk Points:  Moderate: New diagnosis with unknown prognosis    MDM Prob point Data point Risk   SF 1 1 Minimal   Low 2 2 Low   Mod 3 3 Moderate   High 4 4 High     Code MDM History Exam Time   43452 SF/Low Detailed Detailed 30   07732 Mod Comprehensive Comprehensive 50   51233 High Comprehensive Comprehensive 70     Detailed history:  4 elements HPI or status of 3 chronic problems; 2-9 system ROS  Comprehensive:  4 elements HPI or status of 3 chronic problems;  10 system ROS    Detailed Exam:  12 findings from any organ system  Comprehensive Exam:  2 findings from each of 9 systems.     Billin    Assessment & Plan       Acute UTI        42 y.o. male with UTI and worsening bilateral lower extremity wounds and erythema    -no evidence of deep space infection on physical exam- no fluctuance, no drainage.  Will obtain CT scan to evaluate for deep space infection that would require surgical drainage.  Agree with broad spectrum empiric antibiotics for cellulitis.  I have ordered ABIs to confirm he has adequate circulation for wound healing.  I agree with continued local wound care to his multiple superficial wounds.      I discussed the patients findings and my recommendations with patient.  Please call my office with any question: [phone]    Jennifer Parada MD  23  18:59 EDT

## 2023-08-22 ENCOUNTER — APPOINTMENT (OUTPATIENT)
Dept: CARDIOLOGY | Facility: HOSPITAL | Age: 43
End: 2023-08-22
Payer: MEDICARE

## 2023-08-22 PROBLEM — N39.0 UTI (URINARY TRACT INFECTION): Status: ACTIVE | Noted: 2023-08-22

## 2023-08-22 LAB
ANION GAP SERPL CALCULATED.3IONS-SCNC: 10.3 MMOL/L (ref 5–15)
BH CV LOWER ARTERIAL LEFT ABI RATIO: 1.1
BH CV LOWER ARTERIAL LEFT DORSALIS PEDIS SYS MAX: 104
BH CV LOWER ARTERIAL LEFT GREAT TOE SYS MAX: 71
BH CV LOWER ARTERIAL LEFT POST TIBIAL SYS MAX: 118
BH CV LOWER ARTERIAL LEFT TBI RATIO: 0.65
BH CV LOWER ARTERIAL RIGHT ABI RATIO: 1.1
BH CV LOWER ARTERIAL RIGHT DORSALIS PEDIS SYS MAX: 120
BH CV LOWER ARTERIAL RIGHT GREAT TOE SYS MAX: 63
BH CV LOWER ARTERIAL RIGHT POST TIBIAL SYS MAX: 117
BH CV LOWER ARTERIAL RIGHT TBI RATIO: 0.57
BH CV LOWER VASCULAR LEFT COMMON FEMORAL AUGMENT: NORMAL
BH CV LOWER VASCULAR LEFT COMMON FEMORAL COMPETENT: NORMAL
BH CV LOWER VASCULAR LEFT COMMON FEMORAL COMPRESS: NORMAL
BH CV LOWER VASCULAR LEFT COMMON FEMORAL PHASIC: NORMAL
BH CV LOWER VASCULAR LEFT COMMON FEMORAL SPONT: NORMAL
BH CV LOWER VASCULAR RIGHT COMMON FEMORAL AUGMENT: NORMAL
BH CV LOWER VASCULAR RIGHT COMMON FEMORAL COMPETENT: NORMAL
BH CV LOWER VASCULAR RIGHT COMMON FEMORAL COMPRESS: NORMAL
BH CV LOWER VASCULAR RIGHT COMMON FEMORAL PHASIC: NORMAL
BH CV LOWER VASCULAR RIGHT COMMON FEMORAL SPONT: NORMAL
BH CV LOWER VASCULAR RIGHT DISTAL FEMORAL COMPRESS: NORMAL
BH CV LOWER VASCULAR RIGHT GASTRONEMIUS COMPRESS: NORMAL
BH CV LOWER VASCULAR RIGHT GREATER SAPH AK COMPRESS: NORMAL
BH CV LOWER VASCULAR RIGHT GREATER SAPH BK COMPRESS: NORMAL
BH CV LOWER VASCULAR RIGHT LESSER SAPH COMPRESS: NORMAL
BH CV LOWER VASCULAR RIGHT MID FEMORAL AUGMENT: NORMAL
BH CV LOWER VASCULAR RIGHT MID FEMORAL COMPETENT: NORMAL
BH CV LOWER VASCULAR RIGHT MID FEMORAL COMPRESS: NORMAL
BH CV LOWER VASCULAR RIGHT MID FEMORAL PHASIC: NORMAL
BH CV LOWER VASCULAR RIGHT MID FEMORAL SPONT: NORMAL
BH CV LOWER VASCULAR RIGHT PERONEAL COMPRESS: NORMAL
BH CV LOWER VASCULAR RIGHT POPLITEAL AUGMENT: NORMAL
BH CV LOWER VASCULAR RIGHT POPLITEAL COMPETENT: NORMAL
BH CV LOWER VASCULAR RIGHT POPLITEAL COMPRESS: NORMAL
BH CV LOWER VASCULAR RIGHT POPLITEAL PHASIC: NORMAL
BH CV LOWER VASCULAR RIGHT POPLITEAL SPONT: NORMAL
BH CV LOWER VASCULAR RIGHT POSTERIOR TIBIAL COMPRESS: NORMAL
BH CV LOWER VASCULAR RIGHT PROFUNDA FEMORAL COMPRESS: NORMAL
BH CV LOWER VASCULAR RIGHT PROXIMAL FEMORAL COMPRESS: NORMAL
BH CV LOWER VASCULAR RIGHT SAPHENOFEMORAL JUNCTION COMPRESS: NORMAL
BUN SERPL-MCNC: 8 MG/DL (ref 6–20)
BUN/CREAT SERPL: 12.3 (ref 7–25)
CALCIUM SPEC-SCNC: 8.4 MG/DL (ref 8.6–10.5)
CHLORIDE SERPL-SCNC: 101 MMOL/L (ref 98–107)
CO2 SERPL-SCNC: 23.7 MMOL/L (ref 22–29)
CREAT SERPL-MCNC: 0.65 MG/DL (ref 0.76–1.27)
DEPRECATED RDW RBC AUTO: 42.8 FL (ref 37–54)
EGFRCR SERPLBLD CKD-EPI 2021: 121.4 ML/MIN/1.73
ERYTHROCYTE [DISTWIDTH] IN BLOOD BY AUTOMATED COUNT: 13.5 % (ref 12.3–15.4)
GLUCOSE SERPL-MCNC: 103 MG/DL (ref 65–99)
HCT VFR BLD AUTO: 32 % (ref 37.5–51)
HGB BLD-MCNC: 11.1 G/DL (ref 13–17.7)
MCH RBC QN AUTO: 30.4 PG (ref 26.6–33)
MCHC RBC AUTO-ENTMCNC: 34.7 G/DL (ref 31.5–35.7)
MCV RBC AUTO: 87.7 FL (ref 79–97)
PLATELET # BLD AUTO: 235 10*3/MM3 (ref 140–450)
PMV BLD AUTO: 9.1 FL (ref 6–12)
POTASSIUM SERPL-SCNC: 3.3 MMOL/L (ref 3.5–5.2)
RBC # BLD AUTO: 3.65 10*6/MM3 (ref 4.14–5.8)
SODIUM SERPL-SCNC: 135 MMOL/L (ref 136–145)
UPPER ARTERIAL LEFT ARM BRACHIAL SYS MAX: 110 MMHG
UPPER ARTERIAL RIGHT ARM BRACHIAL SYS MAX: 110 MMHG
WBC NRBC COR # BLD: 9.45 10*3/MM3 (ref 3.4–10.8)

## 2023-08-22 PROCEDURE — 85027 COMPLETE CBC AUTOMATED: CPT | Performed by: INTERNAL MEDICINE

## 2023-08-22 PROCEDURE — A9270 NON-COVERED ITEM OR SERVICE: HCPCS | Performed by: INTERNAL MEDICINE

## 2023-08-22 PROCEDURE — 25010000002 PIPERACILLIN SOD-TAZOBACTAM PER 1 G: Performed by: INTERNAL MEDICINE

## 2023-08-22 PROCEDURE — 93922 UPR/L XTREMITY ART 2 LEVELS: CPT

## 2023-08-22 PROCEDURE — 63710000001 GABAPENTIN 300 MG CAPSULE: Performed by: INTERNAL MEDICINE

## 2023-08-22 PROCEDURE — 80048 BASIC METABOLIC PNL TOTAL CA: CPT | Performed by: INTERNAL MEDICINE

## 2023-08-22 PROCEDURE — 63710000001 ARIPIPRAZOLE 5 MG TABLET: Performed by: INTERNAL MEDICINE

## 2023-08-22 PROCEDURE — 63710000001 BACLOFEN 10 MG TABLET: Performed by: INTERNAL MEDICINE

## 2023-08-22 PROCEDURE — 63710000001 BUSPIRONE 15 MG TABLET: Performed by: INTERNAL MEDICINE

## 2023-08-22 PROCEDURE — G0378 HOSPITAL OBSERVATION PER HR: HCPCS

## 2023-08-22 PROCEDURE — 93971 EXTREMITY STUDY: CPT

## 2023-08-22 PROCEDURE — 63710000001 AMITRIPTYLINE 50 MG TABLET: Performed by: INTERNAL MEDICINE

## 2023-08-22 PROCEDURE — 63710000001 AMITRIPTYLINE 25 MG TABLET: Performed by: INTERNAL MEDICINE

## 2023-08-22 PROCEDURE — 25010000002 VANCOMYCIN 10 G RECONSTITUTED SOLUTION: Performed by: INTERNAL MEDICINE

## 2023-08-22 PROCEDURE — 96361 HYDRATE IV INFUSION ADD-ON: CPT

## 2023-08-22 RX ORDER — NICOTINE 21 MG/24HR
1 PATCH, TRANSDERMAL 24 HOURS TRANSDERMAL EVERY 24 HOURS
Status: DISCONTINUED | OUTPATIENT
Start: 2023-08-22 | End: 2023-08-24 | Stop reason: HOSPADM

## 2023-08-22 RX ORDER — LOPERAMIDE HYDROCHLORIDE 2 MG/1
2 CAPSULE ORAL ONCE AS NEEDED
Status: COMPLETED | OUTPATIENT
Start: 2023-08-22 | End: 2023-08-22

## 2023-08-22 RX ORDER — NICOTINE 21 MG/24HR
1 PATCH, TRANSDERMAL 24 HOURS TRANSDERMAL
Status: DISCONTINUED | OUTPATIENT
Start: 2023-08-23 | End: 2023-08-22

## 2023-08-22 RX ADMIN — BACLOFEN 10 MG: 10 TABLET ORAL at 08:45

## 2023-08-22 RX ADMIN — PIPERACILLIN SODIUM AND TAZOBACTAM SODIUM 3.38 G: 3; .375 INJECTION, SOLUTION INTRAVENOUS at 11:42

## 2023-08-22 RX ADMIN — BUSPIRONE HYDROCHLORIDE 15 MG: 15 TABLET ORAL at 08:45

## 2023-08-22 RX ADMIN — AMITRIPTYLINE HYDROCHLORIDE 75 MG: 50 TABLET, FILM COATED ORAL at 08:45

## 2023-08-22 RX ADMIN — SODIUM CHLORIDE 125 ML/HR: 9 INJECTION, SOLUTION INTRAVENOUS at 19:57

## 2023-08-22 RX ADMIN — AMITRIPTYLINE HYDROCHLORIDE 75 MG: 50 TABLET, FILM COATED ORAL at 00:56

## 2023-08-22 RX ADMIN — AMITRIPTYLINE HYDROCHLORIDE 75 MG: 50 TABLET, FILM COATED ORAL at 19:54

## 2023-08-22 RX ADMIN — BACLOFEN 10 MG: 10 TABLET ORAL at 16:19

## 2023-08-22 RX ADMIN — VANCOMYCIN HYDROCHLORIDE 1500 MG: 10 INJECTION, POWDER, LYOPHILIZED, FOR SOLUTION INTRAVENOUS at 08:45

## 2023-08-22 RX ADMIN — GABAPENTIN 600 MG: 300 CAPSULE ORAL at 21:08

## 2023-08-22 RX ADMIN — LOPERAMIDE HYDROCHLORIDE 2 MG: 2 CAPSULE ORAL at 19:57

## 2023-08-22 RX ADMIN — GABAPENTIN 600 MG: 300 CAPSULE ORAL at 14:32

## 2023-08-22 RX ADMIN — NICOTINE 1 PATCH: 21 PATCH, EXTENDED RELEASE TRANSDERMAL at 21:41

## 2023-08-22 RX ADMIN — ARIPIPRAZOLE 5 MG: 5 TABLET ORAL at 08:45

## 2023-08-22 RX ADMIN — PIPERACILLIN SODIUM AND TAZOBACTAM SODIUM 3.38 G: 3; .375 INJECTION, SOLUTION INTRAVENOUS at 17:29

## 2023-08-22 RX ADMIN — SODIUM CHLORIDE 125 ML/HR: 9 INJECTION, SOLUTION INTRAVENOUS at 08:45

## 2023-08-22 RX ADMIN — BACLOFEN 10 MG: 10 TABLET ORAL at 19:54

## 2023-08-22 RX ADMIN — BUSPIRONE HYDROCHLORIDE 15 MG: 15 TABLET ORAL at 19:53

## 2023-08-22 RX ADMIN — VANCOMYCIN HYDROCHLORIDE 1500 MG: 10 INJECTION, POWDER, LYOPHILIZED, FOR SOLUTION INTRAVENOUS at 19:52

## 2023-08-22 RX ADMIN — BACLOFEN 10 MG: 10 TABLET ORAL at 00:57

## 2023-08-22 RX ADMIN — PIPERACILLIN SODIUM AND TAZOBACTAM SODIUM 3.38 G: 3; .375 INJECTION, SOLUTION INTRAVENOUS at 02:40

## 2023-08-22 RX ADMIN — BUSPIRONE HYDROCHLORIDE 15 MG: 15 TABLET ORAL at 00:56

## 2023-08-22 RX ADMIN — GABAPENTIN 600 MG: 300 CAPSULE ORAL at 06:30

## 2023-08-22 RX ADMIN — Medication 10 ML: at 19:52

## 2023-08-22 NOTE — PROGRESS NOTES
Name: Pk May ADMIT: 2023   : 1980  PCP: Chasity Ruggiero APRN    MRN: [id number] LOS: 0 days   AGE/SEX: 42 y.o. male  ROOM: [facility]     Subjective   Subjective   Patient is seen at bedside, no new complaints.       Objective   Objective   Vital Signs  Temp:  [98.6 °F (37 °C)-99.7 °F (37.6 °C)] 98.6 °F (37 °C)  Heart Rate:  [103-119] 103  Resp:  [16] 16  BP: (108-137)/(59-68) 109/59  SpO2:  [95 %-100 %] 100 %  on   ;   Device (Oxygen Therapy): room air  Body mass index is 29.01 kg/m².  Physical Exam  General Appearance:    Alert, cooperative, no distress, appears stated age; chronically ill appearing   Head:    Normocephalic, without obvious abnormality, atraumatic   Eyes:    PERRL, conjunctivae/corneas clear, EOM's intact, both eyes   Ears:    Normal external ear canals, both ears   Nose:   Nares normal, septum midline, mucosa normal, no drainage    or sinus tenderness   Throat:   Lips, mucosa, and tongue normal   Neck:   Supple, symmetrical, trachea midline, no adenopathy;     thyroid:  no enlargement/tenderness/nodules; no carotid    bruit or JVD   Back:     Symmetric, no curvature, ROM normal, no CVA tenderness   Lungs:     Decreased breath sounds bilaterally, respirations unlabored   Chest Wall:    No tenderness or deformity    Heart:    Regular rate and rhythm, S1 and S2 normal, no murmur, rub   or gallop   Abdomen:     Soft, nontender, bowel sounds active all four quadrants,     no masses, no hepatomegaly, no splenomegaly   Extremities:   Extremities normal, atraumatic, leg ulcers present         Results Review     I reviewed the patient's new clinical results.  Results from last 7 days   Lab Units 23  0421 23  1133   WBC 10*3/mm3 9.45 13.60*   HEMOGLOBIN g/dL 11.1* 13.2   PLATELETS 10*3/mm3 235 241     Results from last 7 days   Lab Units 23  0421 23  1133   SODIUM mmol/L 135* 129*   POTASSIUM mmol/L 3.3* 4.3   CHLORIDE mmol/L 101 97*   CO2 mmol/L 23.7 22.0    BUN mg/dL 8 11   CREATININE mg/dL 0.65* 0.75*   GLUCOSE mg/dL 103* 92   EGFR mL/min/1.73 121.4 116.3     Results from last 7 days   Lab Units 08/21/23  1133   ALBUMIN g/dL 3.7   BILIRUBIN mg/dL 0.5   ALK PHOS U/L 114   AST (SGOT) U/L 22   ALT (SGPT) U/L 14     Results from last 7 days   Lab Units 08/22/23  0421 08/21/23  1133   CALCIUM mg/dL 8.4* 8.9   ALBUMIN g/dL  --  3.7     Results from last 7 days   Lab Units 08/21/23  1133   PROCALCITONIN ng/mL 0.21   LACTATE mmol/L 1.4     No results found for: HGBA1C, POCGLU    CT Lower Extremity Bilateral With Contrast    Result Date: 8/21/2023  No fluid collections identified in either lower extremity.  Radiation dose reduction techniques were utilized, including automated exposure control and exposure modulation based on body size.   This report was finalized on 8/21/2023 9:21 PM by Dr. Barbie Garcia M.D.       I have personally reviewed all medications:  Scheduled Medications  amitriptyline, 75 mg, Oral, BID  ARIPiprazole, 5 mg, Oral, Daily  baclofen, 10 mg, Oral, TID  busPIRone, 15 mg, Oral, Q12H  gabapentin, 600 mg, Oral, Q8H  piperacillin-tazobactam, 3.375 g, Intravenous, Q8H  senna-docusate sodium, 2 tablet, Oral, BID  vancomycin, 1,500 mg, Intravenous, Q12H    Infusions  Pharmacy to dose vancomycin,   sodium chloride, 125 mL/hr, Last Rate: 125 mL/hr (08/22/23 0845)    Diet  Diet: Cardiac Diets; Healthy Heart (2-3 Na+); Texture: Regular Texture (IDDSI 7); Fluid Consistency: Thin (IDDSI 0)    I have personally reviewed:  [x]  Laboratory   [x]  Microbiology   [x]  Radiology   [x]  EKG/Telemetry  [x]  Cardiology/Vascular   []  Pathology    []  Records       Assessment/Plan     Active Hospital Problems    Diagnosis  POA    **Acute UTI [N39.0]  Yes    UTI (urinary tract infection) [N39.0]  Yes    PTSD (post-traumatic stress disorder) [F43.10]  Yes    Colostomy in place [Z93.3]  Not Applicable    Tobacco use [Z72.0]  Yes    Paralysis of both lower limbs [G82.20]  Yes     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       42 y.o. male admitted with Acute UTI.    Assessment and plan  Acute UTI, on antibiotics.  Follow culture results.  Leg ulcers, vascular surgery consulted.  CT bilateral lower extremity, does not show any fluid accumulations.  Paraplegia, paralysis of both limbs, PTSD, traumatic injury of spinal cord, chronic conditions.  Anemia, mild drop in hemoglobin noted, continue to follow-up labs.  CODE STATUS is full code.  Further plans based on hospital course.        Marcel Chowdhury MD  [facility]  08/22/23  16:22 EDT

## 2023-08-22 NOTE — PROGRESS NOTES
Normal ABIs with adequate toe pressures for wound healing.  No fluid collections on CT scan that would require surgical drainage.  Continue local wound care and antibiotics for cellulitis.  Vascular surgery will see the patient again as needed.    Jennifer Parada MD  Vascular Surgery  [facility]  O: [phone]  F: [phone]

## 2023-08-22 NOTE — H&P
HISTORY AND PHYSICAL   [facility]        Date of Admission: 2023  Patient Identification:  Name: Pk May  Age: 42 y.o.  Sex: male  :  1980  MRN: [id number]                     Primary Care Physician: Chasity Ruggiero APRN    Chief Complaint:  42 year old gentleman who presented to the emergency room with a fever and foul smelling urine from his urostomy; he has a history of paraplegia; he also has leg wounds; he denies nausea or vomiting; he came from home    History of Present Illness:   As above    Past Medical History:  Past Medical History:   Diagnosis Date    Paraplegia     Wound infection      Past Surgical History:  Past Surgical History:   Procedure Laterality Date    SPINAL FUSION        Home Meds:  Medications Prior to Admission   Medication Sig Dispense Refill Last Dose    amitriptyline (ELAVIL) 75 MG tablet Take 1 tablet by mouth 2 (Two) Times a Day. 180 tablet 3     ARIPiprazole (ABILIFY) 5 MG tablet Take 1 tablet by mouth Daily.       baclofen (LIORESAL) 10 MG tablet TAKE 1 TABLET BY MOUTH THREE TIMES DAILY 14 tablet 0     busPIRone (BUSPAR) 30 MG tablet TAKE 0.5 TABLETS BY MOUTH EVERY MORNING AND 1 TABLET EVERY MORNING       gabapentin (NEURONTIN) 600 MG tablet Take 2 tablets by mouth 3 (Three) Times a Day As Needed (pain). 180 tablet 1     triamcinolone (KENALOG) 0.1 % paste Apply to oral ulcer twice daily until healed 5 g 1     venlafaxine XR (EFFEXOR-XR) 150 MG 24 hr capsule Take 1 capsule by mouth Daily. 90 capsule 1        Allergies:  Allergies   Allergen Reactions    Pholcodine Anaphylaxis    Codeine Itching     Immunizations:  Immunization History   Administered Date(s) Administered    Fluzone Quad >6mos (Multi-dose) 2022    Influenza Injectable Mdck Pf Quad 2022    Pneumococcal Conjugate 20-Valent (PCV20) 2023     Social History:   Social History     Social History Narrative    Not on file     Social History     Socioeconomic History     "Marital status: Significant Other   Tobacco Use    Smoking status: Every Day     Packs/day: 1.00     Years: 24.00     Pack years: 24.00     Types: Cigarettes   Vaping Use    Vaping Use: Every day    Substances: Nicotine   Substance and Sexual Activity    Alcohol use: Not Currently    Drug use: Yes     Types: Marijuana       Family History:  Family History   Problem Relation Age of Onset    No Known Problems Mother     No Known Problems Father         Review of Systems  See history of present illness and past medical history.  Patient denies headache, dizziness, syncope, falls, trauma, change in vision, change in hearing, change in taste, changes in weight, changes in appetite, focal weakness, numbness, or paresthesia.  Patient denies chest pain, palpitations, dyspnea, orthopnea, PND, cough, sinus pressure, rhinorrhea, epistaxis, hemoptysis, nausea, vomiting,hematemesis, diarrhea, constipation or hematochezia.  Denies cold or heat intolerance, polydipsia, polyuria, polyphagia. Denies hematuria, pyuria, dysuria, hesitancy, frequency or urgency. Denies consumption of raw and under cooked meats foods or change in water source.       Objective:  T Max 24 hrs: Temp (24hrs), Av.2 °F (37.9 °C), Min:98.3 °F (36.8 °C), Max:102.6 °F (39.2 °C)    Vitals Ranges:   Temp:  [98.3 °F (36.8 °C)-102.6 °F (39.2 °C)] 98.8 °F (37.1 °C)  Heart Rate:  [115-132] 119  Resp:  [16-19] 16  BP: (112-159)/() 112/68      Exam:  /68 (BP Location: Left arm, Patient Position: Lying)   Pulse 119   Temp 98.8 °F (37.1 °C) (Oral)   Resp 16   Ht 177.8 cm (70\")   Wt 95.3 kg (210 lb)   SpO2 99%   BMI 30.13 kg/m²     General Appearance:    Alert, cooperative, no distress, appears stated age; chronically ill appearing   Head:    Normocephalic, without obvious abnormality, atraumatic   Eyes:    PERRL, conjunctivae/corneas clear, EOM's intact, both eyes   Ears:    Normal external ear canals, both ears   Nose:   Nares normal, septum " midline, mucosa normal, no drainage    or sinus tenderness   Throat:   Lips, mucosa, and tongue normal   Neck:   Supple, symmetrical, trachea midline, no adenopathy;     thyroid:  no enlargement/tenderness/nodules; no carotid    bruit or JVD   Back:     Symmetric, no curvature, ROM normal, no CVA tenderness   Lungs:     Decreased breath sounds bilaterally, respirations unlabored   Chest Wall:    No tenderness or deformity    Heart:    Regular rate and rhythm, S1 and S2 normal, no murmur, rub   or gallop   Abdomen:     Soft, nontender, bowel sounds active all four quadrants,     no masses, no hepatomegaly, no splenomegaly   Extremities:   Extremities normal, atraumatic, leg ulcers present                       .    Data Review:  Labs in chart were reviewed.  WBC   Date Value Ref Range Status   08/21/2023 13.60 (H) 3.40 - 10.80 10*3/mm3 Final     Hemoglobin   Date Value Ref Range Status   08/21/2023 13.2 13.0 - 17.7 g/dL Final     Hematocrit   Date Value Ref Range Status   08/21/2023 38.8 37.5 - 51.0 % Final     Platelets   Date Value Ref Range Status   08/21/2023 241 140 - 450 10*3/mm3 Final     Sodium   Date Value Ref Range Status   08/21/2023 129 (L) 136 - 145 mmol/L Final     Potassium   Date Value Ref Range Status   08/21/2023 4.3 3.5 - 5.2 mmol/L Final     Comment:     Specimen hemolyzed.  Results may be affected.     Chloride   Date Value Ref Range Status   08/21/2023 97 (L) 98 - 107 mmol/L Final     CO2   Date Value Ref Range Status   08/21/2023 22.0 22.0 - 29.0 mmol/L Final     BUN   Date Value Ref Range Status   08/21/2023 11 6 - 20 mg/dL Final     Creatinine   Date Value Ref Range Status   08/21/2023 0.75 (L) 0.76 - 1.27 mg/dL Final     Glucose   Date Value Ref Range Status   08/21/2023 92 65 - 99 mg/dL Final     Calcium   Date Value Ref Range Status   08/21/2023 8.9 8.6 - 10.5 mg/dL Final     AST (SGOT)   Date Value Ref Range Status   08/21/2023 22 1 - 40 U/L Final     Comment:     Specimen hemolyzed.   Results may be affected.     ALT (SGPT)   Date Value Ref Range Status   08/21/2023 14 1 - 41 U/L Final     Comment:     Specimen hemolyzed.  Results may be affected.     Alkaline Phosphatase   Date Value Ref Range Status   08/21/2023 114 39 - 117 U/L Final                Imaging Results (All)       Procedure Component Value Units Date/Time    CT Lower Extremity Bilateral With Contrast [[id number]] Collected: 08/21/23 2109     Updated: 08/21/23 2124    Narrative:      CT OF THE LOWER EXTREMITIES     HISTORY: Bilateral lower extremity wounds     COMPARISON: March 7, 2023     TECHNIQUE: Axial CT imaging was obtained through both lower extremities.  Contrast was administered.     FINDINGS:  Limited images through the pelvis demonstrate relative atrophy of the  musculature. No fluid collections are seen. Patient is noted to have  heterotopic ossification surrounding the hips bilaterally.     Right lower extremity: Full assessment of the arterial vasculature is  limited, due to venous contamination. Both arterial and venous  vasculature appears to be patent. No fluid collections are identified.  No subcutaneous gas is seen. There is no suprapatellar effusion.. There  is diffuse muscular atrophy. No aggressive osseous abnormalities are  seen.     Left lower extremity: Full assessment of the arterial vasculature is  limited, due to venous contamination. Both arterial and venous  vasculature appears to be patent. No fluid collections are identified.  No subcutaneous gas is seen. There is no suprapatellar effusion. The  patient has an intramedullary holly within the left tibia. No definite  aggressive osseous abnormalities are seen. There is diffuse muscular  atrophy.       Impression:      No fluid collections identified in either lower extremity.     Radiation dose reduction techniques were utilized, including automated  exposure control and exposure modulation based on body size.        This report was finalized on 8/21/2023  9:21 PM by Dr. Barbie Garcia M.D.                 Assessment:  Active Hospital Problems    Diagnosis  POA    **Acute UTI [N39.0]  Yes      Resolved Hospital Problems   No resolved problems to display.   Paraplegia  Neurogenic bladder  Leg ulcers  Hyponatremia  Tobacco use    Plan:  Antibiotics  Wound nurse input reviewed  Will ask vascular to see him  Fluids, antibiotics  Trend labs  Monitor on telemetry  Await cultures  Dw patient and ed provider    Isatu Amezquita MD  8/21/2023  22:43 EDT

## 2023-08-22 NOTE — PLAN OF CARE
Goal Outcome Evaluation:  Plan of Care Reviewed With: patient        Progress: improving  Outcome Evaluation: No c/o pain or soa. BLE doppler done this morning. Dressings changed to BLE wounds. Continues on IV vanc and zosyn. Urostomy and colostomy intact. Vascular to see PRN.

## 2023-08-22 NOTE — PLAN OF CARE
Goal Outcome Evaluation:Patient went down for his CT of his leg. With colostomy and ileostomy . IVF of NS at 125 cc/hr started as well as his ordered antibiotics administered. VS stable , no other complaints made. Will continue to monitor.

## 2023-08-22 NOTE — PROGRESS NOTES
"[facility] Clinical Pharmacy Services: Vancomycin Pharmacokinetic Initial Consult Note    Pk May is a 42 y.o. male who is on day 2 of pharmacy to dose vancomycin.    Indication: Skin and Soft Tissue  Consulting Provider: Dr. Amezquita  Planned Duration of Therapy: TBD by clinical course  Loading Dose Ordered or Given: Not indicated  MRSA PCR performed: Not ordered  Culture/Source:   8/21: Bcx pending 2/2  Target: -600 mg/L.hr   Pertinent Vanc Dosing History: Paraplegia   Other Antimicrobials: Zosyn    Vitals/Labs  Ht: 177.8 cm (70\"); Wt: 91.7 kg (202 lb 2.6 oz)  Temp Readings from Last 1 Encounters:   08/22/23 99.7 °F (37.6 °C) (Oral)    Estimated Creatinine Clearance: 170.3 mL/min (A) (by C-G formula based on SCr of 0.65 mg/dL (L)).     Results from last 7 days   Lab Units 08/22/23  0421 08/21/23  1133   CREATININE mg/dL 0.65* 0.75*   WBC 10*3/mm3 9.45 13.60*     Assessment/Plan:    Vancomycin Dose:1,500 mg IV Q12H  Predictive AUC level for the dose ordered is 552 mg/L.hr, which is within the target of 400-600 mg/L.hr  Vancomycin level has been ordered for 8/23 at 0600 with AM labs     Pharmacy will follow patient's kidney function and will adjust doses and obtain levels as necessary. Thank you for involving pharmacy in this patient's care. Please contact pharmacy with any questions or concerns.                           Sasha Martel, Pharmacy Intern  Pharmacy Resident   "

## 2023-08-23 LAB
ANION GAP SERPL CALCULATED.3IONS-SCNC: 8 MMOL/L (ref 5–15)
BASOPHILS # BLD AUTO: 0.04 10*3/MM3 (ref 0–0.2)
BASOPHILS NFR BLD AUTO: 0.6 % (ref 0–1.5)
BUN SERPL-MCNC: 10 MG/DL (ref 6–20)
BUN/CREAT SERPL: 16.7 (ref 7–25)
CALCIUM SPEC-SCNC: 8.2 MG/DL (ref 8.6–10.5)
CHLORIDE SERPL-SCNC: 110 MMOL/L (ref 98–107)
CO2 SERPL-SCNC: 23 MMOL/L (ref 22–29)
CREAT SERPL-MCNC: 0.6 MG/DL (ref 0.76–1.27)
DEPRECATED RDW RBC AUTO: 41.3 FL (ref 37–54)
EGFRCR SERPLBLD CKD-EPI 2021: 124.4 ML/MIN/1.73
EOSINOPHIL # BLD AUTO: 0.1 10*3/MM3 (ref 0–0.4)
EOSINOPHIL NFR BLD AUTO: 1.6 % (ref 0.3–6.2)
ERYTHROCYTE [DISTWIDTH] IN BLOOD BY AUTOMATED COUNT: 13.2 % (ref 12.3–15.4)
GLUCOSE SERPL-MCNC: 118 MG/DL (ref 65–99)
HCT VFR BLD AUTO: 30 % (ref 37.5–51)
HGB BLD-MCNC: 10.2 G/DL (ref 13–17.7)
IMM GRANULOCYTES # BLD AUTO: 0.03 10*3/MM3 (ref 0–0.05)
IMM GRANULOCYTES NFR BLD AUTO: 0.5 % (ref 0–0.5)
LYMPHOCYTES # BLD AUTO: 1.34 10*3/MM3 (ref 0.7–3.1)
LYMPHOCYTES NFR BLD AUTO: 21 % (ref 19.6–45.3)
MCH RBC QN AUTO: 29.5 PG (ref 26.6–33)
MCHC RBC AUTO-ENTMCNC: 34 G/DL (ref 31.5–35.7)
MCV RBC AUTO: 86.7 FL (ref 79–97)
MONOCYTES # BLD AUTO: 0.71 10*3/MM3 (ref 0.1–0.9)
MONOCYTES NFR BLD AUTO: 11.1 % (ref 5–12)
NEUTROPHILS NFR BLD AUTO: 4.16 10*3/MM3 (ref 1.7–7)
NEUTROPHILS NFR BLD AUTO: 65.2 % (ref 42.7–76)
NRBC BLD AUTO-RTO: 0 /100 WBC (ref 0–0.2)
PLATELET # BLD AUTO: 244 10*3/MM3 (ref 140–450)
PMV BLD AUTO: 8.8 FL (ref 6–12)
POTASSIUM SERPL-SCNC: 3.2 MMOL/L (ref 3.5–5.2)
RBC # BLD AUTO: 3.46 10*6/MM3 (ref 4.14–5.8)
SODIUM SERPL-SCNC: 141 MMOL/L (ref 136–145)
VANCOMYCIN SERPL-MCNC: 16.4 MCG/ML (ref 5–40)
WBC NRBC COR # BLD: 6.38 10*3/MM3 (ref 3.4–10.8)

## 2023-08-23 PROCEDURE — G0378 HOSPITAL OBSERVATION PER HR: HCPCS

## 2023-08-23 PROCEDURE — 25010000002 VANCOMYCIN 10 G RECONSTITUTED SOLUTION: Performed by: INTERNAL MEDICINE

## 2023-08-23 PROCEDURE — 80202 ASSAY OF VANCOMYCIN: CPT | Performed by: INTERNAL MEDICINE

## 2023-08-23 PROCEDURE — 80048 BASIC METABOLIC PNL TOTAL CA: CPT | Performed by: INTERNAL MEDICINE

## 2023-08-23 PROCEDURE — 96361 HYDRATE IV INFUSION ADD-ON: CPT

## 2023-08-23 PROCEDURE — 85025 COMPLETE CBC W/AUTO DIFF WBC: CPT | Performed by: INTERNAL MEDICINE

## 2023-08-23 PROCEDURE — 25010000002 PIPERACILLIN SOD-TAZOBACTAM PER 1 G: Performed by: INTERNAL MEDICINE

## 2023-08-23 RX ADMIN — VANCOMYCIN HYDROCHLORIDE 1500 MG: 10 INJECTION, POWDER, LYOPHILIZED, FOR SOLUTION INTRAVENOUS at 08:14

## 2023-08-23 RX ADMIN — GABAPENTIN 600 MG: 300 CAPSULE ORAL at 14:15

## 2023-08-23 RX ADMIN — BACLOFEN 10 MG: 10 TABLET ORAL at 21:13

## 2023-08-23 RX ADMIN — SODIUM CHLORIDE 125 ML/HR: 9 INJECTION, SOLUTION INTRAVENOUS at 23:34

## 2023-08-23 RX ADMIN — PIPERACILLIN SODIUM AND TAZOBACTAM SODIUM 3.38 G: 3; .375 INJECTION, SOLUTION INTRAVENOUS at 10:26

## 2023-08-23 RX ADMIN — BACLOFEN 10 MG: 10 TABLET ORAL at 08:13

## 2023-08-23 RX ADMIN — BUSPIRONE HYDROCHLORIDE 15 MG: 15 TABLET ORAL at 08:14

## 2023-08-23 RX ADMIN — BUSPIRONE HYDROCHLORIDE 15 MG: 15 TABLET ORAL at 21:13

## 2023-08-23 RX ADMIN — GABAPENTIN 600 MG: 300 CAPSULE ORAL at 21:13

## 2023-08-23 RX ADMIN — PIPERACILLIN SODIUM AND TAZOBACTAM SODIUM 3.38 G: 3; .375 INJECTION, SOLUTION INTRAVENOUS at 02:00

## 2023-08-23 RX ADMIN — AMITRIPTYLINE HYDROCHLORIDE 75 MG: 50 TABLET, FILM COATED ORAL at 08:13

## 2023-08-23 RX ADMIN — GABAPENTIN 600 MG: 300 CAPSULE ORAL at 05:39

## 2023-08-23 RX ADMIN — VANCOMYCIN HYDROCHLORIDE 1250 MG: 10 INJECTION, POWDER, LYOPHILIZED, FOR SOLUTION INTRAVENOUS at 20:12

## 2023-08-23 RX ADMIN — PIPERACILLIN SODIUM AND TAZOBACTAM SODIUM 3.38 G: 3; .375 INJECTION, SOLUTION INTRAVENOUS at 17:17

## 2023-08-23 RX ADMIN — AMITRIPTYLINE HYDROCHLORIDE 75 MG: 50 TABLET, FILM COATED ORAL at 21:13

## 2023-08-23 RX ADMIN — SODIUM CHLORIDE 125 ML/HR: 9 INJECTION, SOLUTION INTRAVENOUS at 06:08

## 2023-08-23 RX ADMIN — NICOTINE 1 PATCH: 21 PATCH, EXTENDED RELEASE TRANSDERMAL at 21:14

## 2023-08-23 RX ADMIN — ARIPIPRAZOLE 5 MG: 5 TABLET ORAL at 08:14

## 2023-08-23 RX ADMIN — BACLOFEN 10 MG: 10 TABLET ORAL at 17:17

## 2023-08-23 NOTE — PLAN OF CARE
Goal Outcome Evaluation:         Pt denies pain, SCDs placed and in use most of this shift.  Pt turning self in bed and caring for colostomy; administered once dose of Immodium per pt request.  Pt's potassium was low at last check yesterday AM.  IV abx.  VSS.               Problem: Adult Inpatient Plan of Care  Goal: Plan of Care Review  Outcome: Ongoing, Progressing  Goal: Optimal Comfort and Wellbeing  Outcome: Ongoing, Progressing  Intervention: Provide Person-Centered Care  Description: Use a family-focused approach to care.  Develop trust and rapport by proactively providing information, encouraging questions, addressing concerns and offering reassurance.  Acknowledge emotional response to hospitalization.  Recognize and utilize personal coping strategies.  Honor spiritual and cultural preferences.  Recent Flowsheet Documentation  Taken 8/23/2023 0012 by Samantha Thurman RN  Trust Relationship/Rapport:   care explained   choices provided   thoughts/feelings acknowledged   questions answered   empathic listening provided   reassurance provided   questions encouraged  Taken 8/22/2023 1954 by Samantha Thurman, RN  Trust Relationship/Rapport:   care explained   choices provided   empathic listening provided   thoughts/feelings acknowledged   reassurance provided   questions answered   questions encouraged  Goal: Readiness for Transition of Care  Outcome: Ongoing, Progressing     Problem: Behavioral Health Comorbidity  Goal: Maintenance of Behavioral Health Symptom Control  Outcome: Ongoing, Progressing  Intervention: Maintain Behavioral Health Symptom Control  Description: Confirm mental health diagnosis and current treatment.  Evaluate adherence to previously identified self-management plan.  Advocate continuation of home strategies, including medication.  Evaluate effectiveness of self-management strategies and coping skills.  Communicate with providers to ensure continuity and follow-up at transition.  Recent  Flowsheet Documentation  Taken 8/23/2023 0406 by Samantha Thurman RN  Medication Review/Management: medications reviewed  Taken 8/23/2023 0200 by Samantha Thurman RN  Medication Review/Management:   medications reviewed   high-risk medications identified  Taken 8/23/2023 0012 by Samantha Thurman RN  Medication Review/Management:   medications reviewed   high-risk medications identified  Taken 8/22/2023 2144 by Samantha Thurman RN  Medication Review/Management: medications reviewed  Taken 8/22/2023 1954 by Samantha Thurman RN  Medication Review/Management:   medications reviewed   high-risk medications identified   provider consulted     Problem: COPD (Chronic Obstructive Pulmonary Disease) Comorbidity  Goal: Maintenance of COPD Symptom Control  Outcome: Ongoing, Progressing  Intervention: Maintain COPD-Symptom Control  Description: Evaluate adherence to management plan (e.g., medication, trigger avoidance, infection prevention, self-monitoring).  Advocate for continuation of home regimen, including medication, method of delivery, schedule and symptom monitoring.  Anticipate the need for breathing techniques and activity pacing to minimize fatigue and breathlessness.  Assess for proper use of inhaled medication and delivery technique; assist or reinstruct if needed.  Evaluate effectiveness of coping skills; encourage expression of feelings, expectations and concerns related to disease management and quality of life; reinforce education to enhance management plan and wellbeing.  Recent Flowsheet Documentation  Taken 8/23/2023 0406 by Samantha Thurman RN  Medication Review/Management: medications reviewed  Taken 8/23/2023 0200 by Samantha Thurman RN  Medication Review/Management:   medications reviewed   high-risk medications identified  Taken 8/23/2023 0012 by Samantha Thurman RN  Supportive Measures:   active listening utilized   self-responsibility promoted   verbalization of feelings encouraged  Medication  Review/Management:   medications reviewed   high-risk medications identified  Taken 8/22/2023 2144 by Samantha Thurman RN  Medication Review/Management: medications reviewed  Taken 8/22/2023 1954 by Samantha Thurman RN  Supportive Measures:   active listening utilized   verbalization of feelings encouraged  Medication Review/Management:   medications reviewed   high-risk medications identified   provider consulted     Problem: Hypertension Comorbidity  Goal: Blood Pressure in Desired Range  Outcome: Ongoing, Progressing  Intervention: Maintain Blood Pressure Management  Description: Evaluate adherence to home antihypertensive regimen (e.g., exercise and activity, diet modification, medication).  Provide scheduled antihypertensive medication; consider administration time and effects (e.g., avoid giving diuretic prior to bedtime).  Monitor response to antihypertensive medication therapy (e.g., blood pressure, electrolyte levels, medication effects).  Minimize risk of orthostatic hypotension; encourage caution with position changes, particularly if elderly.  Recent Flowsheet Documentation  Taken 8/23/2023 0406 by Samantha Thurman RN  Medication Review/Management: medications reviewed  Taken 8/23/2023 0200 by Samantha Thurman RN  Medication Review/Management:   medications reviewed   high-risk medications identified  Taken 8/23/2023 0012 by Samantha Thurman RN  Syncope Management: legs elevated  Medication Review/Management:   medications reviewed   high-risk medications identified  Taken 8/22/2023 2144 by Samantha Thurman RN  Medication Review/Management: medications reviewed  Taken 8/22/2023 1954 by Samantha Thurman RN  Syncope Management:   position changed slowly   legs elevated  Medication Review/Management:   medications reviewed   high-risk medications identified   provider consulted     Problem: Obstructive Sleep Apnea Risk or Actual Comorbidity Management  Goal: Unobstructed Breathing During  Sleep  Outcome: Ongoing, Progressing     Problem: Pain Chronic (Persistent) (Comorbidity Management)  Goal: Acceptable Pain Control and Functional Ability  Outcome: Ongoing, Progressing  Intervention: Manage Persistent Pain  Description: Evaluate pain level, effect of treatment and patient response at regular intervals.  Minimize pain stimuli; coordinate care and adjust environment (e.g., light, noise, unnecessary movement); promote sleep/rest.  Match pharmacologic analgesia to severity and type of pain mechanism (e.g., neuropathic, muscle, inflammatory); consider multimodal approach (e.g., nonopioid, opioid, adjuvant).  Provide medication at regular intervals; titrate to patient response.  Manage breakthrough pain with additional doses; consider rotation or switching medication.  Monitor for signs of substance tolerance (increased dose to reach desired effect, decreased effect with same dose).  Avoid abrupt withdrawal of medication, especially agents capable of causing physical dependence.  Manage medication-induced effects, such as constipation, nausea, pruritus, urinary retention, somnolence and dizziness.  Provide multimodal treatment interventions, such as physical activity, therapeutic exercise, yoga, TENS (transcutaneous electrical nerve stimulation) and manual therapy.  Train in functional activity modifications, such as body mechanics, posture, ergonomics, energy conservation and activity pacing.  Consider addition of complementary or alternative therapy, such as acupuncture, hypnosis or therapeutic touch.  Recent Flowsheet Documentation  Taken 8/23/2023 0406 by Samantha Thurman RN  Medication Review/Management: medications reviewed  Taken 8/23/2023 0200 by Samantha Thurman, RN  Medication Review/Management:   medications reviewed   high-risk medications identified  Taken 8/23/2023 0012 by Samantha Thurman RN  Bowel Elimination Promotion: adequate fluid intake promoted  Sleep/Rest Enhancement:    awakenings minimized   consistent schedule promoted   room darkened  Medication Review/Management:   medications reviewed   high-risk medications identified  Taken 8/22/2023 2144 by Samantha Thurman, RN  Medication Review/Management: medications reviewed  Taken 8/22/2023 1954 by Samantha Thurman, RN  Bowel Elimination Promotion:   adequate fluid intake promoted   ambulation promoted  Sleep/Rest Enhancement:   awakenings minimized   consistent schedule promoted   room darkened  Medication Review/Management:   medications reviewed   high-risk medications identified   provider consulted  Intervention: Optimize Psychosocial Wellbeing  Description: Facilitate patient’s self-control over pain by providing pain information and allowing choices in treatment.  Consider and address emotional response to pain.  Explore and promote use of coping strategies; address barriers to successful coping.  Evaluate and assist with psychosocial, cultural and spiritual factors impacting pain.  Modify pain perception by using techniques, such as distraction, mindfulness, guided imagery, meditation or music.  Assess and monitor for signs and symptoms of behavioral health concerns, such as unhealthy substance use, depression and suicidal ideation.  Consider referral for ongoing coping support, such as cognitive behavioral therapy and mindfulness-based stress reduction.  Recent Flowsheet Documentation  Taken 8/23/2023 0012 by Samantha Thurman, RN  Supportive Measures:   active listening utilized   self-responsibility promoted   verbalization of feelings encouraged  Diversional Activities:   television   smartphone  Taken 8/22/2023 1954 by Samantha Thurman, RN  Supportive Measures:   active listening utilized   verbalization of feelings encouraged  Diversional Activities:   television   smartphone     Problem: Seizure Disorder Comorbidity  Goal: Maintenance of Seizure Control  Outcome: Ongoing, Progressing     Problem: Skin Injury Risk  Increased  Goal: Skin Health and Integrity  Outcome: Ongoing, Progressing  Intervention: Promote and Optimize Oral Intake  Description: Perform a nutrition assessment; include a nutrition-focused physical exam.  Determine calorie, protein, vitamin, mineral and fluid requirements.  Assess for micronutrient deficiencies; supplement if depleted.  Assess need and assist with meal set-up and feeding.  Adjust diet or meal schedule based on preferences and tolerance.  Offer oral supplemental food or drinks to enhance calorie and protein intake.  Establish bowel elimination program to increase comfort and appetite.  Minimize unnecessary dietary restrictions to increase oral intake.  Provide and encourage oral hygiene to enhance desire to eat.  Consider enteral nutrition support if oral intake remains inadequate; provide parenteral nutrition if enteral is contraindicated.  Recent Flowsheet Documentation  Taken 8/23/2023 0012 by Samantha Thurman, RN  Oral Nutrition Promotion:   safe use of adaptive equipment encouraged   rest periods promoted  Taken 8/22/2023 1954 by Samantha Thurman, RN  Oral Nutrition Promotion:   rest periods promoted   safe use of adaptive equipment encouraged  Intervention: Optimize Skin Protection  Description: Perform a full pressure injury risk assessment, as indicated by screening, upon admission to care unit.  Reassess skin (injury risk, full inspection) frequently (e.g., scheduled interval, with change in condition) to provide optimal early detection and prevention.  Maintain adequate tissue perfusion (e.g., encourage fluid balance; avoid crossing legs, constrictive clothing or devices) to promote tissue oxygenation.  Maintain head of bed at lowest degree of elevation tolerated, considering medical condition and other restrictions.  Avoid positioning onto an area that remains reddened.  Minimize incontinence and moisture (e.g., toileting schedule; moisture-wicking pad, diaper or incontinence  collection device; skin moisture barrier).  Cleanse skin promptly and gently when soiled utilizing a pH-balanced cleanser.  Relieve and redistribute pressure (e.g., scheduled position changes, weight shifts, use of support surface, medical device repositioning, protective dressing application, use of positioning device, microclimate control, use of pressure-injury-monitor  Encourage increased activity, such as sitting in a chair at the bedside or early mobilization, when able to tolerate.  Recent Flowsheet Documentation  Taken 8/23/2023 0406 by Samantha Thurman RN  Head of Bed ([facility]) Positioning: HOB elevated  Taken 8/23/2023 0200 by Samantha Thurman RN  Head of Bed ([facility]) Positioning: HOB elevated  Taken 8/23/2023 0012 by Samantha Thurman RN  Pressure Reduction Techniques:   frequent weight shift encouraged   weight shift assistance provided   heels elevated off bed  Head of Bed (HOB) Positioning: HOB elevated  Pressure Reduction Devices:   alternating pressure pump (ADD)   positioning supports utilized   pressure-redistributing mattress utilized  Skin Protection:   adhesive use limited   incontinence pads utilized   transparent dressing maintained   tubing/devices free from skin contact  Taken 8/22/2023 2144 by Samantha Thurman RN  Head of Bed ([facility]) Positioning: HOB at 30-45 degrees  Taken 8/22/2023 1954 by Samantha Thurman RN  Pressure Reduction Techniques: frequent weight shift encouraged  Head of Bed (HOB) Positioning: HOB at 30-45 degrees  Pressure Reduction Devices:   alternating pressure pump (ADD)   positioning supports utilized   pressure-redistributing mattress utilized  Skin Protection:   adhesive use limited   tubing/devices free from skin contact   transparent dressing maintained     Problem: Infection Progression (Sepsis/Septic Shock)  Goal: Absence of Infection Signs and Symptoms  Outcome: Ongoing, Progressing  Intervention: Initiate Sepsis Management  Description: Provide fluid therapy, such as  crystalloid or albumin, to increase intravascular volume, organ perfusion and oxygen delivery.  Provide respiratory support, such as oxygen therapy, noninvasive or invasive positive pressure ventilation, to achieve oxygenation and ventilation goal; avoid hyperoxemia.  Obtain cultures prior to initiating antimicrobial therapy when possible. Do not delay for laboratory results in the presence of high suspicion or clinical indicators.  Administer intravenous broad-spectrum antimicrobial therapy promptly.  Implement hemodynamic monitoring to guide intravascular support based on individual targeted parameters.  Determine and address underlying source of infection aggressively; implement transmission-based precautions and isolation, as indicated.  Recent Flowsheet Documentation  Taken 8/23/2023 0406 by Samantha Thurman, RN  Infection Prevention:   visitors restricted/screened   single patient room provided   personal protective equipment utilized   hand hygiene promoted   rest/sleep promoted  Taken 8/23/2023 0200 by Samantha Thurman, RN  Infection Prevention:   visitors restricted/screened   personal protective equipment utilized   single patient room provided   hand hygiene promoted   rest/sleep promoted  Taken 8/23/2023 0012 by Samantha Thurman, RN  Stabilization Measures: legs elevated  Infection Management: aseptic technique maintained  Infection Prevention:   single patient room provided   visitors restricted/screened   personal protective equipment utilized   hand hygiene promoted   rest/sleep promoted  Taken 8/22/2023 2144 by Samantha Thurman, RN  Infection Prevention:   personal protective equipment utilized   rest/sleep promoted   single patient room provided   visitors restricted/screened   hand hygiene promoted  Taken 8/22/2023 1954 by Samantha Thurman, RN  Infection Management: aseptic technique maintained  Infection Prevention:   personal protective equipment utilized   visitors restricted/screened   single  patient room provided   rest/sleep promoted   hand hygiene promoted  Intervention: Promote Recovery  Description: Encourage pulmonary hygiene, such as cough-enhancement and airway-clearance techniques, that may include use of incentive spirometry, deep breathing and cough.  Encourage early rehabilitation and physical activity to optimize functional ability and activity tolerance, as well as minimize delirium.  Promote energy conservation; minimize oxygen demand and consumption by adjusting environment, decreasing stimulation, maintaining normothermia and treating pain.  Optimize fluid balance, nutrition intake, sleep and glycemic control to maintain tissue perfusion and enhance immune response.  Recent Flowsheet Documentation  Taken 8/23/2023 0012 by Samantha Thurman, RN  Activity Management: activity encouraged  Sleep/Rest Enhancement:   awakenings minimized   consistent schedule promoted   room darkened  Taken 8/22/2023 1954 by Samantha Thurman, RN  Activity Management: activity encouraged  Sleep/Rest Enhancement:   awakenings minimized   consistent schedule promoted   room darkened  Intervention: Promote Stabilization  Description: Monitor for signs of fluid responsiveness and overload; consider fluid adjustment and diuretic therapy.  Anticipate use of vasoactive agent to support microperfusion and oxygen delivery; titrate to response.  Monitor laboratory value, diagnostic test and clinical status trends for signs of infection progression and multiple organ failure.  Assess effectiveness of and potential for de-escalation of the antimicrobial regimen daily.  Provide fever-reduction and comfort measures.  Monitor and manage electrolyte imbalance, such as hypocalcemia.  Use lung protective ventilation measures, such as low volume, inspiratory pressure, optimal positive end-expiratory pressure, to minimize the risk of ventilator-induced lung injury; ensure minute volume demands.  Prepare for supportive therapy, such  as corticosteroid therapy, coagulopathy management, CRRT (continuous renal replacement therapy), hemofiltration and cardiac-assist device.  Recent Flowsheet Documentation  Taken 8/23/2023 0012 by Samantha Thurman, RN  Fluid/Electrolyte Management: fluids provided  Taken 8/22/2023 1954 by Samantha Thurman, RN  Fluid/Electrolyte Management: fluids provided
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
## 2023-08-23 NOTE — PROGRESS NOTES
"[facility] Clinical Pharmacy Services: Vancomycin Pharmacokinetic Initial Consult Note    Pk May is a 42 y.o. male who is on day 3 of pharmacy to dose vancomycin.    Indication: Skin and Soft Tissue  Consulting Provider: Dr. Amezquita  Planned Duration of Therapy: TBD by clinical course  Loading Dose Ordered or Given: Not indicated  MRSA PCR performed: Not ordered  Culture/Source:   8/21: Bcx pending 2/2  Target: -600 mg/L.hr   Pertinent Vanc Dosing History: Paraplegia   Other Antimicrobials: Zosyn    Vitals/Labs  Ht: 177.8 cm (70\"); Wt: 93 kg (205 lb 0.4 oz)  Temp Readings from Last 1 Encounters:   08/23/23 98.4 °F (36.9 °C) (Oral)    Estimated Creatinine Clearance: 185.6 mL/min (A) (by C-G formula based on SCr of 0.6 mg/dL (L)).     Results from last 7 days   Lab Units 08/23/23  0413 08/22/23  0421 08/21/23  1133   CREATININE mg/dL 0.60* 0.65* 0.75*   WBC 10*3/mm3 6.38 9.45 13.60*     Assessment/Plan:    Vancomycin Dose:1,250 mg IV Q12H  Predictive AUC level for the dose ordered is 455 mg/L.hr, which is within the target of 400-600 mg/L.hr  Vancomycin random level has been ordered for 8/25 with AM labs.    Changing dose to 1,250 mg IV Q12H to start 8/23 @2030.   Pharmacy will follow patient's kidney function and will adjust doses and obtain levels as necessary. Thank you for involving pharmacy in this patient's care. Please contact pharmacy with any questions or concerns.                           Sasha Martel, Pharmacy Intern  Pharmacy Resident         "
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
## 2023-08-23 NOTE — NURSING NOTE
Paged NELLY Quarles to report pt requesting nicotine patch as he vapes daily normally.  He vapes exclusively he says and does not smoke cigarettes.    Received call back from NELLY Choe who will input orders for nicotine patch.
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
## 2023-08-23 NOTE — PLAN OF CARE
Goal Outcome Evaluation:  Plan of Care Reviewed With: patient        Progress: improving  Outcome Evaluation: No c/o pain or soa. Continues on IV vanc and zosyn. Waiting on culture results for discharge plan.
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
## 2023-08-24 ENCOUNTER — READMISSION MANAGEMENT (OUTPATIENT)
Dept: CALL CENTER | Facility: HOSPITAL | Age: 43
End: 2023-08-24
Payer: MEDICARE

## 2023-08-24 VITALS
BODY MASS INDEX: 29.35 KG/M2 | RESPIRATION RATE: 16 BRPM | HEART RATE: 89 BPM | WEIGHT: 205.03 LBS | OXYGEN SATURATION: 96 % | HEIGHT: 70 IN | TEMPERATURE: 97.9 F | DIASTOLIC BLOOD PRESSURE: 71 MMHG | SYSTOLIC BLOOD PRESSURE: 119 MMHG

## 2023-08-24 LAB
ANION GAP SERPL CALCULATED.3IONS-SCNC: 7.2 MMOL/L (ref 5–15)
BACTERIA SPEC AEROBE CULT: ABNORMAL
BACTERIA SPEC AEROBE CULT: ABNORMAL
BASOPHILS # BLD AUTO: 0.04 10*3/MM3 (ref 0–0.2)
BASOPHILS NFR BLD AUTO: 0.8 % (ref 0–1.5)
BUN SERPL-MCNC: 5 MG/DL (ref 6–20)
BUN/CREAT SERPL: 9.1 (ref 7–25)
CALCIUM SPEC-SCNC: 8.3 MG/DL (ref 8.6–10.5)
CHLORIDE SERPL-SCNC: 111 MMOL/L (ref 98–107)
CO2 SERPL-SCNC: 24.8 MMOL/L (ref 22–29)
CREAT SERPL-MCNC: 0.55 MG/DL (ref 0.76–1.27)
DEPRECATED RDW RBC AUTO: 43.9 FL (ref 37–54)
EGFRCR SERPLBLD CKD-EPI 2021: 127.7 ML/MIN/1.73
EOSINOPHIL # BLD AUTO: 0.15 10*3/MM3 (ref 0–0.4)
EOSINOPHIL NFR BLD AUTO: 2.9 % (ref 0.3–6.2)
ERYTHROCYTE [DISTWIDTH] IN BLOOD BY AUTOMATED COUNT: 13.5 % (ref 12.3–15.4)
GLUCOSE SERPL-MCNC: 107 MG/DL (ref 65–99)
HCT VFR BLD AUTO: 31.2 % (ref 37.5–51)
HGB BLD-MCNC: 10.5 G/DL (ref 13–17.7)
IMM GRANULOCYTES # BLD AUTO: 0.01 10*3/MM3 (ref 0–0.05)
IMM GRANULOCYTES NFR BLD AUTO: 0.2 % (ref 0–0.5)
LYMPHOCYTES # BLD AUTO: 1.43 10*3/MM3 (ref 0.7–3.1)
LYMPHOCYTES NFR BLD AUTO: 27.6 % (ref 19.6–45.3)
MCH RBC QN AUTO: 30.1 PG (ref 26.6–33)
MCHC RBC AUTO-ENTMCNC: 33.7 G/DL (ref 31.5–35.7)
MCV RBC AUTO: 89.4 FL (ref 79–97)
MONOCYTES # BLD AUTO: 0.39 10*3/MM3 (ref 0.1–0.9)
MONOCYTES NFR BLD AUTO: 7.5 % (ref 5–12)
NEUTROPHILS NFR BLD AUTO: 3.16 10*3/MM3 (ref 1.7–7)
NEUTROPHILS NFR BLD AUTO: 61 % (ref 42.7–76)
NRBC BLD AUTO-RTO: 0 /100 WBC (ref 0–0.2)
PLATELET # BLD AUTO: 259 10*3/MM3 (ref 140–450)
PMV BLD AUTO: 8.8 FL (ref 6–12)
POTASSIUM SERPL-SCNC: 3.3 MMOL/L (ref 3.5–5.2)
RBC # BLD AUTO: 3.49 10*6/MM3 (ref 4.14–5.8)
SODIUM SERPL-SCNC: 143 MMOL/L (ref 136–145)
WBC NRBC COR # BLD: 5.18 10*3/MM3 (ref 3.4–10.8)

## 2023-08-24 PROCEDURE — 63710000001 BACLOFEN 10 MG TABLET: Performed by: INTERNAL MEDICINE

## 2023-08-24 PROCEDURE — 80048 BASIC METABOLIC PNL TOTAL CA: CPT | Performed by: INTERNAL MEDICINE

## 2023-08-24 PROCEDURE — G0378 HOSPITAL OBSERVATION PER HR: HCPCS

## 2023-08-24 PROCEDURE — 96361 HYDRATE IV INFUSION ADD-ON: CPT

## 2023-08-24 PROCEDURE — 96366 THER/PROPH/DIAG IV INF ADDON: CPT

## 2023-08-24 PROCEDURE — 25010000002 PIPERACILLIN SOD-TAZOBACTAM PER 1 G: Performed by: INTERNAL MEDICINE

## 2023-08-24 PROCEDURE — A9270 NON-COVERED ITEM OR SERVICE: HCPCS | Performed by: INTERNAL MEDICINE

## 2023-08-24 PROCEDURE — 25010000002 VANCOMYCIN 10 G RECONSTITUTED SOLUTION: Performed by: INTERNAL MEDICINE

## 2023-08-24 PROCEDURE — 85025 COMPLETE CBC W/AUTO DIFF WBC: CPT | Performed by: INTERNAL MEDICINE

## 2023-08-24 RX ORDER — POTASSIUM CHLORIDE 750 MG/1
40 TABLET, FILM COATED, EXTENDED RELEASE ORAL ONCE
Status: COMPLETED | OUTPATIENT
Start: 2023-08-24 | End: 2023-08-24

## 2023-08-24 RX ORDER — AMOXICILLIN 500 MG/1
1000 CAPSULE ORAL 2 TIMES DAILY
Qty: 28 CAPSULE | Refills: 0 | Status: SHIPPED | OUTPATIENT
Start: 2023-08-24 | End: 2023-08-31

## 2023-08-24 RX ADMIN — BACLOFEN 10 MG: 10 TABLET ORAL at 08:03

## 2023-08-24 RX ADMIN — SODIUM CHLORIDE 125 ML/HR: 9 INJECTION, SOLUTION INTRAVENOUS at 08:03

## 2023-08-24 RX ADMIN — GABAPENTIN 600 MG: 300 CAPSULE ORAL at 05:49

## 2023-08-24 RX ADMIN — GABAPENTIN 600 MG: 300 CAPSULE ORAL at 13:34

## 2023-08-24 RX ADMIN — VANCOMYCIN HYDROCHLORIDE 1250 MG: 10 INJECTION, POWDER, LYOPHILIZED, FOR SOLUTION INTRAVENOUS at 08:04

## 2023-08-24 RX ADMIN — ARIPIPRAZOLE 5 MG: 5 TABLET ORAL at 08:03

## 2023-08-24 RX ADMIN — BUSPIRONE HYDROCHLORIDE 15 MG: 15 TABLET ORAL at 08:03

## 2023-08-24 RX ADMIN — BACLOFEN 10 MG: 10 TABLET ORAL at 15:36

## 2023-08-24 RX ADMIN — AMITRIPTYLINE HYDROCHLORIDE 75 MG: 50 TABLET, FILM COATED ORAL at 08:03

## 2023-08-24 RX ADMIN — POTASSIUM CHLORIDE 40 MEQ: 750 TABLET, EXTENDED RELEASE ORAL at 09:42

## 2023-08-24 RX ADMIN — PIPERACILLIN SODIUM AND TAZOBACTAM SODIUM 3.38 G: 3; .375 INJECTION, SOLUTION INTRAVENOUS at 01:48

## 2023-08-24 RX ADMIN — PIPERACILLIN SODIUM AND TAZOBACTAM SODIUM 3.38 G: 3; .375 INJECTION, SOLUTION INTRAVENOUS at 11:07

## 2023-08-24 NOTE — CASE MANAGEMENT/SOCIAL WORK
Case Management Discharge Note      Final Note: Home with [facility] other [facility]. Transport via [facility]  van today at 1700.    Provided Post Acute Provider List?: Refused  Provided Post Acute Provider Quality & Resource List?: Refused    Selected Continued Care - Admitted Since 8/21/2023       Destination    No services have been selected for the patient.                Durable Medical Equipment    No services have been selected for the patient.                Dialysis/Infusion    No services have been selected for the patient.                Home Medical Care    No services have been selected for the patient.                Therapy    No services have been selected for the patient.                Community Resources    No services have been selected for the patient.                Community & DME    No services have been selected for the patient.                    Trans[facility] Services  [facility]    Final Discharge Disposition Code: 01 - home or self-care

## 2023-08-24 NOTE — OUTREACH NOTE
Prep Survey      Flowsheet Row Responses   [facility] facility patient discharged from? [place]   Is LACE score < 7 ? No   Eligibility [facility]   Date of Admission 08/21/23   Date of Discharge 08/24/23   Discharge Disposition Home or Self Care   Discharge diagnosis Acute UTI-Paralysis of both lower limbs   Does the patient have one of the following disease processes/diagnoses(primary or secondary)? Other   Does the patient have Home health ordered? No   Is there a DME ordered? No   Prep survey completed? Yes            JULIA PAYNE - Registered Nurse

## 2023-08-24 NOTE — PLAN OF CARE
Goal Outcome Evaluation:  Plan of Care Reviewed With: patient        Progress: improving  Outcome Evaluation: Pt stable. Pt to be dc home via WC van.

## 2023-08-24 NOTE — DISCHARGE SUMMARY
PHYSICIAN DISCHARGE SUMMARY                                                                        [facility]    Patient Identification:  Name: Pk May  Age: 42 y.o.  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          PMHX:   Past Medical History:   Diagnosis Date    Paraplegia     Wound infection      PSHX:   Past Surgical History:   Procedure Laterality Date    SPINAL FUSION         Hospital Course: Pk May  is a   42 year old gentleman who presented to the emergency room with a fever and foul smelling urine from his urostomy; he has a history of paraplegia; he also has leg wounds; he denies nausea or vomiting; the patient was admitted to the hospital and seen by vascular surgery.  He was treated with antibiotics for UTI and was feeling better after being in the hospital for couple days.  He will finish a course of oral antibiotics and follow-up with his primary care in 1 week.  He has some wounds on his legs which he has had chronically and will follow-up with his wound clinic.    Consults:     Consults       Date and Time Order Name Status Description    2023  5:07 PM Inpatient Vascular Surgery Consult Completed     2023 12:33 PM [facility] (on-call MD unless specified) Details            Results from last 7 days   Lab Units  08/24/23  0420   WBC 10*3/mm3 5.18   HEMOGLOBIN g/dL 10.5*   HEMATOCRIT % 31.2*   PLATELETS 10*3/mm3 259     Results from last 7 days   Lab Units 08/24/23  0420   SODIUM mmol/L 143   POTASSIUM mmol/L 3.3*   CHLORIDE mmol/L 111*   CO2 mmol/L 24.8   BUN mg/dL 5*   CREATININE mg/dL 0.55*   GLUCOSE mg/dL 107*   CALCIUM mg/dL 8.3*     Significant Diagnostic Studies:   WBC   Date Value Ref Range Status   08/24/2023 5.18 3.40 - 10.80 10*3/mm3 Final     Hemoglobin   Date Value Ref Range Status   08/24/2023 10.5 (L) 13.0 - 17.7 g/dL Final     Hematocrit   Date Value Ref Range Status   08/24/2023 31.2 (L) 37.5 - 51.0 % Final     Platelets   Date Value Ref Range Status   08/24/2023 259 140 - 450 10*3/mm3 Final     Sodium   Date Value Ref Range Status   08/24/2023 143 136 - 145 mmol/L Final     Potassium   Date Value Ref Range Status   08/24/2023 3.3 (L) 3.5 - 5.2 mmol/L Final     Chloride   Date Value Ref Range Status   08/24/2023 111 (H) 98 - 107 mmol/L Final     CO2   Date Value Ref Range Status   08/24/2023 24.8 22.0 - 29.0 mmol/L Final     BUN   Date Value Ref Range Status   08/24/2023 5 (L) 6 - 20 mg/dL Final     Creatinine   Date Value Ref Range Status   08/24/2023 0.55 (L) 0.76 - 1.27 mg/dL Final     Glucose   Date Value Ref Range Status   08/24/2023 107 (H) 65 - 99 mg/dL Final     Calcium   Date Value Ref Range Status   08/24/2023 8.3 (L) 8.6 - 10.5 mg/dL Final     No results found for: AST, ALT, ALKPHOS  No results found for: APTT, INR  No results found for: COLORU, CLARITYU, SPECGRAV, PHUR, PROTEINUR, GLUCOSEU, KETONESU, BLOODU, NITRITE, LEUKOCYTESUR, BILIRUBINUR, UROBILINOGEN, RBCUA, WBCUA, BACTERIA, UACOMMENT  No results found for: TROPONINT, TROPONINI, BNP  No components found for: HGBA1C;2  No components found for: TSH;2  Imaging Results (All)       Procedure Component Value Units Date/Time    CT Lower Extremity Bilateral With Contrast [[id number]] Collected: 08/21/23 2109     Updated: 08/21/23 2124    Narrative:       CT OF THE LOWER EXTREMITIES     HISTORY: Bilateral lower extremity wounds     COMPARISON: March 7, 2023     TECHNIQUE: Axial CT imaging was obtained through both lower extremities.  Contrast was administered.     FINDINGS:  Limited images through the pelvis demonstrate relative atrophy of the  musculature. No fluid collections are seen. Patient is noted to have  heterotopic ossification surrounding the hips bilaterally.     Right lower extremity: Full assessment of the arterial vasculature is  limited, due to venous contamination. Both arterial and venous  vasculature appears to be patent. No fluid collections are identified.  No subcutaneous gas is seen. There is no suprapatellar effusion.. There  is diffuse muscular atrophy. No aggressive osseous abnormalities are  seen.     Left lower extremity: Full assessment of the arterial vasculature is  limited, due to venous contamination. Both arterial and venous  vasculature appears to be patent. No fluid collections are identified.  No subcutaneous gas is seen. There is no suprapatellar effusion. The  patient has an intramedullary holly within the left tibia. No definite  aggressive osseous abnormalities are seen. There is diffuse muscular  atrophy.       Impression:      No fluid collections identified in either lower extremity.     Radiation dose reduction techniques were utilized, including automated  exposure control and exposure modulation based on body size.        This report was finalized on 8/21/2023 9:21 PM by Dr. Barbie Garcia M.D.             Lab Results (last 7 days)       Procedure Component Value Units Date/Time    Urine Culture - Urine, Urine, Catheter [[id number]]  (Abnormal)  (Susceptibility) Collected: 08/21/23 1151    Specimen: Urine, Catheter Updated: 08/24/23 1250     Urine Culture >100,000 CFU/mL Providencia rettgeri      50,000 CFU/mL Mixed Apryl Isolated    Narrative:      Colonization of the urinary tract without infection is common.  Treatment is discouraged unless the patient is symptomatic, pregnant, or undergoing an invasive urologic procedure.  Specimen contains mixed organisms of questionable pathogenicity suggestive of contamination. If symptoms persist, suggest recollection.    Susceptibility        Providencia rettgeri      DIOGO      Ampicillin Susceptible      Ampicillin + Sulbactam Susceptible      Cefazolin Resistant      Cefepime Susceptible      Ceftazidime Susceptible      Ceftriaxone Susceptible      Gentamicin Susceptible      Levofloxacin Susceptible      Nitrofurantoin Resistant      Piperacillin + Tazobactam Susceptible      Trimethoprim + Sulfamethoxazole Susceptible                           Blood Culture - Blood, Arm, Left [[id number]]  (Normal) Collected: 08/21/23 1217    Specimen: Blood from Arm, Left Updated: 08/24/23 1230     Blood Culture No growth at 3 days    Blood Culture - Blood, Arm, Left [[id number]]  (Normal) Collected: 08/21/23 1153    Specimen: Blood from Arm, Left Updated: 08/24/23 1200     Blood Culture No growth at 3 days    Basic Metabolic Panel [[id number]]  (Abnormal) Collected: 08/24/23 0420    Specimen: Blood Updated: 08/24/23 0535     Glucose 107 mg/dL      BUN 5 mg/dL      Creatinine 0.55 mg/dL      Sodium 143 mmol/L      Potassium 3.3 mmol/L      Chloride 111 mmol/L      CO2 24.8 mmol/L      Calcium 8.3 mg/dL      BUN/Creatinine Ratio 9.1     Anion Gap 7.2 mmol/L      eGFR 127.7 mL/min/1.73     Narrative:      GFR Normal >60  Chronic Kidney Disease <60  Kidney Failure <15      CBC & Differential [[id number]]  (Abnormal) Collected: 08/24/23 0420    Specimen: Blood Updated: 08/24/23 0515    Narrative:      The following orders were created for panel order CBC & Differential.  Procedure                               Abnormality         Status                     ---------                               -----------         ------                     CBC Auto Differential[[id number]]        Abnormal             Final result                 Please view results for these tests on the individual orders.    CBC Auto Differential [[id number]]  (Abnormal) Collected: 08/24/23 0420    Specimen: Blood Updated: 08/24/23 0515     WBC 5.18 10*3/mm3      RBC 3.49 10*6/mm3      Hemoglobin 10.5 g/dL      Hematocrit 31.2 %      MCV 89.4 fL      MCH 30.1 pg      MCHC 33.7 g/dL      RDW 13.5 %      RDW-SD 43.9 fl      MPV 8.8 fL      Platelets 259 10*3/mm3      Neutrophil % 61.0 %      Lymphocyte % 27.6 %      Monocyte % 7.5 %      Eosinophil % 2.9 %      Basophil % 0.8 %      Immature Grans % 0.2 %      Neutrophils, Absolute 3.16 10*3/mm3      Lymphocytes, Absolute 1.43 10*3/mm3      Monocytes, Absolute 0.39 10*3/mm3      Eosinophils, Absolute 0.15 10*3/mm3      Basophils, Absolute 0.04 10*3/mm3      Immature Grans, Absolute 0.01 10*3/mm3      nRBC 0.0 /100 WBC     Basic Metabolic Panel [[id number]]  (Abnormal) Collected: 08/23/23 0413    Specimen: Blood Updated: 08/23/23 0519     Glucose 118 mg/dL      BUN 10 mg/dL      Creatinine 0.60 mg/dL      Sodium 141 mmol/L      Potassium 3.2 mmol/L      Chloride 110 mmol/L      CO2 23.0 mmol/L      Calcium 8.2 mg/dL      BUN/Creatinine Ratio 16.7     Anion Gap 8.0 mmol/L      eGFR 124.4 mL/min/1.73     Narrative:      GFR Normal >60  Chronic Kidney Disease <60  Kidney Failure <15      Vancomycin, Random [[id number]]  (Normal) Collected: 08/23/23 0413    Specimen: Blood Updated: 08/23/23 0516     Vancomycin Random 16.40 mcg/mL     Narrative:      Therapeutic Ranges for Vancomycin    Vancomycin Random   5.0-40.0 mcg/mL  Vancomycin Trough   5.0-20.0 mcg/mL  Vancomycin Peak     20.0-40.0 mcg/mL    CBC & Differential [[id number]]  (Abnormal) Collected: 08/23/23 0413    Specimen: Blood Updated: 08/23/23 0455    Narrative:      The following orders were created for panel order CBC & Differential.  Procedure                               Abnormality         Status                     ---------                                -----------         ------                     CBC Auto Differential[[id number]]        Abnormal            Final result                 Please view results for these tests on the individual orders.    CBC Auto Differential [[id number]]  (Abnormal) Collected: 08/23/23 0413    Specimen: Blood Updated: 08/23/23 0455     WBC 6.38 10*3/mm3      RBC 3.46 10*6/mm3      Hemoglobin 10.2 g/dL      Hematocrit 30.0 %      MCV 86.7 fL      MCH 29.5 pg      MCHC 34.0 g/dL      RDW 13.2 %      RDW-SD 41.3 fl      MPV 8.8 fL      Platelets 244 10*3/mm3      Neutrophil % 65.2 %      Lymphocyte % 21.0 %      Monocyte % 11.1 %      Eosinophil % 1.6 %      Basophil % 0.6 %      Immature Grans % 0.5 %      Neutrophils, Absolute 4.16 10*3/mm3      Lymphocytes, Absolute 1.34 10*3/mm3      Monocytes, Absolute 0.71 10*3/mm3      Eosinophils, Absolute 0.10 10*3/mm3      Basophils, Absolute 0.04 10*3/mm3      Immature Grans, Absolute 0.03 10*3/mm3      nRBC 0.0 /100 WBC     CBC (No Diff) [[id number]]  (Abnormal) Collected: 08/22/23 0421    Specimen: Blood Updated: 08/22/23 0514     WBC 9.45 10*3/mm3      RBC 3.65 10*6/mm3      Hemoglobin 11.1 g/dL      Hematocrit 32.0 %      MCV 87.7 fL      MCH 30.4 pg      MCHC 34.7 g/dL      RDW 13.5 %      RDW-SD 42.8 fl      MPV 9.1 fL      Platelets 235 10*3/mm3     Basic Metabolic Panel [[id number]]  (Abnormal) Collected: 08/22/23 0421    Specimen: Blood Updated: 08/22/23 0503     Glucose 103 mg/dL      BUN 8 mg/dL      Creatinine 0.65 mg/dL      Sodium 135 mmol/L      Potassium 3.3 mmol/L      Chloride 101 mmol/L      CO2 23.7 mmol/L      Calcium 8.4 mg/dL      BUN/Creatinine Ratio 12.3     Anion Gap 10.3 mmol/L      eGFR 121.4 mL/min/1.73     Narrative:      GFR Normal >60  Chronic Kidney Disease <60  Kidney Failure <15      Urinalysis, Microscopic Only - Urine, Catheter [[id number]]  (Abnormal) Collected: 08/21/23 1151    Specimen: Urine, Catheter Updated: 08/21/23 1229     RBC, UA 6-12  /HPF      WBC, UA 31-50 /HPF      Bacteria, UA 2+ /HPF      Squamous Epithelial Cells, UA None Seen /HPF      Hyaline Casts, UA None Seen /LPF      Methodology Manual Light Microscopy    Urinalysis With Microscopic If Indicated (No Culture) - Urine, Catheter [[id number]]  (Abnormal) Collected: 08/21/23 1151    Specimen: Urine, Catheter Updated: 08/21/23 1215     Color, UA Yellow     Appearance, UA Cloudy     pH, UA 8.0     Specific Gravity, UA 1.014     Glucose, UA Negative     Ketones, UA Negative     Bilirubin, UA Negative     Blood, UA Moderate (2+)     Protein, UA 30 mg/dL (1+)     Leuk Esterase, UA Large (3+)     Nitrite, UA Positive     Urobilinogen, UA 1.0 E.U./dL    Comprehensive Metabolic Panel [[id number]]  (Abnormal) Collected: 08/21/23 1133    Specimen: Blood Updated: 08/21/23 1210     Glucose 92 mg/dL      BUN 11 mg/dL      Creatinine 0.75 mg/dL      Sodium 129 mmol/L      Potassium 4.3 mmol/L      Comment: Specimen hemolyzed.  Results may be affected.        Chloride 97 mmol/L      CO2 22.0 mmol/L      Calcium 8.9 mg/dL      Total Protein 7.6 g/dL      Albumin 3.7 g/dL      ALT (SGPT) 14 U/L      Comment: Specimen hemolyzed.  Results may be affected.        AST (SGOT) 22 U/L      Comment: Specimen hemolyzed.  Results may be affected.        Alkaline Phosphatase 114 U/L      Total Bilirubin 0.5 mg/dL      Globulin 3.9 gm/dL      A/G Ratio 0.9 g/dL      BUN/Creatinine Ratio 14.7     Anion Gap 10.0 mmol/L      eGFR 116.3 mL/min/1.73     Narrative:      GFR Normal >60  Chronic Kidney Disease <60  Kidney Failure <15      Procalcitonin [[id number]]  (Normal) Collected: 08/21/23 1133    Specimen: Blood Updated: 08/21/23 1209     Procalcitonin 0.21 ng/mL     Narrative:      As a Marker for Sepsis (Non-Neonates):    1. <0.5 ng/mL represents a low risk of severe sepsis and/or septic shock.  2. >2 ng/mL represents a high risk of severe sepsis and/or septic shock.    As a Marker for Lower Respiratory Tract Infections  "that require antibiotic therapy:    PCT on Admission    Antibiotic Therapy       6-12 Hrs later    >0.5                Strongly Recommended  >0.25 - <0.5        Recommended   0.1 - 0.25          Discouraged              Remeasure/reassess PCT  <0.1                Strongly Discouraged     Remeasure/reassess PCT    As 28 day mortality risk marker: \"Change in Procalcitonin Result\" (>80% or <=80%) if Day 0 (or Day 1) and Day 4 values are available. Refer to http://www.[facility]-pct-calculator.com    Change in PCT <=80%  A decrease of PCT levels below or equal to 80% defines a positive change in PCT test result representing a higher risk for 28-day all-cause mortality of patients diagnosed with severe sepsis for septic shock.    Change in PCT >80%  A decrease of PCT levels of more than 80% defines a negative change in PCT result representing a lower risk for 28-day all-cause mortality of patients diagnosed with severe sepsis or septic shock.       Lactic Acid, Plasma [[id number]]  (Normal) Collected: 08/21/23 1133    Specimen: Blood Updated: 08/21/23 1207     Lactate 1.4 mmol/L     CBC & Differential [[id number]]  (Abnormal) Collected: 08/21/23 1133    Specimen: Blood Updated: 08/21/23 1144    Narrative:      The following orders were created for panel order CBC & Differential.  Procedure                               Abnormality         Status                     ---------                               -----------         ------                     CBC Auto Differential[[id number]]        Abnormal            Final result                 Please view results for these tests on the individual orders.    CBC Auto Differential [[id number]]  (Abnormal) Collected: 08/21/23 1133    Specimen: Blood Updated: 08/21/23 1144     WBC 13.60 10*3/mm3      RBC 4.37 10*6/mm3      Hemoglobin 13.2 g/dL      Hematocrit 38.8 %      MCV 88.8 fL      MCH 30.2 pg      MCHC 34.0 g/dL      RDW 13.5 %      RDW-SD 43.8 fl      MPV 9.2 fL      Platelets " "241 10*3/mm3      Neutrophil % 82.0 %      Lymphocyte % 8.8 %      Monocyte % 8.5 %      Eosinophil % 0.0 %      Basophil % 0.3 %      Immature Grans % 0.4 %      Neutrophils, Absolute 11.15 10*3/mm3      Lymphocytes, Absolute 1.19 10*3/mm3      Monocytes, Absolute 1.16 10*3/mm3      Eosinophils, Absolute 0.00 10*3/mm3      Basophils, Absolute 0.04 10*3/mm3      Immature Grans, Absolute 0.06 10*3/mm3      nRBC 0.0 /100 WBC           /71   Pulse 89   Temp 97.9 °F (36.6 °C)   Resp 16   Ht 177.8 cm (70\")   Wt 93 kg (205 lb 0.4 oz)   SpO2 96%   BMI 29.42 kg/m²     Discharge Exam:  General Appearance:    Alert, cooperative, no distress                          Head:    Normocephalic, without obvious abnormality, atraumatic                          Eyes:                            Throat:   Lips, tongue, gums normal                          Neck:   Supple, symmetrical, trachea midline, no JVD                        Lungs:     Clear to auscultation bilaterally, respirations unlabored                Chest Wall:    No tenderness or deformity                        Heart:    Regular rate and rhythm, S1 and S2 normal, no murmur,no  Rub  or gallop                  Abdomen:     Soft, non-tender, bowel sounds active, no masses, no organomegaly                  Extremities:   Extremities normal, leg wounds, no cyanosis or edema                             Skin:   Skin is warm and dry,  no rashes or palpable lesions                  Neurologic: He is paraplegic  Disposition:  Home    Activity as tolerated    Diet as tolerated  Diet Order   Procedures    Diet: Cardiac Diets; Healthy Heart (2-3 Na+); Texture: Regular Texture (IDDSI 7); Fluid Consistency: Thin (IDDSI 0)       Patient Instructions:      Discharge Medications        New Medications        Instructions Start Date   amoxicillin 500 MG capsule  Commonly known as: AMOXIL   1,000 mg, Oral, 2 Times Daily             Continue These Medications        Instructions " Start Date   amitriptyline 75 MG tablet  Commonly known as: ELAVIL   75 mg, Oral, 2 Times Daily      ARIPiprazole 5 MG tablet  Commonly known as: ABILIFY   5 mg, Oral, Daily      baclofen 10 MG tablet  Commonly known as: LIORESAL   TAKE 1 TABLET BY MOUTH THREE TIMES DAILY      busPIRone 30 MG tablet  Commonly known as: BUSPAR   TAKE 0.5 TABLETS BY MOUTH EVERY MORNING AND 1 TABLET EVERY MORNING      gabapentin 600 MG tablet  Commonly known as: NEURONTIN   1,200 mg, Oral, 3 Times Daily PRN      triamcinolone 0.1 % paste  Commonly known as: KENALOG   Apply to oral ulcer twice daily until healed      venlafaxine  MG 24 hr capsule  Commonly known as: EFFEXOR-XR   150 mg, Oral, Daily             Future Appointments   Date Time Provider Department Center   7/23/2024  9:00 AM [facility]   7/29/2024  2:45 PM Chasity Ruggiero APRN [facility]      Follow-up Information       Chasity Ruggiero APRN Follow up in 1 week(s).    Specialty: Family Medicine  Contact information:  [address] [address]  [phone]                           Discharge Order (From admission, onward)       Start     Ordered    08/24/23 1334  Discharge patient  Once        Expected Discharge Date: 08/24/23   Discharge Disposition: Home or Self Care   Physician of Record for Attribution - Please select from Treatment Team: ADAMARIS THORNE [[id number]]   Review needed by CMO to determine Physician of Record: No      Question Answer Comment   Physician of Record for Attribution - Please select from Treatment Team ADAMARIS THORNE    Review needed by CMO to determine Physician of Record No        08/24/23 1335                    Total time spent discharging patient including evaluation,post hospitalization follow up,  medication and post hospitalization instructions and education total time exceeds 30 minutes.    Signed:  Adamaris Thorne MD  8/24/2023  13:35 EDT

## 2023-08-24 NOTE — PLAN OF CARE
Goal Outcome Evaluation:   Desires to go home , waiting for the result of his blood C&S for home antibiotic. No complaints made , vs stable.

## 2023-08-25 ENCOUNTER — TRANSITIONAL CARE MANAGEMENT TELEPHONE ENCOUNTER (OUTPATIENT)
Dept: CALL CENTER | Facility: HOSPITAL | Age: 43
End: 2023-08-25
Payer: MEDICARE

## 2023-08-25 NOTE — OUTREACH NOTE
Call Center TCM Note      Flowsheet Row Responses   [facility] patient discharged from? [place]   Does the patient have one of the following disease processes/diagnoses(primary or secondary)? Other   TCM attempt successful? Yes   Call start time 1351   Call end time 1353   Discharge diagnosis Acute UTI-Paralysis of both lower limbs   Person spoke with today (if not patient) and relationship patient   Meds reviewed with patient/caregiver? Yes   Does the patient have all medications ordered at discharge? Yes   Prescription comments amoxicillin 500 MG capsule- Take 2 capsules by mouth 2 (Two) Times a Day  for 7 days.   Is the patient taking all medications as directed (includes completed medication regime)? Yes   Comments Patient states he may be leaving this weekend if he can get a plane ticket to [place] his father has cancer and is going home on Hospice he will call and reschedule hospital fu appt with pcp   Does the patient have an appointment with their PCP within 7-14 days of discharge? Other   Nursing Interventions Routed TCM call to PCP office   Has home health visited the patient within 72 hours of discharge? N/A   Psychosocial issues? No   Did the patient receive a copy of their discharge instructions? Yes   Nursing interventions Reviewed instructions with patient   What is the patient's perception of their health status since discharge? Improving   Is the patient/caregiver able to teach back signs and symptoms related to disease process for when to call PCP? Yes   Is the patient/caregiver able to teach back signs and symptoms related to disease process for when to call 911? Yes   Is the patient/caregiver able to teach back the hierarchy of who to call/visit for symptoms/problems? PCP, Specialist, Home health nurse, Urgent Care, ED, 911 Yes   TCM call completed? Yes   Wrap up additional comments Patient doing well taking medication as advised. Patient states he may be leaving this weekend if he can get a  plane ticket to [place] his father has cancer and is going home on Hospice he will call and reschedule hospital fu appt with pcp   Call end time [id number]   Would this patient benefit from a Referral to [facility] Social Work? No   Is the patient interested in additional calls from an ambulatory ? No            Tara Quiroga RN    8/25/2023, 13:53 EDT

## 2023-08-26 LAB
BACTERIA SPEC AEROBE CULT: NORMAL
BACTERIA SPEC AEROBE CULT: NORMAL

## 2023-09-27 ENCOUNTER — TELEPHONE (OUTPATIENT)
Dept: INTERNAL MEDICINE | Facility: CLINIC | Age: 43
End: 2023-09-27
Payer: MEDICARE

## 2023-09-27 NOTE — TELEPHONE ENCOUNTER
----- Message from NELLY Velasquez sent at 9/27/2023  8:36 AM EDT -----  Please see what medication refills he needs  He has a recent appt. I cannot prescribe any controlled substances due to positive drug screen. He will have to see pain management

## 2023-09-27 NOTE — TELEPHONE ENCOUNTER
Called patient and left message to call us back.  Need to know which medication he needs refilled.  No controlled substances can be prescribed by Chasity for this patient.

## 2023-09-28 ENCOUNTER — TELEPHONE (OUTPATIENT)
Dept: INTERNAL MEDICINE | Facility: CLINIC | Age: 43
End: 2023-09-28
Payer: MEDICARE

## 2023-09-28 NOTE — TELEPHONE ENCOUNTER
Called patient again, voicemail box full and unable to leave additional messages.    Need to know which medication he needs refilled , as Chasity can not prescribe him any cs medications.

## 2023-09-29 ENCOUNTER — OFFICE VISIT (OUTPATIENT)
Dept: INTERNAL MEDICINE | Facility: CLINIC | Age: 43
End: 2023-09-29
Payer: MEDICARE

## 2023-09-29 VITALS
WEIGHT: 220 LBS | SYSTOLIC BLOOD PRESSURE: 120 MMHG | OXYGEN SATURATION: 97 % | BODY MASS INDEX: 31.5 KG/M2 | HEIGHT: 70 IN | HEART RATE: 102 BPM | RESPIRATION RATE: 16 BRPM | DIASTOLIC BLOOD PRESSURE: 60 MMHG

## 2023-09-29 DIAGNOSIS — R15.9 BOWEL AND BLADDER INCONTINENCE: ICD-10-CM

## 2023-09-29 DIAGNOSIS — Z93.3 COLOSTOMY IN PLACE: ICD-10-CM

## 2023-09-29 DIAGNOSIS — R32 BOWEL AND BLADDER INCONTINENCE: ICD-10-CM

## 2023-09-29 DIAGNOSIS — Z80.1 FAMILY HISTORY OF LUNG CANCER: Primary | ICD-10-CM

## 2023-09-29 DIAGNOSIS — M77.8 LEFT ELBOW TENDONITIS: ICD-10-CM

## 2023-09-29 DIAGNOSIS — F17.200 CURRENT EVERY DAY SMOKER: ICD-10-CM

## 2023-09-29 DIAGNOSIS — K59.2 NEUROGENIC BOWEL: ICD-10-CM

## 2023-09-29 PROBLEM — F32.A DEPRESSION: Status: ACTIVE | Noted: 2019-08-10

## 2023-09-29 PROBLEM — N39.0 ACUTE UTI: Status: RESOLVED | Noted: 2023-08-21 | Resolved: 2023-09-29

## 2023-09-29 PROBLEM — F14.91 HISTORY OF COCAINE USE: Status: ACTIVE | Noted: 2019-01-30

## 2023-09-29 PROBLEM — K59.09 CHRONIC CONSTIPATION: Status: ACTIVE | Noted: 2019-08-03

## 2023-09-29 PROBLEM — F12.90 MARIJUANA USE, CONTINUOUS: Status: ACTIVE | Noted: 2019-06-04

## 2023-09-29 PROBLEM — G89.21 CHRONIC PAIN DUE TO TRAUMA: Status: ACTIVE | Noted: 2019-02-02

## 2023-09-29 PROBLEM — N39.0 UTI (URINARY TRACT INFECTION): Status: RESOLVED | Noted: 2023-08-22 | Resolved: 2023-09-29

## 2023-09-29 PROBLEM — Z91.89 HISTORY OF DRUG OVERDOSE: Status: ACTIVE | Noted: 2018-12-11

## 2023-09-29 PROBLEM — F33.1 RECURRENT MAJOR DEPRESSIVE EPISODES, MODERATE: Status: ACTIVE | Noted: 2023-09-29

## 2023-09-29 PROBLEM — F43.10 POSTTRAUMATIC STRESS DISORDER: Status: RESOLVED | Noted: 2022-05-09 | Resolved: 2023-09-29

## 2023-09-29 RX ORDER — METHYLPREDNISOLONE 4 MG/1
TABLET ORAL
Qty: 21 TABLET | Refills: 0 | Status: SHIPPED | OUTPATIENT
Start: 2023-09-29

## 2023-09-29 NOTE — PROGRESS NOTES
Gibran May is a 42 y.o. male. Patient is here today for   Chief Complaint   Patient presents with    Ostomy Supplies          Vitals:    09/29/23 1256   BP: 120/60   Pulse: 102   Resp: 16   SpO2: 97%     Body mass index is 31.57 kg/m².  The following portions of the patient's history were reviewed and updated as appropriate: allergies, current medications, past family history, past medical history, past social history, past surgical history and problem list.    Past Medical History:   Diagnosis Date    Paraplegia     Wound infection       Allergies   Allergen Reactions    Pholcodine Anaphylaxis    Codeine Itching    Amoxicillin-Pot Clavulanate GI Intolerance and Nausea Only     Other reaction(s): Abdominal Pain   Nausea   Nausea   Other reaction(s): Nausea and Vomiting   Nausea   Nausea   Nausea    Nausea   Nausea    Cefuroxime GI Intolerance and Nausea Only     Other reaction(s): Abdominal Pain   Nausea   Nausea    Nausea    Doxycycline Nausea And Vomiting and Nausea Only     Stomach cramping   Stomach cramping    Stomach cramping    Sulfamethoxazole Nausea Only     Stomach cramping      Social History     Socioeconomic History    Marital status: Significant Other   Tobacco Use    Smoking status: Every Day     Packs/day: 1.00     Years: 24.00     Pack years: 24.00     Types: Cigarettes    Smokeless tobacco: Never   Vaping Use    Vaping Use: Every day    Substances: Nicotine   Substance and Sexual Activity    Alcohol use: Not Currently    Drug use: Yes     Types: Marijuana        Current Outpatient Medications:     amitriptyline (ELAVIL) 75 MG tablet, Take 1 tablet by mouth 2 (Two) Times a Day., Disp: 180 tablet, Rfl: 3    ARIPiprazole (ABILIFY) 5 MG tablet, Take 1 tablet by mouth Daily., Disp: , Rfl:     baclofen (LIORESAL) 10 MG tablet, TAKE 1 TABLET BY MOUTH THREE TIMES DAILY, Disp: 14 tablet, Rfl: 0    busPIRone (BUSPAR) 30 MG tablet, TAKE 0.5 TABLETS BY MOUTH EVERY MORNING AND 1 TABLET EVERY  MORNING, Disp: , Rfl:     triamcinolone (KENALOG) 0.1 % paste, Apply to oral ulcer twice daily until healed, Disp: 5 g, Rfl: 1    venlafaxine XR (EFFEXOR-XR) 150 MG 24 hr capsule, Take 1 capsule by mouth Daily., Disp: 90 capsule, Rfl: 1    methylPREDNISolone (MEDROL) 4 MG dose pack, Take as directed on package instructions., Disp: 21 tablet, Rfl: 0     Objective     History of Present Illness Pk is a 42 year old male patient who is here for a follow up and face to face for ostomy supplies. 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 He has a urostomy and colostomy in place .   He is concerned about potential for lung cancer. He states both his grandfather and father have  from lung cancer. He denies shortness of breath , has an occasional cough, and denies hemoptysis and night sweats . He is vapes daily. He is requesting imaging   He also c/o left elbow pain and swelling . He denies any known injury. But uses his arms repetitively to get around in his wheelchair     Review of Systems   Constitutional:  Negative for fatigue.   HENT: Negative.     Respiratory:  Positive for cough (occasional). Negative for chest tightness, shortness of breath and wheezing.    Musculoskeletal:         Elbow pain , NKI      Physical Exam  Vitals reviewed.   Constitutional:       General: He is not in acute distress.  Cardiovascular:      Rate and Rhythm: Normal rate and regular rhythm.   Pulmonary:      Effort: Pulmonary effort is normal.      Breath sounds: Normal breath sounds.   Musculoskeletal:      Left elbow: No deformity or effusion. Normal range of motion. Tenderness present.   Neurological:      Mental Status: He is alert.       Assessment    ASSESSMENT    Problems Addressed this Visit       Bowel and bladder incontinence    Neurogenic bowel    Colostomy in place     Other Visit Diagnoses       Family history of lung cancer    -  Primary    Relevant Orders    XR Chest PA &  Lateral (In Office)    Left elbow tendonitis        Relevant Medications    methylPREDNISolone (MEDROL) 4 MG dose pack    Current every day smoker        Relevant Orders    XR Chest PA & Lateral (In Office)          Diagnoses         Codes Comments    Family history of lung cancer    -  Primary ICD-10-CM: Z80.1  ICD-9-CM: V16.1     Left elbow tendonitis     ICD-10-CM: M77.8  ICD-9-CM: 727.09     Current every day smoker     ICD-10-CM: F17.200  ICD-9-CM: 305.1     Bowel and bladder incontinence     ICD-10-CM: R32, R15.9  ICD-9-CM: 788.30, 787.60     Colostomy in place     ICD-10-CM: Z93.3  ICD-9-CM: V44.3     Neurogenic bowel     ICD-10-CM: K59.2  ICD-9-CM: 564.81             PLAN  Will refill ostomy supplies   Will get chest xray due to occasional cough and concern for lung cancer. Also discussed getting  CT.   Discussed smoking cessation  For left elbow tendonitis- recommend applying ice as needed, and start medrol dosepak. Will refer to ortho if no improvement   Follow up in July as scheduled or sooner if needed

## 2023-11-20 ENCOUNTER — APPOINTMENT (OUTPATIENT)
Dept: INTERVENTIONAL RADIOLOGY/VASCULAR | Facility: HOSPITAL | Age: 43
DRG: 853 | End: 2023-11-20
Payer: MEDICARE

## 2023-11-20 ENCOUNTER — HOSPITAL ENCOUNTER (INPATIENT)
Facility: HOSPITAL | Age: 43
LOS: 11 days | Discharge: HOME | DRG: 853 | End: 2023-12-01
Attending: EMERGENCY MEDICINE | Admitting: INTERNAL MEDICINE
Payer: MEDICARE

## 2023-11-20 ENCOUNTER — APPOINTMENT (OUTPATIENT)
Dept: CT IMAGING | Facility: HOSPITAL | Age: 43
DRG: 853 | End: 2023-11-20
Payer: MEDICARE

## 2023-11-20 DIAGNOSIS — N20.0 KIDNEY STONE: ICD-10-CM

## 2023-11-20 DIAGNOSIS — N17.9 ACUTE RENAL FAILURE, UNSPECIFIED ACUTE RENAL FAILURE TYPE: ICD-10-CM

## 2023-11-20 DIAGNOSIS — N20.1 URETEROLITHIASIS: ICD-10-CM

## 2023-11-20 DIAGNOSIS — Z86.69 HISTORY OF PARAPLEGIA: ICD-10-CM

## 2023-11-20 DIAGNOSIS — F19.10 POLYSUBSTANCE ABUSE: ICD-10-CM

## 2023-11-20 DIAGNOSIS — A41.9 SEPSIS, DUE TO UNSPECIFIED ORGANISM, UNSPECIFIED WHETHER ACUTE ORGAN DYSFUNCTION PRESENT: Primary | ICD-10-CM

## 2023-11-20 DIAGNOSIS — N39.0 ACUTE URINARY TRACT INFECTION: ICD-10-CM

## 2023-11-20 LAB
ALBUMIN SERPL-MCNC: 3.2 G/DL (ref 3.5–5.2)
ALBUMIN/GLOB SERPL: 0.7 G/DL
ALP SERPL-CCNC: 329 U/L (ref 39–117)
ALT SERPL W P-5'-P-CCNC: 20 U/L (ref 1–41)
AMPHET+METHAMPHET UR QL: NEGATIVE
ANION GAP SERPL CALCULATED.3IONS-SCNC: 19.8 MMOL/L (ref 5–15)
AST SERPL-CCNC: 35 U/L (ref 1–40)
BACTERIA UR QL AUTO: ABNORMAL /HPF
BARBITURATES UR QL SCN: NEGATIVE
BENZODIAZ UR QL SCN: NEGATIVE
BILIRUB SERPL-MCNC: 1.1 MG/DL (ref 0–1.2)
BILIRUB UR QL STRIP: NEGATIVE
BUN SERPL-MCNC: 46 MG/DL (ref 6–20)
BUN/CREAT SERPL: 16.4 (ref 7–25)
BURR CELLS BLD QL SMEAR: ABNORMAL
CALCIUM SPEC-SCNC: 9.1 MG/DL (ref 8.6–10.5)
CANNABINOIDS SERPL QL: POSITIVE
CHLORIDE SERPL-SCNC: 95 MMOL/L (ref 98–107)
CLARITY UR: ABNORMAL
CO2 SERPL-SCNC: 17.2 MMOL/L (ref 22–29)
COCAINE UR QL: POSITIVE
COLOR UR: YELLOW
CREAT SERPL-MCNC: 2.81 MG/DL (ref 0.76–1.27)
D-LACTATE SERPL-SCNC: 3.2 MMOL/L (ref 0.5–2)
D-LACTATE SERPL-SCNC: 3.9 MMOL/L (ref 0.5–2)
DEPRECATED RDW RBC AUTO: 39.1 FL (ref 37–54)
EGFRCR SERPLBLD CKD-EPI 2021: 28.1 ML/MIN/1.73
ERYTHROCYTE [DISTWIDTH] IN BLOOD BY AUTOMATED COUNT: 12.9 % (ref 12.3–15.4)
FENTANYL UR-MCNC: NEGATIVE NG/ML
GLOBULIN UR ELPH-MCNC: 4.6 GM/DL
GLUCOSE BLDC GLUCOMTR-MCNC: 66 MG/DL (ref 70–130)
GLUCOSE SERPL-MCNC: 70 MG/DL (ref 65–99)
GLUCOSE UR STRIP-MCNC: NEGATIVE MG/DL
HCT VFR BLD AUTO: 41 % (ref 37.5–51)
HGB BLD-MCNC: 14.4 G/DL (ref 13–17.7)
HGB UR QL STRIP.AUTO: ABNORMAL
HYALINE CASTS UR QL AUTO: ABNORMAL /LPF
KETONES UR QL STRIP: NEGATIVE
LEUKOCYTE ESTERASE UR QL STRIP.AUTO: ABNORMAL
LYMPHOCYTES # BLD MANUAL: 0.1 10*3/MM3 (ref 0.7–3.1)
MCH RBC QN AUTO: 29.8 PG (ref 26.6–33)
MCHC RBC AUTO-ENTMCNC: 35.1 G/DL (ref 31.5–35.7)
MCV RBC AUTO: 84.7 FL (ref 79–97)
METHADONE UR QL SCN: NEGATIVE
NEUTROPHILS # BLD AUTO: 9.35 10*3/MM3 (ref 1.7–7)
NEUTROPHILS NFR BLD MANUAL: 98.9 % (ref 42.7–76)
NEUTS VAC BLD QL SMEAR: ABNORMAL
NITRITE UR QL STRIP: NEGATIVE
OPIATES UR QL: NEGATIVE
OXYCODONE UR QL SCN: NEGATIVE
PH UR STRIP.AUTO: 8.5 [PH] (ref 5–8)
PLAT MORPH BLD: NORMAL
PLATELET # BLD AUTO: 71 10*3/MM3 (ref 140–450)
PMV BLD AUTO: 10.7 FL (ref 6–12)
POIKILOCYTOSIS BLD QL SMEAR: ABNORMAL
POTASSIUM SERPL-SCNC: 4.5 MMOL/L (ref 3.5–5.2)
PROCALCITONIN SERPL-MCNC: 12.2 NG/ML (ref 0–0.25)
PROT SERPL-MCNC: 7.8 G/DL (ref 6–8.5)
PROT UR QL STRIP: ABNORMAL
RBC # BLD AUTO: 4.84 10*6/MM3 (ref 4.14–5.8)
RBC # UR STRIP: ABNORMAL /HPF
REF LAB TEST METHOD: ABNORMAL
SODIUM SERPL-SCNC: 132 MMOL/L (ref 136–145)
SP GR UR STRIP: 1.01 (ref 1–1.03)
SQUAMOUS #/AREA URNS HPF: ABNORMAL /HPF
TOXIC GRANULATION: ABNORMAL
UROBILINOGEN UR QL STRIP: ABNORMAL
VARIANT LYMPHS NFR BLD MANUAL: 1.1 % (ref 19.6–45.3)
WBC # UR STRIP: ABNORMAL /HPF
WBC NRBC COR # BLD AUTO: 9.45 10*3/MM3 (ref 3.4–10.8)

## 2023-11-20 PROCEDURE — 36415 COLL VENOUS BLD VENIPUNCTURE: CPT

## 2023-11-20 PROCEDURE — 25010000002 ONDANSETRON PER 1 MG: Performed by: EMERGENCY MEDICINE

## 2023-11-20 PROCEDURE — 80307 DRUG TEST PRSMV CHEM ANLYZR: CPT | Performed by: EMERGENCY MEDICINE

## 2023-11-20 PROCEDURE — 80053 COMPREHEN METABOLIC PANEL: CPT | Performed by: EMERGENCY MEDICINE

## 2023-11-20 PROCEDURE — 25810000003 SODIUM CHLORIDE 0.9 % SOLUTION: Performed by: INTERNAL MEDICINE

## 2023-11-20 PROCEDURE — 93005 ELECTROCARDIOGRAM TRACING: CPT | Performed by: EMERGENCY MEDICINE

## 2023-11-20 PROCEDURE — 81001 URINALYSIS AUTO W/SCOPE: CPT | Performed by: PHYSICIAN ASSISTANT

## 2023-11-20 PROCEDURE — 84145 PROCALCITONIN (PCT): CPT | Performed by: EMERGENCY MEDICINE

## 2023-11-20 PROCEDURE — 99285 EMERGENCY DEPT VISIT HI MDM: CPT

## 2023-11-20 PROCEDURE — 87086 URINE CULTURE/COLONY COUNT: CPT | Performed by: PHYSICIAN ASSISTANT

## 2023-11-20 PROCEDURE — 25010000002 HYDROMORPHONE PER 4 MG: Performed by: EMERGENCY MEDICINE

## 2023-11-20 PROCEDURE — 87077 CULTURE AEROBIC IDENTIFY: CPT | Performed by: EMERGENCY MEDICINE

## 2023-11-20 PROCEDURE — 87186 SC STD MICRODIL/AGAR DIL: CPT | Performed by: EMERGENCY MEDICINE

## 2023-11-20 PROCEDURE — C1769 GUIDE WIRE: HCPCS

## 2023-11-20 PROCEDURE — 82948 REAGENT STRIP/BLOOD GLUCOSE: CPT

## 2023-11-20 PROCEDURE — 85025 COMPLETE CBC W/AUTO DIFF WBC: CPT | Performed by: EMERGENCY MEDICINE

## 2023-11-20 PROCEDURE — 87040 BLOOD CULTURE FOR BACTERIA: CPT | Performed by: EMERGENCY MEDICINE

## 2023-11-20 PROCEDURE — 25010000002 CEFTRIAXONE PER 250 MG: Performed by: PHYSICIAN ASSISTANT

## 2023-11-20 PROCEDURE — 74176 CT ABD & PELVIS W/O CONTRAST: CPT

## 2023-11-20 PROCEDURE — 93010 ELECTROCARDIOGRAM REPORT: CPT | Performed by: INTERNAL MEDICINE

## 2023-11-20 PROCEDURE — 25010000002 DROPERIDOL PER 5 MG: Performed by: EMERGENCY MEDICINE

## 2023-11-20 PROCEDURE — 25010000002 VANCOMYCIN 10 G RECONSTITUTED SOLUTION: Performed by: PHYSICIAN ASSISTANT

## 2023-11-20 PROCEDURE — 25810000003 SEPSIS FLUID NS 0.9 % SOLUTION: Performed by: EMERGENCY MEDICINE

## 2023-11-20 PROCEDURE — 25810000003 SODIUM CHLORIDE 0.9 % SOLUTION: Performed by: PHYSICIAN ASSISTANT

## 2023-11-20 PROCEDURE — 87150 DNA/RNA AMPLIFIED PROBE: CPT | Performed by: EMERGENCY MEDICINE

## 2023-11-20 PROCEDURE — 83605 ASSAY OF LACTIC ACID: CPT | Performed by: EMERGENCY MEDICINE

## 2023-11-20 PROCEDURE — 25010000002 HYDROMORPHONE 1 MG/ML SOLUTION: Performed by: EMERGENCY MEDICINE

## 2023-11-20 PROCEDURE — C1729 CATH, DRAINAGE: HCPCS

## 2023-11-20 PROCEDURE — C1894 INTRO/SHEATH, NON-LASER: HCPCS

## 2023-11-20 PROCEDURE — 85007 BL SMEAR W/DIFF WBC COUNT: CPT | Performed by: EMERGENCY MEDICINE

## 2023-11-20 RX ORDER — POLYETHYLENE GLYCOL 3350 17 G/17G
17 POWDER, FOR SOLUTION ORAL DAILY PRN
Status: DISCONTINUED | OUTPATIENT
Start: 2023-11-20 | End: 2023-12-01 | Stop reason: HOSPADM

## 2023-11-20 RX ORDER — DEXTROSE MONOHYDRATE 25 G/50ML
25 INJECTION, SOLUTION INTRAVENOUS
Status: DISCONTINUED | OUTPATIENT
Start: 2023-11-20 | End: 2023-12-01 | Stop reason: HOSPADM

## 2023-11-20 RX ORDER — NICOTINE POLACRILEX 4 MG
15 LOZENGE BUCCAL
Status: DISCONTINUED | OUTPATIENT
Start: 2023-11-20 | End: 2023-12-01 | Stop reason: HOSPADM

## 2023-11-20 RX ORDER — NITROGLYCERIN 0.4 MG/1
0.4 TABLET SUBLINGUAL
Status: DISCONTINUED | OUTPATIENT
Start: 2023-11-20 | End: 2023-12-01 | Stop reason: HOSPADM

## 2023-11-20 RX ORDER — SODIUM CHLORIDE 0.9 % (FLUSH) 0.9 %
10 SYRINGE (ML) INJECTION AS NEEDED
Status: DISCONTINUED | OUTPATIENT
Start: 2023-11-20 | End: 2023-12-01 | Stop reason: HOSPADM

## 2023-11-20 RX ORDER — VANCOMYCIN 2 GRAM/500 ML IN 0.9 % SODIUM CHLORIDE INTRAVENOUS
20 ONCE
Qty: 500 ML | Refills: 0 | Status: COMPLETED | OUTPATIENT
Start: 2023-11-20 | End: 2023-11-21

## 2023-11-20 RX ORDER — IBUPROFEN 600 MG/1
1 TABLET ORAL
Status: DISCONTINUED | OUTPATIENT
Start: 2023-11-20 | End: 2023-12-01 | Stop reason: HOSPADM

## 2023-11-20 RX ORDER — SODIUM CHLORIDE 0.9 % (FLUSH) 0.9 %
10 SYRINGE (ML) INJECTION EVERY 12 HOURS SCHEDULED
Status: DISCONTINUED | OUTPATIENT
Start: 2023-11-20 | End: 2023-12-01 | Stop reason: HOSPADM

## 2023-11-20 RX ORDER — BISACODYL 10 MG
10 SUPPOSITORY, RECTAL RECTAL DAILY PRN
Status: DISCONTINUED | OUTPATIENT
Start: 2023-11-20 | End: 2023-12-01 | Stop reason: HOSPADM

## 2023-11-20 RX ORDER — AMOXICILLIN 250 MG
2 CAPSULE ORAL 2 TIMES DAILY
Status: DISCONTINUED | OUTPATIENT
Start: 2023-11-20 | End: 2023-12-01 | Stop reason: HOSPADM

## 2023-11-20 RX ORDER — SODIUM CHLORIDE 9 MG/ML
125 INJECTION, SOLUTION INTRAVENOUS CONTINUOUS
Status: DISCONTINUED | OUTPATIENT
Start: 2023-11-20 | End: 2023-11-22

## 2023-11-20 RX ORDER — ONDANSETRON 2 MG/ML
4 INJECTION INTRAMUSCULAR; INTRAVENOUS ONCE
Status: COMPLETED | OUTPATIENT
Start: 2023-11-20 | End: 2023-11-20

## 2023-11-20 RX ORDER — FENTANYL CITRATE 50 UG/ML
25 INJECTION, SOLUTION INTRAMUSCULAR; INTRAVENOUS
Status: DISCONTINUED | OUTPATIENT
Start: 2023-11-20 | End: 2023-11-22

## 2023-11-20 RX ORDER — DROPERIDOL 2.5 MG/ML
2.5 INJECTION, SOLUTION INTRAMUSCULAR; INTRAVENOUS ONCE
Status: COMPLETED | OUTPATIENT
Start: 2023-11-20 | End: 2023-11-20

## 2023-11-20 RX ORDER — BISACODYL 5 MG/1
5 TABLET, DELAYED RELEASE ORAL DAILY PRN
Status: DISCONTINUED | OUTPATIENT
Start: 2023-11-20 | End: 2023-12-01 | Stop reason: HOSPADM

## 2023-11-20 RX ORDER — SODIUM CHLORIDE 9 MG/ML
40 INJECTION, SOLUTION INTRAVENOUS AS NEEDED
Status: DISCONTINUED | OUTPATIENT
Start: 2023-11-20 | End: 2023-12-01 | Stop reason: HOSPADM

## 2023-11-20 RX ORDER — HYDROMORPHONE HYDROCHLORIDE 1 MG/ML
0.5 INJECTION, SOLUTION INTRAMUSCULAR; INTRAVENOUS; SUBCUTANEOUS ONCE
Status: COMPLETED | OUTPATIENT
Start: 2023-11-20 | End: 2023-11-20

## 2023-11-20 RX ADMIN — DEXTROSE MONOHYDRATE 25 G: 25 INJECTION, SOLUTION INTRAVENOUS at 23:48

## 2023-11-20 RX ADMIN — SODIUM CHLORIDE 2457 ML: 9 INJECTION, SOLUTION INTRAVENOUS at 21:13

## 2023-11-20 RX ADMIN — SODIUM CHLORIDE 1000 ML: 9 INJECTION, SOLUTION INTRAVENOUS at 22:43

## 2023-11-20 RX ADMIN — CEFTRIAXONE 2000 MG: 2 INJECTION, POWDER, FOR SOLUTION INTRAMUSCULAR; INTRAVENOUS at 21:24

## 2023-11-20 RX ADMIN — VANCOMYCIN HYDROCHLORIDE 2000 MG: 10 INJECTION, POWDER, LYOPHILIZED, FOR SOLUTION INTRAVENOUS at 21:59

## 2023-11-20 RX ADMIN — ONDANSETRON 4 MG: 2 INJECTION INTRAMUSCULAR; INTRAVENOUS at 22:13

## 2023-11-20 RX ADMIN — SODIUM CHLORIDE 125 ML/HR: 9 INJECTION, SOLUTION INTRAVENOUS at 22:35

## 2023-11-20 RX ADMIN — DROPERIDOL 2.5 MG: 2.5 INJECTION, SOLUTION INTRAMUSCULAR; INTRAVENOUS at 20:32

## 2023-11-20 RX ADMIN — HYDROMORPHONE HYDROCHLORIDE 1 MG: 1 INJECTION, SOLUTION INTRAMUSCULAR; INTRAVENOUS; SUBCUTANEOUS at 20:33

## 2023-11-20 RX ADMIN — HYDROMORPHONE HYDROCHLORIDE 0.5 MG: 1 INJECTION, SOLUTION INTRAMUSCULAR; INTRAVENOUS; SUBCUTANEOUS at 22:13

## 2023-11-20 NOTE — Clinical Note
Level of Care: Critical Care [6]   Diagnosis: Sepsis [[id number]]   Certification: I Certify That Inpatient Hospital Services Are Medically Necessary For Greater Than 2 Midnights

## 2023-11-21 LAB
ALBUMIN SERPL-MCNC: 2.4 G/DL (ref 3.5–5.2)
ALBUMIN/GLOB SERPL: 0.7 G/DL
ALP SERPL-CCNC: 155 U/L (ref 39–117)
ALT SERPL W P-5'-P-CCNC: 16 U/L (ref 1–41)
ANION GAP SERPL CALCULATED.3IONS-SCNC: 13.9 MMOL/L (ref 5–15)
AST SERPL-CCNC: 22 U/L (ref 1–40)
BACTERIA BLD CULT: ABNORMAL
BACTERIA ID TEST ISLT QL CULT: ABNORMAL
BACTERIA SPEC AEROBE CULT: NORMAL
BILIRUB SERPL-MCNC: 0.7 MG/DL (ref 0–1.2)
BOTTLE TYPE: ABNORMAL
BUN SERPL-MCNC: 51 MG/DL (ref 6–20)
BUN/CREAT SERPL: 21.1 (ref 7–25)
CALCIUM SPEC-SCNC: 7.3 MG/DL (ref 8.6–10.5)
CHLORIDE SERPL-SCNC: 107 MMOL/L (ref 98–107)
CO2 SERPL-SCNC: 15.1 MMOL/L (ref 22–29)
CREAT SERPL-MCNC: 2.42 MG/DL (ref 0.76–1.27)
D-LACTATE SERPL-SCNC: 2.3 MMOL/L (ref 0.5–2)
D-LACTATE SERPL-SCNC: 2.7 MMOL/L (ref 0.5–2)
D-LACTATE SERPL-SCNC: 3.2 MMOL/L (ref 0.5–2)
D-LACTATE SERPL-SCNC: 3.8 MMOL/L (ref 0.5–2)
D-LACTATE SERPL-SCNC: 5.1 MMOL/L (ref 0.5–2)
DEPRECATED RDW RBC AUTO: 41.1 FL (ref 37–54)
EGFRCR SERPLBLD CKD-EPI 2021: 33.6 ML/MIN/1.73
ERYTHROCYTE [DISTWIDTH] IN BLOOD BY AUTOMATED COUNT: 13.1 % (ref 12.3–15.4)
GLOBULIN UR ELPH-MCNC: 3.4 GM/DL
GLUCOSE BLDC GLUCOMTR-MCNC: 91 MG/DL (ref 70–130)
GLUCOSE SERPL-MCNC: 117 MG/DL (ref 65–99)
HCT VFR BLD AUTO: 34.9 % (ref 37.5–51)
HGB BLD-MCNC: 11.9 G/DL (ref 13–17.7)
MCH RBC QN AUTO: 29.4 PG (ref 26.6–33)
MCHC RBC AUTO-ENTMCNC: 34.1 G/DL (ref 31.5–35.7)
MCV RBC AUTO: 86.2 FL (ref 79–97)
PLATELET # BLD AUTO: 35 10*3/MM3 (ref 140–450)
PMV BLD AUTO: 10.8 FL (ref 6–12)
POTASSIUM SERPL-SCNC: 4.6 MMOL/L (ref 3.5–5.2)
PROT SERPL-MCNC: 5.8 G/DL (ref 6–8.5)
QT INTERVAL: 260 MS
QTC INTERVAL: 426 MS
RBC # BLD AUTO: 4.05 10*6/MM3 (ref 4.14–5.8)
SODIUM SERPL-SCNC: 136 MMOL/L (ref 136–145)
WBC NRBC COR # BLD AUTO: 18.98 10*3/MM3 (ref 3.4–10.8)

## 2023-11-21 PROCEDURE — 83605 ASSAY OF LACTIC ACID: CPT | Performed by: EMERGENCY MEDICINE

## 2023-11-21 PROCEDURE — 0 IODIXANOL PER 1 ML: Performed by: INTERNAL MEDICINE

## 2023-11-21 PROCEDURE — 25010000002 FENTANYL CITRATE (PF) 50 MCG/ML SOLUTION: Performed by: INTERNAL MEDICINE

## 2023-11-21 PROCEDURE — 0T143JD BYPASS LEFT KIDNEY PELVIS TO CUTANEOUS WITH SYNTHETIC SUBSTITUTE, PERCUTANEOUS APPROACH: ICD-10-PCS | Performed by: RADIOLOGY

## 2023-11-21 PROCEDURE — 85027 COMPLETE CBC AUTOMATED: CPT | Performed by: INTERNAL MEDICINE

## 2023-11-21 PROCEDURE — 82948 REAGENT STRIP/BLOOD GLUCOSE: CPT

## 2023-11-21 PROCEDURE — 83605 ASSAY OF LACTIC ACID: CPT | Performed by: INTERNAL MEDICINE

## 2023-11-21 PROCEDURE — 25010000002 CEFTRIAXONE PER 250 MG: Performed by: INTERNAL MEDICINE

## 2023-11-21 PROCEDURE — 25810000003 SODIUM CHLORIDE 0.9 % SOLUTION: Performed by: INTERNAL MEDICINE

## 2023-11-21 PROCEDURE — 80053 COMPREHEN METABOLIC PANEL: CPT | Performed by: INTERNAL MEDICINE

## 2023-11-21 PROCEDURE — 25010000002 PHENYLEPHRINE 10 MG/ML SOLUTION: Performed by: INTERNAL MEDICINE

## 2023-11-21 RX ORDER — IODIXANOL 320 MG/ML
50 INJECTION, SOLUTION INTRAVASCULAR
Status: COMPLETED | OUTPATIENT
Start: 2023-11-21 | End: 2023-11-21

## 2023-11-21 RX ORDER — NICOTINE 21 MG/24HR
1 PATCH, TRANSDERMAL 24 HOURS TRANSDERMAL
Status: DISCONTINUED | OUTPATIENT
Start: 2023-11-21 | End: 2023-12-01 | Stop reason: HOSPADM

## 2023-11-21 RX ORDER — PHENYLEPHRINE HCL IN 0.9% NACL 0.5 MG/5ML
.5-3 SYRINGE (ML) INTRAVENOUS
Status: DISCONTINUED | OUTPATIENT
Start: 2023-11-21 | End: 2023-11-24

## 2023-11-21 RX ORDER — HEPARIN SODIUM 5000 [USP'U]/ML
5000 INJECTION, SOLUTION INTRAVENOUS; SUBCUTANEOUS EVERY 8 HOURS SCHEDULED
Status: DISCONTINUED | OUTPATIENT
Start: 2023-11-21 | End: 2023-11-21

## 2023-11-21 RX ORDER — SODIUM CHLORIDE 9 MG/ML
INJECTION, SOLUTION INTRAVENOUS CONTINUOUS PRN
Status: COMPLETED | OUTPATIENT
Start: 2023-11-21 | End: 2023-11-21

## 2023-11-21 RX ORDER — ARIPIPRAZOLE 5 MG/1
5 TABLET ORAL DAILY
Status: DISCONTINUED | OUTPATIENT
Start: 2023-11-21 | End: 2023-12-01 | Stop reason: HOSPADM

## 2023-11-21 RX ORDER — BUSPIRONE HYDROCHLORIDE 15 MG/1
15 TABLET ORAL EVERY 12 HOURS SCHEDULED
Status: DISCONTINUED | OUTPATIENT
Start: 2023-11-21 | End: 2023-12-01 | Stop reason: HOSPADM

## 2023-11-21 RX ORDER — NOREPINEPHRINE BITARTRATE 0.03 MG/ML
.02-.3 INJECTION, SOLUTION INTRAVENOUS
Status: DISCONTINUED | OUTPATIENT
Start: 2023-11-21 | End: 2023-11-21

## 2023-11-21 RX ORDER — LIDOCAINE HYDROCHLORIDE 10 MG/ML
INJECTION, SOLUTION EPIDURAL; INFILTRATION; INTRACAUDAL; PERINEURAL AS NEEDED
Status: COMPLETED | OUTPATIENT
Start: 2023-11-21 | End: 2023-11-21

## 2023-11-21 RX ORDER — NOREPINEPHRINE BITARTRATE 0.03 MG/ML
.02-.3 INJECTION, SOLUTION INTRAVENOUS
Status: DISCONTINUED | OUTPATIENT
Start: 2023-11-21 | End: 2023-11-24

## 2023-11-21 RX ORDER — VENLAFAXINE HYDROCHLORIDE 150 MG/1
150 CAPSULE, EXTENDED RELEASE ORAL DAILY
Status: DISCONTINUED | OUTPATIENT
Start: 2023-11-21 | End: 2023-12-01 | Stop reason: HOSPADM

## 2023-11-21 RX ORDER — BACLOFEN 10 MG/1
10 TABLET ORAL 3 TIMES DAILY
Status: DISCONTINUED | OUTPATIENT
Start: 2023-11-21 | End: 2023-12-01 | Stop reason: HOSPADM

## 2023-11-21 RX ADMIN — FENTANYL CITRATE 25 MCG: 50 INJECTION, SOLUTION INTRAMUSCULAR; INTRAVENOUS at 20:45

## 2023-11-21 RX ADMIN — Medication 10 ML: at 20:46

## 2023-11-21 RX ADMIN — FENTANYL CITRATE 25 MCG: 50 INJECTION, SOLUTION INTRAMUSCULAR; INTRAVENOUS at 23:25

## 2023-11-21 RX ADMIN — SODIUM CHLORIDE 500 ML: 9 INJECTION, SOLUTION INTRAVENOUS at 03:50

## 2023-11-21 RX ADMIN — Medication 10 ML: at 09:07

## 2023-11-21 RX ADMIN — Medication 0.02 MCG/KG/MIN: at 10:45

## 2023-11-21 RX ADMIN — SODIUM CHLORIDE 125 ML/HR: 9 INJECTION, SOLUTION INTRAVENOUS at 16:47

## 2023-11-21 RX ADMIN — FENTANYL CITRATE 25 MCG: 50 INJECTION, SOLUTION INTRAMUSCULAR; INTRAVENOUS at 06:22

## 2023-11-21 RX ADMIN — VENLAFAXINE HYDROCHLORIDE 150 MG: 150 CAPSULE, EXTENDED RELEASE ORAL at 19:24

## 2023-11-21 RX ADMIN — LIDOCAINE HYDROCHLORIDE 8 ML: 10 INJECTION, SOLUTION EPIDURAL; INFILTRATION; INTRACAUDAL; PERINEURAL at 00:25

## 2023-11-21 RX ADMIN — SODIUM CHLORIDE 125 ML/HR: 9 INJECTION, SOLUTION INTRAVENOUS at 00:17

## 2023-11-21 RX ADMIN — IODIXANOL 10 ML: 320 INJECTION, SOLUTION INTRAVASCULAR at 01:31

## 2023-11-21 RX ADMIN — FENTANYL CITRATE 25 MCG: 50 INJECTION, SOLUTION INTRAMUSCULAR; INTRAVENOUS at 18:00

## 2023-11-21 RX ADMIN — PHENYLEPHRINE HYDROCHLORIDE 0.5 MCG/KG/MIN: 50 INJECTION INTRAVENOUS at 05:47

## 2023-11-21 RX ADMIN — Medication 1 PATCH: at 11:52

## 2023-11-21 RX ADMIN — BACLOFEN 10 MG: 10 TABLET ORAL at 16:36

## 2023-11-21 RX ADMIN — FENTANYL CITRATE 25 MCG: 50 INJECTION, SOLUTION INTRAMUSCULAR; INTRAVENOUS at 14:51

## 2023-11-21 RX ADMIN — BACLOFEN 10 MG: 10 TABLET ORAL at 20:45

## 2023-11-21 RX ADMIN — Medication 0.02 MCG/KG/MIN: at 04:35

## 2023-11-21 RX ADMIN — CEFTRIAXONE 2000 MG: 2 INJECTION, POWDER, FOR SOLUTION INTRAMUSCULAR; INTRAVENOUS at 20:45

## 2023-11-21 RX ADMIN — FENTANYL CITRATE 25 MCG: 50 INJECTION, SOLUTION INTRAMUSCULAR; INTRAVENOUS at 01:20

## 2023-11-21 RX ADMIN — ARIPIPRAZOLE 5 MG: 5 TABLET ORAL at 16:37

## 2023-11-21 RX ADMIN — BUSPIRONE HYDROCHLORIDE 15 MG: 15 TABLET ORAL at 20:45

## 2023-11-21 RX ADMIN — FENTANYL CITRATE 25 MCG: 50 INJECTION, SOLUTION INTRAMUSCULAR; INTRAVENOUS at 02:29

## 2023-11-21 NOTE — H&P
H&P NOTE    Patient Identification:  Pk May  42 y.o.  male  1980  [id number]                  CC: Left flank pain    History of Present Illness:  42-year-old gentleman present to the emergency room with left flank pain.  Flank pain has been ongoing for the last 2 days.  Pain has been described moderate to severe.  Ongoing nausea vomiting reported.  History of paraplegia with spinal cord injury.  He has a colostomy and urostomy in place.  Also reported chills and in the emergency room despite fluid bolus remains hypotensive.  CT abdomen pelvis with left ureteral stone and urology recommended left nephrostomy tube and ICU admission.  Patient currently tachycardic on the monitor.  He did receive some Dilaudid in the emergency room but still reports ongoing pain on his left flank.      Review of Systems  As above rest is negative  Past Medical History:  Past Medical History:   Diagnosis Date    Paraplegia     Wound infection        Past Surgical History:  Past Surgical History:   Procedure Laterality Date    SPINAL FUSION          Home Meds:  (Not in a hospital admission)      Allergies:  Allergies   Allergen Reactions    Pholcodine Anaphylaxis    Codeine Itching    Amoxicillin-Pot Clavulanate GI Intolerance and Nausea Only     Other reaction(s): Abdominal Pain   Nausea   Nausea   Other reaction(s): Nausea and Vomiting   Nausea   Nausea   Nausea    Nausea   Nausea    Cefuroxime GI Intolerance and Nausea Only     Other reaction(s): Abdominal Pain   Nausea   Nausea    Nausea    Doxycycline Nausea And Vomiting and Nausea Only     Stomach cramping   Stomach cramping    Stomach cramping    Sulfamethoxazole Nausea Only     Stomach cramping       Social History:   Social History     Socioeconomic History    Marital status: Significant Other   Tobacco Use    Smoking status: Every Day     Packs/day: 1.00     Years: 24.00     Additional pack years: 0.00     Total pack years: 24.00     Types: Cigarettes    Smokeless  "tobacco: Never   Vaping Use    Vaping Use: Every day    Substances: Nicotine   Substance and Sexual Activity    Alcohol use: Not Currently    Drug use: Yes     Types: Marijuana       Family History:  Family History   Problem Relation Age of Onset    No Known Problems Mother     No Known Problems Father        Physical Exam:  BP 92/65   Pulse (!) 139   Temp 98.7 °F (37.1 °C)   Resp 20   Ht 177.8 cm (70\")   Wt 95.3 kg (210 lb)   SpO2 96%   BMI 30.13 kg/m²  Body mass index is 30.13 kg/m². 96% 95.3 kg (210 lb)  Physical Exam  Patient is examined using the personal protective equipment as per guidelines from infection control for this particular patient as enacted.  Hand hygiene was performed before and after patient interaction.  Well-developed normal body habitus  Eyes normal conjunctivae and pupils reactive to light  ENT Mallampati between 3 and 4 normal nasal exam  Neck midline trachea no thyromegaly  Chest diminished breath sounds bilaterally  CVS regular rate and rhythm no lower extremity edema  Abdomen left flank tenderness  CNS paraplegic  Skin no rashes no nodules  Psych oriented to time place and person normal memory  Musculoskeletal paraplegia        LABS:  Lab Results   Component Value Date    CALCIUM 9.1 11/20/2023     Results from last 7 days   Lab Units 11/20/23 2112 11/20/23 2004   SODIUM mmol/L  --  132*   POTASSIUM mmol/L  --  4.5   CHLORIDE mmol/L  --  95*   CO2 mmol/L  --  17.2*   BUN mg/dL  --  46*   CREATININE mg/dL  --  2.81*   GLUCOSE mg/dL  --  70   CALCIUM mg/dL  --  9.1   WBC 10*3/mm3 9.45  --    HEMOGLOBIN g/dL 14.4  --    PLATELETS 10*3/mm3 71*  --    ALT (SGPT) U/L  --  20   AST (SGOT) U/L  --  35   PROCALCITONIN ng/mL  --  12.20*     Lab Results   Component Value Date    TROPONINI <0.034 01/26/2021             Results from last 7 days   Lab Units 11/20/23 2112 11/20/23 2004   PROCALCITONIN ng/mL  --  12.20*   LACTATE mmol/L 3.2* 3.9*                     Lab Results   Component " Value Date    TSH 2.170 08/10/2019     Estimated Creatinine Clearance: 40.1 mL/min (A) (by C-G formula based on SCr of 2.81 mg/dL (H)).  Results from last 7 days   Lab Units 11/20/23 2001   NITRITE UA  Negative   WBC UA /HPF 6-10*   BACTERIA UA /HPF 3+*   SQUAM EPITHEL UA /HPF 0-2        Imaging: I personally visualized the images of scans/x-rays performed within last 3 days.      ASSESSMENT:   Sepsis with septic shock  Left pyelonephritis  Left ureteral stone  Paraplegia  Polysubstance abuse  Lactic acidosis  Acute renal failure  Thrombocytopenia  Colostomy in place      PLAN:  At this time we have a young male with polysubstance abuse talk screen positive for cocaine and THC left flank pain with sepsis from left pyelonephritis due to ureteral stone.  Urology has been consulted in the emergency room they recommend IR to place left nephrostomy tube followed by left stent internalization in the next 1 to 3 days  Initiate broad-spectrum antibiotic Rocephin 2 g daily.  De-escalate based on culture  Also watch for withdrawals from drugs as noted in his tox screen.  IV fluids with pressors if needed to maintain MAP greater than 65  Watch creatinine closely with IV fluids.  May need nephrology if creatinine does not improve  Platelets to be monitored  Trend lactate  ICU core measures    Critical care time 35 minutes          Aleida Hdez MD  11/20/2023  22:25 EST      Much of this encounter note is an electronic transcription/translation of spoken language to printed text using Dragon Software.

## 2023-11-21 NOTE — NURSING NOTE
Wound/Ostomy: We see patients at the request of the floor nurse about skin problems on bilateral lower extremities. Patient is alert, oriented, known from previous admission, he is able to repositioning. Upon assessment we could see purple discoloration, coldness,  full-thickness skin and tissue loss on bilateral  feet, black eschar and  yellow slough noted, related to vascular problem, we think that he could benefit from Vascular Dr's evaluation  Rn notified.  In addition non-intact and intact skin with localized persistent non-blanchable purple discoloration was seen to bilateral upper Groin/Hip and Coccyx/Buttocks possibly related to friction, shear and/or DTI POA.  The patient has Urostomy and Colostomy, he is independent in his Ostomy care, stated changes it 1 time a week, the last time was 2 or 3 days ago, does not need supplies.  Wound care order and pressure standing measures have been implemented into Epic.   He will need Low air loss mattress for adequate pressure redistribution and pressure relief once he is out of this unit  Please re-consult for any additional needs.

## 2023-11-21 NOTE — PLAN OF CARE
Goal Outcome Evaluation:  Plan of Care Reviewed With: patient        Progress: no change     Pt arrives from the ED to CICU, around 2300 with sepsis and hypotensive on, was then taken to IR for nephrostomy tube placement. Pt became tachycardic in the 140's and hypotensive upon returning from the IR received an order for 500 cc NS bolus and Stone currently infusing.

## 2023-11-21 NOTE — PAYOR COMM NOTE
"Pk Jaeger (42 y.o. Male)                     ATTENTION; INPATIENT AUTHORIZATION REQUEST - ICD 10 - A41.9 - R65.21                 FACILITY NPI - [id number] - [facility], [address] [facility] [id number]               PHYSICIAN NPI - [id number] DR. HDEZ, [address] [facility].[id number]               REPLY TO UR DEPT  [phone] OR CALL   VANNESA MACE LPN           Date of Birth   1980    Social Security Number       Address   [address]  [place] [address]    Home Phone   [phone]    MRN   [id number]       [employer]   [facility]    Marital Status   Significant Other                            Admission Date   11/20/23    Admission Type   Emergency    Admitting Provider   Aleida Hdez MD    Attending Provider   Aleida Hdez MD    Department, Room/Bed   [facility] CARDIAC INTENSIVE CARE, 3007/1       Discharge Date       Discharge Disposition       Discharge Destination                                 Attending Provider: Aleida Hdez MD    Allergies: Pholcodine, Codeine, Amoxicillin-pot Clavulanate, Cefuroxime, Doxycycline, Sulfamethoxazole    Isolation: None   Infection: MRSA/History Only (11/21/23)   Code Status: CPR    Ht: 177.8 cm (70\")   Wt: 84.8 kg (186 lb 15.2 oz)    Admission Cmt: None   Principal Problem: Sepsis [A41.9]                   Active Insurance as of 11/20/2023       Primary Coverage       Payor Plan Insurance Group Employer/Plan Group    PASSPORT MEDICARE ADVANTAGE PASSPORT ADVANTAGE [id number]       Payor Plan Address Payor Plan Phone Number Payor Plan Fax Number Effective Dates    [address]   8/1/2023 - None Entered    [place] [id number]         Subscriber Name Subscriber Birth Date Member ID       PK JAEGER 1980 [id number]                     Emergency Contacts        (Rel.) Home Phone Work Phone Mobile Phone    JAJA MOTTA (Significant Other) -- -- --                 History & Physical    "     Aleida Hdez MD at 11/20/23 7215          H&P NOTE    Patient Identification:  Pk May  42 y.o.  male  1980  [id number]                  CC: Left flank pain    History of Present Illness:  42-year-old gentleman present to the emergency room with left flank pain.  Flank pain has been ongoing for the last 2 days.  Pain has been described moderate to severe.  Ongoing nausea vomiting reported.  History of paraplegia with spinal cord injury.  He has a colostomy and urostomy in place.  Also reported chills and in the emergency room despite fluid bolus remains hypotensive.  CT abdomen pelvis with left ureteral stone and urology recommended left nephrostomy tube and ICU admission.  Patient currently tachycardic on the monitor.  He did receive some Dilaudid in the emergency room but still reports ongoing pain on his left flank.      Review of Systems  As above rest is negative  Past Medical History:  Past Medical History:   Diagnosis Date    Paraplegia     Wound infection        Past Surgical History:  Past Surgical History:   Procedure Laterality Date    SPINAL FUSION          Home Meds:  (Not in a hospital admission)      Allergies:  Allergies   Allergen Reactions    Pholcodine Anaphylaxis    Codeine Itching    Amoxicillin-Pot Clavulanate GI Intolerance and Nausea Only     Other reaction(s): Abdominal Pain   Nausea   Nausea   Other reaction(s): Nausea and Vomiting   Nausea   Nausea   Nausea    Nausea   Nausea    Cefuroxime GI Intolerance and Nausea Only     Other reaction(s): Abdominal Pain   Nausea   Nausea    Nausea    Doxycycline Nausea And Vomiting and Nausea Only     Stomach cramping   Stomach cramping    Stomach cramping    Sulfamethoxazole Nausea Only     Stomach cramping       Social History:   Social History     Socioeconomic History    Marital status: Significant Other   Tobacco Use    Smoking status: Every Day     Packs/day: 1.00     Years: 24.00     Additional pack years: 0.00     Total pack  "years: 24.00     Types: Cigarettes    Smokeless tobacco: Never   Vaping Use    Vaping Use: Every day    Substances: Nicotine   Substance and Sexual Activity    Alcohol use: Not Currently    Drug use: Yes     Types: Marijuana       Family History:  Family History   Problem Relation Age of Onset    No Known Problems Mother     No Known Problems Father        Physical Exam:  BP 92/65   Pulse (!) 139   Temp 98.7 °F (37.1 °C)   Resp 20   Ht 177.8 cm (70\")   Wt 95.3 kg (210 lb)   SpO2 96%   BMI 30.13 kg/m²  Body mass index is 30.13 kg/m². 96% 95.3 kg (210 lb)  Physical Exam  Patient is examined using the personal protective equipment as per guidelines from infection control for this particular patient as enacted.  Hand hygiene was performed before and after patient interaction.  Well-developed normal body habitus  Eyes normal conjunctivae and pupils reactive to light  ENT Mallampati between 3 and 4 normal nasal exam  Neck midline trachea no thyromegaly  Chest diminished breath sounds bilaterally  CVS regular rate and rhythm no lower extremity edema  Abdomen left flank tenderness  CNS paraplegic  Skin no rashes no nodules  Psych oriented to time place and person normal memory  Musculoskeletal paraplegia        LABS:  Lab Results   Component Value Date    CALCIUM 9.1 11/20/2023     Results from last 7 days   Lab Units 11/20/23 2112 11/20/23 2004   SODIUM mmol/L  --  132*   POTASSIUM mmol/L  --  4.5   CHLORIDE mmol/L  --  95*   CO2 mmol/L  --  17.2*   BUN mg/dL  --  46*   CREATININE mg/dL  --  2.81*   GLUCOSE mg/dL  --  70   CALCIUM mg/dL  --  9.1   WBC 10*3/mm3 9.45  --    HEMOGLOBIN g/dL 14.4  --    PLATELETS 10*3/mm3 71*  --    ALT (SGPT) U/L  --  20   AST (SGOT) U/L  --  35   PROCALCITONIN ng/mL  --  12.20*     Lab Results   Component Value Date    TROPONINI <0.034 01/26/2021             Results from last 7 days   Lab Units 11/20/23 2112 11/20/23 2004   PROCALCITONIN ng/mL  --  12.20*   LACTATE mmol/L 3.2* " 3.9*                     Lab Results   Component Value Date    TSH 2.170 08/10/2019     Estimated Creatinine Clearance: 40.1 mL/min (A) (by C-G formula based on SCr of 2.81 mg/dL (H)).  Results from last 7 days   Lab Units 11/20/23 2001   NITRITE UA  Negative   WBC UA /HPF 6-10*   BACTERIA UA /HPF 3+*   SQUAM EPITHEL UA /HPF 0-2        Imaging: I personally visualized the images of scans/x-rays performed within last 3 days.      ASSESSMENT:   Sepsis with septic shock  Left pyelonephritis  Left ureteral stone  Paraplegia  Polysubstance abuse  Lactic acidosis  Acute renal failure  Thrombocytopenia  Colostomy in place      PLAN:  At this time we have a young male with polysubstance abuse talk screen positive for cocaine and THC left flank pain with sepsis from left pyelonephritis due to ureteral stone.  Urology has been consulted in the emergency room they recommend IR to place left nephrostomy tube followed by left stent internalization in the next 1 to 3 days  Initiate broad-spectrum antibiotic Rocephin 2 g daily.  De-escalate based on culture  Also watch for withdrawals from drugs as noted in his tox screen.  IV fluids with pressors if needed to maintain MAP greater than 65  Watch creatinine closely with IV fluids.  May need nephrology if creatinine does not improve  Platelets to be monitored  Trend lactate  ICU core measures    Critical care time 35 minutes          Aleida Hdez MD  11/20/2023  22:25 EST      Much of this encounter note is an electronic transcription/translation of spoken language to printed text using Dragon Software.    Electronically signed by Aleida Hdez MD at 11/20/23 2233          Emergency Department Notes        Adelfo العلي, RN at 11/20/23 2234          Nursing report ED to floor  Pk May  42 y.o.  male    HPI (triage note):   Chief Complaint   Patient presents with    Flank Pain     7mm stone, septic    NOP-86-jp[age]-old male with history of paraplegia, complicated UTI  presents with flank pain and vomiting.     On exam he is tachycardic to 164 at triage although improved to 120s at bedside.  He does not have significant abdominal tenderness to palpation.     Assessment-etiology of symptoms unclear.  Would be concerned about kidney infection or kidney stone is likely causes.  Suspect tachycardia related to possible dehydration or sepsis.  We will give 30/kg sepsis fluid bolus.  We will get blood cultures and labs to include urinalysis.  Will obtain CT scan of the abdomen.        EMS reports that patient has relapsed on usage of cocaine and meth but patient states he is not being late using illegal drugs.  Would certainly consider possible substance withdrawal or intoxication. [DB]  Admitting doctor:   No admitting provider for patient encounter.    Admitting diagnosis:   The primary encounter diagnosis was Sepsis, due to unspecified organism, unspecified whether acute organ dysfunction present. Diagnoses of Ureterolithiasis, Acute urinary tract infection, Acute renal failure, unspecified acute renal failure type, History of paraplegia, and Polysubstance abuse were also pertinent to this visit.    Code status:   Current Code Status       Date Active Code Status Order ID Comments User Context       Prior            Allergies:   Pholcodine, Codeine, Amoxicillin-pot clavulanate, Cefuroxime, Doxycycline, and Sulfamethoxazole    Past Medical History:  Past Medical History:   Diagnosis Date    Paraplegia     Wound infection         Weight:       11/20/23  1925   Weight: 95.3 kg (210 lb)       Most recent vitals:   Vitals:    11/20/23 2220 11/20/23 2225 11/20/23 2230 11/20/23 2231   BP:    (!) 74/52   Pulse: (!) 139 (!) 141 (!) 142 (!) 142   Resp:       Temp:       SpO2: 96% 95% 95% 94%   Weight:       Height:           Active LDAs/IV Access:   Lines, Drains & Airways       Active LDAs       Name Placement date Placement time Site Days    Peripheral IV 11/20/23 2031 Anterior;Right;Upper  "Arm 11/20/23 2031  Arm  less than 1    Peripheral IV 11/20/23 2101 Anterior;Left;Upper Arm 11/20/23 2101  Arm  less than 1    Peripheral IV 11/20/23 2123 Anterior;Left;Proximal;Upper Arm 11/20/23 2123  Arm  less than 1    Colostomy LUQ --  pt stated 2-3 years ago  --  LUQ  --    Urostomy RUQ --  pt stated 2-3 years ago  --  RUQ  --                    Labs (abnormal labs have a star):   Labs Reviewed   COMPREHENSIVE METABOLIC PANEL - Abnormal; Notable for the following components:       Result Value    BUN 46 (*)     Creatinine 2.81 (*)     Sodium 132 (*)     Chloride 95 (*)     CO2 17.2 (*)     Albumin 3.2 (*)     Alkaline Phosphatase 329 (*)     Anion Gap 19.8 (*)     eGFR 28.1 (*)     All other components within normal limits    Narrative:     GFR Normal >60  Chronic Kidney Disease <60  Kidney Failure <15     LACTIC ACID, PLASMA - Abnormal; Notable for the following components:    Lactate 3.9 (*)     All other components within normal limits   PROCALCITONIN - Abnormal; Notable for the following components:    Procalcitonin 12.20 (*)     All other components within normal limits    Narrative:     As a Marker for Sepsis (Non-Neonates):    1. <0.5 ng/mL represents a low risk of severe sepsis and/or septic shock.  2. >2 ng/mL represents a high risk of severe sepsis and/or septic shock.    As a Marker for Lower Respiratory Tract Infections that require antibiotic therapy:    PCT on Admission    Antibiotic Therapy       6-12 Hrs later    >0.5                Strongly Recommended  >0.25 - <0.5        Recommended   0.1 - 0.25          Discouraged              Remeasure/reassess PCT  <0.1                Strongly Discouraged     Remeasure/reassess PCT    As 28 day mortality risk marker: \"Change in Procalcitonin Result\" (>80% or <=80%) if Day 0 (or Day 1) and Day 4 values are available. Refer to http://www.[employer]-pct-calculator.com    Change in PCT <=80%  A decrease of PCT levels below or equal to 80% defines a positive " change in PCT test result representing a higher risk for 28-day all-cause mortality of patients diagnosed with severe sepsis for septic shock.    Change in PCT >80%  A decrease of PCT levels of more than 80% defines a negative change in PCT result representing a lower risk for 28-day all-cause mortality of patients diagnosed with severe sepsis or septic shock.      URINE DRUG SCREEN - Abnormal; Notable for the following components:    Cocaine Screen, Urine Positive (*)     THC, Screen, Urine Positive (*)     All other components within normal limits    Narrative:     Negative Thresholds Per Drugs Screened:    Amphetamines                 500 ng/ml  Barbiturates                 200 ng/ml  Benzodiazepines              100 ng/ml  Cocaine                      300 ng/ml  Methadone                    300 ng/ml  Opiates                      300 ng/ml  Oxycodone                    100 ng/ml  THC                           50 ng/ml  Fentanyl                       5 ng/ml      The Normal Value for all drugs tested is negative. This report includes final unconfirmed screening results to be used for medical treatment purposes only. Unconfirmed results must not be used for non-medical purposes such as employment or legal testing. Clinical consideration should be applied to any drug of abuse test, particularly when unconfirmed results are used.           CBC WITH AUTO DIFFERENTIAL - Abnormal; Notable for the following components:    Platelets 71 (*)     All other components within normal limits   URINALYSIS W/ MICROSCOPIC IF INDICATED (NO CULTURE) - Abnormal; Notable for the following components:    Appearance, UA Cloudy (*)     pH, UA 8.5 (*)     Blood, UA Moderate (2+) (*)     Protein,  mg/dL (2+) (*)     Leuk Esterase, UA Moderate (2+) (*)     All other components within normal limits   URINALYSIS, MICROSCOPIC ONLY - Abnormal; Notable for the following components:    RBC, UA 3-5 (*)     WBC, UA 6-10 (*)     Bacteria, UA  3+ (*)     All other components within normal limits   LACTIC ACID, REFLEX - Abnormal; Notable for the following components:    Lactate 3.2 (*)     All other components within normal limits   MANUAL DIFFERENTIAL - Abnormal; Notable for the following components:    Neutrophil % 98.9 (*)     Lymphocyte % 1.1 (*)     Neutrophils Absolute 9.35 (*)     Lymphocytes Absolute 0.10 (*)     All other components within normal limits   BLOOD CULTURE   BLOOD CULTURE   URINE CULTURE   LACTIC ACID, REFLEX   CBC AND DIFFERENTIAL    Narrative:     The following orders were created for panel order CBC & Differential.  Procedure                               Abnormality         Status                     ---------                               -----------         ------                     CBC Auto Differential[[id number]]        Abnormal            Final result                 Please view results for these tests on the individual orders.       EKG:   ECG 12 Lead Tachycardia   Preliminary Result   HEART RATE= 161  bpm   RR Interval= 373  ms   TN Interval= 88  ms   P Horizontal Axis= -27  deg   P Front Axis= 34  deg   QRSD Interval= 84  ms   QT Interval= 260  ms   QTcB= 426  ms   QRS Axis= 60  deg   T Wave Axis= 45  deg   - ABNORMAL ECG -   Sinus tachycardia   Multiform ventricular premature complexes   Borderline T wave abnormalities   Electronically Signed By:    Date and Time of Study: 2023-11-20 20:05:38          Meds given in ED:   Medications   vancomycin IVPB 2000 mg in 0.9% Sodium Chloride 500 mL (2,000 mg Intravenous New Bag 11/20/23 5228)   cefTRIAXone (ROCEPHIN) 2,000 mg in sodium chloride 0.9 % 100 mL IVPB-VTB (has no administration in time range)   sodium chloride 0.9 % infusion (125 mL/hr Intravenous New Bag 11/20/23 1295)   fentaNYL citrate (PF) (SUBLIMAZE) injection 25 mcg (has no administration in time range)   nitroglycerin (NITROSTAT) SL tablet 0.4 mg (has no administration in time range)   sodium chloride 0.9 % flush  10 mL (has no administration in time range)   sodium chloride 0.9 % flush 10 mL (has no administration in time range)   sodium chloride 0.9 % infusion 40 mL (has no administration in time range)   sennosides-docusate (PERICOLACE) 8.6-50 MG per tablet 2 tablet (has no administration in time range)     And   polyethylene glycol (MIRALAX) packet 17 g (has no administration in time range)     And   bisacodyl (DULCOLAX) EC tablet 5 mg (has no administration in time range)     And   bisacodyl (DULCOLAX) suppository 10 mg (has no administration in time range)   droperidol (INAPSINE) injection 2.5 mg (2.5 mg Intravenous Given 11/20/23 2032)   HYDROmorphone (DILAUDID) injection 1 mg (1 mg Intravenous Given 11/20/23 2033)   sepsis fluid NS 0.9 % bolus 2,457 mL (2,457 mL Intravenous New Bag 11/20/23 2113)   cefTRIAXone (ROCEPHIN) 2,000 mg in sodium chloride 0.9 % 100 mL IVPB-VTB (0 mg Intravenous Stopped 11/20/23 2154)   ondansetron (ZOFRAN) injection 4 mg (4 mg Intravenous Given 11/20/23 2213)   HYDROmorphone (DILAUDID) injection 0.5 mg (0.5 mg Intravenous Given 11/20/23 2213)       Imaging results:  CT Abdomen Pelvis Without Contrast    Result Date: 11/20/2023  1. Cystectomy with ileal conduit diversion. A 7 mm calculus in the mid left ureter causes mild upstream left-sided hydroureteronephrosis. 2. Moderate left perirenal inflammatory changes extending to the colonic splenic flexure causing focal mild reactive colitis. Recommend correlation for signs of infection. 3. Limited evaluation of the left renal parenchyma without intravenous contrast. 4. At least four additional nonobstructing left renal calculi. 5. Two additional nonobstructing calculi in the ileal conduit. 6. Segmental right lower lobe groundglass opacities, atelectasis versus infection/inflammation. 7. Mildly distended gallbladder without appreciable cholelithiasis or pericholecystic inflammatory changes, which can be seen in a prolonged fasting state. Recommend  clinical correlation for right upper quadrant pain. 8. Additional unchanged incidental findings as above.  This report was finalized on 11/20/2023 9:05 PM by Dr. Andrea Gay M.D on Workstation: [id number]       Ambulatory status:   - paraplegic since 2016    Social issues:   Social History     Socioeconomic History    Marital status: Significant Other   Tobacco Use    Smoking status: Every Day     Packs/day: 1.00     Years: 24.00     Additional pack years: 0.00     Total pack years: 24.00     Types: Cigarettes    Smokeless tobacco: Never   Vaping Use    Vaping Use: Every day    Substances: Nicotine   Substance and Sexual Activity    Alcohol use: Not Currently    Drug use: Yes     Types: Marijuana          NIH Stroke Scale:         Adelfo العلي RN  11/20/23 22:39 EST    Nurse Direct line for any questions: [id number]         Electronically signed by Adelfo العلي RN at 11/20/23 2241       Adelfo العلي RN at 11/20/23 2232          Admitting MD to bedside    Electronically signed by Adelfo العلي RN at 11/20/23 2232       Shawn Kelley PA at 11/20/23 2008        Procedure Orders    1. Critical Care [[id number]] ordered by Shawn Kelley PA                 EMERGENCY DEPARTMENT ENCOUNTER    Room number:  3007/1  Date Seen:  11/20/2023  Time of transfer:  20:00  PCP:  Chasity Ruggiero APRN    Laboratory Results:  Recent Results (from the past 24 hour(s))   Urine Drug Screen - Urine, Clean Catch    Collection Time: 11/20/23  8:01 PM    Specimen: Urine, Clean Catch   Result Value Ref Range    Amphet/Methamphet, Screen Negative Negative    Barbiturates Screen, Urine Negative Negative    Benzodiazepine Screen, Urine Negative Negative    Cocaine Screen, Urine Positive (A) Negative    Opiate Screen Negative Negative    THC, Screen, Urine Positive (A) Negative    Methadone Screen, Urine Negative Negative    Oxycodone Screen, Urine Negative Negative    Fentanyl, Urine Negative Negative   Urinalysis With Microscopic  If Indicated (No Culture) - Urine, Clean Catch    Collection Time: 11/20/23  8:01 PM    Specimen: Urine, Clean Catch   Result Value Ref Range    Color, UA Yellow Yellow, Straw    Appearance, UA Cloudy (A) Clear    pH, UA 8.5 (H) 5.0 - 8.0    Specific Gravity, UA 1.014 1.005 - 1.030    Glucose, UA Negative Negative    Ketones, UA Negative Negative    Bilirubin, UA Negative Negative    Blood, UA Moderate (2+) (A) Negative    Protein,  mg/dL (2+) (A) Negative    Leuk Esterase, UA Moderate (2+) (A) Negative    Nitrite, UA Negative Negative    Urobilinogen, UA 1.0 E.U./dL 0.2 - 1.0 E.U./dL   Urinalysis, Microscopic Only - Urine, Clean Catch    Collection Time: 11/20/23  8:01 PM    Specimen: Urine, Clean Catch   Result Value Ref Range    RBC, UA 3-5 (A) None Seen, 0-2 /HPF    WBC, UA 6-10 (A) None Seen, 0-2 /HPF    Bacteria, UA 3+ (A) None Seen /HPF    Squamous Epithelial Cells, UA 0-2 None Seen, 0-2 /HPF    Hyaline Casts, UA None Seen None Seen /LPF    Methodology Manual Light Microscopy    Comprehensive Metabolic Panel    Collection Time: 11/20/23  8:04 PM    Specimen: Blood   Result Value Ref Range    Glucose 70 65 - 99 mg/dL    BUN 46 (H) 6 - 20 mg/dL    Creatinine 2.81 (H) 0.76 - 1.27 mg/dL    Sodium 132 (L) 136 - 145 mmol/L    Potassium 4.5 3.5 - 5.2 mmol/L    Chloride 95 (L) 98 - 107 mmol/L    CO2 17.2 (L) 22.0 - 29.0 mmol/L    Calcium 9.1 8.6 - 10.5 mg/dL    Total Protein 7.8 6.0 - 8.5 g/dL    Albumin 3.2 (L) 3.5 - 5.2 g/dL    ALT (SGPT) 20 1 - 41 U/L    AST (SGOT) 35 1 - 40 U/L    Alkaline Phosphatase 329 (H) 39 - 117 U/L    Total Bilirubin 1.1 0.0 - 1.2 mg/dL    Globulin 4.6 gm/dL    A/G Ratio 0.7 g/dL    BUN/Creatinine Ratio 16.4 7.0 - 25.0    Anion Gap 19.8 (H) 5.0 - 15.0 mmol/L    eGFR 28.1 (L) >60.0 mL/min/1.73   Lactic Acid, Plasma    Collection Time: 11/20/23  8:04 PM    Specimen: Blood   Result Value Ref Range    Lactate 3.9 (C) 0.5 - 2.0 mmol/L   Procalcitonin    Collection Time: 11/20/23  8:04 PM     Specimen: Blood   Result Value Ref Range    Procalcitonin 12.20 (H) 0.00 - 0.25 ng/mL   ECG 12 Lead Tachycardia    Collection Time: 11/20/23  8:05 PM   Result Value Ref Range    QT Interval 260 ms    QTC Interval 426 ms   CBC Auto Differential    Collection Time: 11/20/23  9:12 PM    Specimen: Blood   Result Value Ref Range    WBC 9.45 3.40 - 10.80 10*3/mm3    RBC 4.84 4.14 - 5.80 10*6/mm3    Hemoglobin 14.4 13.0 - 17.7 g/dL    Hematocrit 41.0 37.5 - 51.0 %    MCV 84.7 79.0 - 97.0 fL    MCH 29.8 26.6 - 33.0 pg    MCHC 35.1 31.5 - 35.7 g/dL    RDW 12.9 12.3 - 15.4 %    RDW-SD 39.1 37.0 - 54.0 fl    MPV 10.7 6.0 - 12.0 fL    Platelets 71 (L) 140 - 450 10*3/mm3   STAT Lactic Acid, Reflex    Collection Time: 11/20/23  9:12 PM    Specimen: Blood   Result Value Ref Range    Lactate 3.2 (C) 0.5 - 2.0 mmol/L   Manual Differential    Collection Time: 11/20/23  9:12 PM    Specimen: Blood   Result Value Ref Range    Neutrophil % 98.9 (H) 42.7 - 76.0 %    Lymphocyte % 1.1 (L) 19.6 - 45.3 %    Neutrophils Absolute 9.35 (H) 1.70 - 7.00 10*3/mm3    Lymphocytes Absolute 0.10 (L) 0.70 - 3.10 10*3/mm3    Crenated RBC's Slight/1+ None Seen    Poikilocytes Slight/1+ None Seen    Toxic Granulation Slight/1+ None Seen    Vacuolated Neutrophils Mod/2+ None Seen    Platelet Morphology Normal Normal   POC Glucose Once    Collection Time: 11/20/23 11:11 PM    Specimen: Blood   Result Value Ref Range    Glucose 66 (L) 70 - 130 mg/dL     I reviewed the above results.    Radiology:  CT Abdomen Pelvis Without Contrast   Final Result   1. Cystectomy with ileal conduit diversion. A 7 mm calculus in the mid   left ureter causes mild upstream left-sided hydroureteronephrosis.   2. Moderate left perirenal inflammatory changes extending to the colonic   splenic flexure causing focal mild reactive colitis. Recommend   correlation for signs of infection.   3. Limited evaluation of the left renal parenchyma without intravenous   contrast.   4. At  least four additional nonobstructing left renal calculi.   5. Two additional nonobstructing calculi in the ileal conduit.   6. Segmental right lower lobe groundglass opacities, atelectasis versus   infection/inflammation.   7. Mildly distended gallbladder without appreciable cholelithiasis or   pericholecystic inflammatory changes, which can be seen in a prolonged   fasting state. Recommend clinical correlation for right upper quadrant   pain.   8. Additional unchanged incidental findings as above.       This report was finalized on 11/20/2023 9:05 PM by Dr. Andrea Gay M.D on Workstation: [id number]          IR Nephrostomy Tube Placement    (Results Pending)     I reviewed the above results    Medications ordered in ED:  Medications   vancomycin IVPB 2000 mg in 0.9% Sodium Chloride 500 mL (2,000 mg Intravenous New Bag 11/20/23 2159)   cefTRIAXone (ROCEPHIN) 2,000 mg in sodium chloride 0.9 % 100 mL IVPB-VTB (has no administration in time range)   sodium chloride 0.9 % infusion (125 mL/hr Intravenous New Bag 11/20/23 2235)   fentaNYL citrate (PF) (SUBLIMAZE) injection 25 mcg (has no administration in time range)   nitroglycerin (NITROSTAT) SL tablet 0.4 mg (has no administration in time range)   sodium chloride 0.9 % flush 10 mL (has no administration in time range)   sodium chloride 0.9 % flush 10 mL (has no administration in time range)   sodium chloride 0.9 % infusion 40 mL (has no administration in time range)   sennosides-docusate (PERICOLACE) 8.6-50 MG per tablet 2 tablet (has no administration in time range)     And   polyethylene glycol (MIRALAX) packet 17 g (has no administration in time range)     And   bisacodyl (DULCOLAX) EC tablet 5 mg (has no administration in time range)     And   bisacodyl (DULCOLAX) suppository 10 mg (has no administration in time range)   droperidol (INAPSINE) injection 2.5 mg (2.5 mg Intravenous Given 11/20/23 2032)   HYDROmorphone (DILAUDID) injection 1 mg (1 mg Intravenous  Given 11/20/23 2033)   sepsis fluid NS 0.9 % bolus 2,457 mL (2,457 mL Intravenous New Bag 11/20/23 2113)   cefTRIAXone (ROCEPHIN) 2,000 mg in sodium chloride 0.9 % 100 mL IVPB-VTB (0 mg Intravenous Stopped 11/20/23 2154)   ondansetron (ZOFRAN) injection 4 mg (4 mg Intravenous Given 11/20/23 2213)   HYDROmorphone (DILAUDID) injection 0.5 mg (0.5 mg Intravenous Given 11/20/23 2213)   sodium chloride 0.9 % bolus 1,000 mL (1,000 mL Intravenous New Bag 11/20/23 2243)       ED Course as of 11/20/23 2335 Mon Nov 20, 2023 1951 [id number] male with history of paraplegia, complicated UTI presents with flank pain and vomiting.    On exam he is tachycardic to 164 at triage although improved to 120s at bedside.  He does not have significant abdominal tenderness to palpation.    Assessment-etiology of symptoms unclear.  Would be concerned about kidney infection or kidney stone is likely causes.  Suspect tachycardia related to possible dehydration or sepsis.  We will give 30/kg sepsis fluid bolus.  We will get blood cultures and labs to include urinalysis.  Will obtain CT scan of the abdomen.    We will give IV Dilaudid and so Hermelinda all to help with pain and nausea.    EMS reports that patient has relapsed on usage of cocaine and meth but patient states he is not being late using illegal drugs.  Would certainly consider possible substance withdrawal or intoxication. [DB]   2009 EKG independently interpreted by me    Time 8:05 PM    Sinus tachycardia 161    Normal P waves and LA intervals  Normal axis, normal QRS  ST and T waves-nonspecific changes [DB]   2023 I turned over care of this patient to EMELINA Ramírez pending results of work-up including labs, CT scan.  Patient treated with sepsis fluid bolus as well as some Inapsine and Dilaudid. [DB]   2031 CT scan of the abdomen independently interpreted by me shows no obvious perforation. [DB]   2037 Lactate(!!): 3.9 [LUIS]   2047 THC Screen, Urine(!): Positive [LUIS]   2047  Procalcitonin(!): 12.20 [LUIS]   2100 Cocaine Screen, Urine(!): Positive [LUIS]   2100 THC Screen, Urine(!): Positive [LUIS]   2101 Leukocytes, UA(!): Moderate (2+) [LUIS]   2101 WBC, UA(!): 6-10 [LUIS]   2101 Bacteria, UA(!): 3+ [LUIS]   2103 Glucose: 70 [LUIS]   2104 BUN(!): 46 [LUIS]   2104 Creatinine(!): 2.81 [LUIS]   2104 Sodium(!): 132 [LUIS]   2104 Potassium: 4.5 [LUIS]   2104 Chloride(!): 95 [LUIS]   2104 CO2(!): 17.2 [LUIS]   2150 WBC: 9.45 [LUIS]   2151 Hemoglobin: 14.4 [LUIS]   2151 Hematocrit: 41.0 [LUIS]   2151 Platelets(!): 71 [LUIS]   2151 Neutrophil Rel %(!): 98.9 [LUIS]   2152 Patient rechecked, lying in bed, still tachycardic in the 150s.  Discussed results including findings of sepsis and infected kidney stone in the left ureter.  Discussed plan for admission to the hospital and urologic consultation.  Patient expresses understanding and agrees with plan. [LUIS]   2212 Patient history, ER presentation and evaluation discussed with Dr. Castro, urology, recommended to call and IR for a left nephrostomy tube and admit patient to ICU.  They will consult. [LUIS]   2220 Patient history, ER presentation and evaluation discussed with Dr. Hdez, will accept to the unit. [LUIS]   2222 Patient history, ER presentation evaluation as well as urology recommendations discussed with Dr. Simms, on-call for IR. [LUIS]      ED Course User Index  [DB] Demarcus Cisneros MD  [LUIS] Shawn Kelley PA     Critical Care    Performed by: Shawn Kelley PA  Authorized by: Demarcus Cisneros MD    Critical care provider statement:     Critical care time (minutes):  45    Critical care was necessary to treat or prevent imminent or life-threatening deterioration of the following conditions:  Renal failure, circulatory failure, sepsis and shock    Critical care was time spent personally by me on the following activities:  Discussions with consultants, evaluation of patient's response to treatment, ordering and performing treatments and interventions, ordering and  review of laboratory studies, ordering and review of radiographic studies, re-evaluation of patient's condition, review of old charts and obtaining history from patient or surrogate        Progress and Consult Notes:  20:00:  Patient care transferred from Dr. Cisneros pending evaluation and disposition.    Diagnosis:  Final diagnoses:   Sepsis, due to unspecified organism, unspecified whether acute organ dysfunction present   Ureterolithiasis   Acute urinary tract infection   Acute renal failure, unspecified acute renal failure type   History of paraplegia   Polysubstance abuse       Disposition:  ADMISSION    Discussed treatment plan and reason for admission with pt/family and admitting physician.  Pt/family voiced understanding of the plan for admission for further testing/treatment as needed.         Provider attestation:  I personally reviewed the past medical history, past surgical history, social history, family history, current medications, and allergies as they appear in the chart.    The patient was seen and examined by myself and Dr. Cisneros, who agrees with plan.          Shawn Kelley PA  11/20/23 2335      Electronically signed by Shawn Kelley PA at 11/20/23 2335       Adelfo العلي, RN at 11/20/23 1948          EMS informed this nurse that the patient informed them that he recently relapsed and used cocaine and meth.    Electronically signed by Adelfo العلي RN at 11/20/23 1950       Alix Cheng RN at 11/20/23 1924          Pt was brought in by ems from home for bilateral flank pain that started 3days ago. Around 1200 today pain became worse with n/v. Decreased output in urostomy    Electronically signed by Alix Cheng RN at 11/20/23 1925       Vital Signs (last day)       Date/Time Temp Temp src Pulse Resp BP Patient Position SpO2    11/21/23 1245 -- -- 124 -- 111/87 -- 95    11/21/23 1230 -- -- 112 -- 99/66 -- 95    11/21/23 1215 -- -- 114 17 98/62 Lying 95    11/21/23  1145 -- -- 115 -- 82/62 -- 95    11/21/23 1130 -- -- 119 -- 86/69 -- 100    11/21/23 1125 -- -- 123 -- 91/64 -- 96    11/21/23 1123 97.5 (36.4) Axillary -- -- -- -- --    11/21/23 1115 -- -- 118 -- 77/58 -- 94    11/21/23 1100 -- -- 119 -- 81/51 -- 94    11/21/23 1055 -- -- 119 -- 83/56 -- 93    11/21/23 1035 -- -- 118 -- 81/54 -- 94    11/21/23 0930 -- -- 124 -- 102/81 -- 94    11/21/23 0900 -- -- 124 -- 105/76 -- 100    11/21/23 0830 -- -- 124 -- 102/68 -- 99    11/21/23 0800 -- -- 123 16 104/62 Lying 99    11/21/23 0709 98.1 (36.7) Oral -- -- -- -- --    11/21/23 0700 -- -- 128 -- 86/58 -- 99    11/21/23 0656 -- -- 129 -- 88/72 -- --    11/21/23 0645 -- -- 129 -- 70/40 -- 99    11/21/23 0630 -- -- 133 -- 81/62 -- 99    11/21/23 0622 -- -- 132 -- 72/36 -- --    11/21/23 0615 -- -- 132 -- 72/36 -- 100    11/21/23 0600 -- -- 133 -- 86/55 -- 98    11/21/23 0547 -- -- 135 -- 79/39 -- --    11/21/23 0545 -- -- 134 -- 79/39 -- 98    11/21/23 0530 -- -- 133 -- 88/63 -- 99    11/21/23 0515 -- -- 133 -- 84/55 -- 97    11/21/23 0500 -- -- 132 -- 75/30 -- 99    11/21/23 0450 -- -- 133 -- 82/51 -- --    11/21/23 0445 -- -- 135 -- 82/51 -- 96    11/21/23 0435 -- -- 132 -- 83/52 -- --    11/21/23 0430 -- -- 131 -- 83/52 -- 96    11/21/23 0415 98.2 (36.8) Oral 138 -- 83/52 -- 98    11/21/23 0400 -- -- 133 -- 84/54 -- 97    11/21/23 0345 -- -- 137 -- 81/54 -- 97    11/21/23 0330 -- -- 138 -- 86/53 -- 98    11/21/23 0315 -- -- 140 -- 86/60 -- 97    11/21/23 0300 -- -- 143 -- 99/63 -- 96    11/21/23 0245 -- -- 147 -- 101/69 -- 96    11/21/23 0230 -- -- 149 -- 95/64 -- 96    11/21/23 0226 98.2 (36.8) Oral -- -- -- -- --    11/21/23 0215 -- -- 151 -- 105/64 -- 97    11/21/23 0200 -- -- 153 -- 104/67 -- 93    11/21/23 0145 -- -- 154 -- 158/51 -- 93    11/21/23 0130 -- -- 157 -- 147/81 -- 97    11/21/23 0115 -- -- 157 -- 128/114 -- 97    11/21/23 0106 97.9 (36.6) Oral -- 25 -- -- --    11/21/23 0100 -- -- 129 -- 87/60 -- 96    11/21/23  0040 -- -- -- 22 81/67 Lying 92    11/21/23 0035 -- -- 134 17 89/61 Lying 93    11/21/23 0030 -- -- 131 15 98/75 Lying 98    11/21/23 0025 -- -- 127 17 82/71 Lying 98    11/21/23 00:22:23 -- -- 126 21 86/65 Lying 99    11/20/23 2345 -- -- 132 -- 84/64 -- 95    11/20/23 2340 97.7 (36.5) Oral 131 14 90/59 Lying 95    11/20/23 2300 -- -- 132 -- 72/57 -- 94    11/20/23 2255 -- -- 134 -- -- -- 93    11/20/23 2250 -- -- 136 -- -- -- 95    11/20/23 2246 -- -- 137 -- 91/60 -- 94    11/20/23 2245 -- -- 139 -- -- -- 95    11/20/23 2231 -- -- 142 -- 74/52 -- 94    11/20/23 2230 -- -- 142 -- -- -- 95    11/20/23 2225 -- -- 141 -- -- -- 95    11/20/23 2220 -- -- 139 -- -- -- 96    11/20/23 2216 -- -- 144 -- 92/65 -- 99    11/20/23 2215 -- -- 144 -- -- -- 99    11/20/23 2210 -- -- 145 -- -- -- 100    11/20/23 2205 -- -- 145 -- -- -- 99    11/20/23 2202 -- -- 145 -- -- -- 99    11/20/23 2201 -- -- 147 -- 83/45 -- 100    11/20/23 2200 -- -- 148 -- -- -- 100    11/20/23 2155 -- -- 150 -- -- -- 98    11/20/23 2150 -- -- 148 -- -- -- 99    11/20/23 2147 -- -- 147 -- 102/42 -- 99    11/20/23 2145 -- -- 149 -- -- -- 99    11/20/23 2135 -- -- 149 -- -- -- 98    11/20/23 2132 -- -- 151 -- 92/73 -- 95    11/20/23 2130 -- -- 153 -- -- -- 96    11/20/23 2125 -- -- 149 -- -- -- 92    11/20/23 2105 -- -- 155 -- -- -- --    11/20/23 2059 -- -- 154 -- -- -- --    11/20/23 2055 -- -- 144 -- -- -- --    11/20/23 2054 -- -- 142 -- -- -- 99    11/20/23 2046 -- -- 152 -- 86/64 -- 100    11/20/23 2031 -- -- 149 -- 102/55 -- 93    11/20/23 2016 -- -- 158 -- 88/62 -- 93    11/20/23 2012 -- -- 152 -- 93/53 -- 90    11/20/23 2009 -- -- 152 -- 91/57 -- 90    11/20/23 2007 -- -- 160 -- 91/59 -- 91    11/20/23 1925 98.7 (37.1) -- 164 20 102/69 -- 100          Oxygen Therapy (last day)       Date/Time SpO2 Device (Oxygen Therapy) Flow (L/min) Oxygen Concentration (%) ETCO2 (mmHg)    11/21/23 1245 95 -- -- -- --    11/21/23 1230 95 -- -- -- --    11/21/23 1215  95 room air -- -- --    11/21/23 1145 95 -- -- -- --    11/21/23 1130 100 -- -- -- --    11/21/23 1125 96 -- -- -- --    11/21/23 1115 94 -- -- -- --    11/21/23 1100 94 -- -- -- --    11/21/23 1055 93 -- -- -- --    11/21/23 1035 94 -- -- -- --    11/21/23 0930 94 -- -- -- --    11/21/23 0900 100 -- -- -- --    11/21/23 0835 -- room air -- -- --    11/21/23 0830 99 -- -- -- --    11/21/23 0800 99 nasal cannula 2 -- --    11/21/23 0700 99 -- -- -- 0    11/21/23 0645 99 -- -- -- 0    11/21/23 0630 99 -- -- -- 0    11/21/23 0615 100 -- -- -- 0    11/21/23 0600 98 -- -- -- 0    11/21/23 0545 98 -- -- -- 0    11/21/23 0530 99 -- -- -- 0    11/21/23 0515 97 -- -- -- 0    11/21/23 0500 99 -- -- -- 0    11/21/23 0445 96 -- -- -- 0    11/21/23 0430 96 -- -- -- 0    11/21/23 0415 98 -- -- -- 0    11/21/23 0400 97 nasal cannula 3 -- 0    11/21/23 0345 97 -- -- -- 0    11/21/23 0330 98 -- -- -- 0    11/21/23 0315 97 -- -- -- 0    11/21/23 0300 96 -- -- -- 0    11/21/23 0245 96 -- -- -- 0    11/21/23 0230 96 -- -- -- 0    11/21/23 0215 97 -- -- -- 0    11/21/23 0200 93 -- -- -- 0    11/21/23 0145 93 -- -- -- 0    11/21/23 0130 97 -- -- -- 0    11/21/23 0115 97 -- -- -- 0    11/21/23 0100 96 -- -- -- 0    11/21/23 0045 -- -- -- -- 0    11/21/23 0040 92 nasal cannula 3 -- --    11/21/23 0035 93 nasal cannula 3 -- --    11/21/23 0030 98 nasal cannula 3 -- 0    11/21/23 0025 98 nasal cannula 3 -- --    11/21/23 00:22:23 99 nasal cannula 3 -- --    11/20/23 2345 95 -- -- -- --    11/20/23 2340 95 nasal cannula 3 -- --    11/20/23 2300 94 -- -- -- --    11/20/23 2255 93 -- -- -- --    11/20/23 2250 95 -- -- -- --    11/20/23 2246 94 -- -- -- --    11/20/23 2245 95 -- -- -- --    11/20/23 2231 94 -- -- -- --    11/20/23 2230 95 -- -- -- --    11/20/23 2225 95 -- -- -- --    11/20/23 2220 96 -- -- -- --    11/20/23 2216 99 -- -- -- --    11/20/23 2215 99 -- -- -- --    11/20/23 2210 100 -- -- -- --    11/20/23 2205 99 -- -- -- --     11/20/23 2202 99 -- -- -- --    11/20/23 2201 100 -- -- -- --    11/20/23 2200 100 -- -- -- --    11/20/23 2155 98 -- -- -- --    11/20/23 2150 99 -- -- -- --    11/20/23 2147 99 -- -- -- --    11/20/23 2145 99 -- -- -- --    11/20/23 2135 98 -- -- -- --    11/20/23 2132 95 -- -- -- --    11/20/23 2130 96 -- -- -- --    11/20/23 2125 92 -- -- -- --    11/20/23 2054 99 -- -- -- --    11/20/23 2046 100 -- -- -- --    11/20/23 2031 93 -- -- -- --    11/20/23 2016 93 -- -- -- --    11/20/23 2012 90 -- -- -- --    11/20/23 2009 90 -- -- -- --    11/20/23 2007 91 -- -- -- --    11/20/23 1956 -- nasal cannula 3 -- --    11/20/23 1925 100 room air -- -- --          Lines, Drains & Airways       Active LDAs       Name Placement date Placement time Site Days    Peripheral IV 11/20/23 2031 Anterior;Right;Upper Arm 11/20/23 2031  Arm  less than 1    Peripheral IV 11/20/23 2101 Anterior;Left;Upper Arm 11/20/23 2101  Arm  less than 1    Peripheral IV 11/20/23 2123 Anterior;Left;Proximal;Upper Arm 11/20/23 2123  Arm  less than 1    Nephrostomy Left 8 Fr. 11/21/23  0033  Left  less than 1    Colostomy LUQ --  pt stated 2-3 years ago  --  LUQ  --    Urostomy RUQ --  pt stated 2-3 years ago  --  RUQ  --              Inactive LDAs       Name Placement date Placement time Removal date Removal time Site Days    [REMOVED] Peripheral IV 03/06/23 1722 Left;Posterior Hand 03/06/23 1722 11/20/23  1931  Hand  259                  Facility-Administered Medications as of 11/21/2023   Medication Dose Route Frequency Provider Last Rate Last Admin    sennosides-docusate (PERICOLACE) 8.6-50 MG per tablet 2 tablet  2 tablet Oral BID Aleida Hdez MD        And    polyethylene glycol (MIRALAX) packet 17 g  17 g Oral Daily PRN Aleida Hdez MD        And    bisacodyl (DULCOLAX) EC tablet 5 mg  5 mg Oral Daily PRN Aleida Hdez MD        And    bisacodyl (DULCOLAX) suppository 10 mg  10 mg Rectal Daily PRN Aleida Hdez MD         [COMPLETED] cefTRIAXone (ROCEPHIN) 2,000 mg in sodium chloride 0.9 % 100 mL IVPB-VTB  2,000 mg Intravenous Once Shawn Kelley PA   Stopped at 11/20/23 2154    cefTRIAXone (ROCEPHIN) 2,000 mg in sodium chloride 0.9 % 100 mL IVPB-VTB  2,000 mg Intravenous Q24H Aleida Hdez MD        dextrose (D50W) (25 g/50 mL) IV injection 25 g  25 g Intravenous Q15 Min PRN Aleida Hdez MD   25 g at 11/20/23 2348    dextrose (GLUTOSE) oral gel 15 g  15 g Oral Q15 Min PRN Aleida Hdez MD        [COMPLETED] droperidol (INAPSINE) injection 2.5 mg  2.5 mg Intravenous Once [place], Demarcus PAYNE MD   2.5 mg at 11/20/23 2032    fentaNYL citrate (PF) (SUBLIMAZE) injection 25 mcg  25 mcg Intravenous Q1H PRN Aleida Hdez MD   25 mcg at 11/21/23 0622    glucagon (GLUCAGEN) injection 1 mg  1 mg Subcutaneous Q15 Min PRN Aleida Hdez MD        heparin (porcine) 5000 UNIT/ML injection 5,000 Units  5,000 Units Subcutaneous Q8H Artis Headley MD        [COMPLETED] HYDROmorphone (DILAUDID) injection 0.5 mg  0.5 mg Intravenous Once Amelia, Demarcus PAYNE MD   0.5 mg at 11/20/23 2213    [COMPLETED] HYDROmorphone (DILAUDID) injection 1 mg  1 mg Intravenous Once Amelia, Demarcus PAYNE MD   1 mg at 11/20/23 2033    [COMPLETED] iodixanol (VISIPAQUE) 320 MG/ML injection 50 mL  50 mL Intracatheter Once in imaging Aleida Hdez MD   10 mL at 11/21/23 0131    [COMPLETED] lidocaine PF 1% (XYLOCAINE) injection    PRN Ponce Simms MD   8 mL at 11/21/23 0025    nicotine (NICODERM CQ) 14 MG/24HR patch 1 patch  1 patch Transdermal Q24H Artis Headley MD   1 patch at 11/21/23 1152    nitroglycerin (NITROSTAT) SL tablet 0.4 mg  0.4 mg Sublingual Q5 Min PRN Aleida Hdez MD        norepinephrine (LEVOPHED) 8 mg in 250 mL NS infusion (premix)  0.02-0.3 mcg/kg/min Intravenous Titrated Artis Headley MD 14.31 mL/hr at 11/21/23 1247 0.09 mcg/kg/min at 11/21/23 1247    [COMPLETED] ondansetron (ZOFRAN) injection 4 mg  4 mg Intravenous Once SenecaDemarcus stanley MD   4 mg  at 11/20/23 2213    phenylephrine (YONY-SYNEPHRINE) 50 mg in 250 mL NS infusion  0.5-3 mcg/kg/min Intravenous Titrated Aleida Hdez MD 5.09 mL/hr at 11/21/23 1247 0.2 mcg/kg/min at 11/21/23 1247    [COMPLETED] sepsis fluid NS 0.9 % bolus 2,457 mL  30 mL/kg (Adjusted) Intravenous Once Demarcus Cisneros MD   2,457 mL at 11/20/23 2113    [COMPLETED] sodium chloride 0.9 % bolus 1,000 mL  1,000 mL Intravenous Once Shawn Kelley PA 2,000 mL/hr at 11/20/23 2243 1,000 mL at 11/20/23 2243    [COMPLETED] sodium chloride 0.9 % bolus 500 mL  500 mL Intravenous Once Aleida Hdez MD 1,000 mL/hr at 11/21/23 0350 500 mL at 11/21/23 0350    sodium chloride 0.9 % flush 10 mL  10 mL Intravenous Q12H Aleida Hdez MD   10 mL at 11/21/23 0907    sodium chloride 0.9 % flush 10 mL  10 mL Intravenous PRN Aleida Hdez MD        sodium chloride 0.9 % infusion 40 mL  40 mL Intravenous PRN Aleida Hdez MD        sodium chloride 0.9 % infusion  125 mL/hr Intravenous Continuous Aleida Hdez  mL/hr at 11/20/23 2235 125 mL/hr at 11/20/23 2235    [COMPLETED] sodium chloride 0.9 % infusion   Intravenous Continuous PRN Ponce Simms  mL/hr at 11/21/23 0017 125 mL/hr at 11/21/23 0017    [COMPLETED] vancomycin IVPB 2000 mg in 0.9% Sodium Chloride 500 mL  20 mg/kg Intravenous Once Shawn Kelley PA   Stopped at 11/21/23 0020     Orders (last 24 hrs)        Start     Ordered    11/22/23 0600  Magnesium  Morning Draw         11/21/23 0938    11/22/23 0600  Phosphorus  Morning Draw         11/21/23 0938    11/21/23 2100  cefTRIAXone (ROCEPHIN) 2,000 mg in sodium chloride 0.9 % 100 mL IVPB-VTB  Every 24 Hours         11/20/23 2228    11/21/23 2100  heparin (porcine) 5000 UNIT/ML injection 5,000 Units  Every 8 Hours Scheduled         11/21/23 0940    11/21/23 2000  Wound Care  Every 12 Hours         11/21/23 1043    11/21/23 2000  Wound Care  Every 12 Hours         11/21/23 1044    11/21/23 1507  STAT Lactic Acid,  Reflex  PROCEDURE ONCE         11/21/23 0946    11/21/23 1200  Comprehensive Metabolic Panel  Daily,   Status:  Canceled       11/21/23 0938    11/21/23 1200  CBC (No Diff)  Daily,   Status:  Canceled       11/21/23 0938    11/21/23 1200  Lactic Acid, Plasma  Every 6 Hours       11/21/23 0938    11/21/23 1100  nicotine (NICODERM CQ) 14 MG/24HR patch 1 patch  Every 24 Hours Scheduled         11/21/23 0940    11/21/23 1043  Follow Pressure Ulcer Prevention Measures Policy  Continuous        Comments: Implement Appropriate Pressure Ulcer Prevention Measures  - Open Order Report to View Full Instructions  Enter Wound LDA & Document Assessment  Add Wound Care Plan  Add Patient Education Per Policy    11/21/23 1042    11/21/23 1043  Turn Patient  Now Then Every 2 Hours         11/21/23 1042    11/21/23 1043  Head of Bed 30 Degrees or Less (Unless Contraindicated)  Until Discontinued         11/21/23 1042    11/21/23 1043  Elevate Heels Off of Bed  Until Discontinued         11/21/23 1042    11/21/23 1043  Use Seat Cushion When Up In Chair  Continuous         11/21/23 1042    11/21/23 1043  Silicone Border Dressing to Bony Prominences  Every Shift       11/21/23 1042    11/21/23 1043  Do NOT Rub or Massage Any Bony Prominence  Continuous         11/21/23 1042    11/21/23 1030  norepinephrine (LEVOPHED) 8 mg in 250 mL NS infusion (premix)  Titrated         11/21/23 0938    11/21/23 1016  CBC (No Diff)  Daily       11/21/23 1015    11/21/23 1016  Comprehensive Metabolic Panel  Daily       11/21/23 1015    11/21/23 1003  Basic Metabolic Panel  Once,   Status:  Canceled         11/21/23 0815    11/21/23 0943  STAT Lactic Acid, Reflex  PROCEDURE ONCE         11/21/23 0423    11/21/23 0942  PT Consult: Eval & Treat Functional Mobility Below Baseline  Once         11/21/23 0941    11/21/23 0940  Wound Ostomy Eval & Treat  Once         11/21/23 0939    11/21/23 0937  Code Status and Medical Interventions:  Continuous          11/21/23 0938    11/21/23 0819  Diet: Regular/House Diet; Texture: Regular Texture (IDDSI 7); Fluid Consistency: Thin (IDDSI 0)  Diet Effective Now         11/21/23 0818    11/21/23 0800  Inpatient Case Management  Consult  Once        Provider:  (Not yet assigned)    11/21/23 0631    11/21/23 0630  phenylephrine (YONY-SYNEPHRINE) 50 mg in 250 mL NS infusion  Titrated         11/21/23 0542    11/21/23 0554  Blood Culture ID, PCR - Blood, Arm, Left  Once         11/21/23 0553    11/21/23 0445  sodium chloride 0.9 % bolus 500 mL  Once         11/21/23 0346    11/21/23 0445  norepinephrine (LEVOPHED) 8 mg in 250 mL NS infusion (premix)  Titrated,   Status:  Discontinued         11/21/23 0346    11/21/23 0427  STAT Lactic Acid, Reflex  PROCEDURE ONCE         11/21/23 0216    11/21/23 0316  Wound Ostomy Eval & Treat  Once         11/21/23 0316    11/21/23 0251  Initiate & Follow Hypercapnic Monitoring Guideline for Opioid Administration via EtCO2 and / or SpO2  Continuous        Comments: Follow Hypercapnic Monitoring Guideline As Outlined in Process Instructions (Open Order Report to View Full Instructions)    11/21/23 0250    11/21/23 0251  Opioid Administration - Document EtCO2 and / or SpO2 With Each Set of Vitals & Any Change in Patient Status  Per Order Details        Comments: With Each Set of Vitals & Any Change in Patient Status    11/21/23 0250    11/21/23 0251  Opioid Administration - Notify Provider Hypercapnic Monitoring  Until Discontinued         11/21/23 0250    11/21/23 0251  Opioid Administration - Continuous Pulse Oximetry (SpO2)  Continuous         11/21/23 0250    11/21/23 0230  iodixanol (VISIPAQUE) 320 MG/ML injection 50 mL  Once in Imaging         11/21/23 0131    11/21/23 0109  POC Glucose Once  PROCEDURE ONCE        Comments: Complete no more than 45 minutes prior to patient eating      11/21/23 0106    11/21/23 0056  Flush left nephrostomy tube with 5 mL sterile saline TID until  urine clears  Nursing Communication  Once        Comments: Flush left nephrostomy tube with 5 mL sterile saline TID until urine clears    11/21/23 0056    11/21/23 0025  lidocaine PF 1% (XYLOCAINE) injection  As Needed         11/21/23 0037    11/21/23 0017  sodium chloride 0.9 % infusion  Continuous PRN         11/21/23 0017    11/21/23 0012  STAT Lactic Acid, Reflex  PROCEDURE ONCE         11/20/23 2140    11/20/23 2344  Obtain Informed Consent  Once         11/20/23 2343    11/20/23 2344  Urinalysis With Culture If Indicated - Urine, Clean Catch  Once         11/20/23 2343    11/20/23 2336  dextrose (GLUTOSE) oral gel 15 g  Every 15 Minutes PRN         11/20/23 2337    11/20/23 2336  dextrose (D50W) (25 g/50 mL) IV injection 25 g  Every 15 Minutes PRN         11/20/23 2337    11/20/23 2336  glucagon (GLUCAGEN) injection 1 mg  Every 15 Minutes PRN         11/20/23 2337    11/20/23 2335  Critical Care  Once        Comments: This order was created via procedure documentation    11/20/23 2334    11/20/23 2314  POC Glucose Once  PROCEDURE ONCE        Comments: Complete no more than 45 minutes prior to patient eating      11/20/23 2311    11/20/23 2304  STAT Lactic Acid, Reflex  PROCEDURE ONCE         11/20/23 2036 11/20/23 2300  Vital Signs Every Hour and Per Hospital Policy Based on Patient Condition  Every Hour       11/20/23 2228 11/20/23 2300  Intake & Output  Every Hour       11/20/23 2228 11/20/23 2255  sodium chloride 0.9 % bolus 1,000 mL  Once         11/20/23 2239 11/20/23 2244  sodium chloride 0.9 % infusion  Continuous         11/20/23 2228 11/20/23 2244  sodium chloride 0.9 % flush 10 mL  Every 12 Hours Scheduled         11/20/23 2228 11/20/23 2244  sennosides-docusate (PERICOLACE) 8.6-50 MG per tablet 2 tablet  2 Times Daily        See Hyperspace for full Linked Orders Report.    11/20/23 2228 11/20/23 2244  IR Nephrostomy Tube Placement  1 Time Imaging         11/20/23 6135     11/20/23 2240  ICU / CCU Bed Request (JACKIE / MACKENZ[facility] ONLY)  Once         11/20/23 2240 11/20/23 2229  Daily Weights  Daily       11/20/23 2228 11/20/23 2229  Place Sequential Compression Device  Once         11/20/23 2228 11/20/23 2229  Maintain Sequential Compression Device  Continuous         11/20/23 2228 11/20/23 2229  NPO Diet NPO Type: Strict NPO  Diet Effective Now,   Status:  Canceled         11/20/23 2228 11/20/23 2228  Continuous Cardiac Monitoring  Continuous        Comments: Follow Standing Orders As Outlined in Process Instructions (Open Order Report to View Full Instructions)    11/20/23 2228 11/20/23 2228  Telemetry - Maintain IV Access  Continuous,   Status:  Canceled         11/20/23 2228 11/20/23 2228  Telemetry - Place Orders & Notify Provider of Results When Patient Experiences Acute Chest Pain, Dysrhythmia or Respiratory Distress  Until Discontinued         11/20/23 2228 11/20/23 2228  Continuous Pulse Oximetry  Continuous,   Status:  Canceled         11/20/23 2228    11/20/23 2228  Height & Weight  Once         11/20/23 2228 11/20/23 2228  Oral Care - Patient Not on NPPV & Not Intubated  Every Shift       11/20/23 2228    11/20/23 2228  Target Arousal Level RASS 0 to -1  Continuous         11/20/23 2228    11/20/23 2228  Use Mobility Guidelines for Advancement of Activity  Continuous         11/20/23 2228    11/20/23 2228  Insert Peripheral IV  Once         11/20/23 2228    11/20/23 2228  Saline Lock & Maintain IV Access  Continuous         11/20/23 2228    11/20/23 2228  Inpatient Admission  Once         11/20/23 2228 11/20/23 2227  nitroglycerin (NITROSTAT) SL tablet 0.4 mg  Every 5 Minutes PRN         11/20/23 2228 11/20/23 2227  sodium chloride 0.9 % flush 10 mL  As Needed         11/20/23 2228    11/20/23 2227  sodium chloride 0.9 % infusion 40 mL  As Needed         11/20/23 2228 11/20/23 2227  polyethylene glycol (MIRALAX) packet 17 g  Daily PRN        See  Hyperspace for full Linked Orders Report.    11/20/23 2228 11/20/23 2227  bisacodyl (DULCOLAX) EC tablet 5 mg  Daily PRN        See Hyperspace for full Linked Orders Report.    11/20/23 2228 11/20/23 2227  bisacodyl (DULCOLAX) suppository 10 mg  Daily PRN        See Hyperspace for full Linked Orders Report.    11/20/23 2228 11/20/23 2227  fentaNYL citrate (PF) (SUBLIMAZE) injection 25 mcg  Every 1 Hour PRN         11/20/23 2228 11/20/23 2221  ondansetron (ZOFRAN) injection 4 mg  Once         11/20/23 2205 11/20/23 2221  HYDROmorphone (DILAUDID) injection 0.5 mg  Once         11/20/23 2205 11/20/23 2214  Pulmonology (on-call MD unless specified)  Once        Specialty:  Pulmonary Disease  Provider:  Aleida Hdez MD    11/20/23 2213 11/20/23 2153  Urology (on-call MD unless specified)  Once        Specialty:  Urology  Provider:  Elijah Castro MD    11/20/23 2152 11/20/23 2152  [facility] (on-call MD unless specified) Details  Once        Specialty:  Hospitalist  Provider:  (Not yet assigned)    11/20/23 2151 11/20/23 2127  Manual Differential  Once         11/20/23 2126 11/20/23 2120  vancomycin IVPB 2000 mg in 0.9% Sodium Chloride 500 mL  Once         11/20/23 2046 11/20/23 2102  cefTRIAXone (ROCEPHIN) 2,000 mg in sodium chloride 0.9 % 100 mL IVPB-VTB  Once         11/20/23 2046 11/20/23 2101  Urine Culture - Urine, Urine, Clean Catch  Once         11/20/23 2100    11/20/23 2046  CBC Auto Differential  PROCEDURE ONCE         11/20/23 1950    11/20/23 2046  Urinalysis With Culture If Indicated - Urine, Clean Catch  Once,   Status:  Canceled         11/20/23 2046 11/20/23 2033  Urinalysis, Microscopic Only - Urine, Clean Catch  Once         11/20/23 2032 11/20/23 2033  Manual Differential  Once,   Status:  Canceled         11/20/23 2032 11/20/23 2020  Manual Differential  Once,   Status:  Canceled         11/20/23 2019 11/20/23 2009  Urinalysis With Microscopic If  Indicated (No Culture) - Urine, Clean Catch  Once         11/20/23 2008 11/20/23 2006  droperidol (INAPSINE) injection 2.5 mg  Once         11/20/23 1950 11/20/23 2006  HYDROmorphone (DILAUDID) injection 1 mg  Once         11/20/23 1950 11/20/23 2006  sepsis fluid NS 0.9 % bolus 2,457 mL  Once         11/20/23 1950 11/20/23 1955  ECG 12 Lead Tachycardia  Once         11/20/23 1954 11/20/23 1951  CT Abdomen Pelvis Without Contrast  1 Time Imaging        Comments: NON-CONTRASTED STUDY      11/20/23 1950 11/20/23 1950  CBC & Differential  Once         11/20/23 1950 11/20/23 1950  Comprehensive Metabolic Panel  Once         11/20/23 1950 11/20/23 1950  Blood Culture - Blood, Arm, Left  Once         11/20/23 1950 11/20/23 1950  Blood Culture - Blood, Arm, Left  Once         11/20/23 1950 11/20/23 1950  Lactic Acid, Plasma  Once         11/20/23 1950 11/20/23 1950  Procalcitonin  Once         11/20/23 1950 11/20/23 1950  Urine Drug Screen - Urine, Clean Catch  STAT         11/20/23 1950    Unscheduled  Follow Hypoglycemia Standing Orders For Blood Glucose <70 & Notify Provider of Treatment  As Needed      Comments: Follow Hypoglycemia Orders As Outlined in Process Instructions (Open Order Report to View Full Instructions)  Notify Provider Any Time Hypoglycemia Treatment is Administered    11/20/23 2337    Unscheduled  Wound Care  As Needed      Comments: Apply Calmoseptine    11/21/23 1042    Pending  cefTRIAXone (ROCEPHIN) 2,000 mg in sodium chloride 0.9 % 100 mL IVPB-VTB  Once         Pending    Pending  Urine Culture - Urine, Urine, Clean Catch  Once         Pending    --  SCANNED - TELEMETRY           11/20/23 0000                     Physician Progress Notes (last 24 hours)        Artis Headley MD at 11/21/23 4984              [facility]  [phone]  Dr. Artis Headley    Subjective:  LOS: 1    Chief Complaint: Septic shock    Lives at home with his girlfriend.  Both  are in a wheelchair.  States his feet stay cold.  Has had previous urine infections.    Objective   Vital Signs past 24hrs  Temp range: Temp (24hrs), Av.1 °F (36.7 °C), Min:97.7 °F (36.5 °C), Max:98.7 °F (37.1 °C)    BP range: BP: ()/() 86/58  Pulse range: Heart Rate:  [126-164] 128  Resp rate range: Resp:  [14-25] 25  Device (Oxygen Therapy): nasal cannulaFlow (L/min):  [3] 3  Oxygen range:SpO2:  [90 %-100 %] 99 %       Physical Exam  Constitutional:       Appearance: He is ill-appearing.   Cardiovascular:      Rate and Rhythm: Regular rhythm. Tachycardia present.      Pulses: Decreased pulses.      Heart sounds: No murmur heard.  Pulmonary:      Effort: Pulmonary effort is normal.      Breath sounds: Decreased breath sounds present.   Abdominal:      General: Bowel sounds are normal.      Palpations: Abdomen is soft. There is no mass.      Tenderness: There is no abdominal tenderness.      Comments: Diverting colostomy  Ileal conduit   Skin:     Coloration: Skin is cyanotic (both feet - toes).      Findings: Rash present.   Neurological:      Mental Status: He is alert.       Results Review:    I have reviewed the laboratory and imaging data since the last note by [facility] physician.  My annotations are noted in assessment and plan.      Result Review:  I have personally reviewed the results from last note by [facility] physician to 2023 09:25 EST and agree with these findings:  [x]  Laboratory list / accordion  [x]  Microbiology  [x]  Radiology  [x]  EKG/Telemetry   []  Cardiology/Vascular   []  Pathology  []  Old records  []  Other:    Medication Review:  I have reviewed the current MAR.  My annotations are noted in assessment and plan.    cefTRIAXone, 2,000 mg, Intravenous, Q24H  senna-docusate sodium, 2 tablet, Oral, BID  sodium chloride, 10 mL, Intravenous, Q12H        phenylephrine, 0.5-3 mcg/kg/min, Last Rate: 0.8 mcg/kg/min (23 0850)  sodium chloride, 125 mL/hr, Last Rate: 125 mL/hr  (11/20/23 2235)      Lines, Drains & Airways       Active LDAs       Name Placement date Placement time Site Days    Peripheral IV 11/20/23 2031 Anterior;Right;Upper Arm 11/20/23 2031  Arm  less than 1    Peripheral IV 11/20/23 2101 Anterior;Left;Upper Arm 11/20/23 2101  Arm  less than 1    Peripheral IV 11/20/23 2123 Anterior;Left;Proximal;Upper Arm 11/20/23 2123  Arm  less than 1    Nephrostomy Left 8 Fr. 11/21/23  0033  Left  less than 1    Colostomy LUQ --  pt stated 2-3 years ago  --  LUQ  --    Urostomy RUQ --  pt stated 2-3 years ago  --  RUQ  --                  Isolation status: No active isolations    Dietary Orders (From admission, onward)       Start     Ordered    11/21/23 0819  Diet: Regular/House Diet; Texture: Regular Texture (IDDSI 7); Fluid Consistency: Thin (IDDSI 0)  Diet Effective Now        References:    Diet Order [place]   Question Answer Comment   Diets: Regular/House Diet    Texture: Regular Texture (IDDSI 7)    Fluid Consistency: Thin (IDDSI 0)        11/21/23 0818                    PCCM Problems  Sepsis with septic shock  Status post left nephrostomy tube 11/20/2023  Left pyelonephritis  Left ureteral stone  Bacteremia with E. coli and Streptococcus  Paraplegia  Lactic acidosis  Acute kidney injury  Acute lactic acidosis  Thrombocytopenia  Colostomy in place  Ileal conduit with urinary diversion in place  Cigarette smoker  UDS positive for cocaine and THC      THESE ARE NEW MEDICAL PROBLEMS TO ME.    Plan of Treatment    Septic shock with persistent elevated lactic acid.  Patient is on phenylephrine and fluids.  Switch to Levophed.    Left ureteral stone with pyelonephritis and left nephrostomy tube.  Appreciate urology input.    Bacteremia with E. coli and Streptococcus.  Currently on ceftriaxone.  Sensitivity is pending.    ALESIA noted.  BMP currently pending.    His feet look a little mottled and cyanotic.  RN to check Dopplers.  If unable then need to get arterial Dopplers of  both lower extremities.    Will ask wound care nurse to see.    Current cigarette smoker.  Give nicotine patch.    Start DVT prophylaxis with subcu heparin.    On regular diet.    Patient remains critically ill.  May need to look at his home situation at time of discharge.    I spent 35 mins critical care time in care of this patient outside of any procedures.     Artis Headley MD  23  09:25 EST      Part of this note may be an electronic transcription/translation of spoken language to printed text using the Dragon Dictation System.      Electronically signed by Artis Headley MD at 23 0987       Elijah Castro MD at 23 2216          [facility] Progress Note  2023  22:16 EST      Urology Consult Pending     Paraplegia, polysubstance abuse (cocaine, meth)  Colostomy, Ileal conduit urinary diversion   Flank pain x 3+ days, Nausea/vomiting  Decreased urine output from urostomy    CT 2023: Left ureteral stone, 7mm, additional stones in left kidney  Evidence of left pyelonephritis / Sepsis  ALESIA, Cr 2.81  WBC 9.45  Lactic acid 3.9 -> 3.2  Initially hypotensive      Plan  - Advised ICU admit for sepsis in complex setting   - Broad spectrum antibiotics / blood cultures  - Recommend urgent consult with IR for consideration of Left nephrostomy tube placement followed by Left stent internalization in the next 1-3 days   - Urology will follow closely     Elijah Castro MD  [facility]  General & Reconstructive Urology  Office: [phone]  Fax: [phone]     Electronically signed by Elijah Castro MD at 232          Consult Notes (last 24 hours)        Elijah Castro MD at 23 0519               [facility] CONSULT      Patient Identification:  NAME:  Pk May  Age:  42 y.o.   Sex:  male   :  1980   MRN:  [id number]       Chief complaint: Left ureteral stone     History of present illness:  Pk May is a 42 y.o.     Paraplegia s/p  colostomy/urostomy in [place]; history of kidney stones, taken care of 7-8 years ago in [place].  Polysubstance abuse (cocaine, meth)  Colostomy, Ileal conduit urinary diversion   Flank pain x 3+ days, Nausea/vomiting  Decreased urine output from urostomy     CT 11/20/2023: Left ureteral stone, 7mm, additional stones in left kidney  Evidence of left pyelonephritis / Sepsis  ALESIA, Cr 2.81  WBC 9.45  Lactic acid 3.9 -> 3.2  Initially hypotensive    Past medical history:  Past Medical History:   Diagnosis Date    Paraplegia     Wound infection        Past surgical history:  Past Surgical History:   Procedure Laterality Date    SPINAL FUSION         Allergies:  Pholcodine, Codeine, Amoxicillin-pot clavulanate, Cefuroxime, Doxycycline, and Sulfamethoxazole    Home medications:  Medications Prior to Admission   Medication Sig Dispense Refill Last Dose    amitriptyline (ELAVIL) 75 MG tablet Take 1 tablet by mouth 2 (Two) Times a Day. 180 tablet 3     ARIPiprazole (ABILIFY) 5 MG tablet Take 1 tablet by mouth Daily.       baclofen (LIORESAL) 10 MG tablet TAKE 1 TABLET BY MOUTH THREE TIMES DAILY 14 tablet 0     busPIRone (BUSPAR) 30 MG tablet TAKE 0.5 TABLETS BY MOUTH EVERY MORNING AND 1 TABLET EVERY MORNING       methylPREDNISolone (MEDROL) 4 MG dose pack Take as directed on package instructions. 21 tablet 0     triamcinolone (KENALOG) 0.1 % paste Apply to oral ulcer twice daily until healed 5 g 1     venlafaxine XR (EFFEXOR-XR) 150 MG 24 hr capsule Take 1 capsule by mouth Daily. 90 capsule 1         Hospital medications:  cefTRIAXone, 2,000 mg, Intravenous, Q24H  senna-docusate sodium, 2 tablet, Oral, BID  sodium chloride, 10 mL, Intravenous, Q12H      norepinephrine, 0.02-0.3 mcg/kg/min, Last Rate: 0.04 mcg/kg/min (11/21/23 0450)  sodium chloride, 125 mL/hr, Last Rate: 125 mL/hr (11/20/23 7513)        senna-docusate sodium **AND** polyethylene glycol **AND** bisacodyl **AND** bisacodyl    dextrose     "dextrose    fentaNYL citrate (PF)    glucagon (human recombinant)    nitroglycerin    sodium chloride    sodium chloride    Family history:  Family History   Problem Relation Age of Onset    No Known Problems Mother     No Known Problems Father        Social history:  Social History     Tobacco Use    Smoking status: Every Day     Packs/day: 1.00     Years: 24.00     Additional pack years: 0.00     Total pack years: 24.00     Types: Cigarettes    Smokeless tobacco: Never   Vaping Use    Vaping Use: Every day    Substances: Nicotine   Substance Use Topics    Alcohol use: Not Currently    Drug use: Yes     Types: Marijuana, \"Crack\" cocaine       REVIEW OF SYSTEMS:  Constitutional - Negative for fevers/chills  Eyes/Ears/Nose/Mouth/Throat - Negative for changes in vision  Cardiovascular - Negative for chest pain, dysrhythmia  Respiratory - Negative for dyspnea  Gastrointestinal - Npsotive nausea or vomiting  Genitourinary - Negative for dysuria  Hematologic/Lymphatic - Negative for bruising  Skin - Negative for erythema  Endocrine - Negative for history of diabetes    Objective:  TMax 24 hours:   Temp (24hrs), Av.1 °F (36.7 °C), Min:97.7 °F (36.5 °C), Max:98.7 °F (37.1 °C)      Vitals Ranges:   Temp:  [97.7 °F (36.5 °C)-98.7 °F (37.1 °C)] 98.2 °F (36.8 °C)  Heart Rate:  [126-164] 133  Resp:  [14-25] 25  BP: ()/(42-75) 82/51    Intake/Output Last 3 shifts:  No intake/output data recorded.     Physical Exam:    General Appearance:    Alert, cooperative, NAD   HEENT:    No trauma, pupils reactive, hearing intact   Back:     No CVA tenderness   Lungs:     Respirations unlabored, no wheezing    Heart:    RRR, intact peripheral pulses   Abdomen:     Soft, NDNT, no masses, no guarding   :    Testes descended bilaterally, no nodules.  Penis normal.      No scrotal or penile rashes noted   Extremities:   No edema, no deformity   Lymphatic:   No neck or groin LAD   Skin:   No bleeding, bruising or rashes "   Neuro/Psych:   Orientation intact, mood/affect pleasant, no focal findings       Results review:   I reviewed the patient's new clinical results.    Data review:  Lab Results (last 24 hours)       Procedure Component Value Units Date/Time    STAT Lactic Acid, Reflex [[id number]]  (Abnormal) Collected: 11/21/23 0343    Specimen: Blood Updated: 11/21/23 0429     Lactate 3.8 mmol/L     STAT Lactic Acid, Reflex [[id number]]  (Abnormal) Collected: 11/21/23 0127    Specimen: Blood Updated: 11/21/23 0218     Lactate 5.1 mmol/L     POC Glucose Once [[id number]]  (Normal) Collected: 11/21/23 0106    Specimen: Blood Updated: 11/21/23 0108     Glucose 91 mg/dL     POC Glucose Once [[id number]]  (Abnormal) Collected: 11/20/23 2311    Specimen: Blood Updated: 11/20/23 2313     Glucose 66 mg/dL     CBC & Differential [[id number]]  (Abnormal) Collected: 11/20/23 2112    Specimen: Blood Updated: 11/20/23 2150    Narrative:      The following orders were created for panel order CBC & Differential.  Procedure                               Abnormality         Status                     ---------                               -----------         ------                     CBC Auto Differential[[id number]]        Abnormal            Final result                 Please view results for these tests on the individual orders.    CBC Auto Differential [[id number]]  (Abnormal) Collected: 11/20/23 2112    Specimen: Blood Updated: 11/20/23 2150     WBC 9.45 10*3/mm3      RBC 4.84 10*6/mm3      Hemoglobin 14.4 g/dL      Hematocrit 41.0 %      MCV 84.7 fL      MCH 29.8 pg      MCHC 35.1 g/dL      RDW 12.9 %      RDW-SD 39.1 fl      MPV 10.7 fL      Platelets 71 10*3/mm3     Manual Differential [[id number]]  (Abnormal) Collected: 11/20/23 2112    Specimen: Blood Updated: 11/20/23 2150     Neutrophil % 98.9 %      Comment: 1+ bands        Lymphocyte % 1.1 %      Neutrophils Absolute 9.35 10*3/mm3      Lymphocytes Absolute 0.10 10*3/mm3      Crenated  RBC's Slight/1+     Poikilocytes Slight/1+     Toxic Granulation Slight/1+     Vacuolated Neutrophils Mod/2+     Platelet Morphology Normal    STAT Lactic Acid, Reflex [[id number]]  (Abnormal) Collected: 11/20/23 2112    Specimen: Blood Updated: 11/20/23 2144     Lactate 3.2 mmol/L     Blood Culture - Blood, Arm, Left [[id number]] Collected: 11/20/23 2123    Specimen: Blood from Arm, Left Updated: 11/20/23 2129    Urine Culture - Urine, Urine, Clean Catch [[id number]] Collected: 11/20/23 2001    Specimen: Urine, Clean Catch Updated: 11/20/23 2100    Urinalysis, Microscopic Only - Urine, Clean Catch [[id number]]  (Abnormal) Collected: 11/20/23 2001    Specimen: Urine, Clean Catch Updated: 11/20/23 2058     RBC, UA 3-5 /HPF      WBC, UA 6-10 /HPF      Bacteria, UA 3+ /HPF      Squamous Epithelial Cells, UA 0-2 /HPF      Hyaline Casts, UA None Seen /LPF      Methodology Manual Light Microscopy    Comprehensive Metabolic Panel [[id number]]  (Abnormal) Collected: 11/20/23 2004    Specimen: Blood Updated: 11/20/23 2050     Glucose 70 mg/dL      BUN 46 mg/dL      Creatinine 2.81 mg/dL      Sodium 132 mmol/L      Potassium 4.5 mmol/L      Comment: Specimen hemolyzed.  Result may be falsely elevated.        Chloride 95 mmol/L      CO2 17.2 mmol/L      Calcium 9.1 mg/dL      Total Protein 7.8 g/dL      Albumin 3.2 g/dL      ALT (SGPT) 20 U/L      Comment: Specimen hemolyzed.  Result may  be falsely elevated.        AST (SGOT) 35 U/L      Comment: Specimen hemolyzed.  Result may be falsely elevated.        Alkaline Phosphatase 329 U/L      Total Bilirubin 1.1 mg/dL      Globulin 4.6 gm/dL      A/G Ratio 0.7 g/dL      BUN/Creatinine Ratio 16.4     Anion Gap 19.8 mmol/L      eGFR 28.1 mL/min/1.73     Narrative:      GFR Normal >60  Chronic Kidney Disease <60  Kidney Failure <15      Urinalysis With Microscopic If Indicated (No Culture) - Urine, Clean Catch [[id number]]  (Abnormal) Collected: 11/20/23 2001    Specimen: Urine, Clean  "Catch Updated: 11/20/23 2046     Color, UA Yellow     Appearance, UA Cloudy     pH, UA 8.5     Specific Gravity, UA 1.014     Glucose, UA Negative     Ketones, UA Negative     Bilirubin, UA Negative     Blood, UA Moderate (2+)     Protein,  mg/dL (2+)     Leuk Esterase, UA Moderate (2+)     Nitrite, UA Negative     Urobilinogen, UA 1.0 E.U./dL    Procalcitonin [[id number]]  (Abnormal) Collected: 11/20/23 2004    Specimen: Blood Updated: 11/20/23 2041     Procalcitonin 12.20 ng/mL     Narrative:      As a Marker for Sepsis (Non-Neonates):    1. <0.5 ng/mL represents a low risk of severe sepsis and/or septic shock.  2. >2 ng/mL represents a high risk of severe sepsis and/or septic shock.    As a Marker for Lower Respiratory Tract Infections that require antibiotic therapy:    PCT on Admission    Antibiotic Therapy       6-12 Hrs later    >0.5                Strongly Recommended  >0.25 - <0.5        Recommended   0.1 - 0.25          Discouraged              Remeasure/reassess PCT  <0.1                Strongly Discouraged     Remeasure/reassess PCT    As 28 day mortality risk marker: \"Change in Procalcitonin Result\" (>80% or <=80%) if Day 0 (or Day 1) and Day 4 values are available. Refer to http://[email or web address]-[email or web address]-[email or web address]    Change in PCT <=80%  A decrease of PCT levels below or equal to 80% defines a positive change in PCT test result representing a higher risk for 28-day all-cause mortality of patients diagnosed with severe sepsis for septic shock.    Change in PCT >80%  A decrease of PCT levels of more than 80% defines a negative change in PCT result representing a lower risk for 28-day all-cause mortality of patients diagnosed with severe sepsis or septic shock.       Urine Drug Screen - Urine, Clean Catch [[id number]]  (Abnormal) Collected: 11/20/23 2001    Specimen: Urine, Clean Catch Updated: 11/20/23 2039     Amphet/Methamphet, Screen Negative     Barbiturates Screen, Urine Negative     Benzodiazepine " Screen, Urine Negative     Cocaine Screen, Urine Positive     Opiate Screen Negative     THC, Screen, Urine Positive     Methadone Screen, Urine Negative     Oxycodone Screen, Urine Negative     Fentanyl, Urine Negative    Narrative:      Negative Thresholds Per Drugs Screened:    Amphetamines                 500 ng/ml  Barbiturates                 200 ng/ml  Benzodiazepines              100 ng/ml  Cocaine                      300 ng/ml  Methadone                    300 ng/ml  Opiates                      300 ng/ml  Oxycodone                    100 ng/ml  THC                           50 ng/ml  Fentanyl                       5 ng/ml      The Normal Value for all drugs tested is negative. This report includes final unconfirmed screening results to be used for medical treatment purposes only. Unconfirmed results must not be used for non-medical purposes such as employment or legal testing. Clinical consideration should be applied to any drug of abuse test, particularly when unconfirmed results are used.            Lactic Acid, Plasma [[id number]]  (Abnormal) Collected: 11/20/23 2004    Specimen: Blood Updated: 11/20/23 2036     Lactate 3.9 mmol/L     Blood Culture - Blood, Arm, Left [[id number]] Collected: 11/20/23 2004    Specimen: Blood from Arm, Left Updated: 11/20/23 2012             Imaging:  Imaging Results (Last 24 Hours)       Procedure Component Value Units Date/Time    IR Nephrostomy Tube Placement [[id number]] Resulted: 11/21/23 0130     Updated: 11/21/23 0133    CT Abdomen Pelvis Without Contrast [[id number]] Collected: 11/20/23 2044     Updated: 11/20/23 2108    Narrative:      CT ABDOMEN AND PELVIS WITHOUT IV CONTRAST     HISTORY: Left flank pain with possible urosepsis.     TECHNIQUE: Radiation dose reduction techniques were utilized, including  automated exposure control and exposure modulation based on body size.   3 mm images were obtained through the abdomen and pelvis without the  administration of IV  contrast.     COMPARISON: CT thoracic and lumbar spine 11/30/2021.        FINDINGS: Cystectomy with ileal conduit diversion. A 7 mm calculus in  the mid left ureter (series 3/image 71). Resulting mild left-sided  hydroureteronephrosis. At least 4 additional nonobstructing left renal  calculi measuring up to 7 mm (series 3/image 89). Moderate left  perirenal inflammatory changes extending to the nearby colonic splenic  flexure with associated short segment circumferential splenic flexure  colon wall thickening (series 2/images 47 and 59). No focal left renal  parenchymal hypodensity. Parenchymal evaluation partially limited  without intravenous contrast.     No right-sided renal calculi. Two calculi within the conduit, the  largest measuring up to 9 mm (series 2/image 105). No right-sided  hydronephrosis.     Segmental groundglass opacities in the right lower lobe (series 2/image  8).     Mildly distended gallbladder. No appreciable cholelithiasis or  pericholecystic inflammatory changes. Distal colectomy with left lower  quadrant diverting colostomy. Remaining solid organs and bowel including  the appendix are normal in limited noncontrast CT appearance.     Unchanged chronically T11 burst fracture fracture post T11 laminectomies  with removal of prior posterior instrumented fusion. Right greater than  left pelvic and proximal femur heterotopic ossification.             Impression:      1. Cystectomy with ileal conduit diversion. A 7 mm calculus in the mid  left ureter causes mild upstream left-sided hydroureteronephrosis.  2. Moderate left perirenal inflammatory changes extending to the colonic  splenic flexure causing focal mild reactive colitis. Recommend  correlation for signs of infection.  3. Limited evaluation of the left renal parenchyma without intravenous  contrast.  4. At least four additional nonobstructing left renal calculi.  5. Two additional nonobstructing calculi in the ileal conduit.  6. Segmental  right lower lobe groundglass opacities, atelectasis versus  infection/inflammation.  7. Mildly distended gallbladder without appreciable cholelithiasis or  pericholecystic inflammatory changes, which can be seen in a prolonged  fasting state. Recommend clinical correlation for right upper quadrant  pain.  8. Additional unchanged incidental findings as above.     This report was finalized on 11/20/2023 9:05 PM by Dr. Andrea Gay M.D on Workstation: [id number]                  Assessment:     Left ureteral stone, obstructing (7mm)  Left renal calculi  Left hydronephrosis  Ileal conduit urinary diversion  UTI / Sepsis  Polysubstance abuse (cocaine, meth, marijuana, tobacco)  ALESIA on CKD    - IR placed Left Neph tube overnight - purulent urine drained     Plan  - Advised ICU admit for sepsis in complex setting   - Broad spectrum antibiotics / blood cultures  - Appreciate IR involvement and Left nephrostomy tube placement overnight  - Anticipate Internalization of left ureteral stent   - Watch renal function   - Urology will follow closely         Elijah Castro MD  11/21/23  05:19 EST     Electronically signed by Elijah Castro MD at 11/21/23 0470

## 2023-11-21 NOTE — POST-PROCEDURE NOTE
POST PROCEDURE NOTE    Procedure: Left nephrostomy tube insertion    Pre-Procedure Diagnosis: Hydronephrosis, obstructing stone, urosepsis    Post-procedure Diagnosis: same    Findings: Technically successful placement of 8 [place] left nephrostomy tube with return of grossly purulent urine. Full report to follow.    Complications: No immediate    Blood loss: Minimal    Specimen Removed: None    Disposition:   Transfer back to ICU

## 2023-11-21 NOTE — PROGRESS NOTES
[facility] Progress Note  11/20/2023  22:16 EST      Urology Consult Pending     Paraplegia, polysubstance abuse (cocaine, meth)  Colostomy, Ileal conduit urinary diversion   Flank pain x 3+ days, Nausea/vomiting  Decreased urine output from urostomy    CT 11/20/2023: Left ureteral stone, 7mm, additional stones in left kidney  Evidence of left pyelonephritis / Sepsis  ALESIA, Cr 2.81  WBC 9.45  Lactic acid 3.9 -> 3.2  Initially hypotensive      Plan  - Advised ICU admit for sepsis in complex setting   - Broad spectrum antibiotics / blood cultures  - Recommend urgent consult with IR for consideration of Left nephrostomy tube placement followed by Left stent internalization in the next 1-3 days   - Urology will follow closely     Elijah Castro MD  [facility]  General & Reconstructive Urology  Office: [phone]  Fax: [phone]

## 2023-11-21 NOTE — PROGRESS NOTES
"Nutrition Services    Patient Name:  Pk May  YOB: 1980  MRN: [id number]  Admit Date:  11/20/2023    Assessment Date:  11/21/23    Summary: Nutrition assessment triggered by chart skin report.  Admitted with L flank pain x 2 days.  N/V.  Paraplegia with spinal cord injury.  Colostomy.  Urostomy.  Polysubstance abuse.  Positive for cocaine and THC on admission.  S/p L nephrostomy tube insertion overnight.      50% x 1 meal per chart review.  Possible weight loss noted.  Sleeping at time of attempted visit.  Multiple pressure injuries noted per flowsheets.    Adding Boost Plus BID and Margarito BID.  RD to follow up to interview.    CLINICAL NUTRITION ASSESSMENT      Reason for Assessment Pressure Injury and/or Non-Healing Wound     Diagnosis/Problem   Sepsis    Medical/Surgical History Past Medical History:   Diagnosis Date    Paraplegia     Wound infection        Past Surgical History:   Procedure Laterality Date    SPINAL FUSION          Anthropometrics        Current Height  Current Weight  BMI kg/m2 Height: 177.8 cm (70\")  Weight: 84.8 kg (186 lb 15.2 oz) (11/21/23 0507)  Body mass index is 26.82 kg/m².   Adjusted BMI (if applicable)    BMI Category Overweight (25 - 29.9)   Ideal Body Weight (IBW) 166 lb (75.5 kg)   Usual Body Weight (UBW) 186-220 lb   Weight Trend Loss   Weight History Wt Readings from Last 30 Encounters:   11/21/23 0507 84.8 kg (186 lb 15.2 oz)   11/20/23 2340 84.8 kg (186 lb 15.2 oz)   11/20/23 1925 95.3 kg (210 lb)   09/29/23 1256 99.8 kg (220 lb)   08/24/23 0515 93 kg (205 lb 0.4 oz)   08/23/23 0445 93 kg (205 lb 0.4 oz)   08/22/23 0500 91.7 kg (202 lb 2.6 oz)   08/21/23 1005 95.3 kg (210 lb)   05/16/23 2349 95.3 kg (210 lb)   03/06/23 1606 90.7 kg (200 lb)   12/29/22 2044 94.3 kg (208 lb)   12/29/22 1751 95.3 kg (210 lb)   06/03/22 1544 95.3 kg (210 lb)   10/20/21 1423 90.7 kg (200 lb)      --  Estimated/Assessed Needs        Current Weight  Weight: 84.8 kg (186 lb 15.2 " oz) (11/21/23 0507)       Energy Requirements    Weight for Calculation 186 lb (84.8 kg)   Method for Estimation  30-35 kcal/kg   EST Needs (kcal/day) 4240-0030       Protein Requirements    Weight for Calculation 186 lb (84.8 kg)   EST Protein Needs (g/kg) 1.2 - 1.5 gm/kg   EST Daily Needs (g/day) 102-127       Fluid Requirements     Method for Estimation 1 mL/kcal    EST Needs (mL/day)      Labs       Pertinent Labs    Results from last 7 days   Lab Units 11/21/23  1328 11/20/23 2004   SODIUM mmol/L 136 132*   POTASSIUM mmol/L 4.6 4.5   CHLORIDE mmol/L 107 95*   CO2 mmol/L 15.1* 17.2*   BUN mg/dL 51* 46*   CREATININE mg/dL 2.42* 2.81*   CALCIUM mg/dL 7.3* 9.1   BILIRUBIN mg/dL 0.7 1.1   ALK PHOS U/L 155* 329*   ALT (SGPT) U/L 16 20   AST (SGOT) U/L 22 35   GLUCOSE mg/dL 117* 70     Results from last 7 days   Lab Units 11/21/23  1328   HEMOGLOBIN g/dL 11.9*   HEMATOCRIT % 34.9*   WBC 10*3/mm3 18.98*   ALBUMIN g/dL 2.4*     Results from last 7 days   Lab Units 11/21/23  1328 11/20/23  2112   PLATELETS 10*3/mm3 35* 71*     SARS-CoV-2, PERCY   Date Value Ref Range Status   11/19/2022 NEGATIVE Negative Final     Comment:     The 2019-CoV rRT-PCR Assay is only for use under a Food and Drug Administration Emergency Use Authorization. The performance characteristics of the assay were verified by the Clinical Laboratory at [facility]. Results should be used in   conjunction with the patient's clinical symptoms, medical history, and other clinical/laboratory findings to determine an overall clinical diagnosis. Negative results do not preclude infection with SARS-CoV-2 (COVID-19).    Test parameters have not been validated for screening asymptomatic patients.     Lab Results   Component Value Date    HGBA1C 5.04 11/08/2021          Medications           Scheduled Medications cefTRIAXone, 2,000 mg, Intravenous, Q24H  nicotine, 1 patch, Transdermal, Q24H  senna-docusate sodium, 2 tablet, Oral, BID  sodium  chloride, 10 mL, Intravenous, Q12H       Infusions norepinephrine, 0.02-0.3 mcg/kg/min, Last Rate: 0.14 mcg/kg/min (11/21/23 1413)  phenylephrine, 0.5-3 mcg/kg/min, Last Rate: Stopped (11/21/23 1330)  sodium chloride, 125 mL/hr, Last Rate: 125 mL/hr (11/20/23 2235)       PRN Medications   senna-docusate sodium **AND** polyethylene glycol **AND** bisacodyl **AND** bisacodyl    dextrose    dextrose    fentaNYL citrate (PF)    glucagon (human recombinant)    nitroglycerin    sodium chloride    sodium chloride     Physical Findings          General Findings alert, oriented, overweight, paraplegia, room air   Oral/Mouth Cavity tooth or teeth missing   Edema  no edema   Gastrointestinal colostomy, last bowel movement: 11/21   Skin  blisters, bruising, burns, excoriation, pressure injury: R 5th toe, R heel, R great toe, L great toe, L ankle, skin tear   Tubes/Drains/Lines colostomy, urostomy , other:nephrostomy tube   NFPE Unable to perform due to: patient sleeping   --  Current Nutrition Orders & Evaluation of Intake       Oral Nutrition     Food Allergies NKFA   Current PO Diet Diet: Regular/House Diet; Texture: Regular Texture (IDDSI 7); Fluid Consistency: Thin (IDDSI 0)   Supplement n/a   PO Evaluation     % PO Intake 50% x 1 meal    Factors Affecting Intake: nausea, pain issues, vomiting   --  PES STATEMENT / NUTRITION DIAGNOSIS      Nutrition Dx Problem  Problem: Increased Nutrient Needs  Etiology: Medical Diagnosis - pressure injuries    Signs/Symptoms: Report/Observation     NUTRITION INTERVENTION / PLAN OF CARE      Intervention Goal(s) Maintain nutrition status, Reduce/improve symptoms, Meet estimated needs, Disease management/therapy, Tolerate PO , Increase intake, and No significant weight loss         RD Intervention/Action Continue to monitor, Care plan reviewed, and Recommend/order: ONS   --      Prescription/Orders:       PO Diet       Supplements Boost Plus BID, Margarito BID      Enteral Nutrition        Parenteral Nutrition    New Prescription Ordered? Yes   --      Monitor/Evaluation Per protocol, PO intake, Supplement intake, Pertinent labs, Weight, Skin status, Symptoms   Discharge Plan/Needs Pending clinical course   --    RD to follow per protocol.      Electronically signed by:  Juany Segovia RD  11/21/23 15:28 EST

## 2023-11-21 NOTE — PROGRESS NOTES
[facility]  [phone]  Dr. Artis Headley    Subjective:  LOS: 1    Chief Complaint: Septic shock    Lives at home with his girlfriend.  Both are in a wheelchair.  States his feet stay cold.  Has had previous urine infections.    Objective   Vital Signs past 24hrs  Temp range: Temp (24hrs), Av.1 °F (36.7 °C), Min:97.7 °F (36.5 °C), Max:98.7 °F (37.1 °C)    BP range: BP: ()/() 86/58  Pulse range: Heart Rate:  [126-164] 128  Resp rate range: Resp:  [14-25] 25  Device (Oxygen Therapy): nasal cannulaFlow (L/min):  [3] 3  Oxygen range:SpO2:  [90 %-100 %] 99 %       Physical Exam  Constitutional:       Appearance: He is ill-appearing.   Cardiovascular:      Rate and Rhythm: Regular rhythm. Tachycardia present.      Pulses: Decreased pulses.      Heart sounds: No murmur heard.  Pulmonary:      Effort: Pulmonary effort is normal.      Breath sounds: Decreased breath sounds present.   Abdominal:      General: Bowel sounds are normal.      Palpations: Abdomen is soft. There is no mass.      Tenderness: There is no abdominal tenderness.      Comments: Diverting colostomy  Ileal conduit   Skin:     Coloration: Skin is cyanotic (both feet - toes).      Findings: Rash present.   Neurological:      Mental Status: He is alert.       Results Review:    I have reviewed the laboratory and imaging data since the last note by [facility] physician.  My annotations are noted in assessment and plan.      Result Review:  I have personally reviewed the results from last note by [facility] physician to 2023 09:25 EST and agree with these findings:  [x]  Laboratory list / accordion  [x]  Microbiology  [x]  Radiology  [x]  EKG/Telemetry   []  Cardiology/Vascular   []  Pathology  []  Old records  []  Other:    Medication Review:  I have reviewed the current MAR.  My annotations are noted in assessment and plan.    cefTRIAXone, 2,000 mg, Intravenous, Q24H  senna-docusate sodium, 2 tablet, Oral, BID  sodium chloride, 10  mL, Intravenous, Q12H        phenylephrine, 0.5-3 mcg/kg/min, Last Rate: 0.8 mcg/kg/min (11/21/23 0850)  sodium chloride, 125 mL/hr, Last Rate: 125 mL/hr (11/20/23 2235)      Lines, Drains & Airways       Active LDAs       Name Placement date Placement time Site Days    Peripheral IV 11/20/23 2031 Anterior;Right;Upper Arm 11/20/23 2031  Arm  less than 1    Peripheral IV 11/20/23 2101 Anterior;Left;Upper Arm 11/20/23 2101  Arm  less than 1    Peripheral IV 11/20/23 2123 Anterior;Left;Proximal;Upper Arm 11/20/23 2123  Arm  less than 1    Nephrostomy Left 8 Fr. 11/21/23 0033  Left  less than 1    Colostomy LUQ --  pt stated 2-3 years ago  --  LUQ  --    Urostomy RUQ --  pt stated 2-3 years ago  --  RUQ  --                  Isolation status: No active isolations    Dietary Orders (From admission, onward)       Start     Ordered    11/21/23 0819  Diet: Regular/House Diet; Texture: Regular Texture (IDDSI 7); Fluid Consistency: Thin (IDDSI 0)  Diet Effective Now        References:    Diet Order [place]   Question Answer Comment   Diets: Regular/House Diet    Texture: Regular Texture (IDDSI 7)    Fluid Consistency: Thin (IDDSI 0)        11/21/23 0818                    PCCM Problems  Sepsis with septic shock  Status post left nephrostomy tube 11/20/2023  Left pyelonephritis  Left ureteral stone  Bacteremia with E. coli and Streptococcus  Paraplegia  Lactic acidosis  Acute kidney injury  Acute lactic acidosis  Thrombocytopenia  Colostomy in place  Ileal conduit with urinary diversion in place  Cigarette smoker  UDS positive for cocaine and THC      THESE ARE NEW MEDICAL PROBLEMS TO ME.    Plan of Treatment    Septic shock with persistent elevated lactic acid.  Patient is on phenylephrine and fluids.  Switch to Levophed.    Left ureteral stone with pyelonephritis and left nephrostomy tube.  Appreciate urology input.    Bacteremia with E. coli and Streptococcus.  Currently on ceftriaxone.  Sensitivity is pending.    ALESIA  noted.  BMP currently pending.    His feet look a little mottled and cyanotic.  RN to check Dopplers.  If unable then need to get arterial Dopplers of both lower extremities.    Will ask wound care nurse to see.    Current cigarette smoker.  Give nicotine patch.    Start DVT prophylaxis with subcu heparin.    On regular diet.    Patient remains critically ill.  May need to look at his home situation at time of discharge.    I spent 35 mins critical care time in care of this patient outside of any procedures.     Artis Headley MD  11/21/23  09:25 EST      Part of this note may be an electronic transcription/translation of spoken language to printed text using the Dragon Dictation System.

## 2023-11-21 NOTE — PLAN OF CARE
Goal Outcome Evaluation:  Plan of Care Reviewed With: patient        Progress: improving  Outcome Evaluation: Pt. admitted for c/o flank pain, dx with sepsis. Emergent placement of L nephrostomy tube last night, 150mL output of red colored drainage. Flushed 2x per orders. Stone gtt d/c'd, switched to Levo. Currently at 0.12mcg, maintaining SBP 90-110s and MAP >65. -120s. q2hr turn, pain treated per MAR. WCTM.

## 2023-11-21 NOTE — ED PROVIDER NOTES
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 radiology studies, and discussions with consultants:  ED Course as of 11/21/23 1658 Mon Nov 20, 2023 1951 [id number] male with history of paraplegia, complicated UTI presents with flank pain and vomiting.    On exam he is tachycardic to 164 at triage although improved to 120s at bedside.  He does not have significant abdominal tenderness to palpation.    Assessment-etiology of symptoms unclear.  Would be concerned about kidney infection or kidney stone is likely causes.  Suspect tachycardia related to possible dehydration or sepsis.  We will give 30/kg sepsis fluid bolus.  We will get blood cultures and labs to include urinalysis.  Will obtain CT scan of the abdomen.    We will give IV Dilaudid and so Hermelinda all to help with pain and nausea.    EMS reports that patient has relapsed on usage of cocaine and meth but patient states he is not being late using illegal drugs.  Would certainly consider possible substance withdrawal or intoxication. [DB]   2009 EKG independently interpreted by me    Time 8:05 PM    Sinus tachycardia 161    Normal P waves and DC intervals  Normal axis, normal QRS  ST and T waves-nonspecific changes [DB]   2023 I turned over care of this patient to EMELINA Ramírez pending results of work-up including labs, CT scan.  Patient treated with sepsis fluid bolus as well as some Inapsine and Dilaudid. [DB]    2031 CT scan of the abdomen independently interpreted by me shows no obvious perforation. [DB]   2037 Lactate(!!): 3.9 [LUIS]   2047 THC Screen, Urine(!): Positive [LUIS]   2047 Procalcitonin(!): 12.20 [LUIS]   2100 Cocaine Screen, Urine(!): Positive [LUIS]   2100 THC Screen, Urine(!): Positive [LUIS]   2101 Leukocytes, UA(!): Moderate (2+) [LUIS]   2101 WBC, UA(!): 6-10 [LUIS]   2101 Bacteria, UA(!): 3+ [LUIS]   2103 Glucose: 70 [LUIS]   2104 BUN(!): 46 [LUIS]   2104 Creatinine(!): 2.81 [LUIS]   2104 Sodium(!): 132 [LUIS]   2104 Potassium: 4.5 [LUIS]   2104 Chloride(!): 95 [LUIS]   2104 CO2(!): 17.2 [LUIS]   2150 WBC: 9.45 [LUIS]   2151 Hemoglobin: 14.4 [LUIS]   2151 Hematocrit: 41.0 [LUIS]   2151 Platelets(!): 71 [LUIS]   2151 Neutrophil Rel %(!): 98.9 [LUIS]   2152 Patient rechecked, lying in bed, still tachycardic in the 150s.  Discussed results including findings of sepsis and infected kidney stone in the left ureter.  Discussed plan for admission to the hospital and urologic consultation.  Patient expresses understanding and agrees with plan. [LUIS]   2212 Patient history, ER presentation and evaluation discussed with Dr. Castro, urology, recommended to call and IR for a left nephrostomy tube and admit patient to ICU.  They will consult. [LUIS]   2220 Patient history, ER presentation and evaluation discussed with Dr. Hdez, will accept to the unit. [LUIS]   2222 Patient history, ER presentation evaluation as well as urology recommendations discussed with Dr. Simms, on-call for IR. [LUIS]      ED Course User Index  [DB] Demarcus Cisneros MD  [LUIS] Shawn Kelely PA               DIAGNOSIS  Final diagnoses:   Sepsis, due to unspecified organism, unspecified whether acute organ dysfunction present   Ureterolithiasis   Acute urinary tract infection   Acute renal failure, unspecified acute renal failure type   History of paraplegia   Polysubstance abuse         DISPOSITION  ICU admission            Latest Documented Vital Signs:  As of 16:58 EST  BP- 97/83  HR- 109 Temp- 97.6 °F (36.4 °C) (Oral) O2 sat- 97%              --    Please note that portions of this were completed with a voice recognition program.       Note Disclaimer: At [facility], we believe that sharing information builds trust and better relationships. You are receiving this note because you are receiving care at [facility] or recently visited. It is possible you will see health information before a provider has talked with you about it. This kind of information can be easy to misunderstand. To help you fully understand what it means for your health, we urge you to discuss this note with your provider.             Demarcus Cisneros MD  11/21/23 0264

## 2023-11-21 NOTE — CONSULTS
FIRST UROLOGY CONSULT      Patient Identification:  NAME:  Pk May  Age:  42 y.o.   Sex:  male   :  1980   MRN:  [id number]       Chief complaint: Left ureteral stone     History of present illness:  Pk May is a 42 y.o.     Paraplegia s/p colostomy/urostomy in [place]; history of kidney stones, taken care of 7-8 years ago in [place].  Polysubstance abuse (cocaine, meth)  Colostomy, Ileal conduit urinary diversion   Flank pain x 3+ days, Nausea/vomiting  Decreased urine output from urostomy     CT 2023: Left ureteral stone, 7mm, additional stones in left kidney  Evidence of left pyelonephritis / Sepsis  ALESIA, Cr 2.81  WBC 9.45  Lactic acid 3.9 -> 3.2  Initially hypotensive    Past medical history:  Past Medical History:   Diagnosis Date    Paraplegia     Wound infection        Past surgical history:  Past Surgical History:   Procedure Laterality Date    SPINAL FUSION         Allergies:  Pholcodine, Codeine, Amoxicillin-pot clavulanate, Cefuroxime, Doxycycline, and Sulfamethoxazole    Home medications:  Medications Prior to Admission   Medication Sig Dispense Refill Last Dose    amitriptyline (ELAVIL) 75 MG tablet Take 1 tablet by mouth 2 (Two) Times a Day. 180 tablet 3     ARIPiprazole (ABILIFY) 5 MG tablet Take 1 tablet by mouth Daily.       baclofen (LIORESAL) 10 MG tablet TAKE 1 TABLET BY MOUTH THREE TIMES DAILY 14 tablet 0     busPIRone (BUSPAR) 30 MG tablet TAKE 0.5 TABLETS BY MOUTH EVERY MORNING AND 1 TABLET EVERY MORNING       methylPREDNISolone (MEDROL) 4 MG dose pack Take as directed on package instructions. 21 tablet 0     triamcinolone (KENALOG) 0.1 % paste Apply to oral ulcer twice daily until healed 5 g 1     venlafaxine XR (EFFEXOR-XR) 150 MG 24 hr capsule Take 1 capsule by mouth Daily. 90 capsule 1         Hospital medications:  cefTRIAXone, 2,000 mg, Intravenous, Q24H  senna-docusate sodium, 2 tablet, Oral, BID  sodium chloride, 10 mL, Intravenous,  "Q12H      norepinephrine, 0.02-0.3 mcg/kg/min, Last Rate: 0.04 mcg/kg/min (23 0450)  sodium chloride, 125 mL/hr, Last Rate: 125 mL/hr (23 6755)        senna-docusate sodium **AND** polyethylene glycol **AND** bisacodyl **AND** bisacodyl    dextrose    dextrose    fentaNYL citrate (PF)    glucagon (human recombinant)    nitroglycerin    sodium chloride    sodium chloride    Family history:  Family History   Problem Relation Age of Onset    No Known Problems Mother     No Known Problems Father        Social history:  Social History     Tobacco Use    Smoking status: Every Day     Packs/day: 1.00     Years: 24.00     Additional pack years: 0.00     Total pack years: 24.00     Types: Cigarettes    Smokeless tobacco: Never   Vaping Use    Vaping Use: Every day    Substances: Nicotine   Substance Use Topics    Alcohol use: Not Currently    Drug use: Yes     Types: Marijuana, \"Crack\" cocaine       REVIEW OF SYSTEMS:  Constitutional - Negative for fevers/chills  Eyes/Ears/Nose/Mouth/Throat - Negative for changes in vision  Cardiovascular - Negative for chest pain, dysrhythmia  Respiratory - Negative for dyspnea  Gastrointestinal - Npsotive nausea or vomiting  Genitourinary - Negative for dysuria  Hematologic/Lymphatic - Negative for bruising  Skin - Negative for erythema  Endocrine - Negative for history of diabetes    Objective:  TMax 24 hours:   Temp (24hrs), Av.1 °F (36.7 °C), Min:97.7 °F (36.5 °C), Max:98.7 °F (37.1 °C)      Vitals Ranges:   Temp:  [97.7 °F (36.5 °C)-98.7 °F (37.1 °C)] 98.2 °F (36.8 °C)  Heart Rate:  [126-164] 133  Resp:  [14-25] 25  BP: ()/(42-75) 82/51    Intake/Output Last 3 shifts:  No intake/output data recorded.     Physical Exam:    General Appearance:    Alert, cooperative, NAD   HEENT:    No trauma, pupils reactive, hearing intact   Back:     No CVA tenderness   Lungs:     Respirations unlabored, no wheezing    Heart:    RRR, intact peripheral pulses   Abdomen:     Soft, " NDNT, no masses, no guarding   :    Testes descended bilaterally, no nodules.  Penis normal.      No scrotal or penile rashes noted   Extremities:   No edema, no deformity   Lymphatic:   No neck or groin LAD   Skin:   No bleeding, bruising or rashes   Neuro/Psych:   Orientation intact, mood/affect pleasant, no focal findings       Results review:   I reviewed the patient's new clinical results.    Data review:  Lab Results (last 24 hours)       Procedure Component Value Units Date/Time    STAT Lactic Acid, Reflex [[id number]]  (Abnormal) Collected: 11/21/23 0343    Specimen: Blood Updated: 11/21/23 0429     Lactate 3.8 mmol/L     STAT Lactic Acid, Reflex [[id number]]  (Abnormal) Collected: 11/21/23 0127    Specimen: Blood Updated: 11/21/23 0218     Lactate 5.1 mmol/L     POC Glucose Once [[id number]]  (Normal) Collected: 11/21/23 0106    Specimen: Blood Updated: 11/21/23 0108     Glucose 91 mg/dL     POC Glucose Once [[id number]]  (Abnormal) Collected: 11/20/23 2311    Specimen: Blood Updated: 11/20/23 2313     Glucose 66 mg/dL     CBC & Differential [[id number]]  (Abnormal) Collected: 11/20/23 2112    Specimen: Blood Updated: 11/20/23 2150    Narrative:      The following orders were created for panel order CBC & Differential.  Procedure                               Abnormality         Status                     ---------                               -----------         ------                     CBC Auto Differential[[id number]]        Abnormal            Final result                 Please view results for these tests on the individual orders.    CBC Auto Differential [[id number]]  (Abnormal) Collected: 11/20/23 2112    Specimen: Blood Updated: 11/20/23 2150     WBC 9.45 10*3/mm3      RBC 4.84 10*6/mm3      Hemoglobin 14.4 g/dL      Hematocrit 41.0 %      MCV 84.7 fL      MCH 29.8 pg      MCHC 35.1 g/dL      RDW 12.9 %      RDW-SD 39.1 fl      MPV 10.7 fL      Platelets 71 10*3/mm3     Manual Differential  [[id number]]  (Abnormal) Collected: 11/20/23 2112    Specimen: Blood Updated: 11/20/23 2150     Neutrophil % 98.9 %      Comment: 1+ bands        Lymphocyte % 1.1 %      Neutrophils Absolute 9.35 10*3/mm3      Lymphocytes Absolute 0.10 10*3/mm3      Crenated RBC's Slight/1+     Poikilocytes Slight/1+     Toxic Granulation Slight/1+     Vacuolated Neutrophils Mod/2+     Platelet Morphology Normal    STAT Lactic Acid, Reflex [[id number]]  (Abnormal) Collected: 11/20/23 2112    Specimen: Blood Updated: 11/20/23 2144     Lactate 3.2 mmol/L     Blood Culture - Blood, Arm, Left [[id number]] Collected: 11/20/23 2123    Specimen: Blood from Arm, Left Updated: 11/20/23 2129    Urine Culture - Urine, Urine, Clean Catch [[id number]] Collected: 11/20/23 2001    Specimen: Urine, Clean Catch Updated: 11/20/23 2100    Urinalysis, Microscopic Only - Urine, Clean Catch [[id number]]  (Abnormal) Collected: 11/20/23 2001    Specimen: Urine, Clean Catch Updated: 11/20/23 2058     RBC, UA 3-5 /HPF      WBC, UA 6-10 /HPF      Bacteria, UA 3+ /HPF      Squamous Epithelial Cells, UA 0-2 /HPF      Hyaline Casts, UA None Seen /LPF      Methodology Manual Light Microscopy    Comprehensive Metabolic Panel [[id number]]  (Abnormal) Collected: 11/20/23 2004    Specimen: Blood Updated: 11/20/23 2050     Glucose 70 mg/dL      BUN 46 mg/dL      Creatinine 2.81 mg/dL      Sodium 132 mmol/L      Potassium 4.5 mmol/L      Comment: Specimen hemolyzed.  Result may be falsely elevated.        Chloride 95 mmol/L      CO2 17.2 mmol/L      Calcium 9.1 mg/dL      Total Protein 7.8 g/dL      Albumin 3.2 g/dL      ALT (SGPT) 20 U/L      Comment: Specimen hemolyzed.  Result may  be falsely elevated.        AST (SGOT) 35 U/L      Comment: Specimen hemolyzed.  Result may be falsely elevated.        Alkaline Phosphatase 329 U/L      Total Bilirubin 1.1 mg/dL      Globulin 4.6 gm/dL      A/G Ratio 0.7 g/dL      BUN/Creatinine Ratio 16.4     Anion Gap 19.8 mmol/L      eGFR  "28.1 mL/min/1.73     Narrative:      GFR Normal >60  Chronic Kidney Disease <60  Kidney Failure <15      Urinalysis With Microscopic If Indicated (No Culture) - Urine, Clean Catch [[id number]]  (Abnormal) Collected: 11/20/23 2001    Specimen: Urine, Clean Catch Updated: 11/20/23 2046     Color, UA Yellow     Appearance, UA Cloudy     pH, UA 8.5     Specific Gravity, UA 1.014     Glucose, UA Negative     Ketones, UA Negative     Bilirubin, UA Negative     Blood, UA Moderate (2+)     Protein,  mg/dL (2+)     Leuk Esterase, UA Moderate (2+)     Nitrite, UA Negative     Urobilinogen, UA 1.0 E.U./dL    Procalcitonin [[id number]]  (Abnormal) Collected: 11/20/23 2004    Specimen: Blood Updated: 11/20/23 2041     Procalcitonin 12.20 ng/mL     Narrative:      As a Marker for Sepsis (Non-Neonates):    1. <0.5 ng/mL represents a low risk of severe sepsis and/or septic shock.  2. >2 ng/mL represents a high risk of severe sepsis and/or septic shock.    As a Marker for Lower Respiratory Tract Infections that require antibiotic therapy:    PCT on Admission    Antibiotic Therapy       6-12 Hrs later    >0.5                Strongly Recommended  >0.25 - <0.5        Recommended   0.1 - 0.25          Discouraged              Remeasure/reassess PCT  <0.1                Strongly Discouraged     Remeasure/reassess PCT    As 28 day mortality risk marker: \"Change in Procalcitonin Result\" (>80% or <=80%) if Day 0 (or Day 1) and Day 4 values are available. Refer to http://www.[facility]-pct-calculator.com    Change in PCT <=80%  A decrease of PCT levels below or equal to 80% defines a positive change in PCT test result representing a higher risk for 28-day all-cause mortality of patients diagnosed with severe sepsis for septic shock.    Change in PCT >80%  A decrease of PCT levels of more than 80% defines a negative change in PCT result representing a lower risk for 28-day all-cause mortality of patients diagnosed with severe sepsis or septic " shock.       Urine Drug Screen - Urine, Clean Catch [[id number]]  (Abnormal) Collected: 11/20/23 2001    Specimen: Urine, Clean Catch Updated: 11/20/23 2039     Amphet/Methamphet, Screen Negative     Barbiturates Screen, Urine Negative     Benzodiazepine Screen, Urine Negative     Cocaine Screen, Urine Positive     Opiate Screen Negative     THC, Screen, Urine Positive     Methadone Screen, Urine Negative     Oxycodone Screen, Urine Negative     Fentanyl, Urine Negative    Narrative:      Negative Thresholds Per Drugs Screened:    Amphetamines                 500 ng/ml  Barbiturates                 200 ng/ml  Benzodiazepines              100 ng/ml  Cocaine                      300 ng/ml  Methadone                    300 ng/ml  Opiates                      300 ng/ml  Oxycodone                    100 ng/ml  THC                           50 ng/ml  Fentanyl                       5 ng/ml      The Normal Value for all drugs tested is negative. This report includes final unconfirmed screening results to be used for medical treatment purposes only. Unconfirmed results must not be used for non-medical purposes such as employment or legal testing. Clinical consideration should be applied to any drug of abuse test, particularly when unconfirmed results are used.            Lactic Acid, Plasma [[id number]]  (Abnormal) Collected: 11/20/23 2004    Specimen: Blood Updated: 11/20/23 2036     Lactate 3.9 mmol/L     Blood Culture - Blood, Arm, Left [[id number]] Collected: 11/20/23 2004    Specimen: Blood from Arm, Left Updated: 11/20/23 2012             Imaging:  Imaging Results (Last 24 Hours)       Procedure Component Value Units Date/Time    IR Nephrostomy Tube Placement [[id number]] Resulted: 11/21/23 0130     Updated: 11/21/23 0133    CT Abdomen Pelvis Without Contrast [[id number]] Collected: 11/20/23 2044     Updated: 11/20/23 2108    Narrative:      CT ABDOMEN AND PELVIS WITHOUT IV CONTRAST     HISTORY: Left flank pain with  possible urosepsis.     TECHNIQUE: Radiation dose reduction techniques were utilized, including  automated exposure control and exposure modulation based on body size.   3 mm images were obtained through the abdomen and pelvis without the  administration of IV contrast.     COMPARISON: CT thoracic and lumbar spine 11/30/2021.        FINDINGS: Cystectomy with ileal conduit diversion. A 7 mm calculus in  the mid left ureter (series 3/image 71). Resulting mild left-sided  hydroureteronephrosis. At least 4 additional nonobstructing left renal  calculi measuring up to 7 mm (series 3/image 89). Moderate left  perirenal inflammatory changes extending to the nearby colonic splenic  flexure with associated short segment circumferential splenic flexure  colon wall thickening (series 2/images 47 and 59). No focal left renal  parenchymal hypodensity. Parenchymal evaluation partially limited  without intravenous contrast.     No right-sided renal calculi. Two calculi within the conduit, the  largest measuring up to 9 mm (series 2/image 105). No right-sided  hydronephrosis.     Segmental groundglass opacities in the right lower lobe (series 2/image  8).     Mildly distended gallbladder. No appreciable cholelithiasis or  pericholecystic inflammatory changes. Distal colectomy with left lower  quadrant diverting colostomy. Remaining solid organs and bowel including  the appendix are normal in limited noncontrast CT appearance.     Unchanged chronically T11 burst fracture fracture post T11 laminectomies  with removal of prior posterior instrumented fusion. Right greater than  left pelvic and proximal femur heterotopic ossification.             Impression:      1. Cystectomy with ileal conduit diversion. A 7 mm calculus in the mid  left ureter causes mild upstream left-sided hydroureteronephrosis.  2. Moderate left perirenal inflammatory changes extending to the colonic  splenic flexure causing focal mild reactive colitis.  Recommend  correlation for signs of infection.  3. Limited evaluation of the left renal parenchyma without intravenous  contrast.  4. At least four additional nonobstructing left renal calculi.  5. Two additional nonobstructing calculi in the ileal conduit.  6. Segmental right lower lobe groundglass opacities, atelectasis versus  infection/inflammation.  7. Mildly distended gallbladder without appreciable cholelithiasis or  pericholecystic inflammatory changes, which can be seen in a prolonged  fasting state. Recommend clinical correlation for right upper quadrant  pain.  8. Additional unchanged incidental findings as above.     This report was finalized on 11/20/2023 9:05 PM by Dr. Andrea Gay M.D on Workstation: [id number]                  Assessment:     Left ureteral stone, obstructing (7mm)  Left renal calculi  Left hydronephrosis  Ileal conduit urinary diversion  UTI / Sepsis  Polysubstance abuse (cocaine, meth, marijuana, tobacco)  ALESIA on CKD    - IR placed Left Neph tube overnight - purulent urine drained     Plan  - Advised ICU admit for sepsis in complex setting   - Broad spectrum antibiotics / blood cultures  - Appreciate IR involvement and Left nephrostomy tube placement overnight  - Anticipate Internalization of left ureteral stent   - Watch renal function   - Urology will follow closely         Elijah Castro MD  11/21/23  05:19 EST

## 2023-11-21 NOTE — ED NOTES
Nursing report ED to floor  Pk May  42 y.o.  male    HPI (triage note):   Chief Complaint   Patient presents with    Flank Pain     7mm stone, septic    DEJ-80-ldyo-old male with history of paraplegia, complicated UTI presents with flank pain and vomiting.     On exam he is tachycardic to 164 at triage although improved to 120s at bedside.  He does not have significant abdominal tenderness to palpation.     Assessment-etiology of symptoms unclear.  Would be concerned about kidney infection or kidney stone is likely causes.  Suspect tachycardia related to possible dehydration or sepsis.  We will give 30/kg sepsis fluid bolus.  We will get blood cultures and labs to include urinalysis.  Will obtain CT scan of the abdomen.        EMS reports that patient has relapsed on usage of cocaine and meth but patient states he is not being late using illegal drugs.  Would certainly consider possible substance withdrawal or intoxication. [DB]  Admitting doctor:   No admitting provider for patient encounter.    Admitting diagnosis:   The primary encounter diagnosis was Sepsis, due to unspecified organism, unspecified whether acute organ dysfunction present. Diagnoses of Ureterolithiasis, Acute urinary tract infection, Acute renal failure, unspecified acute renal failure type, History of paraplegia, and Polysubstance abuse were also pertinent to this visit.    Code status:   Current Code Status       Date Active Code Status Order ID Comments User Context       Prior            Allergies:   Pholcodine, Codeine, Amoxicillin-pot clavulanate, Cefuroxime, Doxycycline, and Sulfamethoxazole    Past Medical History:  Past Medical History:   Diagnosis Date    Paraplegia     Wound infection         Weight:       11/20/23 1925   Weight: 95.3 kg (210 lb)       Most recent vitals:   Vitals:    11/20/23 2220 11/20/23 2225 11/20/23 2230 11/20/23 2231   BP:    (!) 74/52   Pulse: (!) 139 (!) 141 (!) 142 (!) 142   Resp:       Temp:      "  SpO2: 96% 95% 95% 94%   Weight:       Height:           Active LDAs/IV Access:   Lines, Drains & Airways       Active LDAs       Name Placement date Placement time Site Days    Peripheral IV 11/20/23 2031 Anterior;Right;Upper Arm 11/20/23 2031  Arm  less than 1    Peripheral IV 11/20/23 2101 Anterior;Left;Upper Arm 11/20/23 2101  Arm  less than 1    Peripheral IV 11/20/23 2123 Anterior;Left;Proximal;Upper Arm 11/20/23 2123  Arm  less than 1    Colostomy LUQ --  pt stated 2-3 years ago  --  LUQ  --    Urostomy RUQ --  pt stated 2-3 years ago  --  RUQ  --                    Labs (abnormal labs have a star):   Labs Reviewed   COMPREHENSIVE METABOLIC PANEL - Abnormal; Notable for the following components:       Result Value    BUN 46 (*)     Creatinine 2.81 (*)     Sodium 132 (*)     Chloride 95 (*)     CO2 17.2 (*)     Albumin 3.2 (*)     Alkaline Phosphatase 329 (*)     Anion Gap 19.8 (*)     eGFR 28.1 (*)     All other components within normal limits    Narrative:     GFR Normal >60  Chronic Kidney Disease <60  Kidney Failure <15     LACTIC ACID, PLASMA - Abnormal; Notable for the following components:    Lactate 3.9 (*)     All other components within normal limits   PROCALCITONIN - Abnormal; Notable for the following components:    Procalcitonin 12.20 (*)     All other components within normal limits    Narrative:     As a Marker for Sepsis (Non-Neonates):    1. <0.5 ng/mL represents a low risk of severe sepsis and/or septic shock.  2. >2 ng/mL represents a high risk of severe sepsis and/or septic shock.    As a Marker for Lower Respiratory Tract Infections that require antibiotic therapy:    PCT on Admission    Antibiotic Therapy       6-12 Hrs later    >0.5                Strongly Recommended  >0.25 - <0.5        Recommended   0.1 - 0.25          Discouraged              Remeasure/reassess PCT  <0.1                Strongly Discouraged     Remeasure/reassess PCT    As 28 day mortality risk marker: \"Change in " "Procalcitonin Result\" (>80% or <=80%) if Day 0 (or Day 1) and Day 4 values are available. Refer to http://[email or web address]-[email or web address]    Change in PCT <=80%  A decrease of PCT levels below or equal to 80% defines a positive change in PCT test result representing a higher risk for 28-day all-cause mortality of patients diagnosed with severe sepsis for septic shock.    Change in PCT >80%  A decrease of PCT levels of more than 80% defines a negative change in PCT result representing a lower risk for 28-day all-cause mortality of patients diagnosed with severe sepsis or septic shock.      URINE DRUG SCREEN - Abnormal; Notable for the following components:    Cocaine Screen, Urine Positive (*)     THC, Screen, Urine Positive (*)     All other components within normal limits    Narrative:     Negative Thresholds Per Drugs Screened:    Amphetamines                 500 ng/ml  Barbiturates                 200 ng/ml  Benzodiazepines              100 ng/ml  Cocaine                      300 ng/ml  Methadone                    300 ng/ml  Opiates                      300 ng/ml  Oxycodone                    100 ng/ml  THC                           50 ng/ml  Fentanyl                       5 ng/ml      The Normal Value for all drugs tested is negative. This report includes final unconfirmed screening results to be used for medical treatment purposes only. Unconfirmed results must not be used for non-medical purposes such as employment or legal testing. Clinical consideration should be applied to any drug of abuse test, particularly when unconfirmed results are used.           CBC WITH AUTO DIFFERENTIAL - Abnormal; Notable for the following components:    Platelets 71 (*)     All other components within normal limits   URINALYSIS W/ MICROSCOPIC IF INDICATED (NO CULTURE) - Abnormal; Notable for the following components:    Appearance, UA Cloudy (*)     pH, UA 8.5 (*)     Blood, UA Moderate (2+) (*)     Protein,  mg/dL (2+) (*) "     Leuk Esterase, UA Moderate (2+) (*)     All other components within normal limits   URINALYSIS, MICROSCOPIC ONLY - Abnormal; Notable for the following components:    RBC, UA 3-5 (*)     WBC, UA 6-10 (*)     Bacteria, UA 3+ (*)     All other components within normal limits   LACTIC ACID, REFLEX - Abnormal; Notable for the following components:    Lactate 3.2 (*)     All other components within normal limits   MANUAL DIFFERENTIAL - Abnormal; Notable for the following components:    Neutrophil % 98.9 (*)     Lymphocyte % 1.1 (*)     Neutrophils Absolute 9.35 (*)     Lymphocytes Absolute 0.10 (*)     All other components within normal limits   BLOOD CULTURE   BLOOD CULTURE   URINE CULTURE   LACTIC ACID, REFLEX   CBC AND DIFFERENTIAL    Narrative:     The following orders were created for panel order CBC & Differential.  Procedure                               Abnormality         Status                     ---------                               -----------         ------                     CBC Auto Differential[[id number]]        Abnormal            Final result                 Please view results for these tests on the individual orders.       EKG:   ECG 12 Lead Tachycardia   Preliminary Result   HEART RATE= 161  bpm   RR Interval= 373  ms   WV Interval= 88  ms   P Horizontal Axis= -27  deg   P Front Axis= 34  deg   QRSD Interval= 84  ms   QT Interval= 260  ms   QTcB= 426  ms   QRS Axis= 60  deg   T Wave Axis= 45  deg   - ABNORMAL ECG -   Sinus tachycardia   Multiform ventricular premature complexes   Borderline T wave abnormalities   Electronically Signed By:    Date and Time of Study: 2023-11-20 20:05:38          Meds given in ED:   Medications   vancomycin IVPB 2000 mg in 0.9% Sodium Chloride 500 mL (2,000 mg Intravenous New Bag 11/20/23 2933)   cefTRIAXone (ROCEPHIN) 2,000 mg in sodium chloride 0.9 % 100 mL IVPB-VTB (has no administration in time range)   sodium chloride 0.9 % infusion (125 mL/hr Intravenous  New Bag 11/20/23 2235)   fentaNYL citrate (PF) (SUBLIMAZE) injection 25 mcg (has no administration in time range)   nitroglycerin (NITROSTAT) SL tablet 0.4 mg (has no administration in time range)   sodium chloride 0.9 % flush 10 mL (has no administration in time range)   sodium chloride 0.9 % flush 10 mL (has no administration in time range)   sodium chloride 0.9 % infusion 40 mL (has no administration in time range)   sennosides-docusate (PERICOLACE) 8.6-50 MG per tablet 2 tablet (has no administration in time range)     And   polyethylene glycol (MIRALAX) packet 17 g (has no administration in time range)     And   bisacodyl (DULCOLAX) EC tablet 5 mg (has no administration in time range)     And   bisacodyl (DULCOLAX) suppository 10 mg (has no administration in time range)   droperidol (INAPSINE) injection 2.5 mg (2.5 mg Intravenous Given 11/20/23 2032)   HYDROmorphone (DILAUDID) injection 1 mg (1 mg Intravenous Given 11/20/23 2033)   sepsis fluid NS 0.9 % bolus 2,457 mL (2,457 mL Intravenous New Bag 11/20/23 2113)   cefTRIAXone (ROCEPHIN) 2,000 mg in sodium chloride 0.9 % 100 mL IVPB-VTB (0 mg Intravenous Stopped 11/20/23 2154)   ondansetron (ZOFRAN) injection 4 mg (4 mg Intravenous Given 11/20/23 2213)   HYDROmorphone (DILAUDID) injection 0.5 mg (0.5 mg Intravenous Given 11/20/23 2213)       Imaging results:  CT Abdomen Pelvis Without Contrast    Result Date: 11/20/2023  1. Cystectomy with ileal conduit diversion. A 7 mm calculus in the mid left ureter causes mild upstream left-sided hydroureteronephrosis. 2. Moderate left perirenal inflammatory changes extending to the colonic splenic flexure causing focal mild reactive colitis. Recommend correlation for signs of infection. 3. Limited evaluation of the left renal parenchyma without intravenous contrast. 4. At least four additional nonobstructing left renal calculi. 5. Two additional nonobstructing calculi in the ileal conduit. 6. Segmental right lower lobe  groundglass opacities, atelectasis versus infection/inflammation. 7. Mildly distended gallbladder without appreciable cholelithiasis or pericholecystic inflammatory changes, which can be seen in a prolonged fasting state. Recommend clinical correlation for right upper quadrant pain. 8. Additional unchanged incidental findings as above.  This report was finalized on 11/20/2023 9:05 PM by Dr. Andrea Gay M.D on Workstation: [id number]       Ambulatory status:   - paraplegic since 2016    Social issues:   Social History     Socioeconomic History    Marital status: Significant Other   Tobacco Use    Smoking status: Every Day     Packs/day: 1.00     Years: 24.00     Additional pack years: 0.00     Total pack years: 24.00     Types: Cigarettes    Smokeless tobacco: Never   Vaping Use    Vaping Use: Every day    Substances: Nicotine   Substance and Sexual Activity    Alcohol use: Not Currently    Drug use: Yes     Types: Marijuana          NIH Stroke Scale:         Adelfo العلي RN  11/20/23 22:39 EST    Nurse Direct line for any questions: [phone]

## 2023-11-21 NOTE — ED PROVIDER NOTES
EMERGENCY DEPARTMENT ENCOUNTER    Room number:  3007/1  Date Seen:  11/20/2023  Time of transfer:  20:00  PCP:  Chasity Ruggiero APRN    Laboratory Results:  Recent Results (from the past 24 hour(s))   Urine Drug Screen - Urine, Clean Catch    Collection Time: 11/20/23  8:01 PM    Specimen: Urine, Clean Catch   Result Value Ref Range    Amphet/Methamphet, Screen Negative Negative    Barbiturates Screen, Urine Negative Negative    Benzodiazepine Screen, Urine Negative Negative    Cocaine Screen, Urine Positive (A) Negative    Opiate Screen Negative Negative    THC, Screen, Urine Positive (A) Negative    Methadone Screen, Urine Negative Negative    Oxycodone Screen, Urine Negative Negative    Fentanyl, Urine Negative Negative   Urinalysis With Microscopic If Indicated (No Culture) - Urine, Clean Catch    Collection Time: 11/20/23  8:01 PM    Specimen: Urine, Clean Catch   Result Value Ref Range    Color, UA Yellow Yellow, Straw    Appearance, UA Cloudy (A) Clear    pH, UA 8.5 (H) 5.0 - 8.0    Specific Gravity, UA 1.014 1.005 - 1.030    Glucose, UA Negative Negative    Ketones, UA Negative Negative    Bilirubin, UA Negative Negative    Blood, UA Moderate (2+) (A) Negative    Protein,  mg/dL (2+) (A) Negative    Leuk Esterase, UA Moderate (2+) (A) Negative    Nitrite, UA Negative Negative    Urobilinogen, UA 1.0 E.U./dL 0.2 - 1.0 E.U./dL   Urinalysis, Microscopic Only - Urine, Clean Catch    Collection Time: 11/20/23  8:01 PM    Specimen: Urine, Clean Catch   Result Value Ref Range    RBC, UA 3-5 (A) None Seen, 0-2 /HPF    WBC, UA 6-10 (A) None Seen, 0-2 /HPF    Bacteria, UA 3+ (A) None Seen /HPF    Squamous Epithelial Cells, UA 0-2 None Seen, 0-2 /HPF    Hyaline Casts, UA None Seen None Seen /LPF    Methodology Manual Light Microscopy    Comprehensive Metabolic Panel    Collection Time: 11/20/23  8:04 PM    Specimen: Blood   Result Value Ref Range    Glucose 70 65 - 99 mg/dL    BUN 46 (H) 6 - 20 mg/dL     Creatinine 2.81 (H) 0.76 - 1.27 mg/dL    Sodium 132 (L) 136 - 145 mmol/L    Potassium 4.5 3.5 - 5.2 mmol/L    Chloride 95 (L) 98 - 107 mmol/L    CO2 17.2 (L) 22.0 - 29.0 mmol/L    Calcium 9.1 8.6 - 10.5 mg/dL    Total Protein 7.8 6.0 - 8.5 g/dL    Albumin 3.2 (L) 3.5 - 5.2 g/dL    ALT (SGPT) 20 1 - 41 U/L    AST (SGOT) 35 1 - 40 U/L    Alkaline Phosphatase 329 (H) 39 - 117 U/L    Total Bilirubin 1.1 0.0 - 1.2 mg/dL    Globulin 4.6 gm/dL    A/G Ratio 0.7 g/dL    BUN/Creatinine Ratio 16.4 7.0 - 25.0    Anion Gap 19.8 (H) 5.0 - 15.0 mmol/L    eGFR 28.1 (L) >60.0 mL/min/1.73   Lactic Acid, Plasma    Collection Time: 11/20/23  8:04 PM    Specimen: Blood   Result Value Ref Range    Lactate 3.9 (C) 0.5 - 2.0 mmol/L   Procalcitonin    Collection Time: 11/20/23  8:04 PM    Specimen: Blood   Result Value Ref Range    Procalcitonin 12.20 (H) 0.00 - 0.25 ng/mL   ECG 12 Lead Tachycardia    Collection Time: 11/20/23  8:05 PM   Result Value Ref Range    QT Interval 260 ms    QTC Interval 426 ms   CBC Auto Differential    Collection Time: 11/20/23  9:12 PM    Specimen: Blood   Result Value Ref Range    WBC 9.45 3.40 - 10.80 10*3/mm3    RBC 4.84 4.14 - 5.80 10*6/mm3    Hemoglobin 14.4 13.0 - 17.7 g/dL    Hematocrit 41.0 37.5 - 51.0 %    MCV 84.7 79.0 - 97.0 fL    MCH 29.8 26.6 - 33.0 pg    MCHC 35.1 31.5 - 35.7 g/dL    RDW 12.9 12.3 - 15.4 %    RDW-SD 39.1 37.0 - 54.0 fl    MPV 10.7 6.0 - 12.0 fL    Platelets 71 (L) 140 - 450 10*3/mm3   STAT Lactic Acid, Reflex    Collection Time: 11/20/23  9:12 PM    Specimen: Blood   Result Value Ref Range    Lactate 3.2 (C) 0.5 - 2.0 mmol/L   Manual Differential    Collection Time: 11/20/23  9:12 PM    Specimen: Blood   Result Value Ref Range    Neutrophil % 98.9 (H) 42.7 - 76.0 %    Lymphocyte % 1.1 (L) 19.6 - 45.3 %    Neutrophils Absolute 9.35 (H) 1.70 - 7.00 10*3/mm3    Lymphocytes Absolute 0.10 (L) 0.70 - 3.10 10*3/mm3    Crenated RBC's Slight/1+ None Seen    Poikilocytes Slight/1+ None Seen     Toxic Granulation Slight/1+ None Seen    Vacuolated Neutrophils Mod/2+ None Seen    Platelet Morphology Normal Normal   POC Glucose Once    Collection Time: 11/20/23 11:11 PM    Specimen: Blood   Result Value Ref Range    Glucose 66 (L) 70 - 130 mg/dL     I reviewed the above results.    Radiology:  CT Abdomen Pelvis Without Contrast   Final Result   1. Cystectomy with ileal conduit diversion. A 7 mm calculus in the mid   left ureter causes mild upstream left-sided hydroureteronephrosis.   2. Moderate left perirenal inflammatory changes extending to the colonic   splenic flexure causing focal mild reactive colitis. Recommend   correlation for signs of infection.   3. Limited evaluation of the left renal parenchyma without intravenous   contrast.   4. At least four additional nonobstructing left renal calculi.   5. Two additional nonobstructing calculi in the ileal conduit.   6. Segmental right lower lobe groundglass opacities, atelectasis versus   infection/inflammation.   7. Mildly distended gallbladder without appreciable cholelithiasis or   pericholecystic inflammatory changes, which can be seen in a prolonged   fasting state. Recommend clinical correlation for right upper quadrant   pain.   8. Additional unchanged incidental findings as above.       This report was finalized on 11/20/2023 9:05 PM by Dr. Andrea Gay M.D on Workstation: [id number]          IR Nephrostomy Tube Placement    (Results Pending)     I reviewed the above results    Medications ordered in ED:  Medications   vancomycin IVPB 2000 mg in 0.9% Sodium Chloride 500 mL (2,000 mg Intravenous New Bag 11/20/23 8099)   cefTRIAXone (ROCEPHIN) 2,000 mg in sodium chloride 0.9 % 100 mL IVPB-VTB (has no administration in time range)   sodium chloride 0.9 % infusion (125 mL/hr Intravenous New Bag 11/20/23 3008)   fentaNYL citrate (PF) (SUBLIMAZE) injection 25 mcg (has no administration in time range)   nitroglycerin (NITROSTAT) SL tablet 0.4 mg  (has no administration in time range)   sodium chloride 0.9 % flush 10 mL (has no administration in time range)   sodium chloride 0.9 % flush 10 mL (has no administration in time range)   sodium chloride 0.9 % infusion 40 mL (has no administration in time range)   sennosides-docusate (PERICOLACE) 8.6-50 MG per tablet 2 tablet (has no administration in time range)     And   polyethylene glycol (MIRALAX) packet 17 g (has no administration in time range)     And   bisacodyl (DULCOLAX) EC tablet 5 mg (has no administration in time range)     And   bisacodyl (DULCOLAX) suppository 10 mg (has no administration in time range)   droperidol (INAPSINE) injection 2.5 mg (2.5 mg Intravenous Given 11/20/23 2032)   HYDROmorphone (DILAUDID) injection 1 mg (1 mg Intravenous Given 11/20/23 2033)   sepsis fluid NS 0.9 % bolus 2,457 mL (2,457 mL Intravenous New Bag 11/20/23 2113)   cefTRIAXone (ROCEPHIN) 2,000 mg in sodium chloride 0.9 % 100 mL IVPB-VTB (0 mg Intravenous Stopped 11/20/23 2154)   ondansetron (ZOFRAN) injection 4 mg (4 mg Intravenous Given 11/20/23 2213)   HYDROmorphone (DILAUDID) injection 0.5 mg (0.5 mg Intravenous Given 11/20/23 2213)   sodium chloride 0.9 % bolus 1,000 mL (1,000 mL Intravenous New Bag 11/20/23 2243)       ED Course as of 11/20/23 2335 Mon Nov 20, 2023 1952 HMA-16-cvao-old male with history of paraplegia, complicated UTI presents with flank pain and vomiting.    On exam he is tachycardic to 164 at triage although improved to 120s at bedside.  He does not have significant abdominal tenderness to palpation.    Assessment-etiology of symptoms unclear.  Would be concerned about kidney infection or kidney stone is likely causes.  Suspect tachycardia related to possible dehydration or sepsis.  We will give 30/kg sepsis fluid bolus.  We will get blood cultures and labs to include urinalysis.  Will obtain CT scan of the abdomen.    We will give IV Dilaudid and so Hermelinda all to help with pain and  nausea.    EMS reports that patient has relapsed on usage of cocaine and meth but patient states he is not being late using illegal drugs.  Would certainly consider possible substance withdrawal or intoxication. [DB]   2009 EKG independently interpreted by me    Time 8:05 PM    Sinus tachycardia 161    Normal P waves and KS intervals  Normal axis, normal QRS  ST and T waves-nonspecific changes [DB]   2023 I turned over care of this patient to EMELINA Ramírez pending results of work-up including labs, CT scan.  Patient treated with sepsis fluid bolus as well as some Inapsine and Dilaudid. [DB]   2031 CT scan of the abdomen independently interpreted by me shows no obvious perforation. [DB]   2037 Lactate(!!): 3.9 [LUIS]   2047 THC Screen, Urine(!): Positive [LUIS]   2047 Procalcitonin(!): 12.20 [LUIS]   2100 Cocaine Screen, Urine(!): Positive [LUIS]   2100 THC Screen, Urine(!): Positive [LUIS]   2101 Leukocytes, UA(!): Moderate (2+) [LUIS]   2101 WBC, UA(!): 6-10 [LUIS]   2101 Bacteria, UA(!): 3+ [LUIS]   2103 Glucose: 70 [LUIS]   2104 BUN(!): 46 [LUIS]   2104 Creatinine(!): 2.81 [LUIS]   2104 Sodium(!): 132 [LUIS]   2104 Potassium: 4.5 [LUIS]   2104 Chloride(!): 95 [LUIS]   2104 CO2(!): 17.2 [LUIS]   2150 WBC: 9.45 [LUIS]   2151 Hemoglobin: 14.4 [LUIS]   2151 Hematocrit: 41.0 [LUIS]   2151 Platelets(!): 71 [LUIS]   2151 Neutrophil Rel %(!): 98.9 [LUIS]   2152 Patient rechecked, lying in bed, still tachycardic in the 150s.  Discussed results including findings of sepsis and infected kidney stone in the left ureter.  Discussed plan for admission to the hospital and urologic consultation.  Patient expresses understanding and agrees with plan. [LUIS]   2212 Patient history, ER presentation and evaluation discussed with Dr. Castro, urology, recommended to call and IR for a left nephrostomy tube and admit patient to ICU.  They will consult. [LUIS]   2220 Patient history, ER presentation and evaluation discussed with Dr. Hdez, will accept to the unit. [LUIS]   5061  Patient history, ER presentation evaluation as well as urology recommendations discussed with Dr. Simms, on-call for IR. [LUIS]      ED Course User Index  [DB] Demarcus Cisneros MD  [LUIS] Shawn Kelley PA     Critical Care    Performed by: Shawn Kelley PA  Authorized by: Demarcus Cisneros MD    Critical care provider statement:     Critical care time (minutes):  45    Critical care was necessary to treat or prevent imminent or life-threatening deterioration of the following conditions:  Renal failure, circulatory failure, sepsis and shock    Critical care was time spent personally by me on the following activities:  Discussions with consultants, evaluation of patient's response to treatment, ordering and performing treatments and interventions, ordering and review of laboratory studies, ordering and review of radiographic studies, re-evaluation of patient's condition, review of old charts and obtaining history from patient or surrogate        Progress and Consult Notes:  20:00:  Patient care transferred from Dr. Cisneros pending evaluation and disposition.    Diagnosis:  Final diagnoses:   Sepsis, due to unspecified organism, unspecified whether acute organ dysfunction present   Ureterolithiasis   Acute urinary tract infection   Acute renal failure, unspecified acute renal failure type   History of paraplegia   Polysubstance abuse       Disposition:  ADMISSION    Discussed treatment plan and reason for admission with pt/family and admitting physician.  Pt/family voiced understanding of the plan for admission for further testing/treatment as needed.         Provider attestation:  I personally reviewed the past medical history, past surgical history, social history, family history, current medications, and allergies as they appear in the chart.    The patient was seen and examined by myself and Dr. Cisneros, who agrees with plan.          Shawn Kelley PA  11/20/23 3960

## 2023-11-21 NOTE — ED NOTES
EMS informed this nurse that the patient informed them that he recently relapsed and used cocaine and meth.

## 2023-11-22 LAB
ALBUMIN SERPL-MCNC: 2.1 G/DL (ref 3.5–5.2)
ALBUMIN/GLOB SERPL: 0.6 G/DL
ALP SERPL-CCNC: 157 U/L (ref 39–117)
ALT SERPL W P-5'-P-CCNC: 12 U/L (ref 1–41)
ANION GAP SERPL CALCULATED.3IONS-SCNC: 12 MMOL/L (ref 5–15)
ANION GAP SERPL CALCULATED.3IONS-SCNC: 17.3 MMOL/L (ref 5–15)
AST SERPL-CCNC: 19 U/L (ref 1–40)
BILIRUB SERPL-MCNC: 0.4 MG/DL (ref 0–1.2)
BUN SERPL-MCNC: 45 MG/DL (ref 6–20)
BUN SERPL-MCNC: 45 MG/DL (ref 6–20)
BUN/CREAT SERPL: 27.3 (ref 7–25)
BUN/CREAT SERPL: 30.4 (ref 7–25)
CALCIUM SPEC-SCNC: 7.7 MG/DL (ref 8.6–10.5)
CALCIUM SPEC-SCNC: 7.8 MG/DL (ref 8.6–10.5)
CHLORIDE SERPL-SCNC: 107 MMOL/L (ref 98–107)
CHLORIDE SERPL-SCNC: 108 MMOL/L (ref 98–107)
CO2 SERPL-SCNC: 12.7 MMOL/L (ref 22–29)
CO2 SERPL-SCNC: 15 MMOL/L (ref 22–29)
CREAT SERPL-MCNC: 1.48 MG/DL (ref 0.76–1.27)
CREAT SERPL-MCNC: 1.65 MG/DL (ref 0.76–1.27)
D-LACTATE SERPL-SCNC: 1.7 MMOL/L (ref 0.5–2)
D-LACTATE SERPL-SCNC: 1.8 MMOL/L (ref 0.5–2)
DEPRECATED RDW RBC AUTO: 40.6 FL (ref 37–54)
EGFRCR SERPLBLD CKD-EPI 2021: 53.2 ML/MIN/1.73
EGFRCR SERPLBLD CKD-EPI 2021: 60.6 ML/MIN/1.73
ERYTHROCYTE [DISTWIDTH] IN BLOOD BY AUTOMATED COUNT: 13.1 % (ref 12.3–15.4)
GLOBULIN UR ELPH-MCNC: 3.6 GM/DL
GLUCOSE SERPL-MCNC: 117 MG/DL (ref 65–99)
GLUCOSE SERPL-MCNC: 78 MG/DL (ref 65–99)
HCT VFR BLD AUTO: 35.3 % (ref 37.5–51)
HGB BLD-MCNC: 12.3 G/DL (ref 13–17.7)
MAGNESIUM SERPL-MCNC: 1.7 MG/DL (ref 1.6–2.6)
MCH RBC QN AUTO: 29.9 PG (ref 26.6–33)
MCHC RBC AUTO-ENTMCNC: 34.8 G/DL (ref 31.5–35.7)
MCV RBC AUTO: 85.7 FL (ref 79–97)
PHOSPHATE SERPL-MCNC: 2.5 MG/DL (ref 2.5–4.5)
PLATELET # BLD AUTO: 32 10*3/MM3 (ref 140–450)
PMV BLD AUTO: 12.2 FL (ref 6–12)
POTASSIUM SERPL-SCNC: 3.3 MMOL/L (ref 3.5–5.2)
POTASSIUM SERPL-SCNC: 3.7 MMOL/L (ref 3.5–5.2)
PROT SERPL-MCNC: 5.7 G/DL (ref 6–8.5)
RBC # BLD AUTO: 4.12 10*6/MM3 (ref 4.14–5.8)
SODIUM SERPL-SCNC: 134 MMOL/L (ref 136–145)
SODIUM SERPL-SCNC: 138 MMOL/L (ref 136–145)
WBC NRBC COR # BLD AUTO: 14.37 10*3/MM3 (ref 3.4–10.8)

## 2023-11-22 PROCEDURE — 83605 ASSAY OF LACTIC ACID: CPT | Performed by: INTERNAL MEDICINE

## 2023-11-22 PROCEDURE — 25810000003 SODIUM CHLORIDE 0.9 % SOLUTION: Performed by: INTERNAL MEDICINE

## 2023-11-22 PROCEDURE — 85027 COMPLETE CBC AUTOMATED: CPT | Performed by: INTERNAL MEDICINE

## 2023-11-22 PROCEDURE — 25010000002 CEFTRIAXONE PER 250 MG: Performed by: INTERNAL MEDICINE

## 2023-11-22 PROCEDURE — 25010000002 FENTANYL CITRATE (PF) 50 MCG/ML SOLUTION: Performed by: INTERNAL MEDICINE

## 2023-11-22 PROCEDURE — 83735 ASSAY OF MAGNESIUM: CPT | Performed by: INTERNAL MEDICINE

## 2023-11-22 PROCEDURE — 84100 ASSAY OF PHOSPHORUS: CPT | Performed by: INTERNAL MEDICINE

## 2023-11-22 PROCEDURE — 80053 COMPREHEN METABOLIC PANEL: CPT | Performed by: INTERNAL MEDICINE

## 2023-11-22 RX ORDER — DEXTROSE AND SODIUM CHLORIDE 5; .45 G/100ML; G/100ML
75 INJECTION, SOLUTION INTRAVENOUS CONTINUOUS
Status: DISCONTINUED | OUTPATIENT
Start: 2023-11-22 | End: 2023-11-28

## 2023-11-22 RX ORDER — OXYCODONE AND ACETAMINOPHEN 10; 325 MG/1; MG/1
1 TABLET ORAL EVERY 4 HOURS PRN
Status: DISPENSED | OUTPATIENT
Start: 2023-11-22 | End: 2023-11-29

## 2023-11-22 RX ADMIN — BACLOFEN 10 MG: 10 TABLET ORAL at 20:21

## 2023-11-22 RX ADMIN — FENTANYL CITRATE 25 MCG: 50 INJECTION, SOLUTION INTRAMUSCULAR; INTRAVENOUS at 08:43

## 2023-11-22 RX ADMIN — DEXTROSE AND SODIUM CHLORIDE 125 ML/HR: 5; 450 INJECTION, SOLUTION INTRAVENOUS at 12:00

## 2023-11-22 RX ADMIN — ARIPIPRAZOLE 5 MG: 5 TABLET ORAL at 08:09

## 2023-11-22 RX ADMIN — Medication 10 ML: at 20:21

## 2023-11-22 RX ADMIN — Medication 1 PATCH: at 08:08

## 2023-11-22 RX ADMIN — VENLAFAXINE HYDROCHLORIDE 150 MG: 150 CAPSULE, EXTENDED RELEASE ORAL at 08:09

## 2023-11-22 RX ADMIN — OXYCODONE HYDROCHLORIDE AND ACETAMINOPHEN 1 TABLET: 10; 325 TABLET ORAL at 14:16

## 2023-11-22 RX ADMIN — BUSPIRONE HYDROCHLORIDE 15 MG: 15 TABLET ORAL at 08:09

## 2023-11-22 RX ADMIN — CEFTRIAXONE 2000 MG: 2 INJECTION, POWDER, FOR SOLUTION INTRAMUSCULAR; INTRAVENOUS at 20:21

## 2023-11-22 RX ADMIN — OXYCODONE HYDROCHLORIDE AND ACETAMINOPHEN 1 TABLET: 10; 325 TABLET ORAL at 23:54

## 2023-11-22 RX ADMIN — OXYCODONE HYDROCHLORIDE AND ACETAMINOPHEN 1 TABLET: 10; 325 TABLET ORAL at 19:25

## 2023-11-22 RX ADMIN — SODIUM CHLORIDE 125 ML/HR: 9 INJECTION, SOLUTION INTRAVENOUS at 00:28

## 2023-11-22 RX ADMIN — Medication 10 ML: at 08:09

## 2023-11-22 RX ADMIN — FENTANYL CITRATE 25 MCG: 50 INJECTION, SOLUTION INTRAMUSCULAR; INTRAVENOUS at 12:14

## 2023-11-22 RX ADMIN — FENTANYL CITRATE 25 MCG: 50 INJECTION, SOLUTION INTRAMUSCULAR; INTRAVENOUS at 05:23

## 2023-11-22 RX ADMIN — BACLOFEN 10 MG: 10 TABLET ORAL at 08:09

## 2023-11-22 RX ADMIN — DEXTROSE AND SODIUM CHLORIDE 125 ML/HR: 5; 450 INJECTION, SOLUTION INTRAVENOUS at 19:27

## 2023-11-22 RX ADMIN — BUSPIRONE HYDROCHLORIDE 15 MG: 15 TABLET ORAL at 20:21

## 2023-11-22 RX ADMIN — BACLOFEN 10 MG: 10 TABLET ORAL at 16:11

## 2023-11-22 NOTE — PLAN OF CARE
Goal Outcome Evaluation:            Pt remains in cicu, increased pain, switched from fent to percocet, nephrostomy bag flushed TID and PRN, more blood out of nephrostomy bag, Urology ok with it ordered to flush it more. Pt eating well

## 2023-11-22 NOTE — PLAN OF CARE
Goal Outcome Evaluation:  Plan of Care Reviewed With: patient        Progress: improving     Pt remains in CICU,off levo since 200 this morning and maintaining MAP above 65 and on RA sating in the high 90s. Nephrostomy tube in place flushed with 10cc sterile saline x2 per order  and had 280 clear yellow output, uroosotmy had 1500 out, no BM in the colostomy. Gave prn fent for pain helped a little per pt.

## 2023-11-22 NOTE — SIGNIFICANT NOTE
11/22/23 1411   OTHER   Discipline physical therapist   Rehab Time/Intention   Session Not Performed other (see comments)  (pt refused despite encouragement; PT will follow up Friday)   Recommendation   PT - Next Appointment 11/24/23

## 2023-11-22 NOTE — PROGRESS NOTES
[facility]  [phone]  Dr. Artis Headley    Subjective:  LOS: 2    Chief Complaint: Septic shock    States that he feels no better.  However does not appear to be in any kind of distress.  Had some breakfast this morning.    Objective   Vital Signs past 24hrs  Temp range: Temp (24hrs), Av.7 °F (36.5 °C), Min:97.5 °F (36.4 °C), Max:97.9 °F (36.6 °C)    BP range: BP: ()/(51-87) 96/67  Pulse range: Heart Rate:  [103-124] 124  Resp rate range: Resp:  [17-28] 28  Device (Oxygen Therapy): room air   Oxygen range:SpO2:  [90 %-100 %] 94 %       Physical Exam  Constitutional:       Appearance: He is ill-appearing.   Cardiovascular:      Rate and Rhythm: Regular rhythm. Tachycardia present.      Pulses: Decreased pulses.      Heart sounds: No murmur heard.  Pulmonary:      Effort: Pulmonary effort is normal.      Breath sounds: Decreased breath sounds present.   Abdominal:      General: Bowel sounds are normal.      Palpations: Abdomen is soft. There is no mass.      Tenderness: There is no abdominal tenderness.      Comments: Diverting colostomy  Ileal conduit   Skin:     Comments: Improved circulation to the feet   Neurological:      Mental Status: He is alert.       Results Review:    I have reviewed the laboratory and imaging data since the last note by [facility] physician.  My annotations are noted in assessment and plan.      Result Review:  I have personally reviewed the results from last note by [facility] physician to 2023 10:47 EST and agree with these findings:  [x]  Laboratory list / accordion  [x]  Microbiology  [x]  Radiology  [x]  EKG/Telemetry   []  Cardiology/Vascular   []  Pathology  []  Old records  []  Other:    Medication Review:  I have reviewed the current MAR.  My annotations are noted in assessment and plan.    ARIPiprazole, 5 mg, Oral, Daily  baclofen, 10 mg, Oral, TID  busPIRone, 15 mg, Oral, Q12H  cefTRIAXone, 2,000 mg, Intravenous, Q24H  nicotine, 1 patch, Transdermal,  Q24H  senna-docusate sodium, 2 tablet, Oral, BID  sodium chloride, 10 mL, Intravenous, Q12H  venlafaxine XR, 150 mg, Oral, Daily        norepinephrine, 0.02-0.3 mcg/kg/min, Last Rate: Stopped (11/22/23 0243)  phenylephrine, 0.5-3 mcg/kg/min, Last Rate: Stopped (11/21/23 1330)  sodium chloride, 125 mL/hr, Last Rate: 125 mL/hr (11/22/23 0028)      Lines, Drains & Airways       Active LDAs       Name Placement date Placement time Site Days    Peripheral IV 11/20/23 2031 Anterior;Right;Upper Arm 11/20/23 2031  Arm  less than 1    Peripheral IV 11/20/23 2101 Anterior;Left;Upper Arm 11/20/23 2101  Arm  less than 1    Peripheral IV 11/20/23 2123 Anterior;Left;Proximal;Upper Arm 11/20/23 2123  Arm  less than 1    Nephrostomy Left 8 Fr. 11/21/23 0033  Left  less than 1    Colostomy LUQ --  pt stated 2-3 years ago  --  LUQ  --    Urostomy RUQ --  pt stated 2-3 years ago  --  RUQ  --                  Isolation status: No active isolations    Dietary Orders (From admission, onward)       Start     Ordered    11/22/23 0800  Dietary Nutrition Supplements Margarito  Daily With Breakfast & Lunch      Comments: Mixed in crystal light   Question:  Select Supplement:  Answer:  Margarito    11/21/23 1541    11/21/23 1800  Dietary Nutrition Supplements Boost Plus (Ensure Enlive, Ensure Plus)  Daily With Breakfast & Dinner      Question:  Select Supplement:  Answer:  Boost Plus (Ensure Enlive, Ensure Plus)    11/21/23 1541    11/21/23 0819  Diet: Regular/House Diet; Texture: Regular Texture (IDDSI 7); Fluid Consistency: Thin (IDDSI 0)  Diet Effective Now        References:    Diet Order Crosswalk   Question Answer Comment   Diets: Regular/House Diet    Texture: Regular Texture (IDDSI 7)    Fluid Consistency: Thin (IDDSI 0)        11/21/23 0818                    PCCM Problems  Sepsis with septic shock  Status post left nephrostomy tube 11/20/2023  Left pyelonephritis  Left ureteral stone  Bacteremia with E. 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  Currently off pressors.    Left ureteral stone with pyelonephritis and left nephrostomy tube.  Appreciate urology input.    Bacteremia with E. coli and Streptococcus.  Currently on ceftriaxone.  Sensitivity is pending.    ALESIA noted.  Improvement in renal function.  His bicarb is down.  Will switch fluids.  Recheck later today.    Both feet have Doppler pulses bilaterally.  Arterial Dopplers done recently in August showed normal MONICA on the right and normal MONICA on the left.  Note wound nurse input.  Previously vascular has recommended continued local wound care with no additional interventions.    Current cigarette smoker.  Give nicotine patch.  Patient will be counseled to quit smoking prior to discharge.    Platelets are very low.  Will hold heparin subcu for now.  Though I doubt this is HIT.    On regular diet.    May need to look at his home situation at time of discharge.  Ask CCP to see.    Keep in ICU for now.      Artis Headley MD  11/22/23  10:47 EST      Part of this note may be an electronic transcription/translation of spoken language to printed text using the Dragon Dictation System.

## 2023-11-23 LAB
ALBUMIN SERPL-MCNC: 2.2 G/DL (ref 3.5–5.2)
ALBUMIN/GLOB SERPL: 0.7 G/DL
ALP SERPL-CCNC: 117 U/L (ref 39–117)
ALT SERPL W P-5'-P-CCNC: 11 U/L (ref 1–41)
ANION GAP SERPL CALCULATED.3IONS-SCNC: 9.5 MMOL/L (ref 5–15)
AST SERPL-CCNC: 10 U/L (ref 1–40)
BACTERIA SPEC AEROBE CULT: ABNORMAL
BACTERIA SPEC AEROBE CULT: ABNORMAL
BILIRUB SERPL-MCNC: 0.3 MG/DL (ref 0–1.2)
BUN SERPL-MCNC: 39 MG/DL (ref 6–20)
BUN/CREAT SERPL: 30 (ref 7–25)
CALCIUM SPEC-SCNC: 7.8 MG/DL (ref 8.6–10.5)
CHLORIDE SERPL-SCNC: 110 MMOL/L (ref 98–107)
CO2 SERPL-SCNC: 16.5 MMOL/L (ref 22–29)
CREAT SERPL-MCNC: 1.3 MG/DL (ref 0.76–1.27)
DEPRECATED RDW RBC AUTO: 40.4 FL (ref 37–54)
DEPRECATED RDW RBC AUTO: 42.5 FL (ref 37–54)
EGFRCR SERPLBLD CKD-EPI 2021: 70.8 ML/MIN/1.73
ERYTHROCYTE [DISTWIDTH] IN BLOOD BY AUTOMATED COUNT: 13 % (ref 12.3–15.4)
ERYTHROCYTE [DISTWIDTH] IN BLOOD BY AUTOMATED COUNT: 13.2 % (ref 12.3–15.4)
GLOBULIN UR ELPH-MCNC: 3.2 GM/DL
GLUCOSE SERPL-MCNC: 132 MG/DL (ref 65–99)
GRAM STN SPEC: ABNORMAL
HCT VFR BLD AUTO: 23 % (ref 37.5–51)
HCT VFR BLD AUTO: 25.3 % (ref 37.5–51)
HGB BLD-MCNC: 7.8 G/DL (ref 13–17.7)
HGB BLD-MCNC: 8.8 G/DL (ref 13–17.7)
ISOLATED FROM: ABNORMAL
ISOLATED FROM: ABNORMAL
MCH RBC QN AUTO: 29.5 PG (ref 26.6–33)
MCH RBC QN AUTO: 29.7 PG (ref 26.6–33)
MCHC RBC AUTO-ENTMCNC: 33.9 G/DL (ref 31.5–35.7)
MCHC RBC AUTO-ENTMCNC: 34.8 G/DL (ref 31.5–35.7)
MCV RBC AUTO: 84.9 FL (ref 79–97)
MCV RBC AUTO: 87.5 FL (ref 79–97)
PLATELET # BLD AUTO: 38 10*3/MM3 (ref 140–450)
PLATELET # BLD AUTO: 43 10*3/MM3 (ref 140–450)
PMV BLD AUTO: 11.9 FL (ref 6–12)
PMV BLD AUTO: 12.9 FL (ref 6–12)
POTASSIUM SERPL-SCNC: 3.5 MMOL/L (ref 3.5–5.2)
PROT SERPL-MCNC: 5.4 G/DL (ref 6–8.5)
RBC # BLD AUTO: 2.63 10*6/MM3 (ref 4.14–5.8)
RBC # BLD AUTO: 2.98 10*6/MM3 (ref 4.14–5.8)
SODIUM SERPL-SCNC: 136 MMOL/L (ref 136–145)
WBC NRBC COR # BLD AUTO: 11.47 10*3/MM3 (ref 3.4–10.8)
WBC NRBC COR # BLD AUTO: 12.35 10*3/MM3 (ref 3.4–10.8)

## 2023-11-23 PROCEDURE — 25010000002 CEFTRIAXONE PER 250 MG: Performed by: INTERNAL MEDICINE

## 2023-11-23 PROCEDURE — 80053 COMPREHEN METABOLIC PANEL: CPT | Performed by: INTERNAL MEDICINE

## 2023-11-23 PROCEDURE — 85027 COMPLETE CBC AUTOMATED: CPT | Performed by: INTERNAL MEDICINE

## 2023-11-23 PROCEDURE — 87040 BLOOD CULTURE FOR BACTERIA: CPT | Performed by: INTERNAL MEDICINE

## 2023-11-23 RX ADMIN — ARIPIPRAZOLE 5 MG: 5 TABLET ORAL at 09:48

## 2023-11-23 RX ADMIN — OXYCODONE HYDROCHLORIDE AND ACETAMINOPHEN 1 TABLET: 10; 325 TABLET ORAL at 13:30

## 2023-11-23 RX ADMIN — CEFTRIAXONE 2000 MG: 2 INJECTION, POWDER, FOR SOLUTION INTRAMUSCULAR; INTRAVENOUS at 20:05

## 2023-11-23 RX ADMIN — DEXTROSE AND SODIUM CHLORIDE 125 ML/HR: 5; 450 INJECTION, SOLUTION INTRAVENOUS at 11:33

## 2023-11-23 RX ADMIN — Medication 10 ML: at 20:06

## 2023-11-23 RX ADMIN — BACLOFEN 10 MG: 10 TABLET ORAL at 15:50

## 2023-11-23 RX ADMIN — SENNOSIDES AND DOCUSATE SODIUM 2 TABLET: 50; 8.6 TABLET ORAL at 09:48

## 2023-11-23 RX ADMIN — BUSPIRONE HYDROCHLORIDE 15 MG: 15 TABLET ORAL at 09:47

## 2023-11-23 RX ADMIN — OXYCODONE HYDROCHLORIDE AND ACETAMINOPHEN 1 TABLET: 10; 325 TABLET ORAL at 20:04

## 2023-11-23 RX ADMIN — DEXTROSE AND SODIUM CHLORIDE 125 ML/HR: 5; 450 INJECTION, SOLUTION INTRAVENOUS at 03:15

## 2023-11-23 RX ADMIN — BACLOFEN 10 MG: 10 TABLET ORAL at 20:05

## 2023-11-23 RX ADMIN — OXYCODONE HYDROCHLORIDE AND ACETAMINOPHEN 1 TABLET: 10; 325 TABLET ORAL at 07:40

## 2023-11-23 RX ADMIN — Medication 1 PATCH: at 09:46

## 2023-11-23 RX ADMIN — BUSPIRONE HYDROCHLORIDE 15 MG: 15 TABLET ORAL at 20:05

## 2023-11-23 RX ADMIN — BACLOFEN 10 MG: 10 TABLET ORAL at 09:48

## 2023-11-23 RX ADMIN — Medication 10 ML: at 09:51

## 2023-11-23 RX ADMIN — VENLAFAXINE HYDROCHLORIDE 150 MG: 150 CAPSULE, EXTENDED RELEASE ORAL at 09:47

## 2023-11-23 RX ADMIN — DEXTROSE AND SODIUM CHLORIDE 125 ML/HR: 5; 450 INJECTION, SOLUTION INTRAVENOUS at 20:17

## 2023-11-23 NOTE — PLAN OF CARE
Goal Outcome Evaluation:  Plan of Care Reviewed With: patient        Progress: no change     Pt remains in CICU, nephrostomy tube in place still has bloody drainage in the bag, flushed with 10cc sterile saline x4. There is a little bit of blood leakage around the dressing. Urostomy has clear yellow UOP, VSS.

## 2023-11-23 NOTE — PROGRESS NOTES
Paraplegia s/p colostomy/urostomy in [place]; history of kidney stones, taken care of 7-8 years ago in [place].  Polysubstance abuse (cocaine, meth)  Colostomy, Ileal conduit urinary diversion   Flank pain x 3+ days, Nausea/vomiting  Decreased urine output from urostomy     CT 11/20/2023: Left ureteral stone, 7mm, additional stones in left kidney  Evidence of left pyelonephritis / Sepsis  ALESIA, Cr 2.81  WBC 9.45  Lactic acid 3.9 -> 3.2  Initially hypotensive    Left neph tube 11/21/23 11/23/23- bld cx = e.coli, wbc 11, cr 1.3      Plan: internalization of the left neph tube by IR ordered 11/24  My offce will call to schedule left urs and stone removal in 3-4 week     Please call if any questions   Will see Monday if the patient has not been discharged  Please call if any questions

## 2023-11-23 NOTE — CONSULTS
"Access center consult for 42 year old male seen in CICU 3007. Pt admitted 11/20 with sepsis. Pt with hx of kidney stones, colostomy, paraplegia, polysubstance abuse (cocaine and meth). Pt with long reported hx of cocaine and meth abuse. UDS was positive for cocaine and THC. BAL was negative. Denies any alcohol use. Pt lives at home with his girlfriend.     Pt resting in bed upon arrival, appears to be in no distress. Pt alert and oriented x4. Introduced role and explained purpose of visit. Pt politely states that he used to have a problem with meth and cocaine, but that he has not used in a long time and does not intent to use. When asked why his urine drug screen was positive for cocaine, the pt states \"I don't know, unless my weed was laced with something\". Pt voiced that he smokes weed frequently for the pain medicating effects and that he does not intent to stop. Pt voices numerous times \"It's legal in most states now... It'll be legal everywhere soon\". Pt again voices that he has no intention to stop and does not wish to entertain a conversation regarding cessation. Pt did state that he would like to receive resources for counseling as he feels it would be beneficial. Resources were provided with pt's insurance in mind. Pt thankful and declines any further discussion or follow up. Pt denies any SI or past hx of. Access to sign off.   "

## 2023-11-23 NOTE — PROGRESS NOTES
[facility]  [phone]  Dr. Artis Headley    Subjective:  LOS: 3    Chief Complaint: Septic shock    Continues to report lack of improvement.  Appears to be just waking up from sleep.  Does not appear to be in any distress.    Objective   Vital Signs past 24hrs  Temp range: Temp (24hrs), Av.7 °F (36.5 °C), Min:97.3 °F (36.3 °C), Max:98 °F (36.7 °C)    BP range: BP: ()/(54-91) 98/54  Pulse range: Heart Rate:  [] 117  Resp rate range:    Device (Oxygen Therapy): room air   Oxygen range:SpO2:  [63 %-100 %] 96 %       Physical Exam  Constitutional:       Appearance: He is ill-appearing.   Cardiovascular:      Rate and Rhythm: Regular rhythm. Tachycardia present.      Pulses: Decreased pulses.      Heart sounds: No murmur heard.  Pulmonary:      Effort: Pulmonary effort is normal.      Breath sounds: Decreased breath sounds present.   Abdominal:      General: Bowel sounds are normal.      Palpations: Abdomen is soft. There is no mass.      Tenderness: There is no abdominal tenderness.      Comments: Diverting colostomy  Ileal conduit   Skin:     Comments: Improved circulation to the feet   Neurological:      Mental Status: He is alert.       Results Review:    I have reviewed the laboratory and imaging data since the last note by [facility] physician.  My annotations are noted in assessment and plan.      Result Review:  I have personally reviewed the results from last note by [facility] physician to 2023 07:08 EST and agree with these findings:  [x]  Laboratory list / accordion  [x]  Microbiology  [x]  Radiology  [x]  EKG/Telemetry   []  Cardiology/Vascular   []  Pathology  []  Old records  []  Other:    Medication Review:  I have reviewed the current MAR.  My annotations are noted in assessment and plan.    ARIPiprazole, 5 mg, Oral, Daily  baclofen, 10 mg, Oral, TID  busPIRone, 15 mg, Oral, Q12H  cefTRIAXone, 2,000 mg, Intravenous, Q24H  nicotine, 1 patch, Transdermal, Q24H  senna-docusate  sodium, 2 tablet, Oral, BID  sodium chloride, 10 mL, Intravenous, Q12H  venlafaxine XR, 150 mg, Oral, Daily        dextrose 5 % and sodium chloride 0.45 %, 125 mL/hr, Last Rate: 125 mL/hr (11/23/23 0315)  norepinephrine, 0.02-0.3 mcg/kg/min, Last Rate: Stopped (11/22/23 0243)  phenylephrine, 0.5-3 mcg/kg/min, Last Rate: Stopped (11/21/23 1330)      Lines, Drains & Airways       Active LDAs       Name Placement date Placement time Site Days    Peripheral IV 11/20/23 2031 Anterior;Right;Upper Arm 11/20/23 2031  Arm  less than 1    Peripheral IV 11/20/23 2101 Anterior;Left;Upper Arm 11/20/23 2101  Arm  less than 1    Peripheral IV 11/20/23 2123 Anterior;Left;Proximal;Upper Arm 11/20/23 2123  Arm  less than 1    Nephrostomy Left 8 Fr. 11/21/23  0033  Left  less than 1    Colostomy LUQ --  pt stated 2-3 years ago  --  LUQ  --    Urostomy RUQ --  pt stated 2-3 years ago  --  RUQ  --                  Isolation status: No active isolations    Dietary Orders (From admission, onward)       Start     Ordered    11/22/23 0800  Dietary Nutrition Supplements Margarito  Daily With Breakfast & Lunch      Comments: Mixed in crystal light   Question:  Select Supplement:  Answer:  Margarito    11/21/23 1541    11/21/23 1800  Dietary Nutrition Supplements Boost Plus (Ensure Enlive, Ensure Plus)  Daily With Breakfast & Dinner      Question:  Select Supplement:  Answer:  Boost Plus (Ensure Enlive, Ensure Plus)    11/21/23 1541    11/21/23 0819  Diet: Regular/House Diet; Texture: Regular Texture (IDDSI 7); Fluid Consistency: Thin (IDDSI 0)  Diet Effective Now        References:    Diet Order [facility]walk   Question Answer Comment   Diets: Regular/House Diet    Texture: Regular Texture (IDDSI 7)    Fluid Consistency: Thin (IDDSI 0)        11/21/23 0818                    PCCM Problems  Sepsis with septic shock  Status post left nephrostomy tube 11/20/2023  Left pyelonephritis  Left ureteral stone  Bacteremia with E. coli and  Streptococcus  Paraplegia  Lactic acidosis  Acute kidney injury  Acute lactic acidosis  Thrombocytopenia  Colostomy in place  Ileal conduit with urinary diversion in place  Cigarette smoker  UDS positive for cocaine and THC          Plan of Treatment    Septic shock and remains off pressors.    Left ureteral stone with pyelonephritis and left nephrostomy tube.  Appreciate urology input. Plan to internalize nephrostomy tube tomorrow with IR.    Bacteremia with E. coli and Streptococcus.  Currently on ceftriaxone.  E. coli is grimaldo susceptible.  Presumably source Streptococcus.  Will repeat blood cultures today for monitoring.    Improving renal function.  Keep same fluids.    Both feet have Doppler pulses bilaterally.  Arterial Dopplers done recently in August showed normal MONICA on the right and normal MONICA on the left.  Note wound nurse input.  Previously vascular has recommended continued local wound care with no additional interventions.    Current cigarette smoker.  Give nicotine patch.  Patient will be counseled to quit smoking prior to discharge.    Positive UDS for cocaine and THC on admission.  Will ask access center to see tomorrow.    Platelets are very low.  Continue to hold subcu heparin.  Drop in hemoglobin noted.  Repeat again at 12 noon and may need transfusion.    On regular diet.    May need to look at his home situation at time of discharge.  Ask CCP to see.    Keep in ICU for now.      Artis Headley MD  11/23/23  07:08 EST      Part of this note may be an electronic transcription/translation of spoken language to printed text using the Dragon Dictation System.

## 2023-11-23 NOTE — PLAN OF CARE
Goal Outcome Evaluation:  Plan of Care Reviewed With: patient, significant other        Progress: no change  Outcome Evaluation: Still with complaint of left flank pain. Medicated per protocol. L nephrostomy tube intact with pinkish drainage. No clots notes. Flushed q 4 hrs. Appetite poor. Refused meals. Plan of care explained to patient and significant other. Will continue to monitor.

## 2023-11-24 ENCOUNTER — APPOINTMENT (OUTPATIENT)
Dept: GENERAL RADIOLOGY | Facility: HOSPITAL | Age: 43
DRG: 853 | End: 2023-11-24
Payer: MEDICARE

## 2023-11-24 LAB
ABO GROUP BLD: NORMAL
ALBUMIN SERPL-MCNC: 2.3 G/DL (ref 3.5–5.2)
ANION GAP SERPL CALCULATED.3IONS-SCNC: 10 MMOL/L (ref 5–15)
BLD GP AB SCN SERPL QL: NEGATIVE
BUN SERPL-MCNC: 28 MG/DL (ref 6–20)
BUN/CREAT SERPL: 27.5 (ref 7–25)
CALCIUM SPEC-SCNC: 8 MG/DL (ref 8.6–10.5)
CHLORIDE SERPL-SCNC: 109 MMOL/L (ref 98–107)
CO2 SERPL-SCNC: 17 MMOL/L (ref 22–29)
CREAT SERPL-MCNC: 1.02 MG/DL (ref 0.76–1.27)
DEPRECATED RDW RBC AUTO: 40.6 FL (ref 37–54)
EGFRCR SERPLBLD CKD-EPI 2021: 94.7 ML/MIN/1.73
ERYTHROCYTE [DISTWIDTH] IN BLOOD BY AUTOMATED COUNT: 13.3 % (ref 12.3–15.4)
GLUCOSE BLDC GLUCOMTR-MCNC: 105 MG/DL (ref 70–130)
GLUCOSE SERPL-MCNC: 111 MG/DL (ref 65–99)
HCT VFR BLD AUTO: 18.5 % (ref 37.5–51)
HCT VFR BLD AUTO: 27.1 % (ref 37.5–51)
HGB BLD-MCNC: 6.3 G/DL (ref 13–17.7)
HGB BLD-MCNC: 8.9 G/DL (ref 13–17.7)
MCH RBC QN AUTO: 29 PG (ref 26.6–33)
MCHC RBC AUTO-ENTMCNC: 34.1 G/DL (ref 31.5–35.7)
MCV RBC AUTO: 85.3 FL (ref 79–97)
PHOSPHATE SERPL-MCNC: 2.2 MG/DL (ref 2.5–4.5)
PLATELET # BLD AUTO: 72 10*3/MM3 (ref 140–450)
PMV BLD AUTO: 11.8 FL (ref 6–12)
POTASSIUM SERPL-SCNC: 3.4 MMOL/L (ref 3.5–5.2)
RBC # BLD AUTO: 2.17 10*6/MM3 (ref 4.14–5.8)
RH BLD: POSITIVE
SODIUM SERPL-SCNC: 136 MMOL/L (ref 136–145)
T&S EXPIRATION DATE: NORMAL
WBC NRBC COR # BLD AUTO: 12.3 10*3/MM3 (ref 3.4–10.8)

## 2023-11-24 PROCEDURE — 85018 HEMOGLOBIN: CPT | Performed by: INTERNAL MEDICINE

## 2023-11-24 PROCEDURE — 86900 BLOOD TYPING SEROLOGIC ABO: CPT

## 2023-11-24 PROCEDURE — 86901 BLOOD TYPING SEROLOGIC RH(D): CPT

## 2023-11-24 PROCEDURE — P9016 RBC LEUKOCYTES REDUCED: HCPCS

## 2023-11-24 PROCEDURE — 36430 TRANSFUSION BLD/BLD COMPNT: CPT

## 2023-11-24 PROCEDURE — 80069 RENAL FUNCTION PANEL: CPT | Performed by: INTERNAL MEDICINE

## 2023-11-24 PROCEDURE — 86022 PLATELET ANTIBODIES: CPT | Performed by: INTERNAL MEDICINE

## 2023-11-24 PROCEDURE — 25010000002 CEFTRIAXONE PER 250 MG: Performed by: INTERNAL MEDICINE

## 2023-11-24 PROCEDURE — 74018 RADEX ABDOMEN 1 VIEW: CPT

## 2023-11-24 PROCEDURE — 85014 HEMATOCRIT: CPT | Performed by: INTERNAL MEDICINE

## 2023-11-24 PROCEDURE — 86900 BLOOD TYPING SEROLOGIC ABO: CPT | Performed by: HOSPITALIST

## 2023-11-24 PROCEDURE — 86850 RBC ANTIBODY SCREEN: CPT | Performed by: HOSPITALIST

## 2023-11-24 PROCEDURE — 86923 COMPATIBILITY TEST ELECTRIC: CPT

## 2023-11-24 PROCEDURE — 86901 BLOOD TYPING SEROLOGIC RH(D): CPT | Performed by: HOSPITALIST

## 2023-11-24 PROCEDURE — 82948 REAGENT STRIP/BLOOD GLUCOSE: CPT

## 2023-11-24 PROCEDURE — 85027 COMPLETE CBC AUTOMATED: CPT | Performed by: INTERNAL MEDICINE

## 2023-11-24 RX ORDER — POTASSIUM CHLORIDE 750 MG/1
40 TABLET, FILM COATED, EXTENDED RELEASE ORAL EVERY 4 HOURS
Status: COMPLETED | OUTPATIENT
Start: 2023-11-24 | End: 2023-11-24

## 2023-11-24 RX ADMIN — ARIPIPRAZOLE 5 MG: 5 TABLET ORAL at 08:29

## 2023-11-24 RX ADMIN — BUSPIRONE HYDROCHLORIDE 15 MG: 15 TABLET ORAL at 08:30

## 2023-11-24 RX ADMIN — OXYCODONE HYDROCHLORIDE AND ACETAMINOPHEN 1 TABLET: 10; 325 TABLET ORAL at 20:34

## 2023-11-24 RX ADMIN — DEXTROSE AND SODIUM CHLORIDE 125 ML/HR: 5; 450 INJECTION, SOLUTION INTRAVENOUS at 16:07

## 2023-11-24 RX ADMIN — OXYCODONE HYDROCHLORIDE AND ACETAMINOPHEN 1 TABLET: 10; 325 TABLET ORAL at 16:09

## 2023-11-24 RX ADMIN — POTASSIUM CHLORIDE 40 MEQ: 750 TABLET, EXTENDED RELEASE ORAL at 21:47

## 2023-11-24 RX ADMIN — Medication 10 ML: at 08:38

## 2023-11-24 RX ADMIN — Medication 10 ML: at 20:37

## 2023-11-24 RX ADMIN — DEXTROSE AND SODIUM CHLORIDE 125 ML/HR: 5; 450 INJECTION, SOLUTION INTRAVENOUS at 05:52

## 2023-11-24 RX ADMIN — OXYCODONE HYDROCHLORIDE AND ACETAMINOPHEN 1 TABLET: 10; 325 TABLET ORAL at 02:09

## 2023-11-24 RX ADMIN — BUSPIRONE HYDROCHLORIDE 15 MG: 15 TABLET ORAL at 21:47

## 2023-11-24 RX ADMIN — BACLOFEN 10 MG: 10 TABLET ORAL at 08:29

## 2023-11-24 RX ADMIN — OXYCODONE HYDROCHLORIDE AND ACETAMINOPHEN 1 TABLET: 10; 325 TABLET ORAL at 08:29

## 2023-11-24 RX ADMIN — CEFTRIAXONE 2000 MG: 2 INJECTION, POWDER, FOR SOLUTION INTRAMUSCULAR; INTRAVENOUS at 20:36

## 2023-11-24 RX ADMIN — BACLOFEN 10 MG: 10 TABLET ORAL at 20:33

## 2023-11-24 RX ADMIN — POTASSIUM CHLORIDE 40 MEQ: 750 TABLET, EXTENDED RELEASE ORAL at 17:51

## 2023-11-24 RX ADMIN — VENLAFAXINE HYDROCHLORIDE 150 MG: 150 CAPSULE, EXTENDED RELEASE ORAL at 08:30

## 2023-11-24 RX ADMIN — BACLOFEN 10 MG: 10 TABLET ORAL at 16:03

## 2023-11-24 RX ADMIN — OXYCODONE HYDROCHLORIDE AND ACETAMINOPHEN 1 TABLET: 10; 325 TABLET ORAL at 12:34

## 2023-11-24 NOTE — PROGRESS NOTES
[facility]  [phone]  Dr. Artis Headley    Subjective:  LOS: 4    Chief Complaint: Septic shock    States he feels unwell.  However no distress.  Reports abdominal pain.    Objective   Vital Signs past 24hrs  Temp range: Temp (24hrs), Av.9 °F (36.6 °C), Min:97.7 °F (36.5 °C), Max:99.1 °F (37.3 °C)    BP range: BP: (101-130)/(31-79) 119/72  Pulse range: Heart Rate:  [] 105  Resp rate range: Resp:  [16-18] 18  Device (Oxygen Therapy): nasal cannulaFlow (L/min):  [2] 2  Oxygen range:SpO2:  [88 %-100 %] 100 %       Physical Exam  Constitutional:       Appearance: He is ill-appearing.   Cardiovascular:      Rate and Rhythm: Regular rhythm. Tachycardia present.      Pulses: Decreased pulses.      Heart sounds: No murmur heard.  Pulmonary:      Effort: Pulmonary effort is normal.      Breath sounds: Decreased breath sounds present.   Abdominal:      General: Bowel sounds are normal.      Palpations: Abdomen is soft. There is no mass.      Tenderness: There is abdominal tenderness.      Comments: Diverting colostomy  Ileal conduit   Skin:     Comments: Improved circulation to the feet   Neurological:      Mental Status: He is alert.       Results Review:    I have reviewed the laboratory and imaging data since the last note by [facility] physician.  My annotations are noted in assessment and plan.      Result Review:  I have personally reviewed the results from last note by [facility] physician to 2023 16:44 EST and agree with these findings:  [x]  Laboratory list / accordion  [x]  Microbiology  [x]  Radiology  [x]  EKG/Telemetry   []  Cardiology/Vascular   []  Pathology  []  Old records  []  Other:    Medication Review:  I have reviewed the current MAR.  My annotations are noted in assessment and plan.    ARIPiprazole, 5 mg, Oral, Daily  baclofen, 10 mg, Oral, TID  busPIRone, 15 mg, Oral, Q12H  cefTRIAXone, 2,000 mg, Intravenous, Q24H  nicotine, 1 patch, Transdermal, Q24H  senna-docusate sodium, 2  tablet, Oral, BID  sodium chloride, 10 mL, Intravenous, Q12H  venlafaxine XR, 150 mg, Oral, Daily        dextrose 5 % and sodium chloride 0.45 %, 125 mL/hr, Last Rate: 125 mL/hr (11/24/23 1607)  norepinephrine, 0.02-0.3 mcg/kg/min, Last Rate: Stopped (11/22/23 0243)  phenylephrine, 0.5-3 mcg/kg/min, Last Rate: Stopped (11/21/23 1330)      Lines, Drains & Airways       Active LDAs       Name Placement date Placement time Site Days    Peripheral IV 11/20/23 2031 Anterior;Right;Upper Arm 11/20/23 2031  Arm  less than 1    Peripheral IV 11/20/23 2101 Anterior;Left;Upper Arm 11/20/23 2101  Arm  less than 1    Peripheral IV 11/20/23 2123 Anterior;Left;Proximal;Upper Arm 11/20/23 2123  Arm  less than 1    Nephrostomy Left 8 Fr. 11/21/23  0033  Left  less than 1    Colostomy LUQ --  pt stated 2-3 years ago  --  LUQ  --    Urostomy RUQ --  pt stated 2-3 years ago  --  RUQ  --                  Isolation status: No active isolations    Dietary Orders (From admission, onward)       Start     Ordered    11/22/23 0800  Dietary Nutrition Supplements Margarito  Daily With Breakfast & Lunch      Comments: Mixed in crystal light   Question:  Select Supplement:  Answer:  Margarito    11/21/23 1541    11/21/23 1800  Dietary Nutrition Supplements Boost Plus (Ensure Enlive, Ensure Plus)  Daily With Breakfast & Dinner      Question:  Select Supplement:  Answer:  Boost Plus (Ensure Enlive, Ensure Plus)    11/21/23 1541    11/21/23 0819  Diet: Regular/House Diet; Texture: Regular Texture (IDDSI 7); Fluid Consistency: Thin (IDDSI 0)  Diet Effective Now        References:    Diet Order [facility]walk   Question Answer Comment   Diets: Regular/House Diet    Texture: Regular Texture (IDDSI 7)    Fluid Consistency: Thin (IDDSI 0)        11/21/23 0818                    PCCM Problems  Sepsis with septic shock  Status post left nephrostomy tube 11/20/2023  Left pyelonephritis  Left ureteral stone  Bacteremia with E. coli and  Streptococcus  Paraplegia  Lactic acidosis  Acute kidney injury  Acute lactic acidosis  Thrombocytopenia  Colostomy in place  Ileal conduit with urinary diversion in place  Cigarette smoker  UDS positive for cocaine and THC          Plan of Treatment    Septic shock and remains off pressors.    Left ureteral stone with pyelonephritis and left nephrostomy tube.     Bacteremia with E. coli and Streptococcus.  Currently on ceftriaxone.  E. coli is grimaldo susceptible.  Presumably also Streptococcus.  Repeat blood cultures are so far no growth.    Improving renal function.  Keep same fluids.    Both feet have Doppler pulses bilaterally.  Arterial Dopplers done recently in August showed normal MONICA on the right and normal MONICA on the left.  Note wound nurse input.  Previously vascular has recommended continued local wound care with no additional interventions.    Current cigarette smoker.  Give nicotine patch.  Patient will be counseled to quit smoking prior to discharge.    Positive UDS for cocaine and THC on admission.  Will ask access center to see tomorrow.    Platelets improved but drop in hemoglobin noted.  I suspect from hemodilution.  However in view of very low hemoglobin I am uncomfortable starting DVT prophylaxis with heparin or enoxaparin.  Given 1 unit of blood.  Repeat hemoglobin should be pending.    On regular diet.    Patient states that he has abdominal pain.  Had CT abdomen this visit.  Will check KUB but I suspect it is from his nephrostomy tube causing discomfort or possibly from persistent stones.    May need to look at his home situation at time of discharge.  Ask CCP to see.    Keep in ICU for now.      Artis Headley MD  11/24/23  16:44 EST      Part of this note may be an electronic transcription/translation of spoken language to printed text using the Dragon Dictation System.

## 2023-11-24 NOTE — SIGNIFICANT NOTE
11/24/23 1527   OTHER   Discipline physical therapist   Therapy Assessment/Plan (PT)   Criteria for Skilled Interventions Met (PT) other (see comments)  (pt declines need for PT in the acute care setting, states he is independent with a sliding board to and from his wheelchair, PT will sign off)
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
## 2023-11-25 LAB
ALBUMIN SERPL-MCNC: 2.4 G/DL (ref 3.5–5.2)
ANION GAP SERPL CALCULATED.3IONS-SCNC: 7.6 MMOL/L (ref 5–15)
BH BB BLOOD EXPIRATION DATE: NORMAL
BH BB BLOOD TYPE BARCODE: 5100
BH BB DISPENSE STATUS: NORMAL
BH BB PRODUCT CODE: NORMAL
BH BB UNIT NUMBER: NORMAL
BUN SERPL-MCNC: 15 MG/DL (ref 6–20)
BUN/CREAT SERPL: 19.5 (ref 7–25)
CALCIUM SPEC-SCNC: 8.1 MG/DL (ref 8.6–10.5)
CHLORIDE SERPL-SCNC: 115 MMOL/L (ref 98–107)
CO2 SERPL-SCNC: 18.4 MMOL/L (ref 22–29)
CREAT SERPL-MCNC: 0.77 MG/DL (ref 0.76–1.27)
CROSSMATCH INTERPRETATION: NORMAL
DEPRECATED RDW RBC AUTO: 42.8 FL (ref 37–54)
DEPRECATED RDW RBC AUTO: 46.8 FL (ref 37–54)
EGFRCR SERPLBLD CKD-EPI 2021: 114.6 ML/MIN/1.73
ERYTHROCYTE [DISTWIDTH] IN BLOOD BY AUTOMATED COUNT: 13.6 % (ref 12.3–15.4)
ERYTHROCYTE [DISTWIDTH] IN BLOOD BY AUTOMATED COUNT: 14.1 % (ref 12.3–15.4)
GLUCOSE SERPL-MCNC: 109 MG/DL (ref 65–99)
HAPTOGLOB SERPL-MCNC: 261 MG/DL (ref 30–200)
HCT VFR BLD AUTO: 22.3 % (ref 37.5–51)
HCT VFR BLD AUTO: 22.9 % (ref 37.5–51)
HGB BLD-MCNC: 7.4 G/DL (ref 13–17.7)
HGB BLD-MCNC: 7.6 G/DL (ref 13–17.7)
HGB RETIC QN AUTO: 30 PG (ref 29.8–36.1)
IMM RETICS NFR: 8.1 % (ref 3–15.8)
INR PPP: 1.12 (ref 0.9–1.1)
IRON 24H UR-MRATE: 18 MCG/DL (ref 59–158)
IRON SATN MFR SERPL: 7 % (ref 20–50)
MAGNESIUM SERPL-MCNC: 1.7 MG/DL (ref 1.6–2.6)
MCH RBC QN AUTO: 28.8 PG (ref 26.6–33)
MCH RBC QN AUTO: 30.2 PG (ref 26.6–33)
MCHC RBC AUTO-ENTMCNC: 33.2 G/DL (ref 31.5–35.7)
MCHC RBC AUTO-ENTMCNC: 33.2 G/DL (ref 31.5–35.7)
MCV RBC AUTO: 86.8 FL (ref 79–97)
MCV RBC AUTO: 90.9 FL (ref 79–97)
PHOSPHATE SERPL-MCNC: 2.5 MG/DL (ref 2.5–4.5)
PLATELET # BLD AUTO: 130 10*3/MM3 (ref 140–450)
PLATELET # BLD AUTO: 179 10*3/MM3 (ref 140–450)
PMV BLD AUTO: 10.6 FL (ref 6–12)
PMV BLD AUTO: 10.8 FL (ref 6–12)
POTASSIUM SERPL-SCNC: 3.6 MMOL/L (ref 3.5–5.2)
POTASSIUM SERPL-SCNC: 4.5 MMOL/L (ref 3.5–5.2)
PROTHROMBIN TIME: 14.5 SECONDS (ref 11.7–14.2)
RBC # BLD AUTO: 2.52 10*6/MM3 (ref 4.14–5.8)
RBC # BLD AUTO: 2.57 10*6/MM3 (ref 4.14–5.8)
RETICS # AUTO: 0.02 10*6/MM3 (ref 0.02–0.13)
RETICS/RBC NFR AUTO: 0.62 % (ref 0.7–1.9)
SODIUM SERPL-SCNC: 141 MMOL/L (ref 136–145)
TIBC SERPL-MCNC: 273 MCG/DL (ref 298–536)
TRANSFERRIN SERPL-MCNC: 183 MG/DL (ref 200–360)
UNIT  ABO: NORMAL
UNIT  RH: NORMAL
WBC NRBC COR # BLD AUTO: 10.37 10*3/MM3 (ref 3.4–10.8)
WBC NRBC COR # BLD AUTO: 9.7 10*3/MM3 (ref 3.4–10.8)

## 2023-11-25 PROCEDURE — 83540 ASSAY OF IRON: CPT | Performed by: INTERNAL MEDICINE

## 2023-11-25 PROCEDURE — 84466 ASSAY OF TRANSFERRIN: CPT | Performed by: INTERNAL MEDICINE

## 2023-11-25 PROCEDURE — 83735 ASSAY OF MAGNESIUM: CPT | Performed by: INTERNAL MEDICINE

## 2023-11-25 PROCEDURE — 84132 ASSAY OF SERUM POTASSIUM: CPT

## 2023-11-25 PROCEDURE — 25010000002 MAGNESIUM SULFATE 4 GM/100ML SOLUTION

## 2023-11-25 PROCEDURE — 85610 PROTHROMBIN TIME: CPT | Performed by: INTERNAL MEDICINE

## 2023-11-25 PROCEDURE — 25010000002 CEFTRIAXONE PER 250 MG: Performed by: INTERNAL MEDICINE

## 2023-11-25 PROCEDURE — 85046 RETICYTE/HGB CONCENTRATE: CPT | Performed by: INTERNAL MEDICINE

## 2023-11-25 PROCEDURE — 83010 ASSAY OF HAPTOGLOBIN QUANT: CPT | Performed by: INTERNAL MEDICINE

## 2023-11-25 PROCEDURE — 85027 COMPLETE CBC AUTOMATED: CPT | Performed by: INTERNAL MEDICINE

## 2023-11-25 PROCEDURE — 80069 RENAL FUNCTION PANEL: CPT | Performed by: INTERNAL MEDICINE

## 2023-11-25 RX ORDER — POTASSIUM CHLORIDE 750 MG/1
40 TABLET, FILM COATED, EXTENDED RELEASE ORAL EVERY 4 HOURS
Status: COMPLETED | OUTPATIENT
Start: 2023-11-25 | End: 2023-11-25

## 2023-11-25 RX ORDER — MAGNESIUM SULFATE HEPTAHYDRATE 40 MG/ML
4 INJECTION, SOLUTION INTRAVENOUS ONCE
Status: COMPLETED | OUTPATIENT
Start: 2023-11-25 | End: 2023-11-25

## 2023-11-25 RX ORDER — GABAPENTIN 100 MG/1
200 CAPSULE ORAL 3 TIMES DAILY
Status: DISCONTINUED | OUTPATIENT
Start: 2023-11-25 | End: 2023-12-01 | Stop reason: HOSPADM

## 2023-11-25 RX ADMIN — GABAPENTIN 200 MG: 100 CAPSULE ORAL at 09:33

## 2023-11-25 RX ADMIN — POTASSIUM CHLORIDE 40 MEQ: 750 TABLET, EXTENDED RELEASE ORAL at 08:20

## 2023-11-25 RX ADMIN — CEFTRIAXONE 2000 MG: 2 INJECTION, POWDER, FOR SOLUTION INTRAMUSCULAR; INTRAVENOUS at 20:09

## 2023-11-25 RX ADMIN — OXYCODONE HYDROCHLORIDE AND ACETAMINOPHEN 1 TABLET: 10; 325 TABLET ORAL at 17:44

## 2023-11-25 RX ADMIN — DEXTROSE AND SODIUM CHLORIDE 75 ML/HR: 5; 450 INJECTION, SOLUTION INTRAVENOUS at 05:57

## 2023-11-25 RX ADMIN — VENLAFAXINE HYDROCHLORIDE 150 MG: 150 CAPSULE, EXTENDED RELEASE ORAL at 08:20

## 2023-11-25 RX ADMIN — GABAPENTIN 200 MG: 100 CAPSULE ORAL at 17:39

## 2023-11-25 RX ADMIN — OXYCODONE HYDROCHLORIDE AND ACETAMINOPHEN 1 TABLET: 10; 325 TABLET ORAL at 11:10

## 2023-11-25 RX ADMIN — AMITRIPTYLINE HYDROCHLORIDE 75 MG: 50 TABLET, FILM COATED ORAL at 09:33

## 2023-11-25 RX ADMIN — BACLOFEN 10 MG: 10 TABLET ORAL at 17:39

## 2023-11-25 RX ADMIN — DEXTROSE AND SODIUM CHLORIDE 125 ML/HR: 5; 450 INJECTION, SOLUTION INTRAVENOUS at 21:59

## 2023-11-25 RX ADMIN — BACLOFEN 10 MG: 10 TABLET ORAL at 08:20

## 2023-11-25 RX ADMIN — BACLOFEN 10 MG: 10 TABLET ORAL at 20:09

## 2023-11-25 RX ADMIN — POTASSIUM CHLORIDE 40 MEQ: 750 TABLET, EXTENDED RELEASE ORAL at 11:11

## 2023-11-25 RX ADMIN — GABAPENTIN 200 MG: 100 CAPSULE ORAL at 20:09

## 2023-11-25 RX ADMIN — Medication 10 ML: at 08:22

## 2023-11-25 RX ADMIN — MAGNESIUM SULFATE HEPTAHYDRATE 4 G: 40 INJECTION, SOLUTION INTRAVENOUS at 06:00

## 2023-11-25 RX ADMIN — OXYCODONE HYDROCHLORIDE AND ACETAMINOPHEN 1 TABLET: 10; 325 TABLET ORAL at 04:46

## 2023-11-25 RX ADMIN — Medication 10 ML: at 20:09

## 2023-11-25 RX ADMIN — BUSPIRONE HYDROCHLORIDE 15 MG: 15 TABLET ORAL at 20:09

## 2023-11-25 RX ADMIN — BUSPIRONE HYDROCHLORIDE 15 MG: 15 TABLET ORAL at 08:20

## 2023-11-25 RX ADMIN — OXYCODONE HYDROCHLORIDE AND ACETAMINOPHEN 1 TABLET: 10; 325 TABLET ORAL at 00:37

## 2023-11-25 RX ADMIN — OXYCODONE HYDROCHLORIDE AND ACETAMINOPHEN 1 TABLET: 10; 325 TABLET ORAL at 21:49

## 2023-11-25 RX ADMIN — ARIPIPRAZOLE 5 MG: 5 TABLET ORAL at 08:20

## 2023-11-25 RX ADMIN — AMITRIPTYLINE HYDROCHLORIDE 75 MG: 50 TABLET, FILM COATED ORAL at 20:09

## 2023-11-25 NOTE — PLAN OF CARE
Goal Outcome Evaluation:  Plan of Care Reviewed With: patient, significant other        Progress: no change  Outcome Evaluation: Pt remains in CICU overnight. VSS; on 2L NC. Still having c/o L flank pain, medicated per order with little to no effect per pt. L nephrostomy tube with minimal drainage around site; output clear yellow; flushed q4 this shift, tolerated. Nephrostomy output 775mL; urostomy 1775mL; no colostomy output. Plan of care to be reevaluated today.

## 2023-11-25 NOTE — PROGRESS NOTES
[facility]  [phone]  Dr. Artis Headley    Subjective:  LOS: 5    Chief Complaint: Septic shock    States his feet are tingling and he wants his gabapentin that he discontinued a month ago.  Denies any further abdominal pain.    Objective   Vital Signs past 24hrs  Temp range: Temp (24hrs), Av.9 °F (36.6 °C), Min:97.7 °F (36.5 °C), Max:98.7 °F (37.1 °C)    BP range: BP: (105-139)/(31-88) 135/82  Pulse range: Heart Rate:  [] 101  Resp rate range: Resp:  [16-20] 18  Device (Oxygen Therapy): room airFlow (L/min):  [2] 2  Oxygen range:SpO2:  [94 %-100 %] 100 %       Physical Exam  Constitutional:       Appearance: He is ill-appearing.   Cardiovascular:      Rate and Rhythm: Regular rhythm. Tachycardia present.      Pulses: Decreased pulses.      Heart sounds: No murmur heard.  Pulmonary:      Effort: Pulmonary effort is normal.      Breath sounds: Decreased breath sounds present.   Abdominal:      General: Bowel sounds are normal.      Palpations: Abdomen is soft. There is no mass.      Tenderness: There is abdominal tenderness.      Comments: Diverting colostomy  Ileal conduit   Skin:     Comments: Improved circulation to the feet   Neurological:      Mental Status: He is alert.       Results Review:    I have reviewed the laboratory and imaging data since the last note by [facility] physician.  My annotations are noted in assessment and plan.      Result Review:  I have personally reviewed the results from last note by [facility] physician to 2023 08:38 EST and agree with these findings:  [x]  Laboratory list / accordion  [x]  Microbiology  [x]  Radiology  [x]  EKG/Telemetry   []  Cardiology/Vascular   []  Pathology  []  Old records  []  Other:    Medication Review:  I have reviewed the current MAR.  My annotations are noted in assessment and plan.    amitriptyline, 75 mg, Oral, Q12H  ARIPiprazole, 5 mg, Oral, Daily  baclofen, 10 mg, Oral, TID  busPIRone, 15 mg, Oral, Q12H  cefTRIAXone, 2,000 mg,  Intravenous, Q24H  magnesium sulfate, 4 g, Intravenous, Once  nicotine, 1 patch, Transdermal, Q24H  potassium chloride ER, 40 mEq, Oral, Q4H  senna-docusate sodium, 2 tablet, Oral, BID  sodium chloride, 10 mL, Intravenous, Q12H  venlafaxine XR, 150 mg, Oral, Daily        dextrose 5 % and sodium chloride 0.45 %, 75 mL/hr, Last Rate: 75 mL/hr (11/25/23 0557)      Lines, Drains & Airways       Active LDAs       Name Placement date Placement time Site Days    Peripheral IV 11/20/23 2031 Anterior;Right;Upper Arm 11/20/23 2031  Arm  less than 1    Peripheral IV 11/20/23 2101 Anterior;Left;Upper Arm 11/20/23 2101  Arm  less than 1    Peripheral IV 11/20/23 2123 Anterior;Left;Proximal;Upper Arm 11/20/23 2123  Arm  less than 1    Nephrostomy Left 8 Fr. 11/21/23  0033  Left  less than 1    Colostomy LUQ --  pt stated 2-3 years ago  --  LUQ  --    Urostomy RUQ --  pt stated 2-3 years ago  --  RUQ  --                  Isolation status: No active isolations    Dietary Orders (From admission, onward)       Start     Ordered    11/22/23 0800  Dietary Nutrition Supplements Margarito  Daily With Breakfast & Lunch      Comments: Mixed in crystal light   Question:  Select Supplement:  Answer:  Margarito    11/21/23 1541    11/21/23 1800  Dietary Nutrition Supplements Boost Plus (Ensure Enlive, Ensure Plus)  Daily With Breakfast & Dinner      Question:  Select Supplement:  Answer:  Boost Plus (Ensure Enlive, Ensure Plus)    11/21/23 1541    11/21/23 0819  Diet: Regular/House Diet; Texture: Regular Texture (IDDSI 7); Fluid Consistency: Thin (IDDSI 0)  Diet Effective Now        References:    Diet Order Crosswalk   Question Answer Comment   Diets: Regular/House Diet    Texture: Regular Texture (IDDSI 7)    Fluid Consistency: Thin (IDDSI 0)        11/21/23 0818                    PCCM Problems  Sepsis with septic shock  Status post left nephrostomy tube 11/20/2023  Left pyelonephritis  Left ureteral stone  Bacteremia with E. coli and  Streptococcus  Paraplegia  Lactic acidosis  Acute kidney injury  Acute lactic acidosis  Thrombocytopenia  Colostomy in place  Ileal conduit with urinary diversion in place  Cigarette smoker  UDS positive for cocaine and THC  Severe anemia        Plan of Treatment    Septic shock and remains off pressors.    Left ureteral stone with pyelonephritis and left nephrostomy tube.  Await further urology input.    Bacteremia with E. coli and Streptococcus.  Currently on ceftriaxone.  E. coli is grimaldo susceptible.  Presumably also Streptococcus.  Repeat blood cultures are so far no growth.    Improving renal function.  Keep same fluids.    Both feet have Doppler pulses bilaterally.  Arterial Dopplers done recently in August showed normal MONICA on the right and normal MONICA on the left.  Note wound nurse input.  Previously vascular has recommended continued local wound care with no additional interventions.    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    Current cigarette smoker.  Give nicotine patch.  Patient will be counseled to quit smoking prior to discharge.    Positive UDS for cocaine and THC on admission.  Access center input noted.    Platelets have normalized.  However hemoglobin has dropped again after transfusion yesterday.  I am going to repeat a CT abdomen to make sure no insidious intra-abdominal hemorrhage.  There is no blood in the nephrostomy tube drainage.    On regular diet.    Patient plans to return home after discharge. CCP has seen.    Keep in ICU for now.      Artis Headley MD  11/25/23  08:38 EST      Part of this note may be an electronic transcription/translation of spoken language to printed text using the Dragon Dictation System.

## 2023-11-26 ENCOUNTER — APPOINTMENT (OUTPATIENT)
Dept: CT IMAGING | Facility: HOSPITAL | Age: 43
DRG: 853 | End: 2023-11-26
Payer: MEDICARE

## 2023-11-26 LAB
ALBUMIN SERPL-MCNC: 2.7 G/DL (ref 3.5–5.2)
ANION GAP SERPL CALCULATED.3IONS-SCNC: 9 MMOL/L (ref 5–15)
BUN SERPL-MCNC: 10 MG/DL (ref 6–20)
BUN/CREAT SERPL: 14.7 (ref 7–25)
CALCIUM SPEC-SCNC: 8.2 MG/DL (ref 8.6–10.5)
CHLORIDE SERPL-SCNC: 109 MMOL/L (ref 98–107)
CO2 SERPL-SCNC: 20 MMOL/L (ref 22–29)
CREAT SERPL-MCNC: 0.68 MG/DL (ref 0.76–1.27)
DEPRECATED RDW RBC AUTO: 42.5 FL (ref 37–54)
DEPRECATED RDW RBC AUTO: 45.4 FL (ref 37–54)
EGFRCR SERPLBLD CKD-EPI 2021: 119 ML/MIN/1.73
ERYTHROCYTE [DISTWIDTH] IN BLOOD BY AUTOMATED COUNT: 13.7 % (ref 12.3–15.4)
ERYTHROCYTE [DISTWIDTH] IN BLOOD BY AUTOMATED COUNT: 14 % (ref 12.3–15.4)
FERRITIN SERPL-MCNC: 332 NG/ML (ref 30–400)
FOLATE SERPL-MCNC: 9.28 NG/ML (ref 4.78–24.2)
GLUCOSE SERPL-MCNC: 110 MG/DL (ref 65–99)
HCT VFR BLD AUTO: 22.2 % (ref 37.5–51)
HCT VFR BLD AUTO: 22.7 % (ref 37.5–51)
HGB BLD-MCNC: 7.5 G/DL (ref 13–17.7)
HGB BLD-MCNC: 7.5 G/DL (ref 13–17.7)
MAGNESIUM SERPL-MCNC: 1.7 MG/DL (ref 1.6–2.6)
MCH RBC QN AUTO: 28.6 PG (ref 26.6–33)
MCH RBC QN AUTO: 30.1 PG (ref 26.6–33)
MCHC RBC AUTO-ENTMCNC: 33 G/DL (ref 31.5–35.7)
MCHC RBC AUTO-ENTMCNC: 33.8 G/DL (ref 31.5–35.7)
MCV RBC AUTO: 86.6 FL (ref 79–97)
MCV RBC AUTO: 89.2 FL (ref 79–97)
PHOSPHATE SERPL-MCNC: 3 MG/DL (ref 2.5–4.5)
PLATELET # BLD AUTO: 260 10*3/MM3 (ref 140–450)
PLATELET # BLD AUTO: 308 10*3/MM3 (ref 140–450)
PMV BLD AUTO: 10 FL (ref 6–12)
PMV BLD AUTO: 10.2 FL (ref 6–12)
POTASSIUM SERPL-SCNC: 4 MMOL/L (ref 3.5–5.2)
RBC # BLD AUTO: 2.49 10*6/MM3 (ref 4.14–5.8)
RBC # BLD AUTO: 2.62 10*6/MM3 (ref 4.14–5.8)
SODIUM SERPL-SCNC: 138 MMOL/L (ref 136–145)
VIT B12 BLD-MCNC: >2000 PG/ML (ref 211–946)
WBC NRBC COR # BLD AUTO: 10.45 10*3/MM3 (ref 3.4–10.8)
WBC NRBC COR # BLD AUTO: 9.94 10*3/MM3 (ref 3.4–10.8)

## 2023-11-26 PROCEDURE — 82746 ASSAY OF FOLIC ACID SERUM: CPT | Performed by: INTERNAL MEDICINE

## 2023-11-26 PROCEDURE — 25010000002 CEFTRIAXONE PER 250 MG: Performed by: INTERNAL MEDICINE

## 2023-11-26 PROCEDURE — 83735 ASSAY OF MAGNESIUM: CPT

## 2023-11-26 PROCEDURE — 80069 RENAL FUNCTION PANEL: CPT | Performed by: INTERNAL MEDICINE

## 2023-11-26 PROCEDURE — 74177 CT ABD & PELVIS W/CONTRAST: CPT

## 2023-11-26 PROCEDURE — 82728 ASSAY OF FERRITIN: CPT | Performed by: INTERNAL MEDICINE

## 2023-11-26 PROCEDURE — 85027 COMPLETE CBC AUTOMATED: CPT | Performed by: INTERNAL MEDICINE

## 2023-11-26 PROCEDURE — 25010000002 FONDAPARINUX PER 0.5 MG: Performed by: INTERNAL MEDICINE

## 2023-11-26 PROCEDURE — 25010000002 MAGNESIUM SULFATE 4 GM/100ML SOLUTION: Performed by: INTERNAL MEDICINE

## 2023-11-26 PROCEDURE — 25510000001 IOPAMIDOL 61 % SOLUTION: Performed by: INTERNAL MEDICINE

## 2023-11-26 PROCEDURE — 99223 1ST HOSP IP/OBS HIGH 75: CPT | Performed by: INTERNAL MEDICINE

## 2023-11-26 PROCEDURE — 82607 VITAMIN B-12: CPT | Performed by: INTERNAL MEDICINE

## 2023-11-26 RX ORDER — MAGNESIUM SULFATE HEPTAHYDRATE 40 MG/ML
4 INJECTION, SOLUTION INTRAVENOUS ONCE
Status: COMPLETED | OUTPATIENT
Start: 2023-11-26 | End: 2023-11-26

## 2023-11-26 RX ORDER — FONDAPARINUX SODIUM 2.5 MG/.5ML
2.5 INJECTION SUBCUTANEOUS NIGHTLY
Status: DISCONTINUED | OUTPATIENT
Start: 2023-11-26 | End: 2023-11-27

## 2023-11-26 RX ORDER — FERROUS SULFATE 325(65) MG
325 TABLET ORAL
Status: DISCONTINUED | OUTPATIENT
Start: 2023-11-26 | End: 2023-12-01 | Stop reason: HOSPADM

## 2023-11-26 RX ADMIN — BACLOFEN 10 MG: 10 TABLET ORAL at 15:14

## 2023-11-26 RX ADMIN — OXYCODONE HYDROCHLORIDE AND ACETAMINOPHEN 1 TABLET: 10; 325 TABLET ORAL at 10:53

## 2023-11-26 RX ADMIN — BACLOFEN 10 MG: 10 TABLET ORAL at 20:29

## 2023-11-26 RX ADMIN — AMITRIPTYLINE HYDROCHLORIDE 75 MG: 50 TABLET, FILM COATED ORAL at 09:02

## 2023-11-26 RX ADMIN — Medication 10 ML: at 08:23

## 2023-11-26 RX ADMIN — VENLAFAXINE HYDROCHLORIDE 150 MG: 150 CAPSULE, EXTENDED RELEASE ORAL at 09:02

## 2023-11-26 RX ADMIN — OXYCODONE HYDROCHLORIDE AND ACETAMINOPHEN 1 TABLET: 10; 325 TABLET ORAL at 18:43

## 2023-11-26 RX ADMIN — DEXTROSE AND SODIUM CHLORIDE 75 ML/HR: 5; 450 INJECTION, SOLUTION INTRAVENOUS at 20:31

## 2023-11-26 RX ADMIN — SENNOSIDES AND DOCUSATE SODIUM 2 TABLET: 50; 8.6 TABLET ORAL at 08:22

## 2023-11-26 RX ADMIN — BUSPIRONE HYDROCHLORIDE 15 MG: 15 TABLET ORAL at 20:29

## 2023-11-26 RX ADMIN — ARIPIPRAZOLE 5 MG: 5 TABLET ORAL at 09:02

## 2023-11-26 RX ADMIN — BUSPIRONE HYDROCHLORIDE 15 MG: 15 TABLET ORAL at 09:03

## 2023-11-26 RX ADMIN — FONDAPARINUX SODIUM 2.5 MG: 2.5 INJECTION, SOLUTION SUBCUTANEOUS at 20:30

## 2023-11-26 RX ADMIN — DEXTROSE AND SODIUM CHLORIDE 125 ML/HR: 5; 450 INJECTION, SOLUTION INTRAVENOUS at 06:19

## 2023-11-26 RX ADMIN — Medication 10 ML: at 20:40

## 2023-11-26 RX ADMIN — FERROUS SULFATE TAB 325 MG (65 MG ELEMENTAL FE) 325 MG: 325 (65 FE) TAB at 16:20

## 2023-11-26 RX ADMIN — OXYCODONE HYDROCHLORIDE AND ACETAMINOPHEN 1 TABLET: 10; 325 TABLET ORAL at 06:31

## 2023-11-26 RX ADMIN — CEFTRIAXONE 2000 MG: 2 INJECTION, POWDER, FOR SOLUTION INTRAMUSCULAR; INTRAVENOUS at 20:30

## 2023-11-26 RX ADMIN — GABAPENTIN 200 MG: 100 CAPSULE ORAL at 15:20

## 2023-11-26 RX ADMIN — OXYCODONE HYDROCHLORIDE AND ACETAMINOPHEN 1 TABLET: 10; 325 TABLET ORAL at 02:31

## 2023-11-26 RX ADMIN — SENNOSIDES AND DOCUSATE SODIUM 2 TABLET: 50; 8.6 TABLET ORAL at 20:29

## 2023-11-26 RX ADMIN — MAGNESIUM SULFATE HEPTAHYDRATE 4 G: 40 INJECTION, SOLUTION INTRAVENOUS at 08:22

## 2023-11-26 RX ADMIN — BACLOFEN 10 MG: 10 TABLET ORAL at 09:02

## 2023-11-26 RX ADMIN — Medication 1 PATCH: at 09:03

## 2023-11-26 RX ADMIN — IOPAMIDOL 85 ML: 612 INJECTION, SOLUTION INTRAVENOUS at 12:46

## 2023-11-26 RX ADMIN — GABAPENTIN 200 MG: 100 CAPSULE ORAL at 20:29

## 2023-11-26 RX ADMIN — AMITRIPTYLINE HYDROCHLORIDE 75 MG: 50 TABLET, FILM COATED ORAL at 22:12

## 2023-11-26 RX ADMIN — OXYCODONE HYDROCHLORIDE AND ACETAMINOPHEN 1 TABLET: 10; 325 TABLET ORAL at 22:48

## 2023-11-26 RX ADMIN — GABAPENTIN 200 MG: 100 CAPSULE ORAL at 08:22

## 2023-11-26 NOTE — PROGRESS NOTES
Paraplegia s/p colostomy/urostomy in [place]; history of kidney stones, taken care of 7-8 years ago in [place].  Polysubstance abuse (cocaine, meth)  Colostomy, Ileal conduit urinary diversion   Flank pain x 3+ days, Nausea/vomiting  Decreased urine output from urostomy     CT 11/20/2023: Left ureteral stone, 7mm, additional stones in left kidney  Evidence of left pyelonephritis / Sepsis    Left neph tube 11/21/23, draining clear yellow urine.   Urostomy pink, yellow urine in bag     11/23/23- bld cx = e.coli, wbc 11, cr 1.3     11/26/23 -   Cr 0.68  WBC 9.9  Hgb 7.5  AVSS, elevated HR    Plan:   Discussed plan for internalization of the left neph tube by IR ordered 11/24     [facility] will call to schedule left urs and stone removal in 3-4 weeks, [phone]    Will see again on Monday if the patient has not been discharged.

## 2023-11-26 NOTE — PROGRESS NOTES
[facility]  [phone]  Dr. Artis Headley    Subjective:  LOS: 6    Chief Complaint: Septic shock    Patient feels he needs anticoagulants because the blood is clotting in his IVs.  Denies abdominal pain headache or chest pain today.    Objective   Vital Signs past 24hrs  Temp range: Temp (24hrs), Av.1 °F (36.7 °C), Min:98 °F (36.7 °C), Max:98.3 °F (36.8 °C)    BP range: BP: (111-146)/(67-87) 133/81  Pulse range: Heart Rate:  [] 114  Resp rate range: Resp:  [12-18] 18  Device (Oxygen Therapy): room airFlow (L/min):  [2] 2  Oxygen range:SpO2:  [92 %-100 %] 98 %       Physical Exam  Constitutional:       Appearance: He is ill-appearing.   Cardiovascular:      Rate and Rhythm: Regular rhythm. Tachycardia present.      Pulses: Decreased pulses.      Heart sounds: No murmur heard.  Pulmonary:      Effort: Pulmonary effort is normal.      Breath sounds: Decreased breath sounds present.   Abdominal:      General: Bowel sounds are normal.      Palpations: Abdomen is soft. There is no mass.      Tenderness: There is no abdominal tenderness.      Comments: Diverting colostomy  Ileal conduit   Skin:     Comments: Improved circulation to the feet   Neurological:      Mental Status: He is alert.       Results Review:    I have reviewed the laboratory and imaging data [facility]  My annotations are noted in assessment and plan.      Result Review:  I have personally reviewed the results from [facility] to 2023 10:33 EST and agree with these findings:  [x]  Laboratory list / accordion  [x]  Microbiology  [x]  Radiology  [x]  EKG/Telemetry   []  Cardiology/Vascular   []  Pathology  []  Old records  []  Other:    Medication Review:  I have reviewed the current MAR.  My annotations are noted in assessment and plan.    amitriptyline, 75 mg, Oral, Q12H  ARIPiprazole, 5 mg, Oral, Daily  baclofen, 10 mg, Oral, TID  busPIRone, 15 mg, Oral, Q12H  cefTRIAXone,  2,000 mg, Intravenous, Q24H  gabapentin, 200 mg, Oral, TID  magnesium sulfate, 4 g, Intravenous, Once  nicotine, 1 patch, Transdermal, Q24H  senna-docusate sodium, 2 tablet, Oral, BID  sodium chloride, 10 mL, Intravenous, Q12H  venlafaxine XR, 150 mg, Oral, Daily        dextrose 5 % and sodium chloride 0.45 %, 125 mL/hr, Last Rate: 125 mL/hr (11/26/23 0619)      Lines, Drains & Airways       Active LDAs       Name Placement date Placement time Site Days    Peripheral IV 11/20/23 2031 Anterior;Right;Upper Arm 11/20/23 2031  Arm  less than 1    Peripheral IV 11/20/23 2101 Anterior;Left;Upper Arm 11/20/23 2101  Arm  less than 1    Peripheral IV 11/20/23 2123 Anterior;Left;Proximal;Upper Arm 11/20/23 2123  Arm  less than 1    Nephrostomy Left 8 Fr. 11/21/23  0033  Left  less than 1    Colostomy LUQ --  pt stated 2-3 years ago  --  LUQ  --    Urostomy RUQ --  pt stated 2-3 years ago  --  RUQ  --                  Isolation status: No active isolations    Dietary Orders (From admission, onward)       Start     Ordered    11/22/23 0800  Dietary Nutrition Supplements Margarito  Daily With Breakfast & Lunch      Comments: Mixed in crystal light   Question:  Select Supplement:  Answer:  Margarito    11/21/23 1541    11/21/23 1800  Dietary Nutrition Supplements Boost Plus (Ensure Enlive, Ensure Plus)  Daily With Breakfast & Dinner      Question:  Select Supplement:  Answer:  Boost Plus (Ensure Enlive, Ensure Plus)    11/21/23 1541    11/21/23 0819  Diet: Regular/House Diet; Texture: Regular Texture (IDDSI 7); Fluid Consistency: Thin (IDDSI 0)  Diet Effective Now        References:    Diet Order Crosswalk   Question Answer Comment   Diets: Regular/House Diet    Texture: Regular Texture (IDDSI 7)    Fluid Consistency: Thin (IDDSI 0)        11/21/23 0818                    PCCM Problems  Sepsis with septic shock  Status post left nephrostomy tube 11/20/2023  Left pyelonephritis  Left ureteral stone  Bacteremia with E. coli and  Streptococcus  Paraplegia  Lactic acidosis  Acute kidney injury  Acute lactic acidosis  Thrombocytopenia  Colostomy in place  Ileal conduit with urinary diversion in place  Cigarette smoker  UDS positive for cocaine and THC  Severe anemia        Plan of Treatment    Septic shock resolved.    Left ureteral stone with pyelonephritis and left nephrostomy tube.  Await further urology input.  Nephrostomy tube still present.    Bacteremia with E. coli and Streptococcus.  Currently on ceftriaxone.  E. coli is grimaldo susceptible.  Presumably also Streptococcus.  Repeat blood cultures are so far no growth.    Improving renal function.  Keep same fluids.    Both feet have Doppler pulses bilaterally.  Arterial Dopplers done recently in August showed normal MONICA on the right and normal MONICA on the left.  Note wound nurse input.  Previously vascular has recommended continued local wound care with no additional interventions.    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    Current cigarette smoker.  Give nicotine patch.  Patient will be counseled to quit smoking prior to discharge.    Positive UDS for cocaine and THC on admission.  Access center input noted.    Platelets have normalized.  Low hemoglobin despite transfusion.  Hematology consult pending.  Also not clear why CT abdomen not yet done.  Changed to stat.    Not presently on any anticoagulation or DVT prophylaxis due to anemia and drop in hemoglobin.  Now that hemoglobin is stable will start ARIXTRA nightly pending results of CT abdomen above and hematology consultation.    On regular diet.    Patient plans to return home after discharge. CCP has seen.    Likely transfer out of ICU later today if no other issues.    Artis Headley MD  11/26/23  10:33 EST      Part of this note may be an electronic transcription/translation of spoken language to printed text using the Dragon Dictation System.
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
## 2023-11-27 ENCOUNTER — APPOINTMENT (OUTPATIENT)
Dept: INTERVENTIONAL RADIOLOGY/VASCULAR | Facility: HOSPITAL | Age: 43
DRG: 853 | End: 2023-11-27
Payer: MEDICARE

## 2023-11-27 ENCOUNTER — APPOINTMENT (OUTPATIENT)
Dept: CARDIOLOGY | Facility: HOSPITAL | Age: 43
DRG: 853 | End: 2023-11-27
Payer: MEDICARE

## 2023-11-27 ENCOUNTER — APPOINTMENT (OUTPATIENT)
Dept: GENERAL RADIOLOGY | Facility: HOSPITAL | Age: 43
DRG: 853 | End: 2023-11-27
Payer: MEDICARE

## 2023-11-27 PROBLEM — K43.5 PARASTOMAL HERNIA WITHOUT OBSTRUCTION OR GANGRENE: Status: ACTIVE | Noted: 2023-11-27

## 2023-11-27 LAB
ALBUMIN SERPL-MCNC: 2.4 G/DL (ref 3.5–5.2)
ANION GAP SERPL CALCULATED.3IONS-SCNC: 11 MMOL/L (ref 5–15)
AORTIC DIMENSIONLESS INDEX: 0.5 (DI)
BH CV ECHO MEAS - AO MAX PG: 10.2 MMHG
BH CV ECHO MEAS - AO MEAN PG: 6 MMHG
BH CV ECHO MEAS - AO ROOT DIAM: 2.6 CM
BH CV ECHO MEAS - AO V2 MAX: 160 CM/SEC
BH CV ECHO MEAS - AO V2 VTI: 21.3 CM
BH CV ECHO MEAS - AVA(I,D): 1.62 CM2
BH CV ECHO MEAS - EDV(CUBED): 106 ML
BH CV ECHO MEAS - EDV(MOD-SP2): 122 ML
BH CV ECHO MEAS - EDV(MOD-SP4): 128 ML
BH CV ECHO MEAS - EF(MOD-BP): 52.3 %
BH CV ECHO MEAS - EF(MOD-SP2): 55.7 %
BH CV ECHO MEAS - EF(MOD-SP4): 51.6 %
BH CV ECHO MEAS - ESV(CUBED): 29.2 ML
BH CV ECHO MEAS - ESV(MOD-SP2): 54 ML
BH CV ECHO MEAS - ESV(MOD-SP4): 62 ML
BH CV ECHO MEAS - FS: 35 %
BH CV ECHO MEAS - IVS/LVPW: 0.89 CM
BH CV ECHO MEAS - IVSD: 1.01 CM
BH CV ECHO MEAS - LAT PEAK E' VEL: 6.4 CM/SEC
BH CV ECHO MEAS - LV DIASTOLIC VOL/BSA (35-75): 59.9 CM2
BH CV ECHO MEAS - LV MASS(C)D: 182.7 GRAMS
BH CV ECHO MEAS - LV MAX PG: 3.8 MMHG
BH CV ECHO MEAS - LV MEAN PG: 2 MMHG
BH CV ECHO MEAS - LV SYSTOLIC VOL/BSA (12-30): 29 CM2
BH CV ECHO MEAS - LV V1 MAX: 97.5 CM/SEC
BH CV ECHO MEAS - LV V1 VTI: 11.6 CM
BH CV ECHO MEAS - LVIDD: 4.7 CM
BH CV ECHO MEAS - LVIDS: 3.1 CM
BH CV ECHO MEAS - LVOT AREA: 3 CM2
BH CV ECHO MEAS - LVOT DIAM: 1.95 CM
BH CV ECHO MEAS - LVPWD: 1.13 CM
BH CV ECHO MEAS - MED PEAK E' VEL: 8.2 CM/SEC
BH CV ECHO MEAS - MV A DUR: 0.08 SEC
BH CV ECHO MEAS - MV A MAX VEL: 71.6 CM/SEC
BH CV ECHO MEAS - MV DEC SLOPE: 943.5 CM/SEC2
BH CV ECHO MEAS - MV DEC TIME: 169 SEC
BH CV ECHO MEAS - MV E MAX VEL: 77.7 CM/SEC
BH CV ECHO MEAS - MV E/A: 1.09
BH CV ECHO MEAS - MV MAX PG: 6.2 MMHG
BH CV ECHO MEAS - MV MEAN PG: 2.9 MMHG
BH CV ECHO MEAS - MV P1/2T: 38.8 MSEC
BH CV ECHO MEAS - MV V2 VTI: 20.2 CM
BH CV ECHO MEAS - MVA(P1/2T): 5.7 CM2
BH CV ECHO MEAS - MVA(VTI): 1.7 CM2
BH CV ECHO MEAS - PA ACC TIME: 0.07 SEC
BH CV ECHO MEAS - PA V2 MAX: 191.4 CM/SEC
BH CV ECHO MEAS - RAP SYSTOLE: 3 MMHG
BH CV ECHO MEAS - RV MAX PG: 3.4 MMHG
BH CV ECHO MEAS - RV V1 MAX: 92.4 CM/SEC
BH CV ECHO MEAS - RV V1 VTI: 14.2 CM
BH CV ECHO MEAS - RVSP: 15.6 MMHG
BH CV ECHO MEAS - SI(MOD-SP2): 31.8 ML/M2
BH CV ECHO MEAS - SI(MOD-SP4): 30.9 ML/M2
BH CV ECHO MEAS - SV(LVOT): 34.5 ML
BH CV ECHO MEAS - SV(MOD-SP2): 68 ML
BH CV ECHO MEAS - SV(MOD-SP4): 66 ML
BH CV ECHO MEAS - TAPSE (>1.6): 2.17 CM
BH CV ECHO MEAS - TR MAX PG: 12.6 MMHG
BH CV ECHO MEAS - TR MAX VEL: 177.8 CM/SEC
BH CV ECHO MEASUREMENTS AVERAGE E/E' RATIO: 10.64
BH CV XLRA - TDI S': 16 CM/SEC
BUN SERPL-MCNC: 13 MG/DL (ref 6–20)
BUN/CREAT SERPL: 17.3 (ref 7–25)
CALCIUM SPEC-SCNC: 8 MG/DL (ref 8.6–10.5)
CHLORIDE SERPL-SCNC: 105 MMOL/L (ref 98–107)
CO2 SERPL-SCNC: 23 MMOL/L (ref 22–29)
CREAT SERPL-MCNC: 0.75 MG/DL (ref 0.76–1.27)
DEPRECATED RDW RBC AUTO: 43 FL (ref 37–54)
EGFRCR SERPLBLD CKD-EPI 2021: 115.5 ML/MIN/1.73
ERYTHROCYTE [DISTWIDTH] IN BLOOD BY AUTOMATED COUNT: 13.7 % (ref 12.3–15.4)
GLUCOSE BLDC GLUCOMTR-MCNC: 132 MG/DL (ref 70–130)
GLUCOSE SERPL-MCNC: 137 MG/DL (ref 65–99)
HCT VFR BLD AUTO: 23 % (ref 37.5–51)
HGB BLD-MCNC: 7.4 G/DL (ref 13–17.7)
LEFT ATRIUM VOLUME INDEX: 16.5 ML/M2
MAGNESIUM SERPL-MCNC: 1.8 MG/DL (ref 1.6–2.6)
MCH RBC QN AUTO: 28 PG (ref 26.6–33)
MCHC RBC AUTO-ENTMCNC: 32.2 G/DL (ref 31.5–35.7)
MCV RBC AUTO: 87.1 FL (ref 79–97)
PHOSPHATE SERPL-MCNC: 3.8 MG/DL (ref 2.5–4.5)
PLATELET # BLD AUTO: 413 10*3/MM3 (ref 140–450)
PMV BLD AUTO: 10.5 FL (ref 6–12)
POTASSIUM SERPL-SCNC: 4.5 MMOL/L (ref 3.5–5.2)
RBC # BLD AUTO: 2.64 10*6/MM3 (ref 4.14–5.8)
REF LAB TEST METHOD: NORMAL
SODIUM SERPL-SCNC: 139 MMOL/L (ref 136–145)
TROPONIN T SERPL HS-MCNC: 79 NG/L
WBC NRBC COR # BLD AUTO: 10.32 10*3/MM3 (ref 3.4–10.8)

## 2023-11-27 PROCEDURE — 82948 REAGENT STRIP/BLOOD GLUCOSE: CPT

## 2023-11-27 PROCEDURE — 93306 TTE W/DOPPLER COMPLETE: CPT

## 2023-11-27 PROCEDURE — 99221 1ST HOSP IP/OBS SF/LOW 40: CPT | Performed by: SURGERY

## 2023-11-27 PROCEDURE — 93306 TTE W/DOPPLER COMPLETE: CPT | Performed by: INTERNAL MEDICINE

## 2023-11-27 PROCEDURE — 25510000001 PERFLUTREN (DEFINITY) 8.476 MG IN SODIUM CHLORIDE (PF) 0.9 % 10 ML INJECTION: Performed by: INTERNAL MEDICINE

## 2023-11-27 PROCEDURE — 80069 RENAL FUNCTION PANEL: CPT | Performed by: INTERNAL MEDICINE

## 2023-11-27 PROCEDURE — 25010000002 MAGNESIUM SULFATE 4 GM/100ML SOLUTION: Performed by: INTERNAL MEDICINE

## 2023-11-27 PROCEDURE — 25010000002 CEFTRIAXONE PER 250 MG: Performed by: INTERNAL MEDICINE

## 2023-11-27 PROCEDURE — 99223 1ST HOSP IP/OBS HIGH 75: CPT | Performed by: INTERNAL MEDICINE

## 2023-11-27 PROCEDURE — 83735 ASSAY OF MAGNESIUM: CPT | Performed by: INTERNAL MEDICINE

## 2023-11-27 PROCEDURE — 86923 COMPATIBILITY TEST ELECTRIC: CPT

## 2023-11-27 PROCEDURE — 86900 BLOOD TYPING SEROLOGIC ABO: CPT

## 2023-11-27 PROCEDURE — 85027 COMPLETE CBC AUTOMATED: CPT | Performed by: INTERNAL MEDICINE

## 2023-11-27 PROCEDURE — 93010 ELECTROCARDIOGRAM REPORT: CPT | Performed by: INTERNAL MEDICINE

## 2023-11-27 PROCEDURE — P9016 RBC LEUKOCYTES REDUCED: HCPCS

## 2023-11-27 PROCEDURE — 36430 TRANSFUSION BLD/BLD COMPNT: CPT

## 2023-11-27 PROCEDURE — 84484 ASSAY OF TROPONIN QUANT: CPT

## 2023-11-27 PROCEDURE — 93005 ELECTROCARDIOGRAM TRACING: CPT

## 2023-11-27 PROCEDURE — 99232 SBSQ HOSP IP/OBS MODERATE 35: CPT | Performed by: INTERNAL MEDICINE

## 2023-11-27 RX ORDER — ACETAMINOPHEN 325 MG/1
650 TABLET ORAL ONCE
Status: COMPLETED | OUTPATIENT
Start: 2023-11-27 | End: 2023-11-27

## 2023-11-27 RX ORDER — ACETAMINOPHEN 650 MG/1
650 SUPPOSITORY RECTAL ONCE
Status: COMPLETED | OUTPATIENT
Start: 2023-11-27 | End: 2023-11-27

## 2023-11-27 RX ORDER — CALCIUM CARBONATE 500 MG/1
2 TABLET, CHEWABLE ORAL ONCE
Status: COMPLETED | OUTPATIENT
Start: 2023-11-27 | End: 2023-11-27

## 2023-11-27 RX ORDER — POLYETHYLENE GLYCOL 3350 17 G/17G
17 POWDER, FOR SOLUTION ORAL 2 TIMES DAILY
Status: DISCONTINUED | OUTPATIENT
Start: 2023-11-27 | End: 2023-12-01 | Stop reason: HOSPADM

## 2023-11-27 RX ORDER — MAGNESIUM SULFATE HEPTAHYDRATE 40 MG/ML
4 INJECTION, SOLUTION INTRAVENOUS ONCE
Status: COMPLETED | OUTPATIENT
Start: 2023-11-27 | End: 2023-11-27

## 2023-11-27 RX ORDER — ACETAMINOPHEN 160 MG/5ML
650 SOLUTION ORAL ONCE
Status: COMPLETED | OUTPATIENT
Start: 2023-11-27 | End: 2023-11-27

## 2023-11-27 RX ORDER — ACETAMINOPHEN 325 MG/1
650 TABLET ORAL EVERY 6 HOURS PRN
Status: DISCONTINUED | OUTPATIENT
Start: 2023-11-27 | End: 2023-12-01 | Stop reason: HOSPADM

## 2023-11-27 RX ADMIN — POLYETHYLENE GLYCOL 3350 17 G: 17 POWDER, FOR SOLUTION ORAL at 21:03

## 2023-11-27 RX ADMIN — ACETAMINOPHEN 650 MG: 325 TABLET, FILM COATED ORAL at 01:34

## 2023-11-27 RX ADMIN — OXYCODONE HYDROCHLORIDE AND ACETAMINOPHEN 1 TABLET: 10; 325 TABLET ORAL at 21:02

## 2023-11-27 RX ADMIN — ARIPIPRAZOLE 5 MG: 5 TABLET ORAL at 08:32

## 2023-11-27 RX ADMIN — ACETAMINOPHEN 650 MG: 325 TABLET, FILM COATED ORAL at 13:36

## 2023-11-27 RX ADMIN — SENNOSIDES AND DOCUSATE SODIUM 2 TABLET: 50; 8.6 TABLET ORAL at 08:33

## 2023-11-27 RX ADMIN — OXYCODONE HYDROCHLORIDE AND ACETAMINOPHEN 1 TABLET: 10; 325 TABLET ORAL at 03:06

## 2023-11-27 RX ADMIN — SENNOSIDES AND DOCUSATE SODIUM 2 TABLET: 50; 8.6 TABLET ORAL at 21:02

## 2023-11-27 RX ADMIN — GABAPENTIN 200 MG: 100 CAPSULE ORAL at 16:30

## 2023-11-27 RX ADMIN — BACLOFEN 10 MG: 10 TABLET ORAL at 08:33

## 2023-11-27 RX ADMIN — GABAPENTIN 200 MG: 100 CAPSULE ORAL at 21:02

## 2023-11-27 RX ADMIN — CEFTRIAXONE 2000 MG: 2 INJECTION, POWDER, FOR SOLUTION INTRAMUSCULAR; INTRAVENOUS at 21:04

## 2023-11-27 RX ADMIN — OXYCODONE HYDROCHLORIDE AND ACETAMINOPHEN 1 TABLET: 10; 325 TABLET ORAL at 08:33

## 2023-11-27 RX ADMIN — AMITRIPTYLINE HYDROCHLORIDE 75 MG: 50 TABLET, FILM COATED ORAL at 21:02

## 2023-11-27 RX ADMIN — MAGNESIUM SULFATE HEPTAHYDRATE 4 G: 40 INJECTION, SOLUTION INTRAVENOUS at 08:39

## 2023-11-27 RX ADMIN — PERFLUTREN 2 ML: 6.52 INJECTION, SUSPENSION INTRAVENOUS at 16:14

## 2023-11-27 RX ADMIN — BACLOFEN 10 MG: 10 TABLET ORAL at 21:02

## 2023-11-27 RX ADMIN — OXYCODONE HYDROCHLORIDE AND ACETAMINOPHEN 1 TABLET: 10; 325 TABLET ORAL at 16:49

## 2023-11-27 RX ADMIN — BUSPIRONE HYDROCHLORIDE 15 MG: 15 TABLET ORAL at 08:33

## 2023-11-27 RX ADMIN — Medication 10 ML: at 08:39

## 2023-11-27 RX ADMIN — VENLAFAXINE HYDROCHLORIDE 150 MG: 150 CAPSULE, EXTENDED RELEASE ORAL at 08:32

## 2023-11-27 RX ADMIN — BACLOFEN 10 MG: 10 TABLET ORAL at 16:30

## 2023-11-27 RX ADMIN — Medication 10 ML: at 21:03

## 2023-11-27 RX ADMIN — BUSPIRONE HYDROCHLORIDE 15 MG: 15 TABLET ORAL at 21:04

## 2023-11-27 RX ADMIN — AMITRIPTYLINE HYDROCHLORIDE 75 MG: 50 TABLET, FILM COATED ORAL at 08:32

## 2023-11-27 RX ADMIN — Medication 1 PATCH: at 08:39

## 2023-11-27 RX ADMIN — ANTACID TABLETS 2 TABLET: 500 TABLET, CHEWABLE ORAL at 02:03

## 2023-11-27 RX ADMIN — GABAPENTIN 200 MG: 100 CAPSULE ORAL at 08:32

## 2023-11-27 RX ADMIN — OXYCODONE HYDROCHLORIDE AND ACETAMINOPHEN 1 TABLET: 10; 325 TABLET ORAL at 12:42

## 2023-11-27 RX ADMIN — POLYETHYLENE GLYCOL 3350 17 G: 17 POWDER, FOR SOLUTION ORAL at 03:06

## 2023-11-27 NOTE — NURSING NOTE
Note pt had been refusing stool softeners up until 11/26/23, pt now feeling abd discomfort, fullness, gas pain, and indigestion.  Note pt also ate 2 full dinner meals this evening and lots of candy brought in by family.  Pt requested tums earlier in shift without any relief.  Pt agreed to taking a dose of miralax.  Not gas noted in colostomy like night before.  Pt educated on importance of stool softeners with colostomy and while taking pain medications.

## 2023-11-27 NOTE — PROGRESS NOTES
[facility] Update  11/27/2023  09:43 EST      CT 11/27/2023 Reviewed   Left renal subcapsular hematoma has developed since the previous CT 6  days ago and extends along the posterior aspect of the left kidney and  below the left kidney compressing the left kidney anteriorly and  measuring 4.5 x 10 x 19 cm. Hemorrhage extends inferiorly anterolateral  to the left psoas muscle.      - We will hold off on recommendation for left nephr tube internalization to left stent for another 2 weeks.    - Spoke with IR.  This will be done as outpatient in approximately two weeks.    - Urology will follow through stability.        Elijah Castro MD  [facility]  General & Reconstructive Urology  Office: [phone]  Fax: [phone]

## 2023-11-27 NOTE — PLAN OF CARE
Problem: Adult Inpatient Plan of Care  Goal: Plan of Care Review  Outcome: Ongoing, Progressing  Goal: Patient-Specific Goal (Individualized)  Outcome: Ongoing, Progressing  Goal: Absence of Hospital-Acquired Illness or Injury  Outcome: Ongoing, Progressing  Intervention: Identify and Manage Fall Risk  Recent Flowsheet Documentation  Taken 11/26/2023 1800 by Allyssa Naylor RN  Safety Promotion/Fall Prevention:   safety round/check completed   clutter free environment maintained  Taken 11/26/2023 1700 by Allyssa Naylor RN  Safety Promotion/Fall Prevention:   safety round/check completed   clutter free environment maintained  Taken 11/26/2023 1600 by Allyssa Naylor RN  Safety Promotion/Fall Prevention:   safety round/check completed   clutter free environment maintained  Taken 11/26/2023 1400 by Allyssa Naylor RN  Safety Promotion/Fall Prevention:   safety round/check completed   clutter free environment maintained  Taken 11/26/2023 1300 by Allyssa Naylor RN  Safety Promotion/Fall Prevention:   safety round/check completed   clutter free environment maintained  Taken 11/26/2023 1200 by Allyssa Naylor RN  Safety Promotion/Fall Prevention:   safety round/check completed   clutter free environment maintained  Taken 11/26/2023 1100 by Allyssa Naylor RN  Safety Promotion/Fall Prevention:   safety round/check completed   clutter free environment maintained  Taken 11/26/2023 1000 by Allyssa Naylor RN  Safety Promotion/Fall Prevention:   safety round/check completed   clutter free environment maintained  Taken 11/26/2023 0900 by Allyssa Naylor RN  Safety Promotion/Fall Prevention:   safety round/check completed   clutter free environment maintained  Taken 11/26/2023 0815 by Allyssa Naylor RN  Safety Promotion/Fall Prevention:   safety round/check completed   clutter free environment maintained  Intervention: Prevent Skin Injury  Recent Flowsheet Documentation  Taken 11/26/2023 1800 by Allyssa Naylor  RN  Body Position:   turned   right   side-lying  Taken 11/26/2023 1600 by Allyssa Naylor RN  Body Position:   turned   left   side-lying  Skin Protection:   skin-to-device areas padded   tubing/devices free from skin contact  Taken 11/26/2023 1400 by Allyssa Naylor RN  Body Position:   turned   right   side-lying  Taken 11/26/2023 1200 by Allyssa Naylor RN  Body Position: position changed independently  Skin Protection:   skin-to-device areas padded   tubing/devices free from skin contact  Taken 11/26/2023 0815 by Allyssa Naylor RN  Body Position: position changed independently  Skin Protection:   skin-to-device areas padded   tubing/devices free from skin contact  Intervention: Prevent and Manage VTE (Venous Thromboembolism) Risk  Recent Flowsheet Documentation  Taken 11/26/2023 1600 by Allyssa Naylor RN  VTE Prevention/Management:   bilateral   sequential compression devices on  Range of Motion: ROM (range of motion) performed  Taken 11/26/2023 1200 by Allyssa Naylor RN  Activity Management: bedrest  VTE Prevention/Management:   bilateral   sequential compression devices on  Range of Motion: ROM (range of motion) performed  Taken 11/26/2023 0815 by Allyssa Naylor RN  Activity Management: bedrest  VTE Prevention/Management:   bilateral   sequential compression devices on  Range of Motion: ROM (range of motion) performed  Intervention: Prevent Infection  Recent Flowsheet Documentation  Taken 11/26/2023 1600 by Allyssa Naylor RN  Infection Prevention: single patient room provided  Taken 11/26/2023 1400 by Allyssa Naylor RN  Infection Prevention: single patient room provided  Taken 11/26/2023 1000 by Allyssa Naylor RN  Infection Prevention: single patient room provided  Taken 11/26/2023 0815 by Allyssa Naylor RN  Infection Prevention: single patient room provided  Goal: Optimal Comfort and Wellbeing  Outcome: Ongoing, Progressing  Intervention: Provide Person-Centered Care  Recent Flowsheet  Documentation  Taken 11/26/2023 1600 by Allyssa Nayolr, MARCELO  Trust Relationship/Rapport:   care explained   thoughts/feelings acknowledged  Taken 11/26/2023 1200 by Allyssa Naylor RN  Trust Relationship/Rapport:   care explained   thoughts/feelings acknowledged  Taken 11/26/2023 0815 by Allyssa Naylor RN  Trust Relationship/Rapport:   care explained   thoughts/feelings acknowledged  Goal: Readiness for Transition of Care  Outcome: Ongoing, Progressing   Goal Outcome Evaluation:  Pt remain in ccu on roomair. Continue d51/2NS. CT scan of pelvis results bleeding to the kidney. Notified urologist. Received order to check cbc in am. Possible internalization of nephro tube on Monday. Urostomy and colostomy in place with adequate output. Afibrile. Vital stable. Plan of care ongoing.

## 2023-11-27 NOTE — PROGRESS NOTES
[facility] Update  11/27/2023  08:05 EST      Timing unclear for left Nephrostomy internalization to left ureteral stent.    - Recommend assessment of elevated Trop and decreased colostomy output     - He also received Fondaparinux this AM.    - Ultimately, Urology will be arranging outpatient Left ureterscopy/Laser/basket/stent exchange in the next 2-3 weeks.    . Urology will follow           Elijah Castro MD  [facility]  General [facility] Reconstructive [facility]rology  Office: [phone]  Fax: [phone]

## 2023-11-27 NOTE — NURSING NOTE
Note providers aware of pt status of sepsis positive, pt admitted for sepsis from pylonephritis.  Pt adequately fluid resuscitated and remain on IVF and antibiotics at this time. Provider Belle NESBITT notified and is aware of pt low grade temp and elevated HR. See new orders

## 2023-11-27 NOTE — PAYOR COMM NOTE
"JaegerPk (43 y.o. Male)                        ATTENTION - CONTINUED  CLINICALS CASE REF [id number]                       REPLY TO UR DEPT  [phone] OR CALL   VANNESA MACE LPN           Date of Birth   1980    Social Security Number       Address   [address]  [place] [address]    Home Phone   [phone]    MRN   [id number]       Mormonism   Faith    Marital Status   Significant Other                            Admission Date   11/20/23    Admission Type   Emergency    Admitting Provider   Aleida Hdez MD    Attending Provider   Aleida Hdez MD    Department, Room/Bed   [facility], N341/1       Discharge Date       Discharge Disposition       Discharge Destination                                 Attending Provider: Aleida Hdez MD    Allergies: Pholcodine, Codeine, Amoxicillin-pot Clavulanate, Cefuroxime, Doxycycline, Sulfamethoxazole    Isolation: None   Infection: MRSA/History Only (11/21/23)   Code Status: CPR    Ht: 177.8 cm (70\")   Wt: 96 kg (211 lb 10.3 oz)    Admission Cmt: None   Principal Problem: Sepsis [A41.9]                   Active Insurance as of 11/20/2023       Primary Coverage       Payor Plan Insurance Group Employer/Plan Group    PASSPORT MEDICARE ADVANTAGE PASSPORT ADVANTAGE N       Payor Plan Address Payor Plan Phone Number Payor Plan Fax Number Effective Dates    [address]   8/1/2023 - None Entered    [place] [id number]         Subscriber Name Subscriber Birth Date Member ID       JAEGERGARTH KIMMILLER SUAZO 1980 [id number]               Secondary Coverage       Payor Plan Insurance Group Employer/Plan Group    [place]        Payor Plan Address Payor Plan Phone Number Payor Plan Fax Number Effective Dates    [address] [phone]  10/6/2021 - 11/27/2023    [place] [address]         Subscriber Name Subscriber Birth Date Member ID       JAEGERPK KIM GABRIELE 1980 " [id number]                     Emergency Contacts        (Rel.) Home Phone Work Phone Mobile Phone    Carl Santana (Significant Other) [phone] -- --    Elva May (Grandparent) [phone] -- [phone]    David May (NOK) (Son) [phone] -- [phone]              Vital Signs (last day)       Date/Time Temp Temp src Pulse Resp BP Patient Position SpO2    11/27/23 1415 98.6 (37) Oral 112 20 107/59 -- 93    11/27/23 1400 -- -- 115 -- 107/66 -- 93    11/27/23 1350 98 (36.7) Oral 121 20 117/67 -- 93    11/27/23 1331 98.3 (36.8) Oral 118 20 130/83 -- 95    11/27/23 1300 -- -- 109 -- 131/84 -- 100    11/27/23 1200 -- -- 105 20 123/81 -- 100    11/27/23 1115 97.6 (36.4) Oral 112 -- -- -- 94    11/27/23 1100 -- -- 112 -- 125/84 -- 94    11/27/23 1000 -- -- 110 -- 127/79 -- 95    11/27/23 0900 -- -- 101 20 135/90 -- 98    11/27/23 0806 98 (36.7) Oral 100 -- -- -- 97    11/27/23 0800 -- -- 89 -- 129/80 -- 98    11/27/23 0700 -- -- 93 -- 141/87 -- 94    11/27/23 0600 -- -- 107 -- 144/86 -- 100    11/27/23 0500 -- -- 108 -- 142/89 -- 92    11/27/23 0400 99.3 (37.4) Oral 105 20 145/87 Lying 94    11/27/23 0300 -- -- 103 -- 128/77 -- 94    11/27/23 0200 -- -- 120 -- 102/60 -- 93    11/27/23 0100 -- -- 128 -- 119/67 -- 91    11/27/23 0000 100.7 (38.2) Oral 130 18 124/73 Lying 94    11/26/23 2300 -- -- 134 -- 126/79 -- 92    11/26/23 2200 -- -- 121 -- 121/75 -- 91    11/26/23 2100 -- -- 133 -- 118/76 -- 99    11/26/23 2000 100.3 (37.9) Oral 124 16 119/74 Lying 98    11/26/23 1900 -- -- 124 -- -- -- 96    11/26/23 1800 -- -- 126 -- 145/89 -- 98    11/26/23 1700 -- -- 115 -- 145/86 -- 98    11/26/23 1600 -- -- 114 16 131/86 Lying 98    11/26/23 1545 99.5 (37.5) Oral -- -- -- -- --    11/26/23 1511 -- -- 101 -- 134/72 -- 90    11/26/23 1300 -- -- -- -- 135/78 -- --    11/26/23 1200 99.3 (37.4) Oral 126 18 123/85 Lying 91    11/26/23 1100 -- -- 120 -- 122/83 -- 91    11/26/23 1000 -- -- 118 -- 141/78 --  81    11/26/23 0900 -- -- 114 -- 133/81 -- 98    11/26/23 0855 98 (36.7) Axillary 121 18 135/81 -- 99    11/26/23 0600 -- -- 110 -- 134/85 -- 99    11/26/23 0500 -- -- 103 -- 116/81 -- 100    11/26/23 0400 98.2 (36.8) Oral 113 12 116/70 Lying 94    11/26/23 0300 -- -- 112 -- 118/68 -- 95    11/26/23 0200 -- -- 121 -- 117/84 -- 92    11/26/23 0100 -- -- 120 -- 111/67 -- 98    11/26/23 0000 98.3 (36.8) Oral 122 16 120/69 Lying 99          Oxygen Therapy (last day)       Date/Time SpO2 Device (Oxygen Therapy) Flow (L/min) Oxygen Concentration (%) ETCO2 (mmHg)    11/27/23 1415 93 -- -- -- --    11/27/23 1400 93 -- -- -- --    11/27/23 1350 93 -- -- -- --    11/27/23 1331 95 -- -- -- --    11/27/23 1300 100 -- -- -- --    11/27/23 1200 100 room air -- -- --    11/27/23 1115 94 -- -- -- --    11/27/23 1100 94 -- -- -- --    11/27/23 1000 95 -- -- -- --    11/27/23 0900 98 -- -- -- --    11/27/23 0806 97 -- -- -- --    11/27/23 0800 98 room air -- -- --    11/27/23 0700 94 -- -- -- --    11/27/23 0600 100 -- -- -- --    11/27/23 0500 92 -- -- -- --    11/27/23 0400 94 room air -- -- --    11/27/23 0300 94 -- -- -- --    11/27/23 0200 93 -- -- -- --    11/27/23 0100 91 -- -- -- --    11/27/23 0000 94 room air -- -- --    11/26/23 2300 92 -- -- -- --    11/26/23 2200 91 -- -- -- --    11/26/23 2100 99 -- -- -- --    11/26/23 2000 98 room air -- -- --    11/26/23 1900 96 -- -- -- --    11/26/23 1800 98 -- -- -- --    11/26/23 1700 98 -- -- -- --    11/26/23 1600 98 room air -- -- --    11/26/23 1511 90 -- -- -- --    11/26/23 1200 91 -- -- -- --    11/26/23 1100 91 -- -- -- --    11/26/23 1000 81 -- -- -- --    11/26/23 0900 98 -- -- -- --    11/26/23 0855 99 -- -- -- --    11/26/23 0815 -- room air -- -- --    11/26/23 0600 99 -- -- -- --    11/26/23 0500 100 -- -- -- --    11/26/23 0400 94 nasal cannula 2 -- --    11/26/23 0300 95 -- -- -- --    11/26/23 0200 92 -- -- -- --    11/26/23 0100 98 -- -- -- --    11/26/23 0000  99 nasal cannula 2 -- --          Lines, Drains & Airways       Active LDAs       Name Placement date Placement time Site Days    Peripheral IV 11/26/23 0800 Posterior;Right Hand 11/26/23  0800  Hand  1    Peripheral IV 11/26/23 Right;Upper Arm 11/26/23  --  Arm  1    Nephrostomy Left 8 Fr. 11/21/23  0033  Left  6    Colostomy LUQ --  pt stated 2-3 years ago  --  LUQ  --    Urostomy RUQ --  pt stated 2-3 years ago  --  RUQ  --                  Orders (last 24 hrs)        Start     Ordered    11/28/23 0000  Magnesium  Morning Draw         11/27/23 0612    11/27/23 2100  cefTRIAXone (ROCEPHIN) 2,000 mg in sodium chloride 0.9 % 100 mL IVPB-VTB  Every 24 Hours         11/27/23 1304    11/27/23 1413  FL Guided Aspiration Joint  1 Time Imaging         11/27/23 1304    11/27/23 1304  CT Guided Arthrocentesis  1 Time Imaging,   Status:  Canceled         11/27/23 1304    11/27/23 1300  acetaminophen (TYLENOL) tablet 650 mg  Once        See Hyperspace for full Linked Orders Report.    11/27/23 1214    11/27/23 1300  acetaminophen (TYLENOL) 160 MG/5ML oral solution 650 mg  Once        See Hyperspace for full Linked Orders Report.    11/27/23 1214    11/27/23 1300  acetaminophen (TYLENOL) suppository 650 mg  Once        See Hyperspace for full Linked Orders Report.    11/27/23 1214    11/27/23 1234  Adult Transthoracic Echo Complete w/ Color, Spectral and Contrast if Necessary Per Protocol  Once         11/27/23 1151    11/27/23 1214  Verify Informed Consent for Blood Product Administration  Once         11/27/23 1214    11/27/23 1214  Prepare RBC, 1 Units  Blood - Once         11/27/23 1214    11/27/23 1214  Transfuse RBC Infuse Each Unit Over: 3.5H  Transfusion         11/27/23 1214    11/27/23 1152  Adult Transthoracic Echo 3D Complete W/ Cont If Necessary Per Protocol  Once,   Status:  Canceled         11/27/23 1151    11/27/23 1151  Inpatient Infectious Diseases Consult  Once        Specialty:  Infectious Diseases   Provider:  Samm Reyes MD    11/27/23 1150    11/27/23 1150  Inpatient General Surgery Consult  Once        Specialty:  General Surgery  Provider:  Diane Causey MD    11/27/23 1150    11/27/23 1140  Inpatient Case Management  Consult  Once        Provider:  (Not yet assigned)    11/27/23 1140    11/27/23 0824  POC Glucose Once  PROCEDURE ONCE        Comments: Complete no more than 45 minutes prior to patient eating      11/27/23 0818    11/27/23 0700  magnesium sulfate 4g/100mL (PREMIX) infusion  Once         11/27/23 0612    11/27/23 0632  High Sensitivity Troponin T 2Hr  PROCEDURE ONCE         11/27/23 0632    11/27/23 0600  High Sensitivity Troponin T  Morning Draw,   Status:  Canceled         11/27/23 0510    11/27/23 0538  High Sensitivity Troponin T  Once         11/27/23 0537    11/27/23 0511  ECG 12 Lead Chest Pain  Once         11/27/23 0510    11/27/23 0230  calcium carbonate (TUMS) chewable tablet 500 mg (200 mg elemental)  Once         11/27/23 0144    11/27/23 0119  acetaminophen (TYLENOL) tablet 650 mg  Every 6 Hours PRN         11/27/23 0119    11/27/23 0000  Magnesium  Morning Draw         11/26/23 0626    11/26/23 2100  fondaparinux (ARIXTRA) injection 2.5 mg  Nightly,   Status:  Discontinued         11/26/23 1042    11/26/23 1500  ferrous sulfate tablet 325 mg  Daily With Breakfast         11/26/23 1253    11/25/23 1600  CBC (No Diff)  Daily       11/25/23 0844    11/25/23 0930  gabapentin (NEURONTIN) capsule 200 mg  3 Times Daily         11/25/23 0841    11/25/23 0900  amitriptyline (ELAVIL) tablet 75 mg  Every 12 Hours Scheduled         11/25/23 0802    11/24/23 1649  Magnesium Cardiology Dose Replacement - Follow Nurse / BPA Driven Protocol  As Needed         11/24/23 1649    11/24/23 1649  Phosphorus Replacement - Follow Nurse / BPA Driven Protocol  As Needed         11/24/23 1649    11/24/23 1649  Calcium Replacement - Follow Nurse / BPA Driven Protocol  As  Needed         11/24/23 1649    11/24/23 1649  Potassium Replacement - Follow Nurse / BPA Driven Protocol  As Needed         11/24/23 1649    11/24/23 0600  Renal Function Panel  Daily       11/23/23 0720    11/22/23 1405  oxyCODONE-acetaminophen (PERCOCET)  MG per tablet 1 tablet  Every 4 Hours PRN         11/22/23 1405    11/22/23 1145  dextrose 5 % and sodium chloride 0.45 % infusion  Continuous         11/22/23 1057    11/22/23 0800  Dietary Nutrition Supplements Margarito  Daily With Breakfast & Lunch      Comments: Mixed in crystal light    11/21/23 1541    11/21/23 2100  cefTRIAXone (ROCEPHIN) 2,000 mg in sodium chloride 0.9 % 100 mL IVPB-VTB  Every 24 Hours,   Status:  Discontinued         11/20/23 2228    11/21/23 2100  busPIRone (BUSPAR) tablet 15 mg  Every 12 Hours Scheduled         11/21/23 1554    11/21/23 1800  Dietary Nutrition Supplements Boost Plus (Ensure Enlive, Ensure Plus)  Daily With Breakfast & Dinner       11/21/23 1541    11/21/23 1730  ARIPiprazole (ABILIFY) tablet 5 mg  Daily         11/21/23 1554    11/21/23 1730  baclofen (LIORESAL) tablet 10 mg  3 Times Daily         11/21/23 1554    11/21/23 1730  venlafaxine XR (EFFEXOR-XR) 24 hr capsule 150 mg  Daily         11/21/23 1554    11/21/23 1100  nicotine (NICODERM CQ) 14 MG/24HR patch 1 patch  Every 24 Hours Scheduled         11/21/23 0940    11/21/23 1043  Silicone Border Dressing to Bony Prominences  Every Shift       11/21/23 1042    11/21/23 1016  CBC (No Diff)  Daily       11/21/23 1015    11/20/23 2336  dextrose (GLUTOSE) oral gel 15 g  Every 15 Minutes PRN         11/20/23 2337    11/20/23 2336  dextrose (D50W) (25 g/50 mL) IV injection 25 g  Every 15 Minutes PRN         11/20/23 2337    11/20/23 2336  glucagon (GLUCAGEN) injection 1 mg  Every 15 Minutes PRN         11/20/23 2337    11/20/23 2300  Vital Signs Every Hour and Per Hospital Policy Based on Patient Condition  Every Hour       11/20/23 2228 11/20/23 2300  Intake &  Output  Every Hour       11/20/23 2228 11/20/23 2244  sodium chloride 0.9 % flush 10 mL  Every 12 Hours Scheduled         11/20/23 2228 11/20/23 2244  sennosides-docusate (PERICOLACE) 8.6-50 MG per tablet 2 tablet  2 Times Daily        See Hyperspace for full Linked Orders Report.    11/20/23 2228 11/20/23 2229  Daily Weights  Daily       11/20/23 2228 11/20/23 2228  Oral Care - Patient Not on NPPV & Not Intubated  Every Shift       11/20/23 2228 11/20/23 2227  nitroglycerin (NITROSTAT) SL tablet 0.4 mg  Every 5 Minutes PRN         11/20/23 2228 11/20/23 2227  sodium chloride 0.9 % flush 10 mL  As Needed         11/20/23 2228 11/20/23 2227  sodium chloride 0.9 % infusion 40 mL  As Needed         11/20/23 2228 11/20/23 2227  polyethylene glycol (MIRALAX) packet 17 g  Daily PRN        See Hyperspace for full Linked Orders Report.    11/20/23 2228 11/20/23 2227  bisacodyl (DULCOLAX) EC tablet 5 mg  Daily PRN        See Hyperspace for full Linked Orders Report.    11/20/23 2228 11/20/23 2227  bisacodyl (DULCOLAX) suppository 10 mg  Daily PRN        See Hyperspace for full Linked Orders Report.    11/20/23 2228    Unscheduled  Follow Hypoglycemia Standing Orders For Blood Glucose <70 & Notify Provider of Treatment  As Needed      Comments: Follow Hypoglycemia Orders As Outlined in Process Instructions (Open Order Report to View Full Instructions)  Notify Provider Any Time Hypoglycemia Treatment is Administered    11/20/23 2337    Unscheduled  Wound Care  As Needed      Comments: Apply Calmoseptine    11/21/23 1042    --  SCANNED - TELEMETRY           11/20/23 0000    --  SCANNED - TELEMETRY           11/20/23 0000    --  SCANNED - TELEMETRY           11/20/23 0000    --  SCANNED - TELEMETRY           11/20/23 0000    --  SCANNED - TELEMETRY           11/20/23 0000    --  SCANNED - TELEMETRY           11/20/23 0000    --  SCANNED - TELEMETRY           11/20/23 0000    --  SCANNED - TELEMETRY            23 0000    --  SCANNED - TELEMETRY           23 0000    --  SCANNED - TELEMETRY           23 0000    --  SCANNED - TELEMETRY           23 0000    Signed and Held  Body Fluid Culture - Aspirate, Hip, Right  Once         Signed and Held    Signed and Held  Crystal Exam, Fluid - Synovial Fluid,  Once         Signed and Held    Signed and Held  Body Fluid Cell Count With Differential - Aspirate, Hip, Right  Once         Signed and Held    Signed and Held  Obtain Informed Consent  Once         Signed and Held                     Physician Progress Notes (last 24 hours)        Artis Headley MD at 23 1146              [facility]  [phone]  Dr. Artis Headley    Subjective:  LOS: 7    Chief Complaint: Septic shock    Complains of right lower chest pain.  States he feels it may be gas.  Despite laxatives not having any colostomy output.    Objective   Vital Signs past 24hrs  Temp range: Temp (24hrs), Av.2 °F (37.3 °C), Min:97.6 °F (36.4 °C), Max:100.7 °F (38.2 °C)    BP range: BP: (102-145)/(60-90) 135/90  Pulse range: Heart Rate:  [] 112  Resp rate range: Resp:  [16-20] 20  Device (Oxygen Therapy): room air   Oxygen range:SpO2:  [90 %-100 %] 94 %       Physical Exam  Constitutional:       Appearance: He is ill-appearing.   Cardiovascular:      Rate and Rhythm: Regular rhythm. Tachycardia present.      Pulses: Decreased pulses.      Heart sounds: No murmur heard.  Pulmonary:      Effort: Pulmonary effort is normal.      Breath sounds: Decreased breath sounds present.   Abdominal:      General: Bowel sounds are normal.      Palpations: Abdomen is soft. There is no mass.      Tenderness: There is no abdominal tenderness.      Comments: Diverting colostomy  Ileal conduit   Skin:     Comments: Improved circulation to the feet   Neurological:      Mental Status: He is alert.       Results Review:    I have reviewed the laboratory and imaging data since the last note  by [facility] physician.  My annotations are noted in assessment and plan.      Result Review:  I have personally reviewed the results from last note by [facility] physician to 11/27/2023 11:46 EST and agree with these findings:  [x]  Laboratory list / accordion  [x]  Microbiology  [x]  Radiology  [x]  EKG/Telemetry   []  Cardiology/Vascular   []  Pathology  []  Old records  []  Other:    Medication Review:  I have reviewed the current MAR.  My annotations are noted in assessment and plan.    amitriptyline, 75 mg, Oral, Q12H  ARIPiprazole, 5 mg, Oral, Daily  baclofen, 10 mg, Oral, TID  busPIRone, 15 mg, Oral, Q12H  cefTRIAXone, 2,000 mg, Intravenous, Q24H  ferrous sulfate, 325 mg, Oral, Daily With Breakfast  gabapentin, 200 mg, Oral, TID  magnesium sulfate, 4 g, Intravenous, Once  nicotine, 1 patch, Transdermal, Q24H  senna-docusate sodium, 2 tablet, Oral, BID  sodium chloride, 10 mL, Intravenous, Q12H  venlafaxine XR, 150 mg, Oral, Daily        dextrose 5 % and sodium chloride 0.45 %, 75 mL/hr, Last Rate: 75 mL/hr (11/26/23 2031)      Lines, Drains & Airways       Active LDAs       Name Placement date Placement time Site Days    Peripheral IV 11/20/23 2031 Anterior;Right;Upper Arm 11/20/23 2031  Arm  less than 1    Peripheral IV 11/20/23 2101 Anterior;Left;Upper Arm 11/20/23 2101  Arm  less than 1    Peripheral IV 11/20/23 2123 Anterior;Left;Proximal;Upper Arm 11/20/23 2123  Arm  less than 1    Nephrostomy Left 8 Fr. 11/21/23  0033  Left  less than 1    Colostomy LUQ --  pt stated 2-3 years ago  --  LUQ  --    Urostomy RUQ --  pt stated 2-3 years ago  --  RUQ  --                  Isolation status: No active isolations    Dietary Orders (From admission, onward)       Start     Ordered    11/22/23 0800  Dietary Nutrition Supplements Margarito  Daily With Breakfast & Lunch      Comments: Mixed in crystal light   Question:  Select Supplement:  Answer:  Margarito    11/21/23 1541    11/21/23 1800  Dietary Nutrition Supplements Boost Plus  (Ensure Enlive, Ensure Plus)  Daily With Breakfast & Dinner      Question:  Select Supplement:  Answer:  Boost Plus (Ensure Enlive, Ensure Plus)    11/21/23 1541    11/21/23 0819  Diet: Regular/House Diet; Texture: Regular Texture (IDDSI 7); Fluid Consistency: Thin (IDDSI 0)  Diet Effective Now        References:    Diet Order [place]   Question Answer Comment   Diets: Regular/House Diet    Texture: Regular Texture (IDDSI 7)    Fluid Consistency: Thin (IDDSI 0)        11/21/23 0818                    PCC Problems  Sepsis with septic shock  Status post left nephrostomy tube 11/20/2023  Left pyelonephritis  Left ureteral stone  Bacteremia with E. coli and Streptococcus  Paraplegia  Lactic acidosis  Acute kidney injury  Acute lactic acidosis  Thrombocytopenia  Colostomy in place  Ileal conduit with urinary diversion in place  Cigarette smoker  UDS positive for cocaine and THC  Severe anemia  Left renal subcapsular hematoma  Left perirenal nodule/nodes, requires follow-up CT  Parastomal hernia with loop of small bowel in left lower quadrant colostomy  Right hip synovitis  Right-sided chest pain      Plan of Treatment    Septic shock resolved.    Left ureteral stone with pyelonephritis and left nephrostomy tube.  Note plans to keep nephrostomy tube for 2 weeks.    CT abdomen yet today with subcapsular hematoma left kidney.  Plan is to keep nephrostomy tube for 2 weeks.  Continue to monitor hemoglobin at least twice a day.    Left perirenal nodules/nodes that require follow-up CT.  Defer to urology for outpatient monitoring.    Parastomal hernia with loop of small bowel in the left lower quadrant colostomy.  Patient reports that he usually has diarrhea but recently no output from colostomy.  Will ask for general surgery opinion.    Right hip synovitis suspected.  Will ask for surgery opinion to see if antibiotics need to be extended or if right hip arthrocentesis is required.    Bacteremia with E. coli and  Streptococcus.  Currently on ceftriaxone.  E. coli is grimaldo susceptible.  Presumably also Streptococcus.  Repeat blood cultures are so far no growth.    Keep same fluids.  Patient is still tachycardic.    Patient reports ongoing chest pain.  I suspect this is more GI related.  However obtain echo to rule out wall motion abnormalities.  Troponin is insignificantly elevated and there are no EKG changes.    Both feet have Doppler pulses bilaterally.  Arterial Dopplers done recently in August showed normal MONICA on the right and normal MONICA on the left.  Note wound nurse input.  Previously vascular has recommended continued local wound care with no additional interventions.    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    Current cigarette smoker.  Give nicotine patch.  Patient will be counseled to quit smoking prior to discharge.    Positive UDS for cocaine and THC on admission.  Access center input noted.    Platelets have normalized.      Low hemoglobin despite transfusion.  Appreciate hematology input.  Likely chronic disease.  Also due to renal subcapsular hematoma.    Not presently on any anticoagulation or DVT prophylaxis due to anemia and drop in hemoglobin.     On regular diet.    Patient plans to return home after discharge. CCP has seen.    Keep in ICU for next 24 hours and then consider transfer out.    I spoke to his girlfriend as well who was in the room.    Artis Headley MD  11/27/23  11:46 EST      Part of this note may be an electronic transcription/translation of spoken language to printed text using the Dragon Dictation System.      Electronically signed by Artis Headley MD at 11/27/23 4015       Elijah Castro MD at 11/27/23 9999          [facility]  11/27/2023  09:43 EST      CT 11/27/2023 Reviewed   Left renal subcapsular hematoma has developed since the previous CT 6  days ago and extends along the posterior aspect of the left kidney and  below the left kidney compressing  the left kidney anteriorly and  measuring 4.5 x 10 x 19 cm. Hemorrhage extends inferiorly anterolateral  to the left psoas muscle.      - We will hold off on recommendation for left nephr tube internalization to left stent for another 2 weeks.    - Spoke with IR.  This will be done as outpatient in approximately two weeks.    - Urology will follow through stability.        Elijah Castro MD  [facility]  General & Reconstructive Urology  Office: [phone]  Fax: [phone]     Electronically signed by Elijah Castro MD at 11/27/23 0944       Elijah Castro MD at 11/27/23 0805          [facility] Update  11/27/2023  08:05 EST      Timing unclear for left Nephrostomy internalization to left ureteral stent.    - Recommend assessment of elevated Trop and decreased colostomy output     - He also received Fondaparinux this AM.    - Ultimately, Urology will be arranging outpatient Left ureterscopy/Laser/basket/stent exchange in the next 2-3 weeks.    . Urology will follow           Elijah Castro MD  [facility]  General & Reconstructive Urology  Office: [phone]  Fax: [phone]     Electronically signed by Elijah Castro MD at 11/27/23 0808          Consult Notes (last 24 hours)        Samm Reyes MD at 11/27/23 1157        Consult Orders    1. Inpatient Infectious Diseases Consult [[id number]] ordered by Artis Headley MD at 11/27/23 1150                 Referring Provider: Dr Headley      Subjective   History of present illness: 43-year-old with history of polysubstance abuse, paraplegia requiring colostomy and ileal conduit/urinary diversion with urostomy in [place] and history of kidney stones admitted on 11/20/2023 with flank pain and nausea vomiting.  He was found to have septic shock and CT showed a 7 mm left ureteral stone with evidence of pyelonephritis.  He underwent nephrostomy tube placement by interventional radiology and admission blood cultures grew E.  coli.  He initially required pressors but is now been weaned off pressors.  He also has had anemia and thrombocytopenia, the latter resolved.  Hematology is following and CT imaging performed today revealed a left subscapular hematoma 4.5 x 10.9 x 19 cm extending into the left anterior lateral psoas muscle there is also evidence of right hip synovial thickening with mild stranding around the hip joint with fat.  The patient has no sensation below the umbilicus and therefore cannot tell if there is any pain.  He was febrile yesterday as high as 100.7    Physical Exam:   Vital Signs   Temp:  [97.6 °F (36.4 °C)-100.7 °F (38.2 °C)] 97.6 °F (36.4 °C)  Heart Rate:  [] 112  Resp:  [16-20] 20  BP: (102-145)/(60-90) 135/90    GENERAL: Awake and alert, chronically ill-appearing  HEENT: Oropharynx is clear. Hearing is grossly normal.   EYES: . No conjunctival injection. No lid lag.   LUNGS:normal respiratory effort.   SKIN: no cutaneous eruptions in exposed areas  PSYCHIATRIC: Appropriate mood, affect, insight, and judgment.     Results Review:  WBC 10.3, hgb 7, plt 413  Cr 0.75  LFTs nl    Microbiology:  11/20 Bcx 2/2 E coli (pan S); strep spp not viable  11/23 Bcx 2/2 ngtd    Radiology:  CT abdomen pelvis as per HPI    A/p  1.  E. coli septicemia with shock, shock resolved  2.  Left pyelonephritis secondary to obstructive uropathy status post nephrostomy tube placement  3.  Right synovial thickening, rule out septic arthritis  4.  Left renal subscapular hematoma    Continue ceftriaxone 2 g IV every 24 hours.  Unfortunately given the patient's paraplegia there is really no great way to tell the history if he has a septic joint.  Discussed with Dr. Headley and I will order IR guided aspiration of the right hip to rule out septic joint especially in light of positive blood cultures on admission.  We will continue the ceftriaxone.  He also had strep present on admission blood cultures.  If discussions with the microbiology  lab this did not grow in blood cultures.  Regardless, this should be covered by the ceftriaxone.    Thank you for this consult.  We will continue to follow along and tailor antibiotics as the patient's clinical course evolves.              Electronically signed by Samm Reyes MD at 11/27/23 0667

## 2023-11-27 NOTE — CONSULTS
Referring Provider: Dr Headley      Subjective   History of present illness: 43-year-old with history of polysubstance abuse, paraplegia requiring colostomy and ileal conduit/urinary diversion with urostomy in [place] and history of kidney stones admitted on 11/20/2023 with flank pain and nausea vomiting.  He was found to have septic shock and CT showed a 7 mm left ureteral stone with evidence of pyelonephritis.  He underwent nephrostomy tube placement by interventional radiology and admission blood cultures grew E. coli.  He initially required pressors but is now been weaned off pressors.  He also has had anemia and thrombocytopenia, the latter resolved.  Hematology is following and CT imaging performed today revealed a left subscapular hematoma 4.5 x 10.9 x 19 cm extending into the left anterior lateral psoas muscle there is also evidence of right hip synovial thickening with mild stranding around the hip joint with fat.  The patient has no sensation below the umbilicus and therefore cannot tell if there is any pain.  He was febrile yesterday as high as 100.7    Physical Exam:   Vital Signs   Temp:  [97.6 °F (36.4 °C)-100.7 °F (38.2 °C)] 97.6 °F (36.4 °C)  Heart Rate:  [] 112  Resp:  [16-20] 20  BP: (102-145)/(60-90) 135/90    GENERAL: Awake and alert, chronically ill-appearing  HEENT: Oropharynx is clear. Hearing is grossly normal.   EYES: . No conjunctival injection. No lid lag.   LUNGS:normal respiratory effort.   SKIN: no cutaneous eruptions in exposed areas  PSYCHIATRIC: Appropriate mood, affect, insight, and judgment.     Results Review:  WBC 10.3, hgb 7, plt 413  Cr 0.75  LFTs nl    Microbiology:  11/20 Bcx 2/2 E coli (pan S); strep spp not viable  11/23 Bcx 2/2 ngtd    Radiology:  CT abdomen pelvis as per HPI    A/p  1.  E. coli septicemia with shock, shock resolved  2.  Left pyelonephritis secondary to obstructive uropathy status post nephrostomy tube placement  3.  Right synovial thickening,  rule out septic arthritis  4.  Left renal subscapular hematoma    Continue ceftriaxone 2 g IV every 24 hours.  Unfortunately given the patient's paraplegia there is really no great way to tell the history if he has a septic joint.  Discussed with Dr. Headley and I will order IR guided aspiration of the right hip to rule out septic joint especially in light of positive blood cultures on admission.  We will continue the ceftriaxone.  He also had strep present on admission blood cultures.  If discussions with the microbiology lab this did not grow in blood cultures.  Regardless, this should be covered by the ceftriaxone.    Thank you for this consult.  We will continue to follow along and tailor antibiotics as the patient's clinical course evolves.
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
## 2023-11-27 NOTE — PROGRESS NOTES
[facility]  [phone]  Dr. Artis Headley    Subjective:  LOS: 7    Chief Complaint: Septic shock    Complains of right lower chest pain.  States he feels it may be gas.  Despite laxatives not having any colostomy output.    Objective   Vital Signs past 24hrs  Temp range: Temp (24hrs), Av.2 °F (37.3 °C), Min:97.6 °F (36.4 °C), Max:100.7 °F (38.2 °C)    BP range: BP: (102-145)/(60-90) 135/90  Pulse range: Heart Rate:  [] 112  Resp rate range: Resp:  [16-20] 20  Device (Oxygen Therapy): room air   Oxygen range:SpO2:  [90 %-100 %] 94 %       Physical Exam  Constitutional:       Appearance: He is ill-appearing.   Cardiovascular:      Rate and Rhythm: Regular rhythm. Tachycardia present.      Pulses: Decreased pulses.      Heart sounds: No murmur heard.  Pulmonary:      Effort: Pulmonary effort is normal.      Breath sounds: Decreased breath sounds present.   Abdominal:      General: Bowel sounds are normal.      Palpations: Abdomen is soft. There is no mass.      Tenderness: There is no abdominal tenderness.      Comments: Diverting colostomy  Ileal conduit   Skin:     Comments: Improved circulation to the feet   Neurological:      Mental Status: He is alert.       Results Review:    I have reviewed the laboratory and imaging data since the last note by [facility] physician.  My annotations are noted in assessment and plan.      Result Review:  I have personally reviewed the results from last note by [facility] physician to 2023 11:46 EST and agree with these findings:  [x]  Laboratory list / accordion  [x]  Microbiology  [x]  Radiology  [x]  EKG/Telemetry   []  Cardiology/Vascular   []  Pathology  []  Old records  []  Other:    Medication Review:  I have reviewed the current MAR.  My annotations are noted in assessment and plan.    amitriptyline, 75 mg, Oral, Q12H  ARIPiprazole, 5 mg, Oral, Daily  baclofen, 10 mg, Oral, TID  busPIRone, 15 mg, Oral, Q12H  cefTRIAXone, 2,000 mg, Intravenous,  Q24H  ferrous sulfate, 325 mg, Oral, Daily With Breakfast  gabapentin, 200 mg, Oral, TID  magnesium sulfate, 4 g, Intravenous, Once  nicotine, 1 patch, Transdermal, Q24H  senna-docusate sodium, 2 tablet, Oral, BID  sodium chloride, 10 mL, Intravenous, Q12H  venlafaxine XR, 150 mg, Oral, Daily        dextrose 5 % and sodium chloride 0.45 %, 75 mL/hr, Last Rate: 75 mL/hr (11/26/23 2031)      Lines, Drains & Airways       Active LDAs       Name Placement date Placement time Site Days    Peripheral IV 11/20/23 2031 Anterior;Right;Upper Arm 11/20/23 2031  Arm  less than 1    Peripheral IV 11/20/23 2101 Anterior;Left;Upper Arm 11/20/23 2101  Arm  less than 1    Peripheral IV 11/20/23 2123 Anterior;Left;Proximal;Upper Arm 11/20/23 2123  Arm  less than 1    Nephrostomy Left 8 Fr. 11/21/23  0033  Left  less than 1    Colostomy LUQ --  pt stated 2-3 years ago  --  LUQ  --    Urostomy RUQ --  pt stated 2-3 years ago  --  RUQ  --                  Isolation status: No active isolations    Dietary Orders (From admission, onward)       Start     Ordered    11/22/23 0800  Dietary Nutrition Supplements Margarito  Daily With Breakfast & Lunch      Comments: Mixed in crystal light   Question:  Select Supplement:  Answer:  Margarito    11/21/23 1541    11/21/23 1800  Dietary Nutrition Supplements Boost Plus (Ensure Enlive, Ensure Plus)  Daily With Breakfast & Dinner      Question:  Select Supplement:  Answer:  Boost Plus (Ensure Enlive, Ensure Plus)    11/21/23 1541    11/21/23 0819  Diet: Regular/House Diet; Texture: Regular Texture (IDDSI 7); Fluid Consistency: Thin (IDDSI 0)  Diet Effective Now        References:    Diet Order [place]   Question Answer Comment   Diets: Regular/House Diet    Texture: Regular Texture (IDDSI 7)    Fluid Consistency: Thin (IDDSI 0)        11/21/23 0818                    PCCM Problems  Sepsis with septic shock  Status post left nephrostomy tube 11/20/2023  Left pyelonephritis  Left ureteral  stone  Bacteremia with E. coli and Streptococcus  Paraplegia  Lactic acidosis  Acute kidney injury  Acute lactic acidosis  Thrombocytopenia  Colostomy in place  Ileal conduit with urinary diversion in place  Cigarette smoker  UDS positive for cocaine and THC  Severe anemia  Left renal subcapsular hematoma  Left perirenal nodule/nodes, requires follow-up CT  Parastomal hernia with loop of small bowel in left lower quadrant colostomy  Right hip synovitis  Right-sided chest pain      Plan of Treatment    Septic shock resolved.    Left ureteral stone with pyelonephritis and left nephrostomy tube.  Note plans to keep nephrostomy tube for 2 weeks.    CT abdomen yet today with subcapsular hematoma left kidney.  Plan is to keep nephrostomy tube for 2 weeks.  Continue to monitor hemoglobin at least twice a day.    Left perirenal nodules/nodes that require follow-up CT.  Defer to urology for outpatient monitoring.    Parastomal hernia with loop of small bowel in the left lower quadrant colostomy.  Patient reports that he usually has diarrhea but recently no output from colostomy.  Will ask for general surgery opinion.    Right hip synovitis suspected.  Will ask for surgery opinion to see if antibiotics need to be extended or if right hip arthrocentesis is required.    Bacteremia with E. coli and Streptococcus.  Currently on ceftriaxone.  E. coli is grimaldo susceptible.  Presumably also Streptococcus.  Repeat blood cultures are so far no growth.    Keep same fluids.  Patient is still tachycardic.    Patient reports ongoing chest pain.  I suspect this is more GI related.  However obtain echo to rule out wall motion abnormalities.  Troponin is insignificantly elevated and there are no EKG changes.    Both feet have Doppler pulses bilaterally.  Arterial Dopplers done recently in August showed normal MONICA on the right and normal MONICA on the left.  Note wound nurse input.  Previously vascular has recommended continued local wound care  with no additional interventions.    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    Current cigarette smoker.  Give nicotine patch.  Patient will be counseled to quit smoking prior to discharge.    Positive UDS for cocaine and THC on admission.  Access center input noted.    Platelets have normalized.      Low hemoglobin despite transfusion.  Appreciate hematology input.  Likely chronic disease.  Also due to renal subcapsular hematoma.    Not presently on any anticoagulation or DVT prophylaxis due to anemia and drop in hemoglobin.     On regular diet.    Patient plans to return home after discharge. CCP has seen.    Keep in ICU for next 24 hours and then consider transfer out.    I spoke to his girlfriend as well who was in the room.    Artis Headley MD  11/27/23  11:46 EST      Part of this note may be an electronic transcription/translation of spoken language to printed text using the Dragon Dictation System.

## 2023-11-28 ENCOUNTER — APPOINTMENT (OUTPATIENT)
Dept: GENERAL RADIOLOGY | Facility: HOSPITAL | Age: 43
DRG: 853 | End: 2023-11-28
Payer: MEDICARE

## 2023-11-28 LAB
ALBUMIN SERPL-MCNC: 2.6 G/DL (ref 3.5–5.2)
ANION GAP SERPL CALCULATED.3IONS-SCNC: 6.8 MMOL/L (ref 5–15)
BACTERIA SPEC AEROBE CULT: NORMAL
BACTERIA SPEC AEROBE CULT: NORMAL
BH BB BLOOD EXPIRATION DATE: NORMAL
BH BB BLOOD TYPE BARCODE: 5100
BH BB DISPENSE STATUS: NORMAL
BH BB PRODUCT CODE: NORMAL
BH BB UNIT NUMBER: NORMAL
BUN SERPL-MCNC: 10 MG/DL (ref 6–20)
BUN/CREAT SERPL: 20 (ref 7–25)
CALCIUM SPEC-SCNC: 8.4 MG/DL (ref 8.6–10.5)
CHLORIDE SERPL-SCNC: 102 MMOL/L (ref 98–107)
CO2 SERPL-SCNC: 26.2 MMOL/L (ref 22–29)
CREAT SERPL-MCNC: 0.5 MG/DL (ref 0.76–1.27)
CROSSMATCH INTERPRETATION: NORMAL
DEPRECATED RDW RBC AUTO: 48.1 FL (ref 37–54)
DEPRECATED RDW RBC AUTO: 48.7 FL (ref 37–54)
DEPRECATED RDW RBC AUTO: 50.8 FL (ref 37–54)
EGFRCR SERPLBLD CKD-EPI 2021: 130.6 ML/MIN/1.73
ERYTHROCYTE [DISTWIDTH] IN BLOOD BY AUTOMATED COUNT: 15.8 % (ref 12.3–15.4)
ERYTHROCYTE [DISTWIDTH] IN BLOOD BY AUTOMATED COUNT: 16 % (ref 12.3–15.4)
ERYTHROCYTE [DISTWIDTH] IN BLOOD BY AUTOMATED COUNT: 16.2 % (ref 12.3–15.4)
GLUCOSE SERPL-MCNC: 120 MG/DL (ref 65–99)
HCT VFR BLD AUTO: 24.5 % (ref 37.5–51)
HCT VFR BLD AUTO: 26.1 % (ref 37.5–51)
HCT VFR BLD AUTO: 27.3 % (ref 37.5–51)
HGB BLD-MCNC: 8.1 G/DL (ref 13–17.7)
HGB BLD-MCNC: 8.8 G/DL (ref 13–17.7)
HGB BLD-MCNC: 8.9 G/DL (ref 13–17.7)
MAGNESIUM SERPL-MCNC: 1.9 MG/DL (ref 1.6–2.6)
MCH RBC QN AUTO: 27.6 PG (ref 26.6–33)
MCH RBC QN AUTO: 28.3 PG (ref 26.6–33)
MCH RBC QN AUTO: 28.9 PG (ref 26.6–33)
MCHC RBC AUTO-ENTMCNC: 32.6 G/DL (ref 31.5–35.7)
MCHC RBC AUTO-ENTMCNC: 33.1 G/DL (ref 31.5–35.7)
MCHC RBC AUTO-ENTMCNC: 33.7 G/DL (ref 31.5–35.7)
MCV RBC AUTO: 84.8 FL (ref 79–97)
MCV RBC AUTO: 85.7 FL (ref 79–97)
MCV RBC AUTO: 85.9 FL (ref 79–97)
PHOSPHATE SERPL-MCNC: 3.6 MG/DL (ref 2.5–4.5)
PLATELET # BLD AUTO: 460 10*3/MM3 (ref 140–450)
PLATELET # BLD AUTO: 498 10*3/MM3 (ref 140–450)
PLATELET # BLD AUTO: 499 10*3/MM3 (ref 140–450)
PMV BLD AUTO: 9.1 FL (ref 6–12)
PMV BLD AUTO: 9.3 FL (ref 6–12)
PMV BLD AUTO: 9.5 FL (ref 6–12)
POTASSIUM SERPL-SCNC: 4.3 MMOL/L (ref 3.5–5.2)
QT INTERVAL: 351 MS
QTC INTERVAL: 453 MS
RBC # BLD AUTO: 2.86 10*6/MM3 (ref 4.14–5.8)
RBC # BLD AUTO: 3.04 10*6/MM3 (ref 4.14–5.8)
RBC # BLD AUTO: 3.22 10*6/MM3 (ref 4.14–5.8)
SODIUM SERPL-SCNC: 135 MMOL/L (ref 136–145)
UNIT  ABO: NORMAL
UNIT  RH: NORMAL
WBC NRBC COR # BLD AUTO: 8.17 10*3/MM3 (ref 3.4–10.8)
WBC NRBC COR # BLD AUTO: 8.38 10*3/MM3 (ref 3.4–10.8)
WBC NRBC COR # BLD AUTO: 9.11 10*3/MM3 (ref 3.4–10.8)

## 2023-11-28 PROCEDURE — 25510000001 IOPAMIDOL 61 % SOLUTION: Performed by: INTERNAL MEDICINE

## 2023-11-28 PROCEDURE — 25010000002 LIDOCAINE 1 % SOLUTION: Performed by: INTERNAL MEDICINE

## 2023-11-28 PROCEDURE — 85027 COMPLETE CBC AUTOMATED: CPT | Performed by: INTERNAL MEDICINE

## 2023-11-28 PROCEDURE — 80069 RENAL FUNCTION PANEL: CPT | Performed by: INTERNAL MEDICINE

## 2023-11-28 PROCEDURE — 25010000002 CEFTRIAXONE PER 250 MG: Performed by: INTERNAL MEDICINE

## 2023-11-28 PROCEDURE — 99232 SBSQ HOSP IP/OBS MODERATE 35: CPT | Performed by: SURGERY

## 2023-11-28 PROCEDURE — 83735 ASSAY OF MAGNESIUM: CPT | Performed by: INTERNAL MEDICINE

## 2023-11-28 PROCEDURE — 99232 SBSQ HOSP IP/OBS MODERATE 35: CPT | Performed by: INTERNAL MEDICINE

## 2023-11-28 PROCEDURE — 77002 NEEDLE LOCALIZATION BY XRAY: CPT

## 2023-11-28 PROCEDURE — 99231 SBSQ HOSP IP/OBS SF/LOW 25: CPT | Performed by: INTERNAL MEDICINE

## 2023-11-28 RX ORDER — LIDOCAINE HYDROCHLORIDE 10 MG/ML
10 INJECTION, SOLUTION INFILTRATION; PERINEURAL ONCE
Status: COMPLETED | OUTPATIENT
Start: 2023-11-28 | End: 2023-11-28

## 2023-11-28 RX ADMIN — BUSPIRONE HYDROCHLORIDE 15 MG: 15 TABLET ORAL at 20:24

## 2023-11-28 RX ADMIN — OXYCODONE HYDROCHLORIDE AND ACETAMINOPHEN 1 TABLET: 10; 325 TABLET ORAL at 15:29

## 2023-11-28 RX ADMIN — OXYCODONE HYDROCHLORIDE AND ACETAMINOPHEN 1 TABLET: 10; 325 TABLET ORAL at 00:59

## 2023-11-28 RX ADMIN — GABAPENTIN 200 MG: 100 CAPSULE ORAL at 09:24

## 2023-11-28 RX ADMIN — OXYCODONE HYDROCHLORIDE AND ACETAMINOPHEN 1 TABLET: 10; 325 TABLET ORAL at 05:24

## 2023-11-28 RX ADMIN — POLYETHYLENE GLYCOL 3350 17 G: 17 POWDER, FOR SOLUTION ORAL at 09:25

## 2023-11-28 RX ADMIN — AMITRIPTYLINE HYDROCHLORIDE 75 MG: 50 TABLET, FILM COATED ORAL at 20:24

## 2023-11-28 RX ADMIN — VENLAFAXINE HYDROCHLORIDE 150 MG: 150 CAPSULE, EXTENDED RELEASE ORAL at 09:24

## 2023-11-28 RX ADMIN — Medication 10 ML: at 20:25

## 2023-11-28 RX ADMIN — BACLOFEN 10 MG: 10 TABLET ORAL at 16:08

## 2023-11-28 RX ADMIN — CEFTRIAXONE 2000 MG: 2 INJECTION, POWDER, FOR SOLUTION INTRAMUSCULAR; INTRAVENOUS at 20:24

## 2023-11-28 RX ADMIN — GABAPENTIN 200 MG: 100 CAPSULE ORAL at 15:29

## 2023-11-28 RX ADMIN — BUSPIRONE HYDROCHLORIDE 15 MG: 15 TABLET ORAL at 09:24

## 2023-11-28 RX ADMIN — OXYCODONE HYDROCHLORIDE AND ACETAMINOPHEN 1 TABLET: 10; 325 TABLET ORAL at 10:28

## 2023-11-28 RX ADMIN — GABAPENTIN 200 MG: 100 CAPSULE ORAL at 20:24

## 2023-11-28 RX ADMIN — SENNOSIDES AND DOCUSATE SODIUM 2 TABLET: 50; 8.6 TABLET ORAL at 09:24

## 2023-11-28 RX ADMIN — POLYETHYLENE GLYCOL 3350 17 G: 17 POWDER, FOR SOLUTION ORAL at 20:24

## 2023-11-28 RX ADMIN — SENNOSIDES AND DOCUSATE SODIUM 2 TABLET: 50; 8.6 TABLET ORAL at 20:24

## 2023-11-28 RX ADMIN — Medication 10 ML: at 09:00

## 2023-11-28 RX ADMIN — LIDOCAINE HYDROCHLORIDE 3 ML: 10 INJECTION, SOLUTION INFILTRATION; PERINEURAL at 08:55

## 2023-11-28 RX ADMIN — OXYCODONE HYDROCHLORIDE AND ACETAMINOPHEN 1 TABLET: 10; 325 TABLET ORAL at 20:24

## 2023-11-28 RX ADMIN — BACLOFEN 10 MG: 10 TABLET ORAL at 20:24

## 2023-11-28 RX ADMIN — IOPAMIDOL 3 ML: 612 INJECTION, SOLUTION INTRAVENOUS at 09:00

## 2023-11-28 RX ADMIN — FERROUS SULFATE TAB 325 MG (65 MG ELEMENTAL FE) 325 MG: 325 (65 FE) TAB at 09:24

## 2023-11-28 RX ADMIN — Medication 1 PATCH: at 09:26

## 2023-11-28 RX ADMIN — ARIPIPRAZOLE 5 MG: 5 TABLET ORAL at 09:24

## 2023-11-28 RX ADMIN — AMITRIPTYLINE HYDROCHLORIDE 75 MG: 50 TABLET, FILM COATED ORAL at 09:24

## 2023-11-28 RX ADMIN — BACLOFEN 10 MG: 10 TABLET ORAL at 09:24

## 2023-11-28 NOTE — PROGRESS NOTES
"                                              LOS: 8 days   Patient Care Team:  Chasity Ruggiero APRN as PCP - General (Family Medicine)    Chief Complaint:  F/up septic shock, pyelonephritis and critical care management    Subjective   Interval History  I reviewed the admission note, progress notes, PMH, PSH, Family hx, social history, imagings and prior records on this admission, summarized the finding in my note and formulated a transition of care plan.      Tmax 100.7 yesterday around 2300.  Remains tachycardic.  Blood pressure is in normal range.  No leukocytosis.  Reported moderate pain and requested Dilaudid.  He stated that he had a kidney stone in the past and Dilaudid helped him pass it.      REVIEW OF SYSTEMS:   CARDIOVASCULAR: No chest pain, chest pressure or chest discomfort. No palpitations or edema.   RESPIRATORY: No shortness of breath, cough or sputum.   GASTROINTESTINAL: No anorexia, nausea, vomiting or diarrhea. No abdominal pain.  CONSTITUTIONAL: No fever or chills.     Ventilator/Non-Invasive Ventilation Settings (From admission, onward)      None                  Physical Exam:     Vital Signs  Temp:  [97.6 °F (36.4 °C)-99.2 °F (37.3 °C)] 99.2 °F (37.3 °C)  Heart Rate:  [] 112  Resp:  [16-20] 16  BP: ()/(50-90) 119/73    Intake/Output Summary (Last 24 hours) at 11/28/2023 0756  Last data filed at 11/28/2023 0500  Gross per 24 hour   Intake 4930 ml   Output 4325 ml   Net 605 ml     Flowsheet Rows      Flowsheet Row First Filed Value   Admission Height 177.8 cm (70\") Documented at 11/20/2023 1925   Admission Weight 95.3 kg (210 lb) Documented at 11/20/2023 1925            PPE used per hospital policy    General Appearance:   Alert, cooperative, in no acute distress   ENMT:  Mallampati score 3, moist mucous membrane   Eyes:  Pupils equal and reactive to light. EOMI   Neck:  Tracheostomy scar noted.. Trachea midline. No thyromegaly.   Lungs:    Clear to auscultation,respirations " regular, even and nonlabored    Heart:   Regular rhythm and normal rate, normal S1 and S2, no         murmur   Skin:   No rash or ecchymosis   Abdomen:   Colostomy noted.  Abdominal scars from prior surgery.  Soft. No tenderness. No HSM.   Neuro/psych:  Conscious, alert, oriented x3. Strength 5/5 in arms.  0/5 in legs.   Extremities:  No cyanosis, clubbing but mild edema in legs..  Warm extremities and well-perfused.  Mild ulceration of the distal toes with small scar formation.  No erythema or discharge.          Results Review:        Results from last 7 days   Lab Units 11/28/23  0530 11/27/23  0432 11/26/23  0449   SODIUM mmol/L 135* 139 138   POTASSIUM mmol/L 4.3 4.5 4.0   CHLORIDE mmol/L 102 105 109*   CO2 mmol/L 26.2 23.0 20.0*   BUN mg/dL 10 13 10   CREATININE mg/dL 0.50* 0.75* 0.68*   GLUCOSE mg/dL 120* 137* 110*   CALCIUM mg/dL 8.4* 8.0* 8.2*     Results from last 7 days   Lab Units 11/27/23  0432   HSTROP T ng/L 79*     Results from last 7 days   Lab Units 11/28/23  0530 11/28/23  0109 11/27/23  0432   WBC 10*3/mm3 8.38 8.17 10.32   HEMOGLOBIN g/dL 8.8* 8.9* 7.4*   HEMATOCRIT % 26.1* 27.3* 23.0*   PLATELETS 10*3/mm3 460* 499* 413     Results from last 7 days   Lab Units 11/25/23  0951   INR  1.12*                               I reviewed the patient's new clinical results.        Medication Review:   amitriptyline, 75 mg, Oral, Q12H  ARIPiprazole, 5 mg, Oral, Daily  baclofen, 10 mg, Oral, TID  busPIRone, 15 mg, Oral, Q12H  cefTRIAXone, 2,000 mg, Intravenous, Q24H  ferrous sulfate, 325 mg, Oral, Daily With Breakfast  gabapentin, 200 mg, Oral, TID  nicotine, 1 patch, Transdermal, Q24H  polyethylene glycol, 17 g, Oral, BID  senna-docusate sodium, 2 tablet, Oral, BID  sodium chloride, 10 mL, Intravenous, Q12H  venlafaxine XR, 150 mg, Oral, Daily        dextrose 5 % and sodium chloride 0.45 %, 75 mL/hr, Last Rate: 75 mL/hr (11/26/23 2031)        Diagnostic imaging:  I personally and independently reviewed the  following images:  CT of the abdomen 11/26/2023: Agree with radiologist interpretation.    Assessment     Septic shock. Shock status resolved  Left pyelonephritis/Left ureteral stone, s/p nephrostomy tube 1/20/2023  Bacteremia with E. coli and Streptococcus  Right synovial thickening, rule out septic arthritis  Acute thrombocytopenia on this admission, secondary to sepsis, resolved  Spontaneous left renal subcapsular hematoma, 4.5 x 10 x 19 cm   Small bilateral pleural effusion and bibasilar atelectasis  Acute anemia on this admission, secondary to chronic diseases, blood draws and bone marrow suppression and bleeding.  Currently stable.    Paraplegia  Bilateral toes ulcers, probably secondary to poor vascular circulation though had normal MONICA in August 2023  Lactic acidosis, resolved  ALESIA, secondary to sepsis, resolved  Cigarette smoker  UDS positive for cocaine and THC  Left perirenal nodule/nodes, 14 mm -> requires follow-up CT  chronic constipation secondary to spinal cord injury.  Parastomal hernia s/p reduction 11/27  Colostomy and ileal conduit with urinary diversion in place  Continue amitriptyline, reproducible, buspirone, gabapentin and venlafaxine      All problems new to me    Plan     Antibiotics with Rocephin.  ID following.  Plan to probably switch to p.o. antibiotics tomorrow.  IR guided aspiration of the right hip to r/o septic arthritis  Miralax BID for constipation  DVT proph with SCD  IV hydration  F/up CT of the abdomen is required later (3-month or so) to follow the progression of the perirenal nodules  Plan to discontinue the nephrostomy tube at some point and placed double-J stent in 2 weeks as outpatient.  Urology following.      Tx to telemetry.  Discussed with ICU staff, patient and family.    Javier Dooley MD  11/28/23  07:56 EST            This note was dictated utilizing [employer] dictation

## 2023-11-28 NOTE — PROGRESS NOTES
"General Surgery  Progress Note    CC: Follow-up constipation with parastomal hernia    S: He is tolerating regular diet, with multiple empty cups of pudding, applesauce and plates on his bedside table.  He denies any nausea or vomiting.  He has had gas output from his colostomy but no stool in 7 days.  He took a large amount of Imodium before coming to the hospital, and inquired if that may have contributed to his constipation.    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 and constipation; denies nausea, vomiting, or abdominal pain    O:/73   Pulse 112   Temp 97.8 °F (36.6 °C) (Oral)   Resp 16   Ht 177.8 cm (70\")   Wt 96.4 kg (212 lb 8.4 oz)   SpO2 95%   BMI 30.49 kg/m²     GENERAL: alert, well appearing, and in no distress  HEENT: normocephalic, atraumatic, moist mucous membranes, clear sclerae   CHEST: clear to auscultation, no wheezes, rales or rhonchi, symmetric air entry  CARDIAC: regular rate and rhythm    ABDOMEN: Soft, nontender, nondistended, right lower quadrant ileal conduit with yellow urine in the bag, percutaneous nephrostomy tube in left flank, colostomy left lower quadrant has an empty bag and a reducible parastomal hernia that is nontender to palpation  EXTREMITIES: 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  SKIN: Warm and moist, no rashes    LABS  Results from last 7 days   Lab Units 11/28/23  0530 11/28/23  0109 11/27/23  0432   WBC 10*3/mm3 8.38 8.17 10.32   HEMOGLOBIN g/dL 8.8* 8.9* 7.4*   HEMATOCRIT % 26.1* 27.3* 23.0*   PLATELETS 10*3/mm3 460* 499* 413     Results from last 7 days   Lab Units 11/28/23  0530 11/27/23  0432 11/26/23  0449 11/24/23  0514 11/23/23  0439 11/22/23  1649 11/22/23  0600   SODIUM mmol/L 135* 139 138   < > 136   < > 138   POTASSIUM mmol/L 4.3 4.5 4.0   < > 3.5   < > 3.7   CHLORIDE mmol/L 102 105 109*   < > 110*   < > 108*   CO2 mmol/L 26.2 23.0 20.0*   < > 16.5*   < > 12.7*   BUN mg/dL " 10 13 10   < > 39*   < > 45*   CREATININE mg/dL 0.50* 0.75* 0.68*   < > 1.30*   < > 1.65*   CALCIUM mg/dL 8.4* 8.0* 8.2*   < > 7.8*   < > 7.7*   BILIRUBIN mg/dL  --   --   --   --  0.3  --  0.4   ALK PHOS U/L  --   --   --   --  117  --  157*   ALT (SGPT) U/L  --   --   --   --  11  --  12   AST (SGOT) U/L  --   --   --   --  10  --  19   GLUCOSE mg/dL 120* 137* 110*   < > 132*   < > 78    < > = values in this interval not displayed.     Results from last 7 days   Lab Units 11/25/23  0951   INR  1.12*         A/P: 43 y.o. male with constipation, likely related to his chronic paraplegia and resulting colonic dysmotility    His parastomal hernia adjacent to the colostomy is still reducible and soft.  He shows no evidence for small bowel obstruction (i.e. no nausea or vomiting) and is tolerating regular diet.  Continue twice daily MiraLAX to encourage better bowel function.  Hold off on Imodium, as I think this could have worsened his colonic dysmotility.    Diane Causey MD  General, Robotic, and Endoscopic Surgery  [facility]    [address], [address]  [place] [address]  P: [phone]  F: [phone]

## 2023-11-28 NOTE — PROGRESS NOTES
"Nutrition Services    Patient Name:  Pk May  YOB: 1980  MRN: [id number]  Admit Date:  11/20/2023    Assessment Date:  11/28/23    Summary: Follow up note  Pt with great po intake, 100%  Labs: Na 135, glu 120, platelets 460, H/H 8.8/26.1  Wounds reviewed  Pt refused supplements  Drains: colostomy, urostomy, nephrostomy tube   Last BM 11/21 on miralax and pericolace  IVF's at 75    Will continue to follow clinical course and monitor nutritional needs.     CLINICAL NUTRITION ASSESSMENT      Reason for Assessment Follow-up Protocol     Diagnosis/Problem   Sepsis, anemia, thrombocytopenia, paraplegia, Left pyelonephritis and urosepsis, bilateral feet toes ulcers   Medical/Surgical History Past Medical History:   Diagnosis Date    Paraplegia     Wound infection        Past Surgical History:   Procedure Laterality Date    SPINAL FUSION          Anthropometrics        Current Height  Current Weight  BMI kg/m2 Height: 177.8 cm (70\")  Weight: 96.4 kg (212 lb 8.4 oz) (11/28/23 0600)  Body mass index is 30.49 kg/m².   Adjusted BMI (if applicable)    BMI Category Overweight (25 - 29.9)   Ideal Body Weight (IBW) 166 lb (75.5 kg)   Usual Body Weight (UBW) 186-220 lb   Weight Trend Loss   Weight History Wt Readings from Last 30 Encounters:   11/28/23 0600 96.4 kg (212 lb 8.4 oz)   11/27/23 1614 96 kg (211 lb 10.3 oz)   11/27/23 0600 96 kg (211 lb 10.3 oz)   11/26/23 0455 94.2 kg (207 lb 10.8 oz)   11/25/23 0600 91.4 kg (201 lb 8 oz)   11/24/23 0316 90.7 kg (199 lb 15.3 oz)   11/23/23 0600 87.3 kg (192 lb 7.4 oz)   11/22/23 0500 87.3 kg (192 lb 7.4 oz)   11/21/23 0507 84.8 kg (186 lb 15.2 oz)   11/20/23 2340 84.8 kg (186 lb 15.2 oz)   11/20/23 1925 95.3 kg (210 lb)   09/29/23 1256 99.8 kg (220 lb)   08/24/23 0515 93 kg (205 lb 0.4 oz)   08/23/23 0445 93 kg (205 lb 0.4 oz)   08/22/23 0500 91.7 kg (202 lb 2.6 oz)   08/21/23 1005 95.3 kg (210 lb)   05/16/23 2349 95.3 kg (210 lb)   03/06/23 1606 90.7 kg (200 lb) "   12/29/22 2044 94.3 kg (208 lb)   12/29/22 1751 95.3 kg (210 lb)   06/03/22 1544 95.3 kg (210 lb)   10/20/21 1423 90.7 kg (200 lb)      --  Estimated/Assessed Needs        Current Weight  Weight: 96.4 kg (212 lb 8.4 oz) (11/28/23 0600)       Energy Requirements    Weight for Calculation 186 lb (84.8 kg)   Method for Estimation  30-35 kcal/kg   EST Needs (kcal/day) 4220-1492       Protein Requirements    Weight for Calculation 186 lb (84.8 kg)   EST Protein Needs (g/kg) 1.2 - 1.5 gm/kg   EST Daily Needs (g/day) 102-127       Fluid Requirements     Method for Estimation 1 mL/kcal    EST Needs (mL/day)      Labs       Pertinent Labs    Results from last 7 days   Lab Units 11/28/23  0530 11/27/23  0432 11/26/23  0449 11/24/23  0514 11/23/23  0439 11/22/23  1649 11/22/23  0600   SODIUM mmol/L 135* 139 138   < > 136   < > 138   POTASSIUM mmol/L 4.3 4.5 4.0   < > 3.5   < > 3.7   CHLORIDE mmol/L 102 105 109*   < > 110*   < > 108*   CO2 mmol/L 26.2 23.0 20.0*   < > 16.5*   < > 12.7*   BUN mg/dL 10 13 10   < > 39*   < > 45*   CREATININE mg/dL 0.50* 0.75* 0.68*   < > 1.30*   < > 1.65*   CALCIUM mg/dL 8.4* 8.0* 8.2*   < > 7.8*   < > 7.7*   BILIRUBIN mg/dL  --   --   --   --  0.3  --  0.4   ALK PHOS U/L  --   --   --   --  117  --  157*   ALT (SGPT) U/L  --   --   --   --  11  --  12   AST (SGOT) U/L  --   --   --   --  10  --  19   GLUCOSE mg/dL 120* 137* 110*   < > 132*   < > 78    < > = values in this interval not displayed.     Results from last 7 days   Lab Units 11/28/23 0530 11/28/23 0109 11/27/23 0432 11/26/23  1542 11/26/23 0449   MAGNESIUM mg/dL  --  1.9 1.8  --  1.7   PHOSPHORUS mg/dL 3.6  --  3.8  --  3.0   HEMOGLOBIN g/dL 8.8* 8.9* 7.4*   < > 7.5*   HEMATOCRIT % 26.1* 27.3* 23.0*   < > 22.7*   WBC 10*3/mm3 8.38 8.17 10.32   < > 9.94   ALBUMIN g/dL 2.6*  --  2.4*  --  2.7*    < > = values in this interval not displayed.     Results from last 7 days   Lab Units 11/28/23 0530 11/28/23 0109 11/27/23 0432  11/26/23  1542 11/26/23  0449 11/25/23  1511 11/25/23  0951   INR   --   --   --   --   --   --  1.12*   PLATELETS 10*3/mm3 460* 499* 413 308 260   < >  --     < > = values in this interval not displayed.     SARS-CoV-2, PERCY   Date Value Ref Range Status   11/19/2022 NEGATIVE Negative Final     Comment:     The 2019-CoV rRT-PCR Assay is only for use under a Food and Drug Administration Emergency Use Authorization. The performance characteristics of the assay were verified by the Clinical Laboratory at [facility]. Results should be used in   conjunction with the patient's clinical symptoms, medical history, and other clinical/laboratory findings to determine an overall clinical diagnosis. Negative results do not preclude infection with SARS-CoV-2 (COVID-19).    Test parameters have not been validated for screening asymptomatic patients.     Lab Results   Component Value Date    HGBA1C 5.04 11/08/2021          Medications           Scheduled Medications amitriptyline, 75 mg, Oral, Q12H  ARIPiprazole, 5 mg, Oral, Daily  baclofen, 10 mg, Oral, TID  busPIRone, 15 mg, Oral, Q12H  cefTRIAXone, 2,000 mg, Intravenous, Q24H  ferrous sulfate, 325 mg, Oral, Daily With Breakfast  gabapentin, 200 mg, Oral, TID  nicotine, 1 patch, Transdermal, Q24H  polyethylene glycol, 17 g, Oral, BID  senna-docusate sodium, 2 tablet, Oral, BID  sodium chloride, 10 mL, Intravenous, Q12H  venlafaxine XR, 150 mg, Oral, Daily       Infusions dextrose 5 % and sodium chloride 0.45 %, 75 mL/hr, Last Rate: 75 mL/hr (11/26/23 2031)       PRN Medications   acetaminophen    senna-docusate sodium **AND** polyethylene glycol **AND** bisacodyl **AND** bisacodyl    Calcium Replacement - Follow Nurse / BPA Driven Protocol    dextrose    dextrose    glucagon (human recombinant)    Magnesium Cardiology Dose Replacement - Follow Nurse / BPA Driven Protocol    nitroglycerin    oxyCODONE-acetaminophen    Phosphorus Replacement - Follow Nurse / BPA  "Driven Protocol    Potassium Replacement - Follow Nurse / BPA Driven Protocol    sodium chloride    sodium chloride     Physical Findings          General Findings alert, oriented, overweight, paraplegia, room air   Oral/Mouth Cavity tooth or teeth missing   Edema  no edema   Gastrointestinal colostomy, last bowel movement: 11/21   Skin  blisters, bruising, burns, excoriation, pressure injury: R 5th toe, R heel, R great toe, L great toe, L ankle, skin tear   Tubes/Drains/Lines colostomy, urostomy , other:nephrostomy tube   NFPE Unable to perform due to: patient sleeping   --  Current Nutrition Orders & Evaluation of Intake       Oral Nutrition     Food Allergies NKFA   Current PO Diet Diet: Regular/House Diet; Texture: Regular Texture (IDDSI 7); Fluid Consistency: Thin (IDDSI 0)   Supplement Pt refusing   PO Evaluation     % PO Intake 100%  \"eating us out of the house\" per staff    :    --  PES STATEMENT / NUTRITION DIAGNOSIS      Nutrition Dx Problem  Problem: Increased Nutrient Needs  Etiology: Medical Diagnosis - pressure injuries    Signs/Symptoms: Report/Observation     NUTRITION INTERVENTION / PLAN OF CARE      Intervention Goal(s) Maintain nutrition status, Reduce/improve symptoms, Disease management/therapy, and No significant weight loss         RD Intervention/Action Continue to monitor and Care plan reviewed   --      Prescription/Orders:       PO Diet       Supplements Pt refuses      Enteral Nutrition       Parenteral Nutrition    New Prescription Ordered? Continue same per protocol   --      Monitor/Evaluation Per protocol, PO intake, Supplement intake, Pertinent labs, Weight, Skin status, Symptoms   Discharge Plan/Needs Pending clinical course   --    RD to follow per protocol.      Electronically signed by:  Loulou Pillai RD  11/28/23 14:35 EST  "

## 2023-11-28 NOTE — PLAN OF CARE
Problem: Adult Inpatient Plan of Care  Goal: Plan of Care Review  Outcome: Ongoing, Progressing  Flowsheets  Taken 11/28/2023 0545 by Radha Lopez, RN  Progress: no change  Taken 11/25/2023 0650 by Sohail Armando, RN  Plan of Care Reviewed With:   patient   significant other   Goal Outcome Evaluation:  Progress: no change

## 2023-11-28 NOTE — PROGRESS NOTES
ID note for sepsis  S: AF  Tolerating abx  No bm    Physical Exam:   Vital Signs   Temp:  [98 °F (36.7 °C)-99.2 °F (37.3 °C)] 99.2 °F (37.3 °C)  Heart Rate:  [] 116  Resp:  [16-20] 16  BP: ()/(50-89) 150/84    GENERAL: Awake and alert, chronically ill-appearing  HEENT: Oropharynx is clear. Hearing is grossly normal.   EYES: . No conjunctival injection. No lid lag.   LUNGS:normal respiratory effort.   SKIN: no cutaneous eruptions in exposed areas  PSYCHIATRIC: Appropriate mood, affect, insight, and judgment.     Results Review:  WBC 8  Cr 0.5     Microbiology:  11/20 Bcx 2/2 E coli (pan S); strep spp not viable  11/23 Bcx 2/2 ngtd     A/p  1.  E. coli septicemia with shock, shock resolved  2.  Left pyelonephritis secondary to obstructive uropathy status post nephrostomy tube placement  3.  Right synovial thickening, rule out septic arthritis  4.  Left renal subscapular hematoma    Continue ceftriaxone 2 g IV every 24 hours.  Attemped right hip arthrocentesis but was dry tap and unsuccessful.  I think this makes   septic arthritis very unlikely.  Patient says he did fall on that hip which might be the source of inflammation.  Likely transition over to oral antibiotics tomorrow still doing well
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
## 2023-11-29 LAB
ALBUMIN SERPL-MCNC: 2.4 G/DL (ref 3.5–5.2)
ANION GAP SERPL CALCULATED.3IONS-SCNC: 6.6 MMOL/L (ref 5–15)
BUN SERPL-MCNC: 15 MG/DL (ref 6–20)
BUN/CREAT SERPL: 20.3 (ref 7–25)
CALCIUM SPEC-SCNC: 8.4 MG/DL (ref 8.6–10.5)
CHLORIDE SERPL-SCNC: 106 MMOL/L (ref 98–107)
CO2 SERPL-SCNC: 25.4 MMOL/L (ref 22–29)
CREAT SERPL-MCNC: 0.74 MG/DL (ref 0.76–1.27)
DEPRECATED RDW RBC AUTO: 47.6 FL (ref 37–54)
DEPRECATED RDW RBC AUTO: 50.1 FL (ref 37–54)
EGFRCR SERPLBLD CKD-EPI 2021: 116 ML/MIN/1.73
ERYTHROCYTE [DISTWIDTH] IN BLOOD BY AUTOMATED COUNT: 15.6 % (ref 12.3–15.4)
ERYTHROCYTE [DISTWIDTH] IN BLOOD BY AUTOMATED COUNT: 15.7 % (ref 12.3–15.4)
GEN 5 2HR TROPONIN T REFLEX: 66 NG/L
GLUCOSE SERPL-MCNC: 108 MG/DL (ref 65–99)
HCT VFR BLD AUTO: 26.2 % (ref 37.5–51)
HCT VFR BLD AUTO: 26.7 % (ref 37.5–51)
HGB BLD-MCNC: 8.3 G/DL (ref 13–17.7)
HGB BLD-MCNC: 8.4 G/DL (ref 13–17.7)
MCH RBC QN AUTO: 26.9 PG (ref 26.6–33)
MCH RBC QN AUTO: 27.8 PG (ref 26.6–33)
MCHC RBC AUTO-ENTMCNC: 31.5 G/DL (ref 31.5–35.7)
MCHC RBC AUTO-ENTMCNC: 31.7 G/DL (ref 31.5–35.7)
MCV RBC AUTO: 85.1 FL (ref 79–97)
MCV RBC AUTO: 88.4 FL (ref 79–97)
PHOSPHATE SERPL-MCNC: 4.3 MG/DL (ref 2.5–4.5)
PLATELET # BLD AUTO: 545 10*3/MM3 (ref 140–450)
PLATELET # BLD AUTO: 575 10*3/MM3 (ref 140–450)
PMV BLD AUTO: 8.9 FL (ref 6–12)
PMV BLD AUTO: 9.6 FL (ref 6–12)
POTASSIUM SERPL-SCNC: 4.3 MMOL/L (ref 3.5–5.2)
RBC # BLD AUTO: 3.02 10*6/MM3 (ref 4.14–5.8)
RBC # BLD AUTO: 3.08 10*6/MM3 (ref 4.14–5.8)
SODIUM SERPL-SCNC: 138 MMOL/L (ref 136–145)
TROPONIN T DELTA: ABNORMAL
WBC NRBC COR # BLD AUTO: 7.9 10*3/MM3 (ref 3.4–10.8)
WBC NRBC COR # BLD AUTO: 9.17 10*3/MM3 (ref 3.4–10.8)

## 2023-11-29 PROCEDURE — 80069 RENAL FUNCTION PANEL: CPT | Performed by: INTERNAL MEDICINE

## 2023-11-29 PROCEDURE — 99232 SBSQ HOSP IP/OBS MODERATE 35: CPT | Performed by: INTERNAL MEDICINE

## 2023-11-29 PROCEDURE — 85027 COMPLETE CBC AUTOMATED: CPT | Performed by: INTERNAL MEDICINE

## 2023-11-29 PROCEDURE — 84484 ASSAY OF TROPONIN QUANT: CPT | Performed by: INTERNAL MEDICINE

## 2023-11-29 PROCEDURE — 99232 SBSQ HOSP IP/OBS MODERATE 35: CPT | Performed by: SURGERY

## 2023-11-29 RX ORDER — OXYCODONE AND ACETAMINOPHEN 10; 325 MG/1; MG/1
1 TABLET ORAL EVERY 4 HOURS PRN
Status: DISCONTINUED | OUTPATIENT
Start: 2023-11-29 | End: 2023-12-01 | Stop reason: HOSPADM

## 2023-11-29 RX ORDER — SULFAMETHOXAZOLE AND TRIMETHOPRIM 800; 160 MG/1; MG/1
1 TABLET ORAL EVERY 12 HOURS SCHEDULED
Status: DISCONTINUED | OUTPATIENT
Start: 2023-11-29 | End: 2023-11-30

## 2023-11-29 RX ADMIN — GABAPENTIN 200 MG: 100 CAPSULE ORAL at 08:14

## 2023-11-29 RX ADMIN — GABAPENTIN 200 MG: 100 CAPSULE ORAL at 20:21

## 2023-11-29 RX ADMIN — VENLAFAXINE HYDROCHLORIDE 150 MG: 150 CAPSULE, EXTENDED RELEASE ORAL at 08:14

## 2023-11-29 RX ADMIN — Medication 1 PATCH: at 08:16

## 2023-11-29 RX ADMIN — OXYCODONE HYDROCHLORIDE AND ACETAMINOPHEN 1 TABLET: 10; 325 TABLET ORAL at 19:22

## 2023-11-29 RX ADMIN — FERROUS SULFATE TAB 325 MG (65 MG ELEMENTAL FE) 325 MG: 325 (65 FE) TAB at 08:14

## 2023-11-29 RX ADMIN — BACLOFEN 10 MG: 10 TABLET ORAL at 20:21

## 2023-11-29 RX ADMIN — BUSPIRONE HYDROCHLORIDE 15 MG: 15 TABLET ORAL at 20:21

## 2023-11-29 RX ADMIN — OXYCODONE HYDROCHLORIDE AND ACETAMINOPHEN 1 TABLET: 10; 325 TABLET ORAL at 01:41

## 2023-11-29 RX ADMIN — SENNOSIDES AND DOCUSATE SODIUM 2 TABLET: 50; 8.6 TABLET ORAL at 20:21

## 2023-11-29 RX ADMIN — Medication 10 ML: at 22:28

## 2023-11-29 RX ADMIN — BACLOFEN 10 MG: 10 TABLET ORAL at 15:03

## 2023-11-29 RX ADMIN — ARIPIPRAZOLE 5 MG: 5 TABLET ORAL at 08:14

## 2023-11-29 RX ADMIN — AMITRIPTYLINE HYDROCHLORIDE 75 MG: 50 TABLET, FILM COATED ORAL at 08:14

## 2023-11-29 RX ADMIN — BACLOFEN 10 MG: 10 TABLET ORAL at 08:14

## 2023-11-29 RX ADMIN — OXYCODONE HYDROCHLORIDE AND ACETAMINOPHEN 1 TABLET: 10; 325 TABLET ORAL at 08:14

## 2023-11-29 RX ADMIN — Medication 10 ML: at 08:16

## 2023-11-29 RX ADMIN — GABAPENTIN 200 MG: 100 CAPSULE ORAL at 15:02

## 2023-11-29 RX ADMIN — POLYETHYLENE GLYCOL 3350 17 G: 17 POWDER, FOR SOLUTION ORAL at 08:14

## 2023-11-29 RX ADMIN — BUSPIRONE HYDROCHLORIDE 15 MG: 15 TABLET ORAL at 08:14

## 2023-11-29 RX ADMIN — SENNOSIDES AND DOCUSATE SODIUM 2 TABLET: 50; 8.6 TABLET ORAL at 08:14

## 2023-11-29 RX ADMIN — SULFAMETHOXAZOLE AND TRIMETHOPRIM 1 TABLET: 800; 160 TABLET ORAL at 12:14

## 2023-11-29 RX ADMIN — OXYCODONE HYDROCHLORIDE AND ACETAMINOPHEN 1 TABLET: 10; 325 TABLET ORAL at 15:46

## 2023-11-29 RX ADMIN — SULFAMETHOXAZOLE AND TRIMETHOPRIM 1 TABLET: 800; 160 TABLET ORAL at 20:21

## 2023-11-29 RX ADMIN — AMITRIPTYLINE HYDROCHLORIDE 75 MG: 50 TABLET, FILM COATED ORAL at 20:21

## 2023-11-29 NOTE — PROGRESS NOTES
"                                              LOS: 9 days   Patient Care Team:  Chasity Ruggiero APRN as PCP - General (Family Medicine)    Chief Complaint:  F/up septic shock, pyelonephritis and critical care management    Subjective   Interval History      Stable hemoglobin.  No fever.  WBC normal.  No BM since admission.  Diarrhea and he takes Lomotil and therefore he passes a BM every 3 days..  He continues to report pain in his left flank which he attributed to the kidney stone.    REVIEW OF SYSTEMS:      RESPIRATORY: No shortness of breath, cough or sputum.   GASTROINTESTINAL: No nausea or vomiting.  CONSTITUTIONAL: No fever or chills.     Ventilator/Non-Invasive Ventilation Settings (From admission, onward)      None                  Physical Exam:     Vital Signs  Temp:  [98.1 °F (36.7 °C)-99.7 °F (37.6 °C)] 98.4 °F (36.9 °C)  Heart Rate:  [105-118] 105  Resp:  [16-18] 18  BP: (104-132)/(61-76) 111/68    Intake/Output Summary (Last 24 hours) at 11/29/2023 1522  Last data filed at 11/29/2023 1410  Gross per 24 hour   Intake 360 ml   Output 3550 ml   Net -3190 ml     Flowsheet Rows      Flowsheet Row First Filed Value   Admission Height 177.8 cm (70\") Documented at 11/20/2023 1925   Admission Weight 95.3 kg (210 lb) Documented at 11/20/2023 1925            PPE used per hospital policy    General Appearance:   Alert, cooperative, in no acute distress   ENMT:  Mallampati score 3, moist mucous membrane   Eyes:  Pupils equal and reactive to light. EOMI   Neck:  Tracheostomy scar noted.. Trachea midline. No thyromegaly.   Lungs:    Clear to auscultation,respirations regular, even and nonlabored    Heart:   Regular rhythm and normal rate, normal S1 and S2, no         murmur   Skin:   No rash or ecchymosis   Abdomen:   Colostomy noted.  Abdominal scars from prior surgery.  Soft. No tenderness. No HSM.  Nephrostomy tube noted   Neuro/psych:  Conscious, alert, oriented x3. Strength 5/5 in arms.  0/5 in legs. "   Extremities:  No cyanosis, clubbing but mild edema in legs..  Warm extremities and well-perfused.  Mild ulceration of the distal toes with small scar formation.  No erythema or discharge.          Results Review:        Results from last 7 days   Lab Units 11/29/23  0618 11/28/23  0530 11/27/23  0432   SODIUM mmol/L 138 135* 139   POTASSIUM mmol/L 4.3 4.3 4.5   CHLORIDE mmol/L 106 102 105   CO2 mmol/L 25.4 26.2 23.0   BUN mg/dL 15 10 13   CREATININE mg/dL 0.74* 0.50* 0.75*   GLUCOSE mg/dL 108* 120* 137*   CALCIUM mg/dL 8.4* 8.4* 8.0*     Results from last 7 days   Lab Units 11/29/23  0618 11/27/23  0432   HSTROP T ng/L 66* 79*     Results from last 7 days   Lab Units 11/29/23  0618 11/28/23  1615 11/28/23  0530   WBC 10*3/mm3 7.90 9.11 8.38   HEMOGLOBIN g/dL 8.3* 8.1* 8.8*   HEMATOCRIT % 26.2* 24.5* 26.1*   PLATELETS 10*3/mm3 575* 498* 460*     Results from last 7 days   Lab Units 11/25/23  0951   INR  1.12*                               I reviewed the patient's new clinical results.        Medication Review:   amitriptyline, 75 mg, Oral, Q12H  ARIPiprazole, 5 mg, Oral, Daily  baclofen, 10 mg, Oral, TID  busPIRone, 15 mg, Oral, Q12H  ferrous sulfate, 325 mg, Oral, Daily With Breakfast  gabapentin, 200 mg, Oral, TID  nicotine, 1 patch, Transdermal, Q24H  polyethylene glycol, 17 g, Oral, BID  senna-docusate sodium, 2 tablet, Oral, BID  sodium chloride, 10 mL, Intravenous, Q12H  sulfamethoxazole-trimethoprim, 1 tablet, Oral, Q12H  venlafaxine XR, 150 mg, Oral, Daily               Diagnostic imaging:  I personally and independently reviewed the following images:  CT of the abdomen 11/26/2023: Agree with radiologist interpretation.    Assessment     Septic shock. Shock status resolved  Left pyelonephritis/Left ureteral stone, s/p nephrostomy tube 1/20/2023  Bacteremia with E. coli and Streptococcus  Right synovial thickening, rule out septic arthritis  Acute thrombocytopenia on this admission, secondary to sepsis,  resolved  Spontaneous left renal subcapsular hematoma, 4.5 x 10 x 19 cm   Small bilateral pleural effusion and bibasilar atelectasis  Acute anemia on this admission, secondary to chronic diseases, blood draws and bone marrow suppression and bleeding.  Currently stable.    Paraplegia  Bilateral toes ulcers, probably secondary to poor vascular circulation though had normal MONICA in August 2023  Lactic acidosis, resolved  ALESIA, secondary to sepsis, resolved  Cigarette smoker  UDS positive for cocaine and THC  Left perirenal nodule/nodes, 14 mm -> requires follow-up CT  chronic constipation secondary to spinal cord injury.  Parastomal hernia s/p reduction 11/27  Colostomy and ileal conduit with urinary diversion in place  Continue amitriptyline, reproducible, buspirone, gabapentin and venlafaxine        Plan       Bowel prep tomorrow if no BM  DVT proph with SCD  DC IV antibiotics.  Bactrim twice daily for 7 days per ID  F/up CT of the abdomen is required later (3-month or so) to follow the progression of the perirenal nodules  Plan to discontinue the nephrostomy tube at some point and placed double-J stent in 2 weeks as outpatient.  Urology following.      Dispo: Potentially discharge tomorrow if he gets a bowel movement and if okay with all.    Javier Dooley MD  11/29/23  15:22 EST            This note was dictated utilizing Dragon dictation

## 2023-11-29 NOTE — NURSING NOTE
Wound/Ostomy: We see patients at the request of the floor nurse, regarding skin issues existing since of the admission, at the patient's request. Patient is alert, oriented, upon assessment we do not see worsening of the lesions of the Coccy and bilateral Hips. Patient would like to use Therahoney in open wound on bilateral low leg/feet, which was palced and covered with Opticel dressing, Wrap Kerlix.  Rn notified  LUISANA is in the hallway, waiting for the patient to be transferred to a room that fits the bed.  Please re-consult for any additional needs.

## 2023-11-29 NOTE — PLAN OF CARE
Goal Outcome Evaluation:  Plan of Care Reviewed With: patient        Progress: improving  Outcome Evaluation: Patient c/o left flank pain, PRN percocet given x2 today. Moved to bigger room and placed on low air loss mattress. WOCN in to see patient and wound care completed. Antibiotics changed to PO bactrim. Waiting on bowel function in colostomy.

## 2023-11-29 NOTE — PROGRESS NOTES
"General Surgery  Progress Note    CC: Follow-up constipation with parastomal hernia    S: He still has had no stool output from his colostomy.  He has had a lot of gas output and is tolerating a regular diet with no nausea or vomiting.    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 and constipation; denies nausea, vomiting, or abdominal pain    O:/70 (BP Location: Right arm, Patient Position: Sitting)   Pulse 108   Temp 98.1 °F (36.7 °C) (Oral)   Resp 18   Ht 177.8 cm (70\")   Wt 96.4 kg (212 lb 8.4 oz)   SpO2 100%   BMI 30.49 kg/m²     GENERAL: alert, well appearing, and in no distress  HEENT: normocephalic, atraumatic, moist mucous membranes, clear sclerae   CHEST: clear to auscultation, no wheezes, rales or rhonchi, symmetric air entry  CARDIAC: regular rate and rhythm    ABDOMEN: Soft, nontender, nondistended, right lower quadrant ileal conduit with yellow urine in the bag, percutaneous nephrostomy tube in left flank, colostomy left lower quadrant has an empty bag and a reducible parastomal hernia that is nontender to palpation  EXTREMITIES: 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  SKIN: Warm and moist, no rashes    LABS  Results from last 7 days   Lab Units 11/29/23  0618 11/28/23  1615 11/28/23  0530   WBC 10*3/mm3 7.90 9.11 8.38   HEMOGLOBIN g/dL 8.3* 8.1* 8.8*   HEMATOCRIT % 26.2* 24.5* 26.1*   PLATELETS 10*3/mm3 575* 498* 460*     Results from last 7 days   Lab Units 11/29/23  0618 11/28/23  0530 11/27/23  0432 11/24/23  0514 11/23/23  0439   SODIUM mmol/L 138 135* 139   < > 136   POTASSIUM mmol/L 4.3 4.3 4.5   < > 3.5   CHLORIDE mmol/L 106 102 105   < > 110*   CO2 mmol/L 25.4 26.2 23.0   < > 16.5*   BUN mg/dL 15 10 13   < > 39*   CREATININE mg/dL 0.74* 0.50* 0.75*   < > 1.30*   CALCIUM mg/dL 8.4* 8.4* 8.0*   < > 7.8*   BILIRUBIN mg/dL  --   --   --   --  0.3   ALK PHOS U/L  --   --   --   --  117   ALT (SGPT) U/L  --   --   " --   --  11   AST (SGOT) U/L  --   --   --   --  10   GLUCOSE mg/dL 108* 120* 137*   < > 132*    < > = values in this interval not displayed.     Results from last 7 days   Lab Units 11/25/23  0951   INR  1.12*         A/P: 43 y.o. male with constipation, likely related to his chronic paraplegia and resulting colonic dysmotility    I digitized his stoma today and there is no evidence for obstruction.  There was a small amount of impacted stool within the stomal orifice.  If he is still had no bowel movements by tomorrow, I will administer a bowel prep to help get things moving.    Diane Causey MD  General, Robotic, and Endoscopic Surgery  [facility]    [address], [address]  [place] [address]  P: [phone]  F: [phone]

## 2023-11-29 NOTE — PLAN OF CARE
Goal Outcome Evaluation:  Plan of Care Reviewed With: patient        Progress: improving  Outcome Evaluation: Pain managment with PRN meds. New pictures added to coccxy pressure injury and bilateral thigh marks. Wound consulted per pt request. Agility bed ordered for pt since hector score=14. SCDs on. Q2 turned. Urostomy, nephrostomy bag emptied, output in chart. Still no output from ostomy bag, scheduled stool softeners given. VSS, will continue to monitor.

## 2023-11-29 NOTE — PROGRESS NOTES
ID note for sepsis  S: AF  Tolerating abx  No bm  He tells me he is going to have a stent internalized next week    Physical Exam:   Vital Signs   Temp:  [97.8 °F (36.6 °C)-99.7 °F (37.6 °C)] 98.1 °F (36.7 °C)  Heart Rate:  [] 108  Resp:  [16-18] 18  BP: (101-150)/(52-84) 121/70    GENERAL: Awake and alert, chronically ill-appearing  HEENT: Oropharynx is clear. Hearing is grossly normal.   EYES: . No conjunctival injection. No lid lag.   LUNGS:normal respiratory effort.   SKIN: no cutaneous eruptions in exposed areas  PSYCHIATRIC: Appropriate mood, affect, insight, and judgment.     Results Review:  WBC 7.9, hgb 8..3, plt 575  Cr 0.74     Microbiology:  11/20 Bcx 2/2 E coli (pan S); strep spp not viable  11/23 Bcx 2/2 neg     A/p  1.  E. coli septicemia with shock, shock resolved  2.  Left pyelonephritis secondary to obstructive uropathy status post nephrostomy tube placement  3.  Right synovial thickening, rule out septic arthritis  4.  Left renal subscapular hematoma    He has had sufficient antibiotics for pyelonephritis and sepsis but given ongoing hematoma and need for urologic intervention next week, probably reasonable to continue him on antibiotics.  D/c iv abx  Start bactrim ds po bid x7d

## 2023-11-30 LAB
ALBUMIN SERPL-MCNC: 2.8 G/DL (ref 3.5–5.2)
ANION GAP SERPL CALCULATED.3IONS-SCNC: 10.1 MMOL/L (ref 5–15)
BUN SERPL-MCNC: 14 MG/DL (ref 6–20)
BUN/CREAT SERPL: 17.9 (ref 7–25)
CALCIUM SPEC-SCNC: 8.8 MG/DL (ref 8.6–10.5)
CHLORIDE SERPL-SCNC: 100 MMOL/L (ref 98–107)
CO2 SERPL-SCNC: 26.9 MMOL/L (ref 22–29)
CREAT SERPL-MCNC: 0.78 MG/DL (ref 0.76–1.27)
DEPRECATED RDW RBC AUTO: 48.6 FL (ref 37–54)
DEPRECATED RDW RBC AUTO: 49.1 FL (ref 37–54)
EGFRCR SERPLBLD CKD-EPI 2021: 114.2 ML/MIN/1.73
ERYTHROCYTE [DISTWIDTH] IN BLOOD BY AUTOMATED COUNT: 15.4 % (ref 12.3–15.4)
ERYTHROCYTE [DISTWIDTH] IN BLOOD BY AUTOMATED COUNT: 15.7 % (ref 12.3–15.4)
GLUCOSE SERPL-MCNC: 113 MG/DL (ref 65–99)
HCT VFR BLD AUTO: 27.1 % (ref 37.5–51)
HCT VFR BLD AUTO: 29.2 % (ref 37.5–51)
HGB BLD-MCNC: 8.9 G/DL (ref 13–17.7)
HGB BLD-MCNC: 9.4 G/DL (ref 13–17.7)
MCH RBC QN AUTO: 27.9 PG (ref 26.6–33)
MCH RBC QN AUTO: 28.6 PG (ref 26.6–33)
MCHC RBC AUTO-ENTMCNC: 32.2 G/DL (ref 31.5–35.7)
MCHC RBC AUTO-ENTMCNC: 32.8 G/DL (ref 31.5–35.7)
MCV RBC AUTO: 86.6 FL (ref 79–97)
MCV RBC AUTO: 87.1 FL (ref 79–97)
PHOSPHATE SERPL-MCNC: 4.2 MG/DL (ref 2.5–4.5)
PLATELET # BLD AUTO: 668 10*3/MM3 (ref 140–450)
PLATELET # BLD AUTO: 723 10*3/MM3 (ref 140–450)
PMV BLD AUTO: 8.8 FL (ref 6–12)
PMV BLD AUTO: 9.1 FL (ref 6–12)
POTASSIUM SERPL-SCNC: 5.4 MMOL/L (ref 3.5–5.2)
RBC # BLD AUTO: 3.11 10*6/MM3 (ref 4.14–5.8)
RBC # BLD AUTO: 3.37 10*6/MM3 (ref 4.14–5.8)
SODIUM SERPL-SCNC: 137 MMOL/L (ref 136–145)
WBC NRBC COR # BLD AUTO: 10.03 10*3/MM3 (ref 3.4–10.8)
WBC NRBC COR # BLD AUTO: 9.18 10*3/MM3 (ref 3.4–10.8)

## 2023-11-30 PROCEDURE — 80069 RENAL FUNCTION PANEL: CPT | Performed by: INTERNAL MEDICINE

## 2023-11-30 PROCEDURE — 99232 SBSQ HOSP IP/OBS MODERATE 35: CPT | Performed by: INTERNAL MEDICINE

## 2023-11-30 PROCEDURE — 85027 COMPLETE CBC AUTOMATED: CPT | Performed by: INTERNAL MEDICINE

## 2023-11-30 PROCEDURE — 99232 SBSQ HOSP IP/OBS MODERATE 35: CPT | Performed by: SURGERY

## 2023-11-30 RX ORDER — AMOXICILLIN 250 MG/1
1000 CAPSULE ORAL EVERY 8 HOURS SCHEDULED
Status: DISCONTINUED | OUTPATIENT
Start: 2023-11-30 | End: 2023-12-01 | Stop reason: HOSPADM

## 2023-11-30 RX ADMIN — AMOXICILLIN 1000 MG: 250 CAPSULE ORAL at 20:30

## 2023-11-30 RX ADMIN — FERROUS SULFATE TAB 325 MG (65 MG ELEMENTAL FE) 325 MG: 325 (65 FE) TAB at 08:48

## 2023-11-30 RX ADMIN — ARIPIPRAZOLE 5 MG: 5 TABLET ORAL at 08:48

## 2023-11-30 RX ADMIN — VENLAFAXINE HYDROCHLORIDE 150 MG: 150 CAPSULE, EXTENDED RELEASE ORAL at 08:48

## 2023-11-30 RX ADMIN — BUSPIRONE HYDROCHLORIDE 15 MG: 15 TABLET ORAL at 08:48

## 2023-11-30 RX ADMIN — BUSPIRONE HYDROCHLORIDE 15 MG: 15 TABLET ORAL at 20:30

## 2023-11-30 RX ADMIN — OXYCODONE HYDROCHLORIDE AND ACETAMINOPHEN 1 TABLET: 10; 325 TABLET ORAL at 08:48

## 2023-11-30 RX ADMIN — OXYCODONE HYDROCHLORIDE AND ACETAMINOPHEN 1 TABLET: 10; 325 TABLET ORAL at 20:30

## 2023-11-30 RX ADMIN — OXYCODONE HYDROCHLORIDE AND ACETAMINOPHEN 1 TABLET: 10; 325 TABLET ORAL at 04:08

## 2023-11-30 RX ADMIN — Medication 1 APPLICATION: at 12:19

## 2023-11-30 RX ADMIN — Medication 10 ML: at 08:48

## 2023-11-30 RX ADMIN — BACLOFEN 10 MG: 10 TABLET ORAL at 16:17

## 2023-11-30 RX ADMIN — SENNOSIDES AND DOCUSATE SODIUM 2 TABLET: 50; 8.6 TABLET ORAL at 08:48

## 2023-11-30 RX ADMIN — GABAPENTIN 200 MG: 100 CAPSULE ORAL at 20:30

## 2023-11-30 RX ADMIN — OXYCODONE HYDROCHLORIDE AND ACETAMINOPHEN 1 TABLET: 10; 325 TABLET ORAL at 12:22

## 2023-11-30 RX ADMIN — GABAPENTIN 200 MG: 100 CAPSULE ORAL at 08:48

## 2023-11-30 RX ADMIN — Medication 10 ML: at 21:25

## 2023-11-30 RX ADMIN — OXYCODONE HYDROCHLORIDE AND ACETAMINOPHEN 1 TABLET: 10; 325 TABLET ORAL at 00:15

## 2023-11-30 RX ADMIN — AMOXICILLIN 1000 MG: 250 CAPSULE ORAL at 13:40

## 2023-11-30 RX ADMIN — BACLOFEN 10 MG: 10 TABLET ORAL at 20:30

## 2023-11-30 RX ADMIN — AMITRIPTYLINE HYDROCHLORIDE 75 MG: 50 TABLET, FILM COATED ORAL at 20:30

## 2023-11-30 RX ADMIN — GABAPENTIN 200 MG: 100 CAPSULE ORAL at 16:17

## 2023-11-30 RX ADMIN — BACLOFEN 10 MG: 10 TABLET ORAL at 08:48

## 2023-11-30 RX ADMIN — Medication 1 PATCH: at 08:51

## 2023-11-30 RX ADMIN — Medication 1 APPLICATION: at 21:25

## 2023-11-30 RX ADMIN — OXYCODONE HYDROCHLORIDE AND ACETAMINOPHEN 1 TABLET: 10; 325 TABLET ORAL at 16:17

## 2023-11-30 RX ADMIN — AMITRIPTYLINE HYDROCHLORIDE 75 MG: 50 TABLET, FILM COATED ORAL at 08:48

## 2023-11-30 NOTE — PLAN OF CARE
Goal Outcome Evaluation:  Plan of Care Reviewed With: patient        Progress: improving  Outcome Evaluation: Vitals stable. PRN percocet given x2 this shift. Urostomy, nephrostomy, colostomy in place. Low air loss mattress continued. Some output from colostomy. S/O at bedside at start of shift

## 2023-11-30 NOTE — PROGRESS NOTES
"General Surgery  Progress Note    CC: Follow-up constipation with parastomal hernia    S: He has had a lot of stool output from his colostomy today which is palpably quite thick and pasty.  He continues to tolerate a regular diet with no nausea or vomiting.    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; denies nausea, vomiting, or abdominal pain    O:/78 (BP Location: Right arm, Patient Position: Sitting)   Pulse 107   Temp 98.6 °F (37 °C) (Oral)   Resp 18   Ht 177.8 cm (70\")   Wt 92.6 kg (204 lb 1.6 oz)   SpO2 95%   BMI 29.29 kg/m²     GENERAL: alert, well appearing, and in no distress  HEENT: normocephalic, atraumatic, moist mucous membranes, clear sclerae   CHEST: clear to auscultation, no wheezes, rales or rhonchi, symmetric air entry  CARDIAC: regular rate and rhythm    ABDOMEN: Soft, nontender, nondistended, right lower quadrant ileal conduit with yellow urine in the bag, percutaneous nephrostomy tube in left flank, colostomy left lower quadrant has abundant thick, pasty stool in the bag  EXTREMITIES: 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  SKIN: Warm and moist, no rashes    A/P: 43 y.o. male with constipation, likely related to his chronic paraplegia and resulting colonic dysmotility    Now that he has had return of bowel function, he will require no further intervention by me.  I encouraged him when he is discharged he will need to avoid overdoing it with the Imodium and use MiraLAX as needed for any constipation.    Diane Causey MD  General, Robotic, and Endoscopic Surgery  [facility]    [address], [address]  [place] [address]  P: [phone]  F: [phone]     "

## 2023-11-30 NOTE — PROGRESS NOTES
ID note for sepsis  S: AF  Tolerating abx  Starting to have ostomy outpt       Physical Exam:   Vital Signs   Temp:  [97.5 °F (36.4 °C)-98.7 °F (37.1 °C)] 97.5 °F (36.4 °C)  Heart Rate:  [105-121] 120  Resp:  [18] 18  BP: (106-120)/(59-68) 120/59    GENERAL: Awake and alert, chronically ill-appearing  HEENT: Oropharynx is clear. Hearing is grossly normal.   EYES: . No conjunctival injection. No lid lag.   LUNGS:normal respiratory effort.   SKIN: no cutaneous eruptions in exposed areas  PSYCHIATRIC: Appropriate mood, affect, insight, and judgment.     Results Review:  WBC 9  Cr 0.78, k 5.4     Microbiology:  11/20 Bcx 2/2 E coli (pan S); strep spp not viable  11/23 Bcx 2/2 neg     A/p  1.  E. coli septicemia with shock, shock resolved  2.  Left pyelonephritis secondary to obstructive uropathy status post nephrostomy tube placement  3.  Right synovial thickening, rule out septic arthritis  4.  Left renal subscapular hematoma    He has had sufficient antibiotics for pyelonephritis and sepsis but given ongoing hematoma and need for urologic intervention, probably reasonable to continue him on antibiotics.  Now with hyperK and I do no think he will be able to tolerated bactrim  Dc bactrim  Start amoxicillin 1g po q8  Nothing more to add  We will sign off

## 2023-11-30 NOTE — PLAN OF CARE
Goal Outcome Evaluation:  Plan of Care Reviewed With: patient        Progress: improving  Outcome Evaluation: Patient c/o left flank pain. PRP percocet given as ordered. Dressings changed and skin care completed. PO antibiotic changed to amoxicillin. Plan on discharge tomorrow. Transportation set up for 1400. Dr. Dooley notified at bedside.

## 2023-11-30 NOTE — CASE MANAGEMENT/SOCIAL WORK
Continued Stay Note  [facility]     Patient Name: Pk May  MRN: [id number]  Today's Date: 11/30/2023    Admit Date: 11/20/2023    Plan: Return to extended stay Apartment with sig other on PO antibiotics. Transport setup with [employer] [facility] van for tomorrow at 1400.   Discharge Plan       Row Name 11/30/23 1118       Plan    Plan Return to extended stay Apartment with sig other on PO antibiotics. Transport setup with [facility] [facility] van for tomorrow at 1400.    Patient/Family in Agreement with Plan yes    Plan Comments Plan for possible D/C today. Pt needs WC transport. Called and setup [facility] for tomorrow at 1400 (None available today). Pt does have some stool in ostomy bag. Bang Sanchez RN-BC                   Discharge Codes    No documentation.                 Expected Discharge Date and Time       Expected Discharge Date Expected Discharge Time    Dec 1, 2023               Bang Sanchez RN

## 2023-11-30 NOTE — SIGNIFICANT NOTE
"   11/30/23 1535   OTHER   Discipline physical therapist   Rehab Time/Intention   Session Not Performed patient/family declined evaluation  (patient again declined PT eval, states he is not concerned about mobility, \"I have been doing this for 7 years\". pt aware he is d/c tomorrow and has no concerns. notified MARCELO Navarro and NIK Fox)   Therapy Assessment/Plan (PT)   Criteria for Skilled Interventions Met (PT) patient/family refuse skilled intervention at this time       "

## 2023-11-30 NOTE — PROGRESS NOTES
First Urology Update  11/30/2023  06:24 EST    Follow-up with Dr. Elijah Castro ([facility]) approx 2 weeks after Discharge - Schedulers messaged.     Left nephrostomy tube will be changed by IR in approx two weeks after re-imaging of left perinephric hematoma       Elijah Castro MD  [facility]  General & Reconstructive Urology  Office: [phone]  Fax: [phone]

## 2023-11-30 NOTE — PROGRESS NOTES
"                                              LOS: 10 days   Patient Care Team:  Chasity Ruggiero APRN as PCP - General (Family Medicine)    Chief Complaint:  F/up post septic shock and critical care management.  Pyelonephritis.      Subjective   Interval History      No fever.  Slight tachycardia.  WBC normal.  Still complaining about pain in the right flanc.      REVIEW OF SYSTEMS:      RESPIRATORY: No shortness of breath, cough or sputum.   GASTROINTESTINAL: No nausea or vomiting. He had a BM  CONSTITUTIONAL: No fever or chills.     Ventilator/Non-Invasive Ventilation Settings (From admission, onward)      None                  Physical Exam:     Vital Signs  Temp:  [97.5 °F (36.4 °C)-98.7 °F (37.1 °C)] 98.6 °F (37 °C)  Heart Rate:  [107-121] 107  Resp:  [18] 18  BP: (106-121)/(59-78) 121/78    Intake/Output Summary (Last 24 hours) at 11/30/2023 1453  Last data filed at 11/30/2023 1332  Gross per 24 hour   Intake 120 ml   Output 3400 ml   Net -3280 ml     Flowsheet Rows      Flowsheet Row First Filed Value   Admission Height 177.8 cm (70\") Documented at 11/20/2023 1925   Admission Weight 95.3 kg (210 lb) Documented at 11/20/2023 1925            PPE used per hospital policy    General Appearance:   Alert, cooperative, in no acute distress   ENMT:  Mallampati score 3, moist mucous membrane   Eyes:  Pupils equal and reactive to light. EOMI   Neck:  Tracheostomy scar noted.. Trachea midline. No thyromegaly.   Lungs:    Clear to auscultation,respirations regular, even and nonlabored    Heart:   Regular rhythm and normal rate, normal S1 and S2, no         murmur   Skin:   No rash or ecchymosis   Abdomen:   Colostomy noted.  Abdominal scars from prior surgery.  Soft. No tenderness. No HSM.  Nephrostomy tube noted   Neuro/psych:  Conscious, alert, oriented x3. Strength 5/5 in arms.  0/5 in legs.   Extremities:  No cyanosis, clubbing but mild edema in legs..  Warm extremities and well-perfused.  Mild ulceration of the " distal toes with small scar formation.  No erythema or discharge.          Results Review:        Results from last 7 days   Lab Units 11/30/23  0335 11/29/23  0618 11/28/23  0530   SODIUM mmol/L 137 138 135*   POTASSIUM mmol/L 5.4* 4.3 4.3   CHLORIDE mmol/L 100 106 102   CO2 mmol/L 26.9 25.4 26.2   BUN mg/dL 14 15 10   CREATININE mg/dL 0.78 0.74* 0.50*   GLUCOSE mg/dL 113* 108* 120*   CALCIUM mg/dL 8.8 8.4* 8.4*     Results from last 7 days   Lab Units 11/29/23  0618 11/27/23  0432   HSTROP T ng/L 66* 79*     Results from last 7 days   Lab Units 11/30/23  0335 11/29/23  1559 11/29/23  0618   WBC 10*3/mm3 9.18 9.17 7.90   HEMOGLOBIN g/dL 8.9* 8.4* 8.3*   HEMATOCRIT % 27.1* 26.7* 26.2*   PLATELETS 10*3/mm3 668* 545* 575*     Results from last 7 days   Lab Units 11/25/23  0951   INR  1.12*                               I reviewed the patient's new clinical results.        Medication Review:   amitriptyline, 75 mg, Oral, Q12H  amoxicillin, 1,000 mg, Oral, Q8H  ARIPiprazole, 5 mg, Oral, Daily  baclofen, 10 mg, Oral, TID  busPIRone, 15 mg, Oral, Q12H  ferrous sulfate, 325 mg, Oral, Daily With Breakfast  gabapentin, 200 mg, Oral, TID  Menthol-Zinc Oxide, 1 application , Topical, Q12H  nicotine, 1 patch, Transdermal, Q24H  polyethylene glycol, 17 g, Oral, BID  senna-docusate sodium, 2 tablet, Oral, BID  sodium chloride, 10 mL, Intravenous, Q12H  venlafaxine XR, 150 mg, Oral, Daily               Diagnostic imaging:  I personally and independently reviewed the following images:  CT of the abdomen 11/26/2023: Agree with radiologist interpretation.    Assessment     Septic shock. Shock status resolved  Left pyelonephritis/Left ureteral stone, s/p nephrostomy tube 1/20/2023  Bacteremia with E. coli and Streptococcus  Right synovial thickening, rule out septic arthritis  Acute thrombocytopenia on this admission, secondary to sepsis, resolved  Spontaneous left renal subcapsular hematoma, 4.5 x 10 x 19 cm   Small bilateral pleural  effusion and bibasilar atelectasis  Acute anemia on this admission, secondary to chronic diseases, blood draws and bone marrow suppression and bleeding.  Currently stable.  Mild hyperkalemia    Paraplegia  Bilateral toes ulcers, probably secondary to poor vascular circulation though had normal MONICA in August 2023  Lactic acidosis, resolved  ALESIA, secondary to sepsis, resolved  Cigarette smoker  UDS positive for cocaine and THC  Left perirenal nodule/nodes, 14 mm -> requires follow-up CT  chronic constipation secondary to spinal cord injury.  Parastomal hernia s/p reduction 11/27  Colostomy and ileal conduit with urinary diversion in place  Continue amitriptyline, reproducible, buspirone, gabapentin and venlafaxine        Plan       Bowel prep tomorrow if no BM  DVT proph with SCD  DC Bactrim and replace with amoxicillin due to hyperkalemia  Repeat potassium in a.m.  F/up CT of the abdomen is required later (3-month or so) to follow the progression of the perirenal nodules  Left nephrostomy tube will be changed by IR in approximately 2 weeks.  Urology following and arranging for this matter.      Dispo: Discharge tomorrow.    Javier Dooley MD  11/30/23  14:53 EST            This note was dictated utilizing Dragon dictation

## 2023-12-01 ENCOUNTER — TRANSCRIBE ORDERS (OUTPATIENT)
Dept: ADMINISTRATIVE | Facility: HOSPITAL | Age: 43
End: 2023-12-01
Payer: MEDICARE

## 2023-12-01 ENCOUNTER — TRANSCRIBE ORDERS (OUTPATIENT)
Dept: GENERAL RADIOLOGY | Facility: HOSPITAL | Age: 43
End: 2023-12-01
Payer: MEDICARE

## 2023-12-01 ENCOUNTER — READMISSION MANAGEMENT (OUTPATIENT)
Dept: CALL CENTER | Facility: HOSPITAL | Age: 43
End: 2023-12-01
Payer: MEDICARE

## 2023-12-01 VITALS
HEIGHT: 70 IN | BODY MASS INDEX: 27.92 KG/M2 | TEMPERATURE: 98.1 F | WEIGHT: 195 LBS | RESPIRATION RATE: 18 BRPM | HEART RATE: 111 BPM | OXYGEN SATURATION: 97 % | DIASTOLIC BLOOD PRESSURE: 61 MMHG | SYSTOLIC BLOOD PRESSURE: 106 MMHG

## 2023-12-01 DIAGNOSIS — N13.30 HYDRONEPHROSIS, UNSPECIFIED HYDRONEPHROSIS TYPE: Primary | ICD-10-CM

## 2023-12-01 LAB
ALBUMIN SERPL-MCNC: 2.7 G/DL (ref 3.5–5.2)
ANION GAP SERPL CALCULATED.3IONS-SCNC: 10.4 MMOL/L (ref 5–15)
BUN SERPL-MCNC: 13 MG/DL (ref 6–20)
BUN/CREAT SERPL: 17.3 (ref 7–25)
CALCIUM SPEC-SCNC: 8.9 MG/DL (ref 8.6–10.5)
CHLORIDE SERPL-SCNC: 101 MMOL/L (ref 98–107)
CO2 SERPL-SCNC: 25.6 MMOL/L (ref 22–29)
CREAT SERPL-MCNC: 0.75 MG/DL (ref 0.76–1.27)
DEPRECATED RDW RBC AUTO: 47.8 FL (ref 37–54)
EGFRCR SERPLBLD CKD-EPI 2021: 115.5 ML/MIN/1.73
ERYTHROCYTE [DISTWIDTH] IN BLOOD BY AUTOMATED COUNT: 15.3 % (ref 12.3–15.4)
GLUCOSE SERPL-MCNC: 107 MG/DL (ref 65–99)
HCT VFR BLD AUTO: 28.7 % (ref 37.5–51)
HGB BLD-MCNC: 9.4 G/DL (ref 13–17.7)
MCH RBC QN AUTO: 28.1 PG (ref 26.6–33)
MCHC RBC AUTO-ENTMCNC: 32.8 G/DL (ref 31.5–35.7)
MCV RBC AUTO: 85.9 FL (ref 79–97)
PHOSPHATE SERPL-MCNC: 4.3 MG/DL (ref 2.5–4.5)
PLATELET # BLD AUTO: 724 10*3/MM3 (ref 140–450)
PMV BLD AUTO: 8.7 FL (ref 6–12)
POTASSIUM SERPL-SCNC: 4.6 MMOL/L (ref 3.5–5.2)
RBC # BLD AUTO: 3.34 10*6/MM3 (ref 4.14–5.8)
SODIUM SERPL-SCNC: 137 MMOL/L (ref 136–145)
WBC NRBC COR # BLD AUTO: 9.18 10*3/MM3 (ref 3.4–10.8)

## 2023-12-01 PROCEDURE — 80069 RENAL FUNCTION PANEL: CPT | Performed by: INTERNAL MEDICINE

## 2023-12-01 PROCEDURE — 85027 COMPLETE CBC AUTOMATED: CPT | Performed by: INTERNAL MEDICINE

## 2023-12-01 RX ORDER — AMOXICILLIN 500 MG/1
1000 CAPSULE ORAL EVERY 8 HOURS SCHEDULED
Qty: 30 CAPSULE | Refills: 0 | Status: SHIPPED | OUTPATIENT
Start: 2023-12-01 | End: 2023-12-06

## 2023-12-01 RX ORDER — OXYCODONE HYDROCHLORIDE 5 MG/1
5 TABLET ORAL EVERY 8 HOURS PRN
Qty: 15 TABLET | Refills: 0 | Status: SHIPPED | OUTPATIENT
Start: 2023-12-01 | End: 2023-12-08

## 2023-12-01 RX ORDER — SENNOSIDES 8.6 MG
650 CAPSULE ORAL EVERY 4 HOURS PRN
Status: ON HOLD
Start: 2023-12-01

## 2023-12-01 RX ADMIN — FERROUS SULFATE TAB 325 MG (65 MG ELEMENTAL FE) 325 MG: 325 (65 FE) TAB at 08:15

## 2023-12-01 RX ADMIN — ARIPIPRAZOLE 5 MG: 5 TABLET ORAL at 08:15

## 2023-12-01 RX ADMIN — BUSPIRONE HYDROCHLORIDE 15 MG: 15 TABLET ORAL at 08:15

## 2023-12-01 RX ADMIN — OXYCODONE HYDROCHLORIDE AND ACETAMINOPHEN 1 TABLET: 10; 325 TABLET ORAL at 11:33

## 2023-12-01 RX ADMIN — BACLOFEN 10 MG: 10 TABLET ORAL at 08:15

## 2023-12-01 RX ADMIN — AMOXICILLIN 1000 MG: 250 CAPSULE ORAL at 13:31

## 2023-12-01 RX ADMIN — Medication 1 APPLICATION: at 08:17

## 2023-12-01 RX ADMIN — VENLAFAXINE HYDROCHLORIDE 150 MG: 150 CAPSULE, EXTENDED RELEASE ORAL at 08:15

## 2023-12-01 RX ADMIN — Medication 10 ML: at 08:16

## 2023-12-01 RX ADMIN — AMITRIPTYLINE HYDROCHLORIDE 75 MG: 50 TABLET, FILM COATED ORAL at 08:15

## 2023-12-01 RX ADMIN — Medication 1 PATCH: at 08:16

## 2023-12-01 RX ADMIN — OXYCODONE HYDROCHLORIDE AND ACETAMINOPHEN 1 TABLET: 10; 325 TABLET ORAL at 05:51

## 2023-12-01 RX ADMIN — GABAPENTIN 200 MG: 100 CAPSULE ORAL at 08:15

## 2023-12-01 RX ADMIN — AMOXICILLIN 1000 MG: 250 CAPSULE ORAL at 05:51

## 2023-12-01 RX ADMIN — OXYCODONE HYDROCHLORIDE AND ACETAMINOPHEN 1 TABLET: 10; 325 TABLET ORAL at 00:54

## 2023-12-01 NOTE — DISCHARGE SUMMARY
DISCHARGE SUMMARY    Patient Name: Pk May  Age/Sex: 43 y.o. male  : 1980  MRN: [id number]  Patient Care Team:  Chasity Ruggiero APRN as PCP - General (Family Medicine)       Date of Admit: 2023  Date of Discharge:  23  Discharge Condition: Good    Discharge Diagnoses:  Septic shock. Shock status resolved  Left pyelonephritis/Left ureteral stone, s/p nephrostomy tube 2023  Bacteremia with E. coli and Streptococcus  Right synovial thickening, underwent CT-guided aspiration with no evidence of septic arthritis  Acute thrombocytopenia on this admission, secondary to sepsis, resolved  Spontaneous left renal subcapsular hematoma, 4.5 x 10 x 19 cm   Small bilateral pleural effusion and bibasilar atelectasis  Acute anemia on this admission, secondary to chronic diseases, blood draws and bone marrow suppression and bleeding.  Currently stable.  Mild hyperkalemia, corrected     Paraplegia  Bilateral toes ulcers, probably secondary to poor vascular circulation though had normal MONICA in 2023  Lactic acidosis, resolved  ALESIA, secondary to sepsis, resolved  Cigarette smoker  UDS positive for cocaine and THC  Left perirenal nodule/nodes, 14 mm -> requires follow-up CT  chronic constipation secondary to spinal cord injury.  Parastomal hernia s/p reduction   Colostomy and ileal conduit with urinary diversion in place  Continue amitriptyline, reproducible, buspirone, gabapentin and venlafaxine    History of present illness from H&P from 2023: per Dr. Hdez  42-year-old gentleman present to the emergency room with left flank pain.  Flank pain has been ongoing for the last 2 days.  Pain has been described moderate to severe.  Ongoing nausea vomiting reported.  History of paraplegia with spinal cord injury.  He has a colostomy and urostomy in place.  Also reported chills and in the emergency room despite fluid bolus remains hypotensive.  CT abdomen pelvis with left ureteral stone  and urology recommended left nephrostomy tube and ICU admission.  Patient currently tachycardic on the monitor.  He did receive some Dilaudid in the emergency room but still reports ongoing pain on his left flank.     Hospital Course:     Patient was admitted to the intensive care unit.  He required intravenous pressors initially and IV hydration.    He had nephrostomy tube placed on 11/21.  Plan was to replace it in 2 weeks.    Blood culture eventually grew E. coli and Streptococcus.  He was seen by ID and treated with IV antibiotics then later transitioned to amoxicillin which she will continue as outpatient.  Follow-up blood cultures were negative.    On DC, was provided with Percocet 5 mg 15 tablets for severe pain.  He was asked to take Tylenol and ibuprofen as needed for mild pain but avoid excessive ibuprofen as this could lead to gastritis and kidney failure..    F/up CT of the abdomen is required later (3-month or so) to follow the progression of the perirenal nodules  Left nephrostomy tube will be changed by IR in approximately 2 weeks.  Urology will be arranging.    Consults:   IP CONSULT TO HOSPITALIST  IP CONSULT TO UROLOGY  IP CONSULT TO PULMONOLOGY  IP CONSULT TO CASE MANAGEMENT   IP CONSULT TO ACCESS CENTER  IP CONSULT TO HEMATOLOGY AND ONCOLOGY  IP CONSULT TO CASE MANAGEMENT   IP CONSULT TO GENERAL SURGERY  IP CONSULT TO INFECTIOUS DISEASES    Significant Discharge Diagnostics   Procedures Performed:         Pertinent Lab Results:  Results from last 7 days   Lab Units 12/01/23  0619 11/30/23  0335 11/29/23  0618 11/28/23  0530 11/27/23  0432 11/26/23  0449 11/25/23  1511 11/25/23  0403   SODIUM mmol/L 137 137 138 135* 139 138  --  141   POTASSIUM mmol/L 4.6 5.4* 4.3 4.3 4.5 4.0 4.5 3.6   CHLORIDE mmol/L 101 100 106 102 105 109*  --  115*   CO2 mmol/L 25.6 26.9 25.4 26.2 23.0 20.0*  --  18.4*   BUN mg/dL 13 14 15 10 13 10  --  15   CREATININE mg/dL 0.75* 0.78 0.74* 0.50*  0.75* 0.68*  --  0.77   GLUCOSE mg/dL 107* 113* 108* 120* 137* 110*  --  109*   CALCIUM mg/dL 8.9 8.8 8.4* 8.4* 8.0* 8.2*  --  8.1*     Results from last 7 days   Lab Units 11/29/23  0618 11/27/23  0432   HSTROP T ng/L 66* 79*     Results from last 7 days   Lab Units 12/01/23  0619 11/30/23  1605 11/30/23  0335 11/29/23  1559 11/29/23  0618 11/28/23  1615 11/28/23  0530 11/28/23  0109 11/27/23  0432 11/26/23  1542 11/26/23  0449 11/25/23  1511 11/25/23  0403 11/25/23  0403 11/24/23  1635   WBC 10*3/mm3 9.18 10.03 9.18 9.17 7.90 9.11 8.38 8.17 10.32 10.45 9.94 9.70  --  10.37  --    HEMOGLOBIN g/dL 9.4* 9.4* 8.9* 8.4* 8.3* 8.1* 8.8* 8.9* 7.4* 7.5* 7.5* 7.6*  --  7.4* 8.9*   HEMATOCRIT % 28.7* 29.2* 27.1* 26.7* 26.2* 24.5* 26.1* 27.3* 23.0* 22.2* 22.7* 22.9*  --  22.3* 27.1*   PLATELETS 10*3/mm3 724* 723* 668* 545* 575* 498* 460* 499* 413 308 260 179  --  130*  --    MCV fL 85.9 86.6 87.1 88.4 85.1 85.7 85.9 84.8 87.1 89.2 86.6 90.9  --  86.8  --    MCH pg 28.1 27.9 28.6 27.8 26.9 28.3 28.9 27.6 28.0 30.1 28.6 30.2   < > 28.8  --    MCHC g/dL 32.8 32.2 32.8 31.5 31.7 33.1 33.7 32.6 32.2 33.8 33.0 33.2   < > 33.2  --    RDW % 15.3 15.4 15.7* 15.6* 15.7* 15.8* 16.2* 16.0* 13.7 14.0 13.7 14.1   < > 13.6  --    RDW-SD fl 47.8 48.6 49.1 50.1 47.6 48.1 50.8 48.7 43.0 45.4 42.5 46.8   < > 42.8  --    MPV fL 8.7 8.8 9.1 9.6 8.9 9.3 9.5 9.1 10.5 10.0 10.2 10.6   < > 10.8  --     < > = values in this interval not displayed.     Results from last 7 days   Lab Units 11/25/23  0951   INR  1.12*     Results from last 7 days   Lab Units 11/28/23  0109 11/27/23  0432 11/26/23  0449 11/25/23  0403   MAGNESIUM mg/dL 1.9 1.8 1.7 1.7                                                   Imaging Results:  Imaging Results (All)       Procedure Component Value Units Date/Time    FL Guided Aspiration Joint [[id number]] Collected: 11/28/23 1132     Updated: 11/28/23 1256    Narrative:      FLUOROSCOPIC GUIDED RIGHT HIP ASPIRATION     HISTORY:  Synovial thickening, right hip.     TECHNIQUE:: Risks and benefits were reviewed with the patient. Reviewed  risks include but are not limited to bleeding and infection and failure  to obtain an adequate representative sample.      The patient was placed in a supine fashion on the fluoroscopy table and  the skin over the anterior right hip joint was prepped and draped in  sterile fashion. Local anesthesia was achieved with 1% lidocaine. Under  intermittent fluoroscopic guidance, a 22-gauge needle was advanced into  the right hip joint and no fluid could be aspirated. Intra-articular  position was confirmed with injection of Isovue-300. Several locations  of the joint were aspirated without success of obtaining fluid.       Impression:      Fluoroscopic guided right hip aspiration failed to yield  fluid.        This report was finalized on 11/28/2023 12:53 PM by Dr. Felix Connor M.D on Workstation: [id number]       CT Abdomen Pelvis With Contrast [[id number]] Collected: 11/26/23 1319     Updated: 11/26/23 1340    Narrative:      CT ABDOMEN AND PELVIS WITH IV CONTRAST     HISTORY: Acute blood loss.     TECHNIQUE:  CT includes axial imaging from the lung bases to the  trochanters with intravenous contrast and without use of oral contrast.  Data reconstructed in coronal and sagittal planes. Radiation dose  reduction techniques were utilized, including automated exposure control  and exposure modulation based on body size.     COMPARISON: CT abdomen and pelvis 11/20/2023.     FINDINGS: Since the CT 6 days ago there has been left external  nephrostomy drainage placement. Nephrostomy drain extends from the skin  surface through the left proximal renal lower pole and a left lower pole  calyx and there has been resolution of left hydronephrosis. However,  there is developed a left renal subcapsular hematoma extending along the  posterior margin of the left kidney from the upper pole to below the  lower pole. Posterior to  the left kidney the subcapsular hematoma  measures 4.5 cm in AP thickness x 10 cm in transverse dimension.  Hematoma extends below the lower pole and lateral to the left psoas  muscle measuring approximately 19 cm in craniocaudal dimension. Left  kidney is compressed anteriorly and exhibits heterogenous appearance.     There are mildly enlarged left perirenal nodes or nodules with the  largest measuring 14 mm and these are without change. Right kidney,  liver, adrenal glands, pancreas appear within normal limits. There is a  left lower quadrant colostomy with parastomal hernia that contains a  loop of small bowel. The right lower quadrant ileostomy is present. Mild  presacral stranding/edema is present. There is chronic abdominal pelvic  muscular atrophy. There is chronic T12 burst fracture with retropulsion  and there has been previous posterolateral instrumented fusion with  removal of instrumentation and these findings appear without change and  are chronic.     There is right hip synovial thickening and there is mild stranding  surrounding the synovial thickening which could represent synovitis of  the right hip.       Impression:      1. Left renal subcapsular hematoma has developed since the previous CT 6  days ago and extends along the posterior aspect of the left kidney and  below the left kidney compressing the left kidney anteriorly and  measuring 4.5 x 10 x 19 cm. Hemorrhage extends inferiorly anterolateral  to the left psoas muscle.  2. Interval placement of left nephrostomy drainage catheter with  improved hydronephrosis.  3. Left lower quadrant colostomy with parastomal hernia that contains a  loop of small bowel. Right lower quadrant ileostomy.  4. Small bilateral pleural effusions with basilar atelectasis.  5. Left perirenal nodes/nodules that extend medial to the left kidney  and measure up to 14 mm. These are of uncertain etiology and could be  followed on subsequent CT to evaluate for  stability.  6. Right hip synovial thickening with mild stranding surrounding the  right hip joint within the fat. This could represent synovitis of the  right hip and needs correlation with clinical data. If there is  suspicion for infection of the right hip, arthrocentesis could be  performed for further evaluation.     Findings pertaining to the renal subcapsular hematoma called to Carly,  the nurse caring for the patient, on 11/26/2023 at 1:10 p.m.      Radiation dose reduction techniques were utilized, including automated  exposure control and exposure modulation based on body size.        This report was finalized on 11/26/2023 1:37 PM by Dr. Felix Connor M.D on Workstation: [id number]       XR Abdomen KUB [[id number]] Collected: 11/24/23 1827     Updated: 11/24/23 1845    Narrative:      KUB     HISTORY: Abdominal pain. Abdominal distention.     COMPARISON: CT abdomen and pelvis 11/20/2023     FINDINGS: There is a left nephrostomy drainage catheter. Air-filled  bowel loops are present without bowel dilatation to a degree to suggest  obstruction. Right lower quadrant ostomy is present. Stomach is mildly  distended with gas. No pathologic calcifications are evident. There are  surgical clips overlying the central lower pelvis.     There is prominent bony spur formation extending laterally from both  ischial tuberosities where there is chronic calcification consistent  with chronic bilateral ischiofemoral impingement.       Impression:      1. Left nephrostomy drainage catheter is present.  2. Mild air-filled bowel loops are present without bowel dilatation to  suggest obstruction.  3. Evidence for chronic bilateral ischiofemoral impingement.     This report was finalized on 11/24/2023 6:42 PM by Dr. Felix Connor M.D on Workstation: [id number]       IR Nephrostomy Tube Placement [[id number]] Collected: 11/21/23 0825     Updated: 11/21/23 0832    Narrative:      LEFT NEPHROSTOMY TUBE INSERTION     INDICATION:  Obstructing left ureteral stone, urosepsis, hydronephrosis     Reference air kerma: 58.53 mGy     The risks, benefits and alternatives of the procedure were discussed  with the patient, and informed consent was obtained. In the procedure  room a timeout was performed confirming correct patient and procedure.     IV antibiotics were received in the ER prior to the procedure     Maximum sterile barrier technique was used including sterile cap, mask,  gloves, gown and large sterile sheet as well as pre-procedure hand  washing and cutaneous antisepsis with 2 % chlorhexidine. Real-time  sterile ultrasound guidance was utilized with sterile gel and probe  cover. 1% lidocaine was administered for local anesthesia     TECHNIQUE:  Under ultrasound guidance, a 22-gauge needle was advanced into a lower  pole calyx of the left kidney. There was return of thick, purulent  urine. A microwire was advanced into the renal pelvis and a transitional  dilator was advanced over the wire. The microwire was exchanged for a  superstiff guidewire. The tract was dilated to 8 Swazi and next an 8  Swazi nephrostomy tube was advanced over the wire and positioned in the  left renal pelvis. Final contrast injection showed satisfactory  positioning. The tube was secured to the skin with a suture and was  attached to gravity bag and was draining thick purulent urine.       Impression:      Technically successful left-sided nephrostomy tube insertion under  ultrasound and fluoroscopic guidance        This report was finalized on 11/21/2023 8:28 AM by Dr. Ponce Simms M.D on Workstation: [id number]       CT Abdomen Pelvis Without Contrast [[id number]] Collected: 11/20/23 2044     Updated: 11/20/23 2108    Narrative:      CT ABDOMEN AND PELVIS WITHOUT IV CONTRAST     HISTORY: Left flank pain with possible urosepsis.     TECHNIQUE: Radiation dose reduction techniques were utilized, including  automated exposure control and exposure modulation  based on body size.   3 mm images were obtained through the abdomen and pelvis without the  administration of IV contrast.     COMPARISON: CT thoracic and lumbar spine 11/30/2021.        FINDINGS: Cystectomy with ileal conduit diversion. A 7 mm calculus in  the mid left ureter (series 3/image 71). Resulting mild left-sided  hydroureteronephrosis. At least 4 additional nonobstructing left renal  calculi measuring up to 7 mm (series 3/image 89). Moderate left  perirenal inflammatory changes extending to the nearby colonic splenic  flexure with associated short segment circumferential splenic flexure  colon wall thickening (series 2/images 47 and 59). No focal left renal  parenchymal hypodensity. Parenchymal evaluation partially limited  without intravenous contrast.     No right-sided renal calculi. Two calculi within the conduit, the  largest measuring up to 9 mm (series 2/image 105). No right-sided  hydronephrosis.     Segmental groundglass opacities in the right lower lobe (series 2/image  8).     Mildly distended gallbladder. No appreciable cholelithiasis or  pericholecystic inflammatory changes. Distal colectomy with left lower  quadrant diverting colostomy. Remaining solid organs and bowel including  the appendix are normal in limited noncontrast CT appearance.     Unchanged chronically T11 burst fracture fracture post T11 laminectomies  with removal of prior posterior instrumented fusion. Right greater than  left pelvic and proximal femur heterotopic ossification.             Impression:      1. Cystectomy with ileal conduit diversion. A 7 mm calculus in the mid  left ureter causes mild upstream left-sided hydroureteronephrosis.  2. Moderate left perirenal inflammatory changes extending to the colonic  splenic flexure causing focal mild reactive colitis. Recommend  correlation for signs of infection.  3. Limited evaluation of the left renal parenchyma without intravenous  contrast.  4. At least four additional  nonobstructing left renal calculi.  5. Two additional nonobstructing calculi in the ileal conduit.  6. Segmental right lower lobe groundglass opacities, atelectasis versus  infection/inflammation.  7. Mildly distended gallbladder without appreciable cholelithiasis or  pericholecystic inflammatory changes, which can be seen in a prolonged  fasting state. Recommend clinical correlation for right upper quadrant  pain.  8. Additional unchanged incidental findings as above.     This report was finalized on 11/20/2023 9:05 PM by Dr. Andrea Gay M.D on Workstation: [id number]               Objective:   Temp:  [98.1 °F (36.7 °C)-98.6 °F (37 °C)] 98.1 °F (36.7 °C)  Heart Rate:  [107-119] 111  Resp:  [18] 18  BP: (106-133)/(61-78) 106/61   SpO2:  [95 %-97 %] 97 %  on    Device (Oxygen Therapy): room air    Intake/Output Summary (Last 24 hours) at 12/1/2023 1258  Last data filed at 12/1/2023 1202  Gross per 24 hour   Intake 580 ml   Output 4200 ml   Net -3620 ml     Body mass index is 27.98 kg/m².      11/28/23  0600 11/30/23  0633 12/01/23  0412   Weight: 96.4 kg (212 lb 8.4 oz) 92.6 kg (204 lb 1.6 oz) 88.5 kg (195 lb)     Weight change: -4.128 kg (-9 lb 1.6 oz)    Physical Exam:  General Appearance:   Alert, cooperative, in no acute distress   ENMT:  Mallampati score 3, moist mucous membrane   Eyes:  Pupils equal and reactive to light. EOMI   Neck:  Tracheostomy scar noted.. Trachea midline. No thyromegaly.   Lungs:    Clear to auscultation,respirations regular, even and nonlabored    Heart:   Regular rhythm and normal rate, normal S1 and S2, no         murmur   Skin:   No rash or ecchymosis   Abdomen:   Colostomy noted.  Abdominal scars from prior surgery.  Soft. No tenderness. No HSM.  Nephrostomy tube noted   Neuro/psych:  Conscious, alert, oriented x3. Strength 5/5 in arms.  0/5 in legs.   Extremities:  No cyanosis, clubbing but mild edema in legs..  Warm extremities and well-perfused.  Mild ulceration of the distal  toes with small scar formation.  No erythema or discharge.       Discharge Medications and Instructions:     Discharge Medications     Discharge Medications        New Medications        Instructions Start Date   acetaminophen 650 MG 8 hr tablet  Commonly known as: Tylenol 8 Hour   650 mg, Oral, Every 4 Hours PRN      amoxicillin 500 MG capsule  Commonly known as: AMOXIL   1,000 mg, Oral, Every 8 Hours Scheduled      oxyCODONE 5 MG immediate release tablet  Commonly known as: Roxicodone   5 mg, Oral, Every 8 Hours PRN             Continue These Medications        Instructions Start Date   amitriptyline 75 MG tablet  Commonly known as: ELAVIL   75 mg, Oral, 2 Times Daily      ARIPiprazole 5 MG tablet  Commonly known as: ABILIFY   5 mg, Oral, Daily      baclofen 10 MG tablet  Commonly known as: LIORESAL   TAKE 1 TABLET BY MOUTH THREE TIMES DAILY      busPIRone 30 MG tablet  Commonly known as: BUSPAR   TAKE 0.5 TABLETS BY MOUTH EVERY MORNING AND 1 TABLET EVERY MORNING      venlafaxine  MG 24 hr capsule  Commonly known as: EFFEXOR-XR   150 mg, Oral, Daily               Discharge Diet:    Dietary Orders (From admission, onward)       Start     Ordered    11/21/23 0819  Diet: Regular/House Diet; Texture: Regular Texture (IDDSI 7); Fluid Consistency: Thin (IDDSI 0)  Diet Effective Now        References:    Diet Order [facility]   Question Answer Comment   Diets: Regular/House Diet    Texture: Regular Texture (IDDSI 7)    Fluid Consistency: Thin (IDDSI 0)        11/21/23 0818                    Activity at Discharge:   as tolerated    Discharge disposition: Home    Discharge Instructions and Follow ups:     Follow-up Information       Chasity Ruggiero APRN .    Specialty: Family Medicine  Contact information:  [address] [id number] [phone]                           Future Appointments   Date Time Provider Department Center   12/13/2023  8:30 AM [facility]  [facility]   7/23/2024   9:00 AM [facility]   7/29/2024  2:45 PM Chasity Ruggiero APRN [facility]        Medication Reconciliation: Please see electronically completed Med Rec.    Total time spent discharging patient including evaluation, medication reconciliation, arranging follow up, and post hospitalization instructions and education total time exceeds 30 minutes.     Javier Dooley MD  12/01/23  12:58 EST      Dictated utilizing Dragon dictation

## 2023-12-01 NOTE — PAYOR COMM NOTE
"Pk Jaeger (43 y.o. Male)          DC SUMMARY FOR [id number]          Date of Birth   1980    Social Security Number       Address   [address] [address]    Home Phone   [phone]    MRN   [id number]       [facility]    Marital Status   Significant Other                            Admission Date   23    Admission Type   Emergency    Admitting Provider   Aleida Hdez MD    Attending Provider       Department, Room/Bed   [address], E468/1       Discharge Date   2023    Discharge Disposition   Home or Self Care    Discharge Destination                                 Attending Provider: (none)   Allergies: Pholcodine, Codeine, Amoxicillin-pot Clavulanate, Cefuroxime, Doxycycline, Sulfamethoxazole    Isolation: None   Infection: MRSA/History Only (23)   Code Status: CPR    Ht: 177.8 cm (70\")   Wt: 88.5 kg (195 lb)    Admission Cmt: None   Principal Problem: Sepsis [A41.9]                   Active Insurance as of 2023       Primary Coverage       Payor Plan Insurance Group Employer/Plan Group    PASSPORT MEDICARE ADVANTAGE PASSPORT ADVANTAGE [id number]       Payor Plan Address Payor Plan Phone Number Payor Plan Fax Number Effective Dates    [address]   2023 - None Entered    [place] [id number]         Subscriber Name Subscriber Birth Date Member ID       PK JAEGER 1980 [id number]                     Emergency Contacts        (Rel.) Home Phone Work Phone Mobile Phone    Carl Santana (Significant Other) [phone] -- --    JaegerElva (Grandparent) [phone] -- [phone]    David Jaeger (NOK) (Son) [phone] -- [phone]                 Discharge Summary        Javier Dooley MD at 23 1258            DISCHARGE SUMMARY    Patient Name: Pk Jaeger  Age/Sex: 43 y.o. male  : 1980  MRN: [id number]  Patient Care Team:  Chasity Ruggiero APRN as PCP - General " (Family Medicine)       Date of Admit: 11/20/2023  Date of Discharge:  12/01/23  Discharge Condition: Good    Discharge Diagnoses:  Septic shock. Shock status resolved  Left pyelonephritis/Left ureteral stone, s/p nephrostomy tube 11/21/2023  Bacteremia with E. coli and Streptococcus  Right synovial thickening, underwent CT-guided aspiration with no evidence of septic arthritis  Acute thrombocytopenia on this admission, secondary to sepsis, resolved  Spontaneous left renal subcapsular hematoma, 4.5 x 10 x 19 cm   Small bilateral pleural effusion and bibasilar atelectasis  Acute anemia on this admission, secondary to chronic diseases, blood draws and bone marrow suppression and bleeding.  Currently stable.  Mild hyperkalemia, corrected     Paraplegia  Bilateral toes ulcers, probably secondary to poor vascular circulation though had normal MONICA in August 2023  Lactic acidosis, resolved  ALESIA, secondary to sepsis, resolved  Cigarette smoker  UDS positive for cocaine and THC  Left perirenal nodule/nodes, 14 mm -> requires follow-up CT  chronic constipation secondary to spinal cord injury.  Parastomal hernia s/p reduction 11/27  Colostomy and ileal conduit with urinary diversion in place  Continue amitriptyline, reproducible, buspirone, gabapentin and venlafaxine    History of present illness from H&P from 11/20/2023: per Dr. Hdez  42-year-old gentleman present to the emergency room with left flank pain.  Flank pain has been ongoing for the last 2 days.  Pain has been described moderate to severe.  Ongoing nausea vomiting reported.  History of paraplegia with spinal cord injury.  He has a colostomy and urostomy in place.  Also reported chills and in the emergency room despite fluid bolus remains hypotensive.  CT abdomen pelvis with left ureteral stone and urology recommended left nephrostomy tube and ICU admission.  Patient currently tachycardic on the monitor.  He did receive some Dilaudid in the emergency room but  still reports ongoing pain on his left flank.     Hospital Course:     Patient was admitted to the intensive care unit.  He required intravenous pressors initially and IV hydration.    He had nephrostomy tube placed on 11/21.  Plan was to replace it in 2 weeks.    Blood culture eventually grew E. coli and Streptococcus.  He was seen by ID and treated with IV antibiotics then later transitioned to amoxicillin which she will continue as outpatient.  Follow-up blood cultures were negative.    On DC, was provided with Percocet 5 mg 15 tablets for severe pain.  He was asked to take Tylenol and ibuprofen as needed for mild pain but avoid excessive ibuprofen as this could lead to gastritis and kidney failure..    F/up CT of the abdomen is required later (3-month or so) to follow the progression of the perirenal nodules  Left nephrostomy tube will be changed by IR in approximately 2 weeks.  Urology will be arranging.    Consults:   IP CONSULT TO HOSPITALIST  IP CONSULT TO UROLOGY  IP CONSULT TO PULMONOLOGY  IP CONSULT TO CASE MANAGEMENT   IP CONSULT TO ACCESS CENTER  IP CONSULT TO HEMATOLOGY AND ONCOLOGY  IP CONSULT TO CASE MANAGEMENT   IP CONSULT TO GENERAL SURGERY  IP CONSULT TO INFECTIOUS DISEASES    Significant Discharge Diagnostics   Procedures Performed:         Pertinent Lab Results:  Results from last 7 days   Lab Units 12/01/23  0619 11/30/23  0335 11/29/23  0618 11/28/23  0530 11/27/23  0432 11/26/23  0449 11/25/23  1511 11/25/23  0403   SODIUM mmol/L 137 137 138 135* 139 138  --  141   POTASSIUM mmol/L 4.6 5.4* 4.3 4.3 4.5 4.0 4.5 3.6   CHLORIDE mmol/L 101 100 106 102 105 109*  --  115*   CO2 mmol/L 25.6 26.9 25.4 26.2 23.0 20.0*  --  18.4*   BUN mg/dL 13 14 15 10 13 10  --  15   CREATININE mg/dL 0.75* 0.78 0.74* 0.50* 0.75* 0.68*  --  0.77   GLUCOSE mg/dL 107* 113* 108* 120* 137* 110*  --  109*   CALCIUM mg/dL 8.9 8.8 8.4* 8.4* 8.0* 8.2*  --  8.1*     Results from last 7 days   Lab  Units 11/29/23  0618 11/27/23  0432   HSTROP T ng/L 66* 79*     Results from last 7 days   Lab Units 12/01/23  0619 11/30/23  1605 11/30/23  0335 11/29/23  1559 11/29/23  0618 11/28/23  1615 11/28/23  0530 11/28/23  0109 11/27/23  0432 11/26/23  1542 11/26/23  0449 11/25/23  1511 11/25/23  0403 11/25/23  0403 11/24/23  1635   WBC 10*3/mm3 9.18 10.03 9.18 9.17 7.90 9.11 8.38 8.17 10.32 10.45 9.94 9.70  --  10.37  --    HEMOGLOBIN g/dL 9.4* 9.4* 8.9* 8.4* 8.3* 8.1* 8.8* 8.9* 7.4* 7.5* 7.5* 7.6*  --  7.4* 8.9*   HEMATOCRIT % 28.7* 29.2* 27.1* 26.7* 26.2* 24.5* 26.1* 27.3* 23.0* 22.2* 22.7* 22.9*  --  22.3* 27.1*   PLATELETS 10*3/mm3 724* 723* 668* 545* 575* 498* 460* 499* 413 308 260 179  --  130*  --    MCV fL 85.9 86.6 87.1 88.4 85.1 85.7 85.9 84.8 87.1 89.2 86.6 90.9  --  86.8  --    MCH pg 28.1 27.9 28.6 27.8 26.9 28.3 28.9 27.6 28.0 30.1 28.6 30.2   < > 28.8  --    MCHC g/dL 32.8 32.2 32.8 31.5 31.7 33.1 33.7 32.6 32.2 33.8 33.0 33.2   < > 33.2  --    RDW % 15.3 15.4 15.7* 15.6* 15.7* 15.8* 16.2* 16.0* 13.7 14.0 13.7 14.1   < > 13.6  --    RDW-SD fl 47.8 48.6 49.1 50.1 47.6 48.1 50.8 48.7 43.0 45.4 42.5 46.8   < > 42.8  --    MPV fL 8.7 8.8 9.1 9.6 8.9 9.3 9.5 9.1 10.5 10.0 10.2 10.6   < > 10.8  --     < > = values in this interval not displayed.     Results from last 7 days   Lab Units 11/25/23  0951   INR  1.12*     Results from last 7 days   Lab Units 11/28/23  0109 11/27/23  0432 11/26/23  0449 11/25/23  0403   MAGNESIUM mg/dL 1.9 1.8 1.7 1.7                                                   Imaging Results:  Imaging Results (All)       Procedure Component Value Units Date/Time    FL Guided Aspiration Joint [[id number]] Collected: 11/28/23 1132     Updated: 11/28/23 1256    Narrative:      FLUOROSCOPIC GUIDED RIGHT HIP ASPIRATION     HISTORY: Synovial thickening, right hip.     TECHNIQUE:: Risks and benefits were reviewed with the patient. Reviewed  risks include but are not limited to bleeding and infection  and failure  to obtain an adequate representative sample.      The patient was placed in a supine fashion on the fluoroscopy table and  the skin over the anterior right hip joint was prepped and draped in  sterile fashion. Local anesthesia was achieved with 1% lidocaine. Under  intermittent fluoroscopic guidance, a 22-gauge needle was advanced into  the right hip joint and no fluid could be aspirated. Intra-articular  position was confirmed with injection of Isovue-300. Several locations  of the joint were aspirated without success of obtaining fluid.       Impression:      Fluoroscopic guided right hip aspiration failed to yield  fluid.        This report was finalized on 11/28/2023 12:53 PM by Dr. Felix Connor M.D on Workstation: [id number]       CT Abdomen Pelvis With Contrast [[id number]] Collected: 11/26/23 1319     Updated: 11/26/23 1340    Narrative:      CT ABDOMEN AND PELVIS WITH IV CONTRAST     HISTORY: Acute blood loss.     TECHNIQUE:  CT includes axial imaging from the lung bases to the  trochanters with intravenous contrast and without use of oral contrast.  Data reconstructed in coronal and sagittal planes. Radiation dose  reduction techniques were utilized, including automated exposure control  and exposure modulation based on body size.     COMPARISON: CT abdomen and pelvis 11/20/2023.     FINDINGS: Since the CT 6 days ago there has been left external  nephrostomy drainage placement. Nephrostomy drain extends from the skin  surface through the left proximal renal lower pole and a left lower pole  calyx and there has been resolution of left hydronephrosis. However,  there is developed a left renal subcapsular hematoma extending along the  posterior margin of the left kidney from the upper pole to below the  lower pole. Posterior to the left kidney the subcapsular hematoma  measures 4.5 cm in AP thickness x 10 cm in transverse dimension.  Hematoma extends below the lower pole and lateral to the left  psoas  muscle measuring approximately 19 cm in craniocaudal dimension. Left  kidney is compressed anteriorly and exhibits heterogenous appearance.     There are mildly enlarged left perirenal nodes or nodules with the  largest measuring 14 mm and these are without change. Right kidney,  liver, adrenal glands, pancreas appear within normal limits. There is a  left lower quadrant colostomy with parastomal hernia that contains a  loop of small bowel. The right lower quadrant ileostomy is present. Mild  presacral stranding/edema is present. There is chronic abdominal pelvic  muscular atrophy. There is chronic T12 burst fracture with retropulsion  and there has been previous posterolateral instrumented fusion with  removal of instrumentation and these findings appear without change and  are chronic.     There is right hip synovial thickening and there is mild stranding  surrounding the synovial thickening which could represent synovitis of  the right hip.       Impression:      1. Left renal subcapsular hematoma has developed since the previous CT 6  days ago and extends along the posterior aspect of the left kidney and  below the left kidney compressing the left kidney anteriorly and  measuring 4.5 x 10 x 19 cm. Hemorrhage extends inferiorly anterolateral  to the left psoas muscle.  2. Interval placement of left nephrostomy drainage catheter with  improved hydronephrosis.  3. Left lower quadrant colostomy with parastomal hernia that contains a  loop of small bowel. Right lower quadrant ileostomy.  4. Small bilateral pleural effusions with basilar atelectasis.  5. Left perirenal nodes/nodules that extend medial to the left kidney  and measure up to 14 mm. These are of uncertain etiology and could be  followed on subsequent CT to evaluate for stability.  6. Right hip synovial thickening with mild stranding surrounding the  right hip joint within the fat. This could represent synovitis of the  right hip and needs  correlation with clinical data. If there is  suspicion for infection of the right hip, arthrocentesis could be  performed for further evaluation.     Findings pertaining to the renal subcapsular hematoma called to Carly,  the nurse caring for the patient, on 11/26/2023 at 1:10 p.m.      Radiation dose reduction techniques were utilized, including automated  exposure control and exposure modulation based on body size.        This report was finalized on 11/26/2023 1:37 PM by Dr. Felix Connor M.D on Workstation: [id number]       XR Abdomen KUB [[id number]] Collected: 11/24/23 1827     Updated: 11/24/23 1845    Narrative:      KUB     HISTORY: Abdominal pain. Abdominal distention.     COMPARISON: CT abdomen and pelvis 11/20/2023     FINDINGS: There is a left nephrostomy drainage catheter. Air-filled  bowel loops are present without bowel dilatation to a degree to suggest  obstruction. Right lower quadrant ostomy is present. Stomach is mildly  distended with gas. No pathologic calcifications are evident. There are  surgical clips overlying the central lower pelvis.     There is prominent bony spur formation extending laterally from both  ischial tuberosities where there is chronic calcification consistent  with chronic bilateral ischiofemoral impingement.       Impression:      1. Left nephrostomy drainage catheter is present.  2. Mild air-filled bowel loops are present without bowel dilatation to  suggest obstruction.  3. Evidence for chronic bilateral ischiofemoral impingement.     This report was finalized on 11/24/2023 6:42 PM by Dr. Felix Connor M.D on Workstation: [id number]       IR Nephrostomy Tube Placement [[id number]] Collected: 11/21/23 0825     Updated: 11/21/23 0832    Narrative:      LEFT NEPHROSTOMY TUBE INSERTION     INDICATION: Obstructing left ureteral stone, urosepsis, hydronephrosis     Reference air kerma: 58.53 mGy     The risks, benefits and alternatives of the procedure were discussed  with  the patient, and informed consent was obtained. In the procedure  room a timeout was performed confirming correct patient and procedure.     IV antibiotics were received in the ER prior to the procedure     Maximum sterile barrier technique was used including sterile cap, mask,  gloves, gown and large sterile sheet as well as pre-procedure hand  washing and cutaneous antisepsis with 2 % chlorhexidine. Real-time  sterile ultrasound guidance was utilized with sterile gel and probe  cover. 1% lidocaine was administered for local anesthesia     TECHNIQUE:  Under ultrasound guidance, a 22-gauge needle was advanced into a lower  pole calyx of the left kidney. There was return of thick, purulent  urine. A microwire was advanced into the renal pelvis and a transitional  dilator was advanced over the wire. The microwire was exchanged for a  superstiff guidewire. The tract was dilated to 8 Peruvian and next an 8  Peruvian nephrostomy tube was advanced over the wire and positioned in the  left renal pelvis. Final contrast injection showed satisfactory  positioning. The tube was secured to the skin with a suture and was  attached to gravity bag and was draining thick purulent urine.       Impression:      Technically successful left-sided nephrostomy tube insertion under  ultrasound and fluoroscopic guidance        This report was finalized on 11/21/2023 8:28 AM by Dr. Ponce Simms M.D on Workstation: [id number]       CT Abdomen Pelvis Without Contrast [[id number]] Collected: 11/20/23 2044     Updated: 11/20/23 2108    Narrative:      CT ABDOMEN AND PELVIS WITHOUT IV CONTRAST     HISTORY: Left flank pain with possible urosepsis.     TECHNIQUE: Radiation dose reduction techniques were utilized, including  automated exposure control and exposure modulation based on body size.   3 mm images were obtained through the abdomen and pelvis without the  administration of IV contrast.     COMPARISON: CT thoracic and lumbar spine  11/30/2021.        FINDINGS: Cystectomy with ileal conduit diversion. A 7 mm calculus in  the mid left ureter (series 3/image 71). Resulting mild left-sided  hydroureteronephrosis. At least 4 additional nonobstructing left renal  calculi measuring up to 7 mm (series 3/image 89). Moderate left  perirenal inflammatory changes extending to the nearby colonic splenic  flexure with associated short segment circumferential splenic flexure  colon wall thickening (series 2/images 47 and 59). No focal left renal  parenchymal hypodensity. Parenchymal evaluation partially limited  without intravenous contrast.     No right-sided renal calculi. Two calculi within the conduit, the  largest measuring up to 9 mm (series 2/image 105). No right-sided  hydronephrosis.     Segmental groundglass opacities in the right lower lobe (series 2/image  8).     Mildly distended gallbladder. No appreciable cholelithiasis or  pericholecystic inflammatory changes. Distal colectomy with left lower  quadrant diverting colostomy. Remaining solid organs and bowel including  the appendix are normal in limited noncontrast CT appearance.     Unchanged chronically T11 burst fracture fracture post T11 laminectomies  with removal of prior posterior instrumented fusion. Right greater than  left pelvic and proximal femur heterotopic ossification.             Impression:      1. Cystectomy with ileal conduit diversion. A 7 mm calculus in the mid  left ureter causes mild upstream left-sided hydroureteronephrosis.  2. Moderate left perirenal inflammatory changes extending to the colonic  splenic flexure causing focal mild reactive colitis. Recommend  correlation for signs of infection.  3. Limited evaluation of the left renal parenchyma without intravenous  contrast.  4. At least four additional nonobstructing left renal calculi.  5. Two additional nonobstructing calculi in the ileal conduit.  6. Segmental right lower lobe groundglass opacities, atelectasis  versus  infection/inflammation.  7. Mildly distended gallbladder without appreciable cholelithiasis or  pericholecystic inflammatory changes, which can be seen in a prolonged  fasting state. Recommend clinical correlation for right upper quadrant  pain.  8. Additional unchanged incidental findings as above.     This report was finalized on 11/20/2023 9:05 PM by Dr. Andrea Gay M.D on Workstation: [id number]               Objective:   Temp:  [98.1 °F (36.7 °C)-98.6 °F (37 °C)] 98.1 °F (36.7 °C)  Heart Rate:  [107-119] 111  Resp:  [18] 18  BP: (106-133)/(61-78) 106/61   SpO2:  [95 %-97 %] 97 %  on    Device (Oxygen Therapy): room air    Intake/Output Summary (Last 24 hours) at 12/1/2023 1258  Last data filed at 12/1/2023 1202  Gross per 24 hour   Intake 580 ml   Output 4200 ml   Net -3620 ml     Body mass index is 27.98 kg/m².      11/28/23  0600 11/30/23  0633 12/01/23  0412   Weight: 96.4 kg (212 lb 8.4 oz) 92.6 kg (204 lb 1.6 oz) 88.5 kg (195 lb)     Weight change: -4.128 kg (-9 lb 1.6 oz)    Physical Exam:  General Appearance:   Alert, cooperative, in no acute distress   ENMT:  Mallampati score 3, moist mucous membrane   Eyes:  Pupils equal and reactive to light. EOMI   Neck:  Tracheostomy scar noted.. Trachea midline. No thyromegaly.   Lungs:    Clear to auscultation,respirations regular, even and nonlabored    Heart:   Regular rhythm and normal rate, normal S1 and S2, no         murmur   Skin:   No rash or ecchymosis   Abdomen:   Colostomy noted.  Abdominal scars from prior surgery.  Soft. No tenderness. No HSM.  Nephrostomy tube noted   Neuro/psych:  Conscious, alert, oriented x3. Strength 5/5 in arms.  0/5 in legs.   Extremities:  No cyanosis, clubbing but mild edema in legs..  Warm extremities and well-perfused.  Mild ulceration of the distal toes with small scar formation.  No erythema or discharge.       Discharge Medications and Instructions:     Discharge Medications     Discharge Medications         New Medications        Instructions Start Date   acetaminophen 650 MG 8 hr tablet  Commonly known as: Tylenol 8 Hour   650 mg, Oral, Every 4 Hours PRN      amoxicillin 500 MG capsule  Commonly known as: AMOXIL   1,000 mg, Oral, Every 8 Hours Scheduled      oxyCODONE 5 MG immediate release tablet  Commonly known as: Roxicodone   5 mg, Oral, Every 8 Hours PRN             Continue These Medications        Instructions Start Date   amitriptyline 75 MG tablet  Commonly known as: ELAVIL   75 mg, Oral, 2 Times Daily      ARIPiprazole 5 MG tablet  Commonly known as: ABILIFY   5 mg, Oral, Daily      baclofen 10 MG tablet  Commonly known as: LIORESAL   TAKE 1 TABLET BY MOUTH THREE TIMES DAILY      busPIRone 30 MG tablet  Commonly known as: BUSPAR   TAKE 0.5 TABLETS BY MOUTH EVERY MORNING AND 1 TABLET EVERY MORNING      venlafaxine  MG 24 hr capsule  Commonly known as: EFFEXOR-XR   150 mg, Oral, Daily               Discharge Diet:    Dietary Orders (From admission, onward)       Start     Ordered    11/21/23 0819  Diet: Regular/House Diet; Texture: Regular Texture (IDDSI 7); Fluid Consistency: Thin (IDDSI 0)  Diet Effective Now        References:    Diet Order [facility]   Question Answer Comment   Diets: Regular/House Diet    Texture: Regular Texture (IDDSI 7)    Fluid Consistency: Thin (IDDSI 0)        11/21/23 0818                    Activity at Discharge:   as tolerated    Discharge disposition: Home    Discharge Instructions and Follow ups:     Follow-up Information       Chasity Ruggiero APRN .    Specialty: Family Medicine  Contact information:  [address] [address]  [phone]                           Future Appointments   Date Time Provider Department Center   12/13/2023  8:30 AM [facility] ROOM 1  [facility]   7/23/2024  9:00 AM [facility]   7/29/2024  2:45 PM Chasity Ruggiero APRN [facility]        Medication Reconciliation: Please see  electronically completed Med Rec.    Total time spent discharging patient including evaluation, medication reconciliation, arranging follow up, and post hospitalization instructions and education total time exceeds 30 minutes.     Javier Dooley MD  12/01/23  12:58 EST      Dictated utilizing Dragon dictation     Electronically signed by Javier Dooley MD at 12/01/23 2366

## 2023-12-01 NOTE — PLAN OF CARE
Goal Outcome Evaluation:  Plan of Care Reviewed With: patient        Progress: improving  Outcome Evaluation: Pt stable. Pt to be dc via  van home at 2pm.

## 2023-12-01 NOTE — PLAN OF CARE
Goal Outcome Evaluation:  Plan of Care Reviewed With: patient        Progress: improving  Outcome Evaluation: Vitals stable. ostomy, urostomy, nephrostomy in place. PRN percocet given. Amoxicillin started. D/C home today @ 1400 via [employer] van.

## 2023-12-01 NOTE — CASE MANAGEMENT/SOCIAL WORK
Continued Stay Note  [facility]     Patient Name: Pk May  MRN: [id number]  Today's Date: 12/1/2023    Admit Date: 11/20/2023    Plan: Received call and pt requesting HH for nephrostomy tube. Nurse spoke with him and explained all he needed to do was empty. Pt insisting on having HH. Referrals placed to  agencies.   Discharge Plan       Row Name 12/01/23 1734       Plan    Plan Received call and pt requesting HH for nephrostomy tube. Nurse spoke with him and explained all he needed to do was empty. Pt insisting on having HH. Referrals placed to  agencies.    Patient/Family in Agreement with Plan other (see comments)                   Discharge Codes    No documentation.                 Expected Discharge Date and Time       Expected Discharge Date Expected Discharge Time    Dec 1, 2023               Bang Sanchez RN

## 2023-12-01 NOTE — DISCHARGE PLACEMENT REQUEST
"Pk Jaeger (43 y.o. Male)       Date of Birth   1980    Social Security Number       Address   [address] [address]    Home Phone   [phone]    MRN   [id number]       [facility]    Marital Status   Significant Other                            Admission Date   11/20/23    Admission Type   Emergency    Admitting Provider   Aleida Hdez MD    Attending Provider       Department, Room/Bed   [address], E468/1       Discharge Date   12/1/2023    Discharge Disposition   Home or Self Care    Discharge Destination                                 Attending Provider: (none)   Allergies: Pholcodine, Codeine, Amoxicillin-pot Clavulanate, Cefuroxime, Doxycycline, Sulfamethoxazole    Isolation: None   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   8/1/2023 - None Entered    [place] [id number]         Subscriber Name Subscriber Birth Date Member ID       PK JAEGER 1980 [id number]                     Emergency Contacts        (Rel.) Home Phone Work Phone Mobile Phone    EstherCarl (Significant Other) [phone] -- --    Elva Jaeger (Grandparent) [phone] -- [phone]    David Jaeger (NOK) (Son) [phone] -- [phone]                "

## 2023-12-01 NOTE — CASE MANAGEMENT/SOCIAL WORK
Case Management Discharge Note      Final Note: Return to extended stay Apartment with sig other on PO antibiotics. [facility]    Provided Post Acute Provider List?: Refused  Refused Provider List Comment: Carl declined list at this time.    Selected Continued Care - Admitted Since 11/20/2023       Destination    No services have been selected for the patient.                Durable Medical Equipment    No services have been selected for the patient.                Dialysis/Infusion    No services have been selected for the patient.                Home Medical Care    No services have been selected for the patient.                Therapy    No services have been selected for the patient.                Community Resources    No services have been selected for the patient.                Community & DME    No services have been selected for the patient.                    Transportation Services  W/C Van: [facility]    Final Discharge Disposition Code: 01 - home or self-care

## 2023-12-02 ENCOUNTER — HOME HEALTH ADMISSION (OUTPATIENT)
Dept: HOME HEALTH SERVICES | Facility: HOME HEALTHCARE | Age: 43
End: 2023-12-02
Payer: MEDICARE

## 2023-12-02 NOTE — OUTREACH NOTE
Prep Survey      Flowsheet Row Responses   [facility] facility patient discharged from? [place]   Is LACE score < 7 ? No   Eligibility [facility]   Date of Admission 11/20/23   Date of Discharge 12/01/23   Discharge Disposition Home-Health Care Svc   Discharge diagnosis septic shock,Left pyelonephritis/Left ureteral stone, s/p nephrostomy tube, has a coy & urostomy   Does the patient have one of the following disease processes/diagnoses(primary or secondary)? Sepsis   Does the patient have Home health ordered? Yes   What is the Home health agency?  pt requested HH. Referrals sent   Is there a DME ordered? No   General alerts for this patient paraplegia   Prep survey completed? Yes            Jovana BOSTON - Registered Nurse

## 2023-12-03 ENCOUNTER — TRANSITIONAL CARE MANAGEMENT TELEPHONE ENCOUNTER (OUTPATIENT)
Dept: CALL CENTER | Facility: HOSPITAL | Age: 43
End: 2023-12-03
Payer: MEDICARE

## 2023-12-03 NOTE — OUTREACH NOTE
Call Center TCM Note      Flowsheet Row Responses   [facility] facility patient discharged from? [place]   Does the patient have one of the following disease processes/diagnoses(primary or secondary)? Sepsis   TCM attempt successful? No   Unsuccessful attempts Attempt 1            Dyan Lewis RN    12/3/2023, 10:25 EST

## 2023-12-04 ENCOUNTER — TRANSITIONAL CARE MANAGEMENT TELEPHONE ENCOUNTER (OUTPATIENT)
Dept: CALL CENTER | Facility: HOSPITAL | Age: 43
End: 2023-12-04
Payer: MEDICARE

## 2023-12-04 NOTE — OUTREACH NOTE
Call Center TCM Note      Flowsheet Row Responses   [facility] facility patient discharged from? [place]   Does the patient have one of the following disease processes/diagnoses(primary or secondary)? Sepsis   TCM attempt successful? No   Unsuccessful attempts Attempt 2            Chloe Malloy LPN    12/4/2023, 15:35 EST

## 2023-12-05 ENCOUNTER — TRANSITIONAL CARE MANAGEMENT TELEPHONE ENCOUNTER (OUTPATIENT)
Dept: CALL CENTER | Facility: HOSPITAL | Age: 43
End: 2023-12-05
Payer: MEDICARE

## 2023-12-05 NOTE — OUTREACH NOTE
Call Center TCM Note      Flowsheet Row Responses   [facility] facility patient discharged from? [place]   Does the patient have one of the following disease processes/diagnoses(primary or secondary)? Sepsis   TCM attempt successful? No   Unsuccessful attempts Attempt 3            Pearl Kate RN    12/5/2023, 11:42 EST

## 2023-12-07 ENCOUNTER — TELEPHONE (OUTPATIENT)
Dept: INTERNAL MEDICINE | Facility: CLINIC | Age: 43
End: 2023-12-07
Payer: MEDICARE

## 2023-12-07 NOTE — PROGRESS NOTES
"Enter Query Response Below      Query Response: Anemia due to blood loss and chronic illnesses.   Electronically signed by Javier Dooley MD, 23, 10:55 AM EST.               If applicable, please update the problem list.   Patient: Pk May        : 1980  Account: [id number]           Admit Date: 2023        How to Respond to this query:       a. Click New Note     b. Answer query within the yellow box.                c. Update the Problem List, if applicable.      If you have any questions about this query contact me at: [phone]     :    42-year-old patient admitted with septic shock, left pyelonephritis, and ureteral stone on 2023 had nephrostomy tube placed . Discharge Summary includes \"Acute anemia on this admission, secondary to chronic diseases, blood draws and bone marrow suppression and bleeding\".  Oncology PN states \"Reviewed the CT scan that shows a very large perirenal hematoma on the left. This is probably the source of his bleeding and low hemoglobin\".   Hemoglobin was 14.4 on arrival and dropped to 7.8 on . Hgb was 6.3 on  after receiving a unit of blood.    CT abdomen and pelvis  shows \"Left renal subcapsular hematoma has developed since the previous CT 6 days ago and extends along the posterior aspect of the left kidney and below the left kidney compressing the left kidney anteriorly and measuring 4.5 x 10 x 19 cm. Hemorrhage extends inferiorly anterolateral to the left psoas muscle\".    Treatment has included monitoring labs, transfusing 2 units red blood cells, Hematology consult, holding anticoagulation, and Ferrous Sulfate PO. After study, can the acute anemia secondary to bleeding (per Discharge Summary) be further specified as:    Acute blood loss anemia  Anemia due to chronic blood loss  Other- specify_______  Unable to determine    By submitting this query, we are merely seeking further clarification of documentation to " accurately reflect all conditions that you are monitoring, evaluating, treating or that extend the hospitalization or utilize additional resources of care. Please utilize your independent clinical judgment when addressing the question(s) above.     This query and your response, once completed, will be entered into the legal medical record.    Sincerely,  Ivory Cardenas RN, BSN, CCDS  Clinical Documentation Integrity Program   [email or web address]

## 2023-12-07 NOTE — TELEPHONE ENCOUNTER
Caller: TITI VALLADARES    Relationship to patient: Home Health    Best call back number: [phone]    Patient is needing: NEEDING ORDERS FOR SKILLED NURSING 2X WEEKLY FOR 4 WEEKS. PLEASE CALL AND ADVISE. ASSESSMENT WAS DONE IN HOSPITAL

## 2023-12-11 ENCOUNTER — OFFICE VISIT (OUTPATIENT)
Dept: INTERNAL MEDICINE | Facility: CLINIC | Age: 43
End: 2023-12-11
Payer: MEDICARE

## 2023-12-11 VITALS
BODY MASS INDEX: 30.78 KG/M2 | DIASTOLIC BLOOD PRESSURE: 64 MMHG | HEIGHT: 70 IN | WEIGHT: 215 LBS | HEART RATE: 120 BPM | SYSTOLIC BLOOD PRESSURE: 110 MMHG | RESPIRATION RATE: 16 BRPM

## 2023-12-11 DIAGNOSIS — R03.1 LOW BLOOD PRESSURE READING: ICD-10-CM

## 2023-12-11 DIAGNOSIS — N20.0 KIDNEY STONE: ICD-10-CM

## 2023-12-11 DIAGNOSIS — N39.0 ACUTE UTI: ICD-10-CM

## 2023-12-11 DIAGNOSIS — A41.9 SEPSIS, DUE TO UNSPECIFIED ORGANISM, UNSPECIFIED WHETHER ACUTE ORGAN DYSFUNCTION PRESENT: Primary | ICD-10-CM

## 2023-12-11 NOTE — H&P (VIEW-ONLY)
Transitional Care Follow Up Visit  Subjective     Pk May is a 43 y.o. male who presents for a transitional care management visit.    Within 48 business hours after discharge our office contacted him via telephone to coordinate his care and needs.      I reviewed and discussed the details of that call along with the discharge summary, hospital problems, inpatient lab results, inpatient diagnostic studies, and consultation reports with Pk.     Current outpatient and discharge medications have been reconciled for the patient.  Reviewed by: NELLY Velasquez          12/1/2023     7:50 PM   Date of TCM Phone Call   [facility]   Date of Admission 11/20/2023   Date of Discharge 12/1/2023   Discharge Disposition Home-Health Care [facility]     Risk for Readmission (LACE) Score: 12 (12/1/2023  6:00 AM)      History of Present Illness   Course During Hospital Stay:  Pk was admitted to [facility] from 11/20/23 to 12/1/2023   Septic shock. Shock status resolved  Left pyelonephritis/Left ureteral stone, s/p nephrostomy tube 11/21/2023  Bacteremia with E. coli and Streptococcus  Right synovial thickening, underwent CT-guided aspiration with no evidence of septic arthritis  Acute thrombocytopenia on this admission, secondary to sepsis, resolved  Spontaneous left renal subcapsular hematoma, 4.5 x 10 x 19 cm   Small bilateral pleural effusion and bibasilar atelectasis  Acute anemia on this admission, secondary to chronic diseases, blood draws and bone marrow suppression and bleeding.  Currently stable.  Mild hyperkalemia, corrected     He Presented to the ER with left flank pain.  He was hypotensive in the ER and given IV fludis. He was admitted to ICU and urology was consulted.  left nephrostomy tube was placed on 11/21/23. Blood cultures grew ecoli and streptococcus. He was seen by ID and treated with IV antibiotics.   He is to continue amoxil post discharge and he was given percocet for pain   He is scheduled to  have nephrostomy tube replaced this week.   He overall feels well. He is still noticed hematuria from nephrostomy bag. He denies pain, fever, and chills.      The following portions of the patient's history were reviewed and updated as appropriate: allergies, current medications, past family history, past medical history, past social history, past surgical history, and problem list.    Review of Systems   Eyes:  Negative for visual disturbance.   Respiratory: Negative.     Cardiovascular: Negative.    Genitourinary:  Positive for hematuria.   Neurological:  Negative for dizziness and headaches.       Objective   Physical Exam  Vitals and nursing note reviewed.   Constitutional:       General: He is not in acute distress.     Comments: Seated in wheelchair    Cardiovascular:      Rate and Rhythm: Normal rate and regular rhythm.   Pulmonary:      Effort: Pulmonary effort is normal.      Breath sounds: Normal breath sounds.   Abdominal:      Comments: Nephrostomy bag with dark red colored urine    Neurological:      Mental Status: He is alert.         Assessment & Plan   Diagnoses and all orders for this visit:    1. Sepsis, due to unspecified organism, unspecified whether acute organ dysfunction present (Primary)    2. Acute UTI    3. Kidney stone    4. Low blood pressure reading      Finish course of amoxil prescribed   He is schedule for nephrostomy tube placement with urology in 2 days.  BP is slightly low today. He will continue to try and push fluids. Avoid caffeine.   Follow up with urology as scheduled  Follow up with labs in July as scheduled or sooner if needed

## 2023-12-11 NOTE — PROGRESS NOTES
Transitional Care Follow Up Visit  Subjective     Pk May is a 43 y.o. male who presents for a transitional care management visit.    Within 48 business hours after discharge our office contacted him via telephone to coordinate his care and needs.      I reviewed and discussed the details of that call along with the discharge summary, hospital problems, inpatient lab results, inpatient diagnostic studies, and consultation reports with Pk.     Current outpatient and discharge medications have been reconciled for the patient.  Reviewed by: NELLY Velasquez          12/1/2023     7:50 PM   Date of TCM Phone Call   [facility]   Date of Admission 11/20/2023   Date of Discharge 12/1/2023   Discharge Disposition [place] [place] [facility]     Risk for Readmission (LACE) Score: 12 (12/1/2023  6:00 AM)      History of Present Illness   Course During Hospital Stay:  Pk was admitted to [facility] from 11/20/23 to 12/1/2023   Septic shock. Shock status resolved  Left pyelonephritis/Left ureteral stone, s/p nephrostomy tube 11/21/2023  Bacteremia with E. coli and Streptococcus  Right synovial thickening, underwent CT-guided aspiration with no evidence of septic arthritis  Acute thrombocytopenia on this admission, secondary to sepsis, resolved  Spontaneous left renal subcapsular hematoma, 4.5 x 10 x 19 cm   Small bilateral pleural effusion and bibasilar atelectasis  Acute anemia on this admission, secondary to chronic diseases, blood draws and bone marrow suppression and bleeding.  Currently stable.  Mild hyperkalemia, corrected     He Presented to the ER with left flank pain.  He was hypotensive in the ER and given IV fludis. He was admitted to ICU and urology was consulted.  left nephrostomy tube was placed on 11/21/23. Blood cultures grew ecoli and streptococcus. He was seen by ID and treated with IV antibiotics.   He is to continue amoxil post discharge and he was given percocet for pain   He is scheduled to  have nephrostomy tube replaced this week.   He overall feels well. He is still noticed hematuria from nephrostomy bag. He denies pain, fever, and chills.      The following portions of the patient's history were reviewed and updated as appropriate: allergies, current medications, past family history, past medical history, past social history, past surgical history, and problem list.    Review of Systems   Eyes:  Negative for visual disturbance.   Respiratory: Negative.     Cardiovascular: Negative.    Genitourinary:  Positive for hematuria.   Neurological:  Negative for dizziness and headaches.       Objective   Physical Exam  Vitals and nursing note reviewed.   Constitutional:       General: He is not in acute distress.     Comments: Seated in wheelchair    Cardiovascular:      Rate and Rhythm: Normal rate and regular rhythm.   Pulmonary:      Effort: Pulmonary effort is normal.      Breath sounds: Normal breath sounds.   Abdominal:      Comments: Nephrostomy bag with dark red colored urine    Neurological:      Mental Status: He is alert.         Assessment & Plan   Diagnoses and all orders for this visit:    1. Sepsis, due to unspecified organism, unspecified whether acute organ dysfunction present (Primary)    2. Acute UTI    3. Kidney stone    4. Low blood pressure reading      Finish course of amoxil prescribed   He is schedule for nephrostomy tube placement with urology in 2 days.  BP is slightly low today. He will continue to try and push fluids. Avoid caffeine.   Follow up with urology as scheduled  Follow up with labs in July as scheduled or sooner if needed

## 2023-12-12 NOTE — PROGRESS NOTES
"Enter Query Response Below      Query Response: Spontaneous subcapsular hematoma due to thrombocytopenia .  Electronically signed by Javier Dooley MD, 23, 6:58 PM EST.               If applicable, please update the problem list.   Patient: Pk May        : 1980  Account: [id number]           Admit Date: 2023        How to Respond to this query:       a. Click New Note     b. Answer query within the yellow box.                c. Update the Problem List, if applicable.      If you have any questions about this query contact me at: [phone]     :    42-year-old patient admitted with pyelonephritis, left ureteral stone, and septic shock had left nephrostomy tube placement on . Discharge Summary includes \"Spontaneous left renal subcapsular hematoma\". Hematoma was noted on CT abdomen and pelvis on . Patient also has diagnosis of thrombocytopenia this admit.   Platelets were 71,000 on admit and decreased to 32 on .   Treatment has included monitoring labs and holding anticoagulation.   Please clarify if patient treated/monitored for one or more of the following:    Subcapsular hematoma due to nephrostomy tube placement  Subcapsular hematoma integral to nephrostomy tube placement  Spontaneous subcapsular hematoma due to thrombocytopenia  Other- specify______  Unable to determine    By submitting this query, we are merely seeking further clarification of documentation to accurately reflect all conditions that you are monitoring, evaluating, treating or that extend the hospitalization or utilize additional resources of care. Please utilize your independent clinical judgment when addressing the question(s) above.     This query and your response, once completed, will be entered into the legal medical record.    Sincerely,  Ivory Cardenas RN, BSN, CCDS  Clinical Documentation Integrity Program   [email or web address]   "

## 2023-12-12 NOTE — PROGRESS NOTES
12/13/23 0001   Pre-Procedure Phone Call   Procedure Time Verified Yes   Arrival Time 1200   Procedure Location Verified Yes   Medical History Reviewed Yes   NPO Status Reinforced Yes   Ride and Caregiver Arranged Yes   Patient Knows to Bring Current Medications Yes   Bring Outside Films Requested No

## 2023-12-13 ENCOUNTER — HOSPITAL ENCOUNTER (OUTPATIENT)
Dept: INTERVENTIONAL RADIOLOGY/VASCULAR | Facility: HOSPITAL | Age: 43
Discharge: HOME | End: 2023-12-13
Admitting: UROLOGY
Payer: MEDICARE

## 2023-12-13 VITALS
DIASTOLIC BLOOD PRESSURE: 69 MMHG | HEART RATE: 102 BPM | WEIGHT: 215 LBS | TEMPERATURE: 97.9 F | SYSTOLIC BLOOD PRESSURE: 93 MMHG | BODY MASS INDEX: 30.78 KG/M2 | HEIGHT: 70 IN | OXYGEN SATURATION: 94 % | RESPIRATION RATE: 15 BRPM

## 2023-12-13 DIAGNOSIS — N13.30 HYDRONEPHROSIS, UNSPECIFIED HYDRONEPHROSIS TYPE: ICD-10-CM

## 2023-12-13 PROCEDURE — 25010000002 CEFTRIAXONE PER 250 MG: Performed by: RADIOLOGY

## 2023-12-13 PROCEDURE — C1769 GUIDE WIRE: HCPCS

## 2023-12-13 PROCEDURE — 99152 MOD SED SAME PHYS/QHP 5/>YRS: CPT

## 2023-12-13 PROCEDURE — 25010000002 MIDAZOLAM PER 1 MG: Performed by: RADIOLOGY

## 2023-12-13 PROCEDURE — 25510000001 IOPAMIDOL PER 1 ML: Performed by: RADIOLOGY

## 2023-12-13 PROCEDURE — 25010000002 FENTANYL CITRATE (PF) 50 MCG/ML SOLUTION: Performed by: RADIOLOGY

## 2023-12-13 RX ORDER — MIDAZOLAM HYDROCHLORIDE 1 MG/ML
INJECTION INTRAMUSCULAR; INTRAVENOUS AS NEEDED
Status: COMPLETED | OUTPATIENT
Start: 2023-12-13 | End: 2023-12-13

## 2023-12-13 RX ORDER — FENTANYL CITRATE 50 UG/ML
INJECTION, SOLUTION INTRAMUSCULAR; INTRAVENOUS AS NEEDED
Status: COMPLETED | OUTPATIENT
Start: 2023-12-13 | End: 2023-12-13

## 2023-12-13 RX ORDER — SODIUM CHLORIDE 9 MG/ML
25 INJECTION, SOLUTION INTRAVENOUS ONCE
Status: COMPLETED | OUTPATIENT
Start: 2023-12-13 | End: 2023-12-13

## 2023-12-13 RX ORDER — SODIUM CHLORIDE 0.9 % (FLUSH) 0.9 %
10 SYRINGE (ML) INJECTION AS NEEDED
Status: DISCONTINUED | OUTPATIENT
Start: 2023-12-13 | End: 2023-12-14 | Stop reason: HOSPADM

## 2023-12-13 RX ORDER — LIDOCAINE HYDROCHLORIDE 20 MG/ML
JELLY TOPICAL AS NEEDED
Status: COMPLETED | OUTPATIENT
Start: 2023-12-13 | End: 2023-12-13

## 2023-12-13 RX ADMIN — CEFTRIAXONE 1000 MG: 1 INJECTION, POWDER, FOR SOLUTION INTRAMUSCULAR; INTRAVENOUS at 14:30

## 2023-12-13 RX ADMIN — FENTANYL CITRATE 50 MCG: 50 INJECTION, SOLUTION INTRAMUSCULAR; INTRAVENOUS at 14:36

## 2023-12-13 RX ADMIN — MIDAZOLAM 1 MG: 1 INJECTION INTRAMUSCULAR; INTRAVENOUS at 14:35

## 2023-12-13 RX ADMIN — LIDOCAINE HYDROCHLORIDE 1 ML: 20 JELLY TOPICAL at 14:32

## 2023-12-13 RX ADMIN — SODIUM CHLORIDE 25 ML/HR: 9 INJECTION, SOLUTION INTRAVENOUS at 14:20

## 2023-12-13 RX ADMIN — IOPAMIDOL 10 ML: 510 INJECTION, SOLUTION INTRAVASCULAR at 14:39

## 2023-12-13 NOTE — POST-PROCEDURE NOTE
POST PROCEDURE NOTE    Procedure: left nephrostomy conversion to ureteral stent    Pre-Procedure Diagnosis: hydronephrosis    Post-procedure Diagnosis: same    Findings: technically successful left nephrostomy tube conversion to internal ureteral stent, 6 Fr 26 cm stent placed    Complications: no immediate    Blood loss: none    Specimen Removed: none    Disposition:   Discharge home

## 2023-12-13 NOTE — PROGRESS NOTES
Discharged home via pt reserved medical cab.  Escorted to vehicle per this RN.  No acute distress noted and tolerating po well.  All belongings returned to patient prior to discharge and discharge instructions given verbally and in writing to pt.

## 2023-12-13 NOTE — NURSING NOTE
Patient arrived to bay 18 in IR preop area.  PPE worn per patient and care providers for any bedside care.

## 2023-12-19 NOTE — PROGRESS NOTES
"Enter Query Response Below      Query Response:   Chronic kidney disease was not supported       If applicable, please update the problem list.   Patient: Pk May        : 1980  Account: [id number]           Admit Date: 2023        How to Respond to this query:       a. Click New Note     b. Answer query within the yellow box.                c. Update the Problem List, if applicable.      If you have any questions about this query contact me at: [phone]     Dr. Castro:    42 year old patient admitted 2023 with septic shock, pyelonephritis, left ureteral stone, and ALESIA. Urology Consult Note includes \"ALESIA on CKD\".   Creatinine was 2.81 on arrival and improved to 0.50 on . eGFR on arrival was 28.1 and improved to 130.6 on .  Treatment has included left nephrostomy tube, NS 2457 ml IV bolus, NS 1000 ml IV bolus,  ml IV bolus, NS at 125 ml/hour, Levophed drip, Stone-Synephrine drip, IV antibiotics, monitoring labs, and monitoring intake and output.  After study, was chronic kidney disease clinically supported during this admission?    Chronic kidney disease was supported with additional clinical indicators: ____________  Chronic kidney disease was not supported  Other- specify_____________  Unable to determine    By submitting this query, we are merely seeking further clarification of documentation to accurately reflect all conditions that you are monitoring, evaluating, treating or that extend the hospitalization or utilize additional resources of care. Please utilize your independent clinical judgment when addressing the question(s) above.     This query and your response, once completed, will be entered into the legal medical record.    Sincerely,  Ivory Cardenas, RN, BSN, CCDS  Clinical Documentation Integrity Program   [email or web address]  "

## 2023-12-20 ENCOUNTER — HOSPITAL ENCOUNTER (INPATIENT)
Facility: HOSPITAL | Age: 43
LOS: 6 days | Discharge: HOME HEALTH | End: 2023-12-27
Attending: EMERGENCY MEDICINE | Admitting: INTERNAL MEDICINE
Payer: MEDICARE

## 2023-12-20 ENCOUNTER — APPOINTMENT (OUTPATIENT)
Dept: CT IMAGING | Facility: HOSPITAL | Age: 43
End: 2023-12-20
Payer: MEDICARE

## 2023-12-20 DIAGNOSIS — S37.012A HEMATOMA OF LEFT KIDNEY, INITIAL ENCOUNTER: ICD-10-CM

## 2023-12-20 DIAGNOSIS — N39.0 ACUTE UTI: Primary | ICD-10-CM

## 2023-12-20 LAB
ALBUMIN SERPL-MCNC: 3 G/DL (ref 3.5–5.2)
ALBUMIN/GLOB SERPL: 0.6 G/DL
ALP SERPL-CCNC: 208 U/L (ref 39–117)
ALT SERPL W P-5'-P-CCNC: 25 U/L (ref 1–41)
ANION GAP SERPL CALCULATED.3IONS-SCNC: 13 MMOL/L (ref 5–15)
AST SERPL-CCNC: 18 U/L (ref 1–40)
B PARAPERT DNA SPEC QL NAA+PROBE: NOT DETECTED
B PERT DNA SPEC QL NAA+PROBE: NOT DETECTED
BACTERIA UR QL AUTO: ABNORMAL /HPF
BASOPHILS # BLD AUTO: 0.04 10*3/MM3 (ref 0–0.2)
BASOPHILS NFR BLD AUTO: 0.2 % (ref 0–1.5)
BILIRUB SERPL-MCNC: 0.3 MG/DL (ref 0–1.2)
BILIRUB UR QL STRIP: NEGATIVE
BUN SERPL-MCNC: 13 MG/DL (ref 6–20)
BUN/CREAT SERPL: 12.5 (ref 7–25)
C PNEUM DNA NPH QL NAA+NON-PROBE: NOT DETECTED
CALCIUM SPEC-SCNC: 8.5 MG/DL (ref 8.6–10.5)
CHLORIDE SERPL-SCNC: 98 MMOL/L (ref 98–107)
CLARITY UR: ABNORMAL
CO2 SERPL-SCNC: 20 MMOL/L (ref 22–29)
COLOR UR: ABNORMAL
CREAT SERPL-MCNC: 1.04 MG/DL (ref 0.76–1.27)
D-LACTATE SERPL-SCNC: 1.1 MMOL/L (ref 0.5–2)
DEPRECATED RDW RBC AUTO: 48.5 FL (ref 37–54)
EGFRCR SERPLBLD CKD-EPI 2021: 91.9 ML/MIN/1.73
EOSINOPHIL # BLD AUTO: 0.01 10*3/MM3 (ref 0–0.4)
EOSINOPHIL NFR BLD AUTO: 0.1 % (ref 0.3–6.2)
ERYTHROCYTE [DISTWIDTH] IN BLOOD BY AUTOMATED COUNT: 15.5 % (ref 12.3–15.4)
FLUAV SUBTYP SPEC NAA+PROBE: NOT DETECTED
FLUBV RNA ISLT QL NAA+PROBE: NOT DETECTED
GLOBULIN UR ELPH-MCNC: 5 GM/DL
GLUCOSE SERPL-MCNC: 120 MG/DL (ref 65–99)
GLUCOSE UR STRIP-MCNC: NEGATIVE MG/DL
HADV DNA SPEC NAA+PROBE: NOT DETECTED
HCOV 229E RNA SPEC QL NAA+PROBE: NOT DETECTED
HCOV HKU1 RNA SPEC QL NAA+PROBE: NOT DETECTED
HCOV NL63 RNA SPEC QL NAA+PROBE: NOT DETECTED
HCOV OC43 RNA SPEC QL NAA+PROBE: NOT DETECTED
HCT VFR BLD AUTO: 25.5 % (ref 37.5–51)
HGB BLD-MCNC: 8.2 G/DL (ref 13–17.7)
HGB UR QL STRIP.AUTO: ABNORMAL
HMPV RNA NPH QL NAA+NON-PROBE: NOT DETECTED
HOLD SPECIMEN: NORMAL
HOLD SPECIMEN: NORMAL
HPIV1 RNA ISLT QL NAA+PROBE: NOT DETECTED
HPIV2 RNA SPEC QL NAA+PROBE: NOT DETECTED
HPIV3 RNA NPH QL NAA+PROBE: NOT DETECTED
HPIV4 P GENE NPH QL NAA+PROBE: NOT DETECTED
HYALINE CASTS UR QL AUTO: ABNORMAL /LPF
IMM GRANULOCYTES # BLD AUTO: 0.08 10*3/MM3 (ref 0–0.05)
IMM GRANULOCYTES NFR BLD AUTO: 0.5 % (ref 0–0.5)
KETONES UR QL STRIP: ABNORMAL
LEUKOCYTE ESTERASE UR QL STRIP.AUTO: ABNORMAL
LYMPHOCYTES # BLD AUTO: 1.14 10*3/MM3 (ref 0.7–3.1)
LYMPHOCYTES NFR BLD AUTO: 7 % (ref 19.6–45.3)
M PNEUMO IGG SER IA-ACNC: NOT DETECTED
MCH RBC QN AUTO: 27.7 PG (ref 26.6–33)
MCHC RBC AUTO-ENTMCNC: 32.2 G/DL (ref 31.5–35.7)
MCV RBC AUTO: 86.1 FL (ref 79–97)
MONOCYTES # BLD AUTO: 0.97 10*3/MM3 (ref 0.1–0.9)
MONOCYTES NFR BLD AUTO: 5.9 % (ref 5–12)
NEUTROPHILS NFR BLD AUTO: 14.09 10*3/MM3 (ref 1.7–7)
NEUTROPHILS NFR BLD AUTO: 86.3 % (ref 42.7–76)
NITRITE UR QL STRIP: NEGATIVE
NRBC BLD AUTO-RTO: 0 /100 WBC (ref 0–0.2)
PH UR STRIP.AUTO: 7 [PH] (ref 5–8)
PLATELET # BLD AUTO: 463 10*3/MM3 (ref 140–450)
PMV BLD AUTO: 8.9 FL (ref 6–12)
POTASSIUM SERPL-SCNC: 3.9 MMOL/L (ref 3.5–5.2)
PROT SERPL-MCNC: 8 G/DL (ref 6–8.5)
PROT UR QL STRIP: ABNORMAL
RBC # BLD AUTO: 2.96 10*6/MM3 (ref 4.14–5.8)
RBC # UR STRIP: ABNORMAL /HPF
REF LAB TEST METHOD: ABNORMAL
RHINOVIRUS RNA SPEC NAA+PROBE: NOT DETECTED
RSV RNA NPH QL NAA+NON-PROBE: NOT DETECTED
SARS-COV-2 RNA NPH QL NAA+NON-PROBE: NOT DETECTED
SODIUM SERPL-SCNC: 131 MMOL/L (ref 136–145)
SP GR UR STRIP: 1.02 (ref 1–1.03)
SQUAMOUS #/AREA URNS HPF: ABNORMAL /HPF
UROBILINOGEN UR QL STRIP: ABNORMAL
WBC # UR STRIP: ABNORMAL /HPF
WBC NRBC COR # BLD AUTO: 16.33 10*3/MM3 (ref 3.4–10.8)
WHOLE BLOOD HOLD COAG: NORMAL
WHOLE BLOOD HOLD SPECIMEN: NORMAL

## 2023-12-20 PROCEDURE — 87040 BLOOD CULTURE FOR BACTERIA: CPT

## 2023-12-20 PROCEDURE — 85025 COMPLETE CBC W/AUTO DIFF WBC: CPT

## 2023-12-20 PROCEDURE — 80307 DRUG TEST PRSMV CHEM ANLYZR: CPT | Performed by: NURSE PRACTITIONER

## 2023-12-20 PROCEDURE — 87186 SC STD MICRODIL/AGAR DIL: CPT | Performed by: EMERGENCY MEDICINE

## 2023-12-20 PROCEDURE — 99285 EMERGENCY DEPT VISIT HI MDM: CPT

## 2023-12-20 PROCEDURE — 36415 COLL VENOUS BLD VENIPUNCTURE: CPT

## 2023-12-20 PROCEDURE — 74176 CT ABD & PELVIS W/O CONTRAST: CPT

## 2023-12-20 PROCEDURE — 87086 URINE CULTURE/COLONY COUNT: CPT | Performed by: EMERGENCY MEDICINE

## 2023-12-20 PROCEDURE — 0202U NFCT DS 22 TRGT SARS-COV-2: CPT | Performed by: EMERGENCY MEDICINE

## 2023-12-20 PROCEDURE — 83605 ASSAY OF LACTIC ACID: CPT

## 2023-12-20 PROCEDURE — 87077 CULTURE AEROBIC IDENTIFY: CPT | Performed by: EMERGENCY MEDICINE

## 2023-12-20 PROCEDURE — 81001 URINALYSIS AUTO W/SCOPE: CPT | Performed by: EMERGENCY MEDICINE

## 2023-12-20 PROCEDURE — 25810000003 LACTATED RINGERS SOLUTION: Performed by: EMERGENCY MEDICINE

## 2023-12-20 PROCEDURE — 80053 COMPREHEN METABOLIC PANEL: CPT

## 2023-12-20 PROCEDURE — 25010000002 CEFTRIAXONE PER 250 MG: Performed by: EMERGENCY MEDICINE

## 2023-12-20 RX ORDER — SODIUM CHLORIDE 0.9 % (FLUSH) 0.9 %
10 SYRINGE (ML) INJECTION AS NEEDED
Status: DISCONTINUED | OUTPATIENT
Start: 2023-12-20 | End: 2023-12-27 | Stop reason: HOSPADM

## 2023-12-20 RX ORDER — ACETAMINOPHEN 500 MG
1000 TABLET ORAL ONCE
Status: COMPLETED | OUTPATIENT
Start: 2023-12-20 | End: 2023-12-20

## 2023-12-20 RX ADMIN — CEFTRIAXONE 2000 MG: 2 INJECTION, POWDER, FOR SOLUTION INTRAMUSCULAR; INTRAVENOUS at 21:29

## 2023-12-20 RX ADMIN — SODIUM CHLORIDE, POTASSIUM CHLORIDE, SODIUM LACTATE AND CALCIUM CHLORIDE 1000 ML: 600; 310; 30; 20 INJECTION, SOLUTION INTRAVENOUS at 21:28

## 2023-12-20 RX ADMIN — ACETAMINOPHEN 1000 MG: 500 TABLET ORAL at 21:28

## 2023-12-21 ENCOUNTER — APPOINTMENT (OUTPATIENT)
Dept: CT IMAGING | Facility: HOSPITAL | Age: 43
End: 2023-12-21
Payer: MEDICARE

## 2023-12-21 LAB
ABO GROUP BLD: NORMAL
AMPHET+METHAMPHET UR QL: NEGATIVE
ANION GAP SERPL CALCULATED.3IONS-SCNC: 11.4 MMOL/L (ref 5–15)
BARBITURATES UR QL SCN: NEGATIVE
BENZODIAZ UR QL SCN: NEGATIVE
BLD GP AB SCN SERPL QL: NEGATIVE
BUN SERPL-MCNC: 11 MG/DL (ref 6–20)
BUN/CREAT SERPL: 12.9 (ref 7–25)
CALCIUM SPEC-SCNC: 8.6 MG/DL (ref 8.6–10.5)
CANNABINOIDS SERPL QL: POSITIVE
CHLORIDE SERPL-SCNC: 101 MMOL/L (ref 98–107)
CO2 SERPL-SCNC: 22.6 MMOL/L (ref 22–29)
COCAINE UR QL: NEGATIVE
CREAT SERPL-MCNC: 0.85 MG/DL (ref 0.76–1.27)
DEPRECATED RDW RBC AUTO: 48.5 FL (ref 37–54)
EGFRCR SERPLBLD CKD-EPI 2021: 111.3 ML/MIN/1.73
ERYTHROCYTE [DISTWIDTH] IN BLOOD BY AUTOMATED COUNT: 15.5 % (ref 12.3–15.4)
FENTANYL UR-MCNC: NEGATIVE NG/ML
FERRITIN SERPL-MCNC: 648 NG/ML (ref 30–400)
GLUCOSE SERPL-MCNC: 113 MG/DL (ref 65–99)
HCT VFR BLD AUTO: 21.6 % (ref 37.5–51)
HCT VFR BLD AUTO: 25.6 % (ref 37.5–51)
HGB BLD-MCNC: 7.1 G/DL (ref 13–17.7)
HGB BLD-MCNC: 8.2 G/DL (ref 13–17.7)
IRON 24H UR-MRATE: 14 MCG/DL (ref 59–158)
IRON SATN MFR SERPL: 7 % (ref 20–50)
MCH RBC QN AUTO: 27.2 PG (ref 26.6–33)
MCHC RBC AUTO-ENTMCNC: 32 G/DL (ref 31.5–35.7)
MCV RBC AUTO: 85 FL (ref 79–97)
METHADONE UR QL SCN: NEGATIVE
OPIATES UR QL: POSITIVE
OXYCODONE UR QL SCN: NEGATIVE
PLATELET # BLD AUTO: 506 10*3/MM3 (ref 140–450)
PMV BLD AUTO: 8.8 FL (ref 6–12)
POTASSIUM SERPL-SCNC: 3.5 MMOL/L (ref 3.5–5.2)
RBC # BLD AUTO: 3.01 10*6/MM3 (ref 4.14–5.8)
RH BLD: POSITIVE
SODIUM SERPL-SCNC: 135 MMOL/L (ref 136–145)
T&S EXPIRATION DATE: NORMAL
TIBC SERPL-MCNC: 203 MCG/DL (ref 298–536)
TRANSFERRIN SERPL-MCNC: 136 MG/DL (ref 200–360)
WBC NRBC COR # BLD AUTO: 17.18 10*3/MM3 (ref 3.4–10.8)

## 2023-12-21 PROCEDURE — 83540 ASSAY OF IRON: CPT | Performed by: INTERNAL MEDICINE

## 2023-12-21 PROCEDURE — 82728 ASSAY OF FERRITIN: CPT | Performed by: INTERNAL MEDICINE

## 2023-12-21 PROCEDURE — 86850 RBC ANTIBODY SCREEN: CPT | Performed by: INTERNAL MEDICINE

## 2023-12-21 PROCEDURE — 86900 BLOOD TYPING SEROLOGIC ABO: CPT | Performed by: INTERNAL MEDICINE

## 2023-12-21 PROCEDURE — 86901 BLOOD TYPING SEROLOGIC RH(D): CPT | Performed by: INTERNAL MEDICINE

## 2023-12-21 PROCEDURE — P9016 RBC LEUKOCYTES REDUCED: HCPCS

## 2023-12-21 PROCEDURE — 85014 HEMATOCRIT: CPT | Performed by: NURSE PRACTITIONER

## 2023-12-21 PROCEDURE — 36415 COLL VENOUS BLD VENIPUNCTURE: CPT | Performed by: NURSE PRACTITIONER

## 2023-12-21 PROCEDURE — 80048 BASIC METABOLIC PNL TOTAL CA: CPT | Performed by: NURSE PRACTITIONER

## 2023-12-21 PROCEDURE — 84466 ASSAY OF TRANSFERRIN: CPT | Performed by: INTERNAL MEDICINE

## 2023-12-21 PROCEDURE — 86923 COMPATIBILITY TEST ELECTRIC: CPT

## 2023-12-21 PROCEDURE — 85018 HEMOGLOBIN: CPT | Performed by: NURSE PRACTITIONER

## 2023-12-21 PROCEDURE — 25810000003 SODIUM CHLORIDE 0.9 % SOLUTION: Performed by: NURSE PRACTITIONER

## 2023-12-21 PROCEDURE — 25010000002 KETOROLAC TROMETHAMINE PER 15 MG: Performed by: INTERNAL MEDICINE

## 2023-12-21 PROCEDURE — 85027 COMPLETE CBC AUTOMATED: CPT | Performed by: NURSE PRACTITIONER

## 2023-12-21 PROCEDURE — 74174 CTA ABD&PLVS W/CONTRAST: CPT

## 2023-12-21 PROCEDURE — 86900 BLOOD TYPING SEROLOGIC ABO: CPT

## 2023-12-21 PROCEDURE — 25010000002 CEFTRIAXONE PER 250 MG: Performed by: NURSE PRACTITIONER

## 2023-12-21 PROCEDURE — 25510000001 IOPAMIDOL PER 1 ML: Performed by: INTERNAL MEDICINE

## 2023-12-21 PROCEDURE — 63710000001 DIPHENHYDRAMINE PER 50 MG: Performed by: NURSE PRACTITIONER

## 2023-12-21 PROCEDURE — 36430 TRANSFUSION BLD/BLD COMPNT: CPT

## 2023-12-21 PROCEDURE — 99223 1ST HOSP IP/OBS HIGH 75: CPT | Performed by: STUDENT IN AN ORGANIZED HEALTH CARE EDUCATION/TRAINING PROGRAM

## 2023-12-21 PROCEDURE — 25810000003 SODIUM CHLORIDE 0.9 % SOLUTION: Performed by: INTERNAL MEDICINE

## 2023-12-21 RX ORDER — SODIUM CHLORIDE 9 MG/ML
40 INJECTION, SOLUTION INTRAVENOUS AS NEEDED
Status: DISCONTINUED | OUTPATIENT
Start: 2023-12-21 | End: 2023-12-27 | Stop reason: HOSPADM

## 2023-12-21 RX ORDER — DIPHENHYDRAMINE HCL 25 MG
25 CAPSULE ORAL ONCE
Status: COMPLETED | OUTPATIENT
Start: 2023-12-21 | End: 2023-12-21

## 2023-12-21 RX ORDER — ARIPIPRAZOLE 5 MG/1
5 TABLET ORAL DAILY
Status: DISCONTINUED | OUTPATIENT
Start: 2023-12-21 | End: 2023-12-27 | Stop reason: HOSPADM

## 2023-12-21 RX ORDER — CALCIUM CARBONATE 500 MG/1
2 TABLET, CHEWABLE ORAL 2 TIMES DAILY PRN
Status: DISCONTINUED | OUTPATIENT
Start: 2023-12-21 | End: 2023-12-27 | Stop reason: HOSPADM

## 2023-12-21 RX ORDER — NITROGLYCERIN 0.4 MG/1
0.4 TABLET SUBLINGUAL
Status: DISCONTINUED | OUTPATIENT
Start: 2023-12-21 | End: 2023-12-27 | Stop reason: HOSPADM

## 2023-12-21 RX ORDER — PETROLATUM 420 MG/G
1 OINTMENT TOPICAL EVERY 12 HOURS SCHEDULED
Status: DISCONTINUED | OUTPATIENT
Start: 2023-12-21 | End: 2023-12-27 | Stop reason: HOSPADM

## 2023-12-21 RX ORDER — ACETAMINOPHEN 650 MG/1
650 SUPPOSITORY RECTAL EVERY 4 HOURS PRN
Status: DISCONTINUED | OUTPATIENT
Start: 2023-12-21 | End: 2023-12-27 | Stop reason: HOSPADM

## 2023-12-21 RX ORDER — SODIUM CHLORIDE 0.9 % (FLUSH) 0.9 %
10 SYRINGE (ML) INJECTION EVERY 12 HOURS SCHEDULED
Status: DISCONTINUED | OUTPATIENT
Start: 2023-12-21 | End: 2023-12-27 | Stop reason: HOSPADM

## 2023-12-21 RX ORDER — VENLAFAXINE HYDROCHLORIDE 150 MG/1
150 CAPSULE, EXTENDED RELEASE ORAL DAILY
Status: DISCONTINUED | OUTPATIENT
Start: 2023-12-21 | End: 2023-12-27 | Stop reason: HOSPADM

## 2023-12-21 RX ORDER — SODIUM CHLORIDE 9 MG/ML
75 INJECTION, SOLUTION INTRAVENOUS CONTINUOUS
Status: DISCONTINUED | OUTPATIENT
Start: 2023-12-21 | End: 2023-12-27 | Stop reason: HOSPADM

## 2023-12-21 RX ORDER — ACETAMINOPHEN 160 MG/5ML
650 SOLUTION ORAL EVERY 4 HOURS PRN
Status: DISCONTINUED | OUTPATIENT
Start: 2023-12-21 | End: 2023-12-27 | Stop reason: HOSPADM

## 2023-12-21 RX ORDER — SODIUM CHLORIDE 0.9 % (FLUSH) 0.9 %
10 SYRINGE (ML) INJECTION AS NEEDED
Status: DISCONTINUED | OUTPATIENT
Start: 2023-12-21 | End: 2023-12-27 | Stop reason: HOSPADM

## 2023-12-21 RX ORDER — ONDANSETRON 2 MG/ML
4 INJECTION INTRAMUSCULAR; INTRAVENOUS EVERY 6 HOURS PRN
Status: DISCONTINUED | OUTPATIENT
Start: 2023-12-21 | End: 2023-12-27 | Stop reason: HOSPADM

## 2023-12-21 RX ORDER — KETOROLAC TROMETHAMINE 30 MG/ML
15 INJECTION, SOLUTION INTRAMUSCULAR; INTRAVENOUS EVERY 6 HOURS PRN
Status: DISPENSED | OUTPATIENT
Start: 2023-12-21 | End: 2023-12-26

## 2023-12-21 RX ORDER — ACETAMINOPHEN 325 MG/1
650 TABLET ORAL EVERY 4 HOURS PRN
Status: DISCONTINUED | OUTPATIENT
Start: 2023-12-21 | End: 2023-12-27 | Stop reason: HOSPADM

## 2023-12-21 RX ORDER — BUSPIRONE HYDROCHLORIDE 15 MG/1
30 TABLET ORAL EVERY MORNING
Status: DISCONTINUED | OUTPATIENT
Start: 2023-12-21 | End: 2023-12-27 | Stop reason: HOSPADM

## 2023-12-21 RX ORDER — ONDANSETRON 4 MG/1
4 TABLET, ORALLY DISINTEGRATING ORAL EVERY 6 HOURS PRN
Status: DISCONTINUED | OUTPATIENT
Start: 2023-12-21 | End: 2023-12-27 | Stop reason: HOSPADM

## 2023-12-21 RX ADMIN — IOPAMIDOL 95 ML: 755 INJECTION, SOLUTION INTRAVENOUS at 11:01

## 2023-12-21 RX ADMIN — KETOROLAC TROMETHAMINE 15 MG: 30 INJECTION, SOLUTION INTRAMUSCULAR; INTRAVENOUS at 12:16

## 2023-12-21 RX ADMIN — SODIUM CHLORIDE 100 ML/HR: 9 INJECTION, SOLUTION INTRAVENOUS at 01:48

## 2023-12-21 RX ADMIN — Medication 10 ML: at 20:07

## 2023-12-21 RX ADMIN — VENLAFAXINE HYDROCHLORIDE 150 MG: 150 CAPSULE, EXTENDED RELEASE ORAL at 09:40

## 2023-12-21 RX ADMIN — PETROLATUM 1 APPLICATION: 420 OINTMENT TOPICAL at 23:54

## 2023-12-21 RX ADMIN — ACETAMINOPHEN 650 MG: 325 TABLET, FILM COATED ORAL at 19:57

## 2023-12-21 RX ADMIN — DIPHENHYDRAMINE HYDROCHLORIDE 25 MG: 25 CAPSULE ORAL at 20:25

## 2023-12-21 RX ADMIN — BUSPIRONE HYDROCHLORIDE 30 MG: 15 TABLET ORAL at 09:40

## 2023-12-21 RX ADMIN — ARIPIPRAZOLE 5 MG: 5 TABLET ORAL at 09:40

## 2023-12-21 RX ADMIN — CEFTRIAXONE 2000 MG: 2 INJECTION, POWDER, FOR SOLUTION INTRAMUSCULAR; INTRAVENOUS at 21:50

## 2023-12-21 RX ADMIN — ACETAMINOPHEN 650 MG: 325 TABLET, FILM COATED ORAL at 06:59

## 2023-12-21 RX ADMIN — SODIUM CHLORIDE 500 ML: 9 INJECTION, SOLUTION INTRAVENOUS at 08:01

## 2023-12-21 RX ADMIN — Medication 10 ML: at 01:50

## 2023-12-21 RX ADMIN — AMITRIPTYLINE HYDROCHLORIDE 75 MG: 50 TABLET, FILM COATED ORAL at 21:50

## 2023-12-21 RX ADMIN — Medication 10 ML: at 08:02

## 2023-12-21 RX ADMIN — AMITRIPTYLINE HYDROCHLORIDE 75 MG: 50 TABLET, FILM COATED ORAL at 09:40

## 2023-12-21 NOTE — NURSING NOTE
Cwon note:  I requested a low air loss mattress from [employer].  Reference #: [id number].  A bed frame was also requested from [facility].  MARCELO Contreras, patient's primary nurse, was notified.

## 2023-12-21 NOTE — PLAN OF CARE
Goal Outcome Evaluation:      VSS, ex/ ST. RA. NPO w/ sips w/ meds, ice chips. NS @ 100 mL/hr. RLQ urostomy to jacob bag. LLQ colostomy CDI. L flank drx CDI, old drainage, soft, no bruising. Q8 H&H.

## 2023-12-21 NOTE — NURSING NOTE
Cwon follow up to assess bilateral foot wounds.  Feet are currently wrapped with Kerlix gauze. Dressings removed and skin assessed.     Right lateral heel intact with mild redness noted that is slow to darwin. Skin with thick, flaky dry patches, especially to first and 2nd toes. Scabbed, flaky tissue was easily removed with gentle cleansing.  No open wounds to right foot that warrant any topical dressing. Will recommend keeping well moisturized and protected and offloaded with heel boots or pillows. Right posterior lower leg, also with a dry scab present. Stable.    Left foot too, with dry, flaky skin and scabbed tissue noted to first, second and third toes. Much of the dead flaky skin able to easily be removed with gentle cleansing. Scabs to toes still remain. Monitor.     Plantar surface of left foot with a full thickness wound present. Exact etiology not clear but patient did mention poor fitting Crocs that he wore at one time and feels the plantar wound could possibly have developed from these. This wound measures 2.0 cm x 1.0 cm x 0.3 cm. Base is pale and relatively dry but stable.  No outward s/s of infection. Will recommend using Therahoney gel to wound base, followed by Opticell dressing and secured with Kerlix and tape.     Patient also with a healed/resolved wound to left anterior ankle, 2° to compression stockings, according to patient. There is scarring and hyperpigmentation (blanchable) noted to area but no open wound and no topical treatment needed, other than to keep well moisturized.     Patient agreeable to treatment plan and pleased with how well is wounds/skin are doing.  Appreciative of visit. He is currently on a low air loss mattress for appropriate pressure redistribution. Continue turn protocol.  All wound and skin care provided during visit and pt tolerated well.     I have added prevention orders as well as wound and skin care orders in EPIC.     Thank you for consult and please call with  any questions.
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
## 2023-12-21 NOTE — ED TRIAGE NOTES
Pt was brought in by ems from home for fever that started yesterday. Pt had nephrostomy placed 1wk ago. Pt just finished abx for uti

## 2023-12-21 NOTE — ED NOTES
Nursing report ED to floor  Pk May  43 y.o.  male    HPI :   Chief Complaint   Patient presents with    Fever       Admitting doctor:   Bryan Verma MD    Admitting diagnosis:   The encounter diagnosis was Acute UTI.    Code status:   Current Code Status       Date Active Code Status Order ID Comments User Context       12/21/2023 0019 CPR (Attempt to Resuscitate) [id number]  Edelmira Martel APRN ED        Question Answer    Code Status (Patient has no pulse and is not breathing) CPR (Attempt to Resuscitate)    Medical Interventions (Patient has pulse or is breathing) Full Support                    Allergies:   Pholcodine, Codeine, Amoxicillin-pot clavulanate, Cefuroxime, Doxycycline, Sulfamethoxazole, and Sulfamethoxazole-trimethoprim    Isolation:   Enhanced Droplet/Contact     Intake and Output    Intake/Output Summary (Last 24 hours) at 12/21/2023 0024  Last data filed at 12/20/2023 2215  Gross per 24 hour   Intake 1200 ml   Output --   Net 1200 ml       Weight:       12/20/23 2013   Weight: 95.3 kg (210 lb)       Most recent vitals:   Vitals:    12/20/23 2346 12/21/23 0001 12/21/23 0023 12/21/23 0024   BP:  (!) 83/52     Pulse: 109 109 109 110   Resp:       Temp:       TempSrc:       SpO2: 96% 97% 99% 98%   Weight:       Height:           Active LDAs/IV Access:   Lines, Drains & Airways       Active LDAs       Name Placement date Placement time Site Days    Peripheral IV 12/20/23 2117 Anterior;Left;Upper Arm 12/20/23 2117  Arm  less than 1    Nephrostomy Left 8 Fr. 11/21/23  0033  Left  29    Colostomy LUQ --  pt stated 2-3 years ago  --  LUQ  --    Urostomy RUQ --  pt stated 2-3 years ago  --  RUQ  --    Ureteral Drain/Stent Left ureter 6 Fr. 12/13/23  1443  Left ureter  7                    Labs (abnormal labs have a star):   Labs Reviewed   COMPREHENSIVE METABOLIC PANEL - Abnormal; Notable for the following components:       Result Value    Glucose 120 (*)     Sodium 131 (*)     CO2 20.0 (*)      Calcium 8.5 (*)     Albumin 3.0 (*)     Alkaline Phosphatase 208 (*)     All other components within normal limits    Narrative:     GFR Normal >60  Chronic Kidney Disease <60  Kidney Failure <15     CBC WITH AUTO DIFFERENTIAL - Abnormal; Notable for the following components:    WBC 16.33 (*)     RBC 2.96 (*)     Hemoglobin 8.2 (*)     Hematocrit 25.5 (*)     RDW 15.5 (*)     Platelets 463 (*)     Neutrophil % 86.3 (*)     Lymphocyte % 7.0 (*)     Eosinophil % 0.1 (*)     Neutrophils, Absolute 14.09 (*)     Monocytes, Absolute 0.97 (*)     Immature Grans, Absolute 0.08 (*)     All other components within normal limits   URINALYSIS W/ CULTURE IF INDICATED - Abnormal; Notable for the following components:    Color, UA Dark Yellow (*)     Appearance, UA Cloudy (*)     Ketones, UA Trace (*)     Blood, UA Trace (*)     Protein,  mg/dL (2+) (*)     Leuk Esterase, UA Moderate (2+) (*)     All other components within normal limits    Narrative:     In absence of clinical symptoms, the presence of pyuria, bacteria, and/or nitrites on the urinalysis result does not correlate with infection.   URINALYSIS, MICROSCOPIC ONLY - Abnormal; Notable for the following components:    RBC, UA 3-5 (*)     WBC, UA Too Numerous to Count (*)     Bacteria, UA 4+ (*)     All other components within normal limits   RESPIRATORY PANEL PCR W/ COVID-19 (SARS-COV-2), NP SWAB IN UTM/VTP, 2 HR TAT - Normal    Narrative:     In the setting of a positive respiratory panel with a viral infection PLUS a negative procalcitonin without other underlying concern for bacterial infection, consider observing off antibiotics or discontinuation of antibiotics and continue supportive care. If the respiratory panel is positive for atypical bacterial infection (Bordetella pertussis, Chlamydophila pneumoniae, or Mycoplasma pneumoniae), consider antibiotic de-escalation to target atypical bacterial infection.   LACTIC ACID, PLASMA - Normal   BLOOD CULTURE    BLOOD CULTURE   URINE CULTURE   RAINBOW DRAW    Narrative:     The following orders were created for panel order [place] Draw.  Procedure                               Abnormality         Status                     ---------                               -----------         ------                     Green Top (Gel)[[id number]]                                  Final result               Lavender Top[[id number]]                                     Final result               Gold Top - SST[[id number]]                                   Final result               Light Blue Top[[id number]]                                   Final result                 Please view results for these tests on the individual orders.   HEMOGLOBIN AND HEMATOCRIT, BLOOD   BASIC METABOLIC PANEL   CBC (NO DIFF)   URINE DRUG SCREEN   CBC AND DIFFERENTIAL    Narrative:     The following orders were created for panel order CBC & Differential.  Procedure                               Abnormality         Status                     ---------                               -----------         ------                     CBC Auto Differential[[id number]]        Abnormal            Final result                 Please view results for these tests on the individual orders.   GREEN TOP   LAVENDER TOP   GOLD TOP - SST   LIGHT BLUE TOP       EKG:   No orders to display       Meds given in ED:   Medications   sodium chloride 0.9 % flush 10 mL (has no administration in time range)   sodium chloride 0.9 % flush 10 mL (has no administration in time range)   sodium chloride 0.9 % flush 10 mL (has no administration in time range)   sodium chloride 0.9 % infusion 40 mL (has no administration in time range)   nitroglycerin (NITROSTAT) SL tablet 0.4 mg (has no administration in time range)   sodium chloride 0.9 % infusion (has no administration in time range)   acetaminophen (TYLENOL) tablet 650 mg (has no administration in time range)     Or   acetaminophen (TYLENOL) 160  "MG/5ML oral solution 650 mg (has no administration in time range)     Or   acetaminophen (TYLENOL) suppository 650 mg (has no administration in time range)   ondansetron ODT (ZOFRAN-ODT) disintegrating tablet 4 mg (has no administration in time range)     Or   ondansetron (ZOFRAN) injection 4 mg (has no administration in time range)   calcium carbonate (TUMS) chewable tablet 500 mg (200 mg elemental) (has no administration in time range)   cefTRIAXone (ROCEPHIN) 2,000 mg in sodium chloride 0.9 % 100 mL IVPB-VTB (has no administration in time range)   lactated ringers bolus 1,000 mL (1,000 mL Intravenous New Bag 23)   acetaminophen (TYLENOL) tablet 1,000 mg (1,000 mg Oral Given 23)   cefTRIAXone (ROCEPHIN) 2,000 mg in sodium chloride 0.9 % 100 mL IVPB-VTB (0 mg Intravenous Stopped 23)       Imaging results:  CT Abdomen Pelvis Without Contrast    Result Date: 2023  Electronically signed by Willian Casas MD on 23 at 2344     Ambulatory status:   PARA. ax2    Social issues:   Social History     Socioeconomic History    Marital status: Significant Other   Tobacco Use    Smoking status: Former     Packs/day: 1.00     Years: 24.00     Additional pack years: 0.00     Total pack years: 24.00     Types: Cigarettes     Quit date: 2018     Years since quittin.9    Smokeless tobacco: Never   Vaping Use    Vaping Use: Every day    Substances: Nicotine   Substance and Sexual Activity    Alcohol use: Not Currently    Drug use: Yes     Types: Marijuana, \"Crack\" cocaine    Sexual activity: Defer       NIH Stroke Scale:       Registered Nurse, RN  23 00:24 EST       "

## 2023-12-21 NOTE — PROGRESS NOTES
[facility] Update  12/21/2023  10:51 EST      Non visit note    CT reviewed  No obstructing stones, no hydronephrosis  Stent has spontaneously fallen out  Left renal hematoma appears concerning for possible recurrent active bleeding    Favor CT angio and consideration of embolization or observation  May need transfusion if Hb/Hct drops     Urology will actively follow       Elijah Castro MD  [facility]  General & Reconstructive Urology  Office: [phone]  Fax: [phone]

## 2023-12-21 NOTE — DISCHARGE PLACEMENT REQUEST
"Pk Jaeger (43 y.o. Male)       Date of Birth   1980    Social Security Number       Address   [address] [place] [address]    Home Phone   [phone]    MRN   [id number]       [facility]    Marital Status   Significant Other                            Admission Date   12/20/23    Admission Type   Emergency    Admitting Provider   Sarthak Rolon MD    Attending Provider   Sarthak Rolon MD    Department, Room/Bed   [address], [address]       Discharge Date       Discharge Disposition       Discharge Destination                                 Attending Provider: Sarthak Rolon MD    Allergies: Pholcodine, Codeine, Amoxicillin-pot Clavulanate, Cefuroxime, Doxycycline, Sulfamethoxazole, Sulfamethoxazole-trimethoprim    Isolation: Contact   Infection: MRSA (12/21/23)   Code Status: CPR    Ht: 177.8 cm (70\")   Wt: 85 kg (187 lb 6.3 oz)    Admission Cmt: None   Principal Problem: Sepsis [A41.9]                   Active Insurance as of 12/20/2023       Primary Coverage       Payor Plan Insurance Group Employer/Plan Group    PASSPORT MEDICARE ADVANTAGE PASSPORT ADVANTAGE N       Payor Plan Address Payor Plan Phone Number Payor Plan Fax Number Effective Dates    [address]   8/1/2023 - None Entered    [place] [id number]         Subscriber Name Subscriber Birth Date Member ID       PK JAEGER 1980 [id number]                     Emergency Contacts        (Rel.) Home Phone Work Phone Mobile Phone    Carl Santana (Significant Other) [phone] -- --    JaegerElva (Grandparent) [phone] -- [phone]    David Jaeger (NOK) (Son) [phone] -- [phone]              Emergency Contact Information       Name Relation Home Work Mobile    Carl Santana Significant Other [phone]      JaegerElva sparks Grandparent [phone]    David Jaeger (NOK) Son [phone]  " [phone]
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
## 2023-12-21 NOTE — CASE MANAGEMENT/SOCIAL WORK
Discharge Planning Assessment  [facility]     Patient Name: Pk May  MRN: [id number]  Today's Date: 12/21/2023    Admit Date: 12/20/2023    Plan: Home with    Discharge Needs Assessment       Row Name 12/21/23 1435       Living Environment    People in Home significant other    Current Living Arrangements extended care facility    Potentially Unsafe Housing Conditions none    Primary Care Provided by self    Family Caregiver if Needed none    Quality of Family Relationships supportive       Resource/Environmental Concerns    Transportation Concerns no car       Transition Planning    Patient/Family Anticipates Transition to home with help/services    Transportation Anticipated health plan transportation       Discharge Needs Assessment    Equipment Currently Used at Home wheelchair, motorized;colostomy/ostomy supplies    Equipment Needed After Discharge none                   Discharge Plan       Row Name 12/21/23 1436       Plan    Plan Home with     Patient/Family in Agreement with Plan yes    Plan Comments Met with pt at bedside.  Introduced self, explained CCP role, facesheet verified. Pt states he lives with girlfriend in extended stay hotel in [place].  Pt uses wheelchair and slide board and is transfers self.  Uses [employer] transportation for MD appts.  Pt states he is current with [facility].  Plans to return home at discharge with [facility]. Will need wheelchair van transportation.  Does not have wheelchair with him.  No further needs identified.  NAKITA Dunn RN                  Continued Care and Services - Admitted Since 12/20/2023       Home Medical Care       Service Provider Request Status Selected Services Address Phone Fax Patient Preferred    [facility] Pending - Request Sent N/A [address] [facility] [address] [facility] [id number] [phone] --                  Selected Continued Care - Prior Encounters Includes continued care and service providers with  selected services from prior encounters from 9/21/2023 to 12/21/2023      Discharged on 12/1/2023 Admission date: 11/20/2023 - Discharge disposition: Home or Self Care      Home Medical Care       Service Provider Selected Services Address Phone Fax Patient Preferred    [facility] [address] [place] [phone] --       Internal Comment last updated by Hali Paul RN 12/2/2023 1100    Left a [facility] for [facility] confirming they will follow pt for                                         Demographic Summary       Row Name 12/21/23 1434       General Information    Admission Type inpatient    Arrived From home    Referral Source admission list    Reason for Consult discharge planning    Preferred Language English       Contact Information    Permission Granted to Share Info With family/designee  Carl Esther (ANTHONY) [phone]                   Functional Status    No documentation.                  Psychosocial    No documentation.                  Abuse/Neglect    No documentation.                  Legal    No documentation.                  Substance Abuse    No documentation.                  Patient Forms    No documentation.                     Samantha Dunn RN
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
## 2023-12-22 LAB
ANION GAP SERPL CALCULATED.3IONS-SCNC: 9.6 MMOL/L (ref 5–15)
BUN SERPL-MCNC: 9 MG/DL (ref 6–20)
BUN/CREAT SERPL: 11.5 (ref 7–25)
CALCIUM SPEC-SCNC: 8 MG/DL (ref 8.6–10.5)
CHLORIDE SERPL-SCNC: 106 MMOL/L (ref 98–107)
CO2 SERPL-SCNC: 21.4 MMOL/L (ref 22–29)
CREAT SERPL-MCNC: 0.78 MG/DL (ref 0.76–1.27)
DEPRECATED RDW RBC AUTO: 43.9 FL (ref 37–54)
EGFRCR SERPLBLD CKD-EPI 2021: 114.2 ML/MIN/1.73
ERYTHROCYTE [DISTWIDTH] IN BLOOD BY AUTOMATED COUNT: 14.9 % (ref 12.3–15.4)
FOLATE SERPL-MCNC: 11.7 NG/ML (ref 4.78–24.2)
GLUCOSE SERPL-MCNC: 106 MG/DL (ref 65–99)
HCT VFR BLD AUTO: 23.4 % (ref 37.5–51)
HCT VFR BLD AUTO: 23.7 % (ref 37.5–51)
HCT VFR BLD AUTO: 23.8 % (ref 37.5–51)
HCT VFR BLD AUTO: 24.8 % (ref 37.5–51)
HCT VFR BLD AUTO: 24.8 % (ref 37.5–51)
HGB BLD-MCNC: 7.6 G/DL (ref 13–17.7)
HGB BLD-MCNC: 7.8 G/DL (ref 13–17.7)
HGB BLD-MCNC: 7.8 G/DL (ref 13–17.7)
HGB BLD-MCNC: 7.9 G/DL (ref 13–17.7)
HGB BLD-MCNC: 7.9 G/DL (ref 13–17.7)
MCH RBC QN AUTO: 26.3 PG (ref 26.6–33)
MCHC RBC AUTO-ENTMCNC: 31.9 G/DL (ref 31.5–35.7)
MCV RBC AUTO: 82.7 FL (ref 79–97)
PLATELET # BLD AUTO: 430 10*3/MM3 (ref 140–450)
PMV BLD AUTO: 8.4 FL (ref 6–12)
POTASSIUM SERPL-SCNC: 3.8 MMOL/L (ref 3.5–5.2)
RBC # BLD AUTO: 3 10*6/MM3 (ref 4.14–5.8)
SODIUM SERPL-SCNC: 137 MMOL/L (ref 136–145)
VIT B12 BLD-MCNC: 534 PG/ML (ref 211–946)
WBC NRBC COR # BLD AUTO: 12.67 10*3/MM3 (ref 3.4–10.8)

## 2023-12-22 PROCEDURE — 85027 COMPLETE CBC AUTOMATED: CPT | Performed by: INTERNAL MEDICINE

## 2023-12-22 PROCEDURE — 25810000003 SODIUM CHLORIDE 0.9 % SOLUTION: Performed by: INTERNAL MEDICINE

## 2023-12-22 PROCEDURE — 25010000002 KETOROLAC TROMETHAMINE PER 15 MG: Performed by: INTERNAL MEDICINE

## 2023-12-22 PROCEDURE — 25010000002 CEFEPIME PER 500 MG: Performed by: STUDENT IN AN ORGANIZED HEALTH CARE EDUCATION/TRAINING PROGRAM

## 2023-12-22 PROCEDURE — 82746 ASSAY OF FOLIC ACID SERUM: CPT | Performed by: INTERNAL MEDICINE

## 2023-12-22 PROCEDURE — 99232 SBSQ HOSP IP/OBS MODERATE 35: CPT | Performed by: STUDENT IN AN ORGANIZED HEALTH CARE EDUCATION/TRAINING PROGRAM

## 2023-12-22 PROCEDURE — 25010000002 ONDANSETRON PER 1 MG: Performed by: NURSE PRACTITIONER

## 2023-12-22 PROCEDURE — 80048 BASIC METABOLIC PNL TOTAL CA: CPT | Performed by: INTERNAL MEDICINE

## 2023-12-22 PROCEDURE — 85014 HEMATOCRIT: CPT | Performed by: NURSE PRACTITIONER

## 2023-12-22 PROCEDURE — 36415 COLL VENOUS BLD VENIPUNCTURE: CPT | Performed by: NURSE PRACTITIONER

## 2023-12-22 PROCEDURE — 82607 VITAMIN B-12: CPT | Performed by: INTERNAL MEDICINE

## 2023-12-22 PROCEDURE — 85018 HEMOGLOBIN: CPT | Performed by: NURSE PRACTITIONER

## 2023-12-22 RX ORDER — SIMETHICONE 80 MG
80 TABLET,CHEWABLE ORAL 4 TIMES DAILY PRN
Status: DISCONTINUED | OUTPATIENT
Start: 2023-12-22 | End: 2023-12-23

## 2023-12-22 RX ADMIN — Medication 10 ML: at 22:01

## 2023-12-22 RX ADMIN — PETROLATUM 1 APPLICATION: 420 OINTMENT TOPICAL at 22:01

## 2023-12-22 RX ADMIN — VENLAFAXINE HYDROCHLORIDE 150 MG: 150 CAPSULE, EXTENDED RELEASE ORAL at 08:47

## 2023-12-22 RX ADMIN — AMITRIPTYLINE HYDROCHLORIDE 75 MG: 50 TABLET, FILM COATED ORAL at 08:47

## 2023-12-22 RX ADMIN — PETROLATUM 1 APPLICATION: 420 OINTMENT TOPICAL at 08:47

## 2023-12-22 RX ADMIN — AMITRIPTYLINE HYDROCHLORIDE 75 MG: 50 TABLET, FILM COATED ORAL at 20:15

## 2023-12-22 RX ADMIN — SODIUM CHLORIDE 100 ML/HR: 9 INJECTION, SOLUTION INTRAVENOUS at 11:16

## 2023-12-22 RX ADMIN — KETOROLAC TROMETHAMINE 15 MG: 30 INJECTION, SOLUTION INTRAMUSCULAR; INTRAVENOUS at 14:43

## 2023-12-22 RX ADMIN — SIMETHICONE 80 MG: 80 TABLET, CHEWABLE ORAL at 21:01

## 2023-12-22 RX ADMIN — ONDANSETRON HYDROCHLORIDE 4 MG: 2 INJECTION, SOLUTION INTRAMUSCULAR; INTRAVENOUS at 14:43

## 2023-12-22 RX ADMIN — CEFEPIME 2000 MG: 2 INJECTION, POWDER, FOR SOLUTION INTRAVENOUS at 14:44

## 2023-12-22 RX ADMIN — BUSPIRONE HYDROCHLORIDE 30 MG: 15 TABLET ORAL at 07:17

## 2023-12-22 RX ADMIN — ARIPIPRAZOLE 5 MG: 5 TABLET ORAL at 08:47

## 2023-12-22 RX ADMIN — SODIUM CHLORIDE 150 ML/HR: 9 INJECTION, SOLUTION INTRAVENOUS at 03:48

## 2023-12-22 RX ADMIN — ACETAMINOPHEN 650 MG: 325 TABLET, FILM COATED ORAL at 03:54

## 2023-12-22 RX ADMIN — CEFEPIME 2000 MG: 2 INJECTION, POWDER, FOR SOLUTION INTRAVENOUS at 22:29

## 2023-12-22 RX ADMIN — ACETAMINOPHEN 650 MG: 325 TABLET, FILM COATED ORAL at 20:16

## 2023-12-22 RX ADMIN — Medication 10 ML: at 08:47

## 2023-12-22 RX ADMIN — ACETAMINOPHEN 650 MG: 325 TABLET, FILM COATED ORAL at 14:51

## 2023-12-22 NOTE — PAYOR COMM NOTE
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 Straw    Appearance, UA Cloudy (A) Clear    pH, UA 7.0 5.0 - 8.0    Specific Gravity, UA 1.016 1.005 - 1.030    Glucose, UA Negative Negative    Ketones, UA Trace (A) Negative    Bilirubin, UA Negative Negative    Blood, UA Trace (A) Negative    Protein,  mg/dL (2+) (A) Negative    Leuk Esterase, UA Moderate (2+) (A) Negative    Nitrite, UA Negative Negative    Urobilinogen, UA 1.0 E.U./dL 0.2 - 1.0 E.U./dL   Respiratory Panel PCR w/COVID-19(SARS-CoV-2) VLADIMIR/MACKENZIE/JACKIE/PAD/COR/LOUIE In-House, NP Swab in UTM/VTM, 2 HR TAT - Swab, Nasopharynx    Collection Time: 12/20/23  9:31 PM    Specimen: Nasopharynx; Swab   Result Value Ref Range    ADENOVIRUS, PCR Not Detected Not Detected    Coronavirus 229E Not Detected Not Detected    Coronavirus HKU1 Not Detected Not Detected    Coronavirus NL63 Not Detected Not Detected    Coronavirus OC43 Not Detected Not Detected    COVID19 Not Detected Not Detected - Ref. Range     Human Metapneumovirus Not Detected Not Detected    Human Rhinovirus/Enterovirus Not Detected Not Detected    Influenza A PCR Not Detected Not Detected    Influenza B PCR Not Detected Not Detected    Parainfluenza Virus 1 Not Detected Not Detected    Parainfluenza Virus 2 Not Detected Not Detected    Parainfluenza Virus 3 Not Detected Not Detected    Parainfluenza Virus 4 Not Detected Not Detected    RSV, PCR Not Detected Not Detected    Bordetella pertussis pcr Not Detected Not Detected    Bordetella parapertussis PCR Not Detected Not Detected    Chlamydophila pneumoniae PCR Not Detected Not Detected    Mycoplasma pneumo by PCR Not Detected Not Detected   Urinalysis, Microscopic Only - Urine, Clean Catch    Collection Time: 12/20/23  9:31 PM    Specimen: Urine, Clean Catch   Result Value Ref Range    RBC, UA 3-5 (A) None Seen, 0-2 /HPF    WBC, UA Too Numerous to Count (A) None Seen, 0-2 /HPF    Bacteria, UA 4+ (A) None Seen /HPF    Squamous Epithelial Cells, UA 0-2 None Seen, 0-2 /HPF    Hyaline Casts, UA None Seen None Seen /LPF    Methodology Manual Light Microscopy    Urine Drug Screen - Urine, Clean Catch    Collection Time: 12/20/23  9:31 PM    Specimen: Urine, Clean Catch   Result Value Ref Range    Amphet/Methamphet, Screen Negative Negative    Barbiturates Screen, Urine Negative Negative    Benzodiazepine Screen, Urine Negative Negative    Cocaine Screen, Urine Negative Negative    Opiate Screen Positive (A) Negative    THC, Screen, Urine Positive (A) Negative    Methadone Screen, Urine Negative Negative    Oxycodone Screen, Urine Negative Negative    Fentanyl, Urine Negative Negative   Basic Metabolic Panel    Collection Time: 12/21/23  5:37 AM    Specimen: Blood   Result Value Ref Range    Glucose 113 (H) 65 - 99 mg/dL    BUN 11 6 - 20 mg/dL    Creatinine 0.85 0.76 - 1.27 mg/dL    Sodium 135 (L) 136 - 145 mmol/L    Potassium 3.5 3.5 - 5.2 mmol/L    Chloride 101 98 - 107 mmol/L    CO2 22.6 22.0 - 29.0  mmol/L    Calcium 8.6 8.6 - 10.5 mg/dL    BUN/Creatinine Ratio 12.9 7.0 - 25.0    Anion Gap 11.4 5.0 - 15.0 mmol/L    eGFR 111.3 >60.0 mL/min/1.73   CBC (No Diff)    Collection Time: 12/21/23  5:37 AM    Specimen: Blood   Result Value Ref Range    WBC 17.18 (H) 3.40 - 10.80 10*3/mm3    RBC 3.01 (L) 4.14 - 5.80 10*6/mm3    Hemoglobin 8.2 (L) 13.0 - 17.7 g/dL    Hematocrit 25.6 (L) 37.5 - 51.0 %    MCV 85.0 79.0 - 97.0 fL    MCH 27.2 26.6 - 33.0 pg    MCHC 32.0 31.5 - 35.7 g/dL    RDW 15.5 (H) 12.3 - 15.4 %    RDW-SD 48.5 37.0 - 54.0 fl    MPV 8.8 6.0 - 12.0 fL    Platelets 506 (H) 140 - 450 10*3/mm3       Ordered the above labs and reviewed the results.        RADIOLOGY  CT Abdomen Pelvis Without Contrast    Result Date: 12/20/2023  Patient: ZACHARY JAEGER  Time Out: 23:44 Exam(s): CT ABDOMEN + PELVIS Without Contrast EXAM:   CT Abdomen and Pelvis Without Intravenous Contrast CLINICAL HISTORY:    Reason for exam: flank pain. TECHNIQUE:   Axial computed tomography images of the abdomen and pelvis without intravenous contrast.  This CT exam was performed according to the principle of ALARA (As Low As Reasonably Achievable) by using one or more of the following dose reduction techniques: automated exposure control, adjustment of the mA and or kV according to patient size, and or use of iterative reconstruction technique. COMPARISON:   No relevant prior studies available. FINDINGS:   Lung bases:  Unremarkable.  No mass.  No consolidation.  ABDOMEN:   Liver:  Unremarkable.  No focal hepatic lesion.   Gallbladder and bile ducts:  Contracted gallbladder.  No calcified stones.  No ductal dilation.   Pancreas:  Unremarkable.  No ductal dilation.   Spleen:  Unremarkable.  No splenomegaly.   Adrenals:  Unremarkable.  No mass.   Kidneys and ureters:  Left renal hematoma, which measures approximately 10.5 x 7.7 cm.  Internal hyperdensity, concerning for more acute blood products.  Mild extension of retroperitoneal blood, which  extends into the pelvis.  Recommend CTA if there is concern for active bleed.  Note, any underlying mass cannot be excluded without contrast.  No obstructing stones.  No hydronephrosis.   Stomach and bowel:  Large and small bowel resections are present.  PELVIS:   Appendix:  See below.    Bladder:  Unremarkable.  No stones.   Reproductive:  Unremarkable as visualized.  ABDOMEN and PELVIS:   Intraperitoneal space:  Unremarkable.  No free air.  No significant fluid collection.   Bones joints:  Spine removal of hardware with underlying deformity at T11.  Correlate with surgical history of the spine.  No acute fracture.  No dislocation.   Soft tissues:  Left lower quadrant colostomy with small parastomal hernia.  Partial colectomy.  Right lower quadrant ileostomy.   Vasculature:  Unremarkable.  No abdominal aortic aneurysm.   Lymph nodes:  Unremarkable.  No enlarged lymph nodes. IMPRESSION:     1.  Left renal hematoma, which measures approximately 10.5 x 7.7 cm. Internal hyperdensity, concerning for more acute blood products.  Mild extension of retroperitoneal blood, which extends into the pelvis.  Recommend CTA if there is concern for active bleed.  Note, any underlying mass cannot be excluded without contrast.  No prior studies are available for comparison.  Correlate with surgical history. 2.  Large and small bowel resections are present. 3.  Left lower quadrant colostomy with small parastomal hernia.  Partial colectomy.  Right lower quadrant ileostomy. 4.  Spine removal of hardware with underlying deformity at T11.  Correlate with surgical history of the spine.     Electronically signed by Willian Casas MD on 12-20-23 at 2344     Ordered the above noted radiological studies. Reviewed by me in PACS.            PROCEDURES  Procedures              MEDICATIONS GIVEN IN ER  Medications   sodium chloride 0.9 % flush 10 mL (has no administration in time range)   sodium chloride 0.9 % flush 10 mL (10 mL Intravenous Given  12/21/23 0150)   sodium chloride 0.9 % flush 10 mL (has no administration in time range)   sodium chloride 0.9 % infusion 40 mL (has no administration in time range)   nitroglycerin (NITROSTAT) SL tablet 0.4 mg (has no administration in time range)   sodium chloride 0.9 % infusion (100 mL/hr Intravenous New Bag 12/21/23 0148)   acetaminophen (TYLENOL) tablet 650 mg (has no administration in time range)     Or   acetaminophen (TYLENOL) 160 MG/5ML oral solution 650 mg (has no administration in time range)     Or   acetaminophen (TYLENOL) suppository 650 mg (has no administration in time range)   ondansetron ODT (ZOFRAN-ODT) disintegrating tablet 4 mg (has no administration in time range)     Or   ondansetron (ZOFRAN) injection 4 mg (has no administration in time range)   calcium carbonate (TUMS) chewable tablet 500 mg (200 mg elemental) (has no administration in time range)   cefTRIAXone (ROCEPHIN) 2,000 mg in sodium chloride 0.9 % 100 mL IVPB-VTB (has no administration in time range)   lactated ringers bolus 1,000 mL (1,000 mL Intravenous New Bag 12/20/23 2128)   acetaminophen (TYLENOL) tablet 1,000 mg (1,000 mg Oral Given 12/20/23 2128)   cefTRIAXone (ROCEPHIN) 2,000 mg in sodium chloride 0.9 % 100 mL IVPB-VTB (0 mg Intravenous Stopped 12/20/23 2215)                   MEDICAL DECISION MAKING, PROGRESS, and CONSULTS     Discussion below represents my analysis of pertinent findings related to patient's condition, differential diagnosis, treatment plan and final disposition.      Additional sources:  - Discussed/ obtained information from independent historians:  None    - External (non-ED) record review: Epic reviewed and patient admitted November 2023 for sepsis    - Chronic or social conditions impacting care: none    - Shared decision making:  none      Orders placed during this visit:  Orders Placed This Encounter   Procedures    Blood Culture - Blood,    Blood Culture - Blood,    Respiratory Panel PCR  w/COVID-19(SARS-CoV-2) VLADIMIR/MACKENZIE/JACKIE/PAD/COR/LOUIE In-House, NP Swab in UTM/VTM, 2 HR TAT - Swab, Nasopharynx    Urine Culture - Urine,    CT Abdomen Pelvis Without Contrast    Comprehensive Metabolic Panel    Lactic Acid, Plasma    [place] Draw    CBC Auto Differential    Urinalysis With Culture If Indicated - Urine, Clean Catch    Urinalysis, Microscopic Only - Urine, Clean Catch    Hemoglobin & Hematocrit, Blood    Basic Metabolic Panel    CBC (No Diff)    Urine Drug Screen - Urine, Clean Catch    NPO Diet NPO Type: Sips with Meds, Ice Chips    Undress & Gown    Continuous Pulse Oximetry    Vital Signs    Vital Signs    Intake & Output    Weigh Patient    Oral Care    Place Sequential Compression Device    Maintain Sequential Compression Device    Telemetry - Maintain IV Access    Telemetry - Place Orders & Notify Provider of Results When Patient Experiences Acute Chest Pain, Dysrhythmia or Respiratory Distress    May Be Off Telemetry for Tests    Code Status and Medical Interventions:    [facility] (on-call MD unless specified) Details    Urology (on-call MD unless specified)    Inpatient Urology Consult    Inpatient Consult to Advance Care Planning    Inpatient Case Management  Consult    Oxygen Therapy- Nasal Cannula; Titrate 1-6 LPM Per SpO2; 90 - 95%    SCANNED - TELEMETRY      Wound Ostomy Eval & Treat    Insert Peripheral IV    Insert Peripheral IV    Inpatient Admission    CBC & Differential    Green Top (Gel)    Lavender Top    Gold Top - SST    Light Blue Top         Additional orders considered but not ordered:  none        Differential diagnosis includes but is not limited to:    Renal mass, abscess, hematoma      Independent interpretation of labs, radiology studies, and discussions with consultants:  ED Course as of 12/21/23 0641   Wed Dec 20, 2023   2303 23:04 EST  Presents for fever and appears to have urinary tract infection again.  Awaiting CT reading but does appear to have hydronephrosis  again on that kidney.  Patient has been given Rocephin.  Patient has been given fluids.  Patient will need to be admitted. [SL]   2350 Per the radiologist, CT abdomen/pelvis shows a left renal hematoma which measures approximately 10.5 x 7.7 cm.  There is internal hyperdensity concerning for more acute blood products.  There is mild extension of retroperitoneal blood which extends into the pelvis.  There is a left lower quadrant colostomy with small peristomal hernia.  There is right lower quadrant ileostomy.  See dictated report for details. []   Thu Dec 21, 2023   0006 Case discussed with Dr. Beasley, urology, and he agrees to see the patient in consult.  Pertinent history, exam findings, test results, ED course, and diagnoses were discussed with him. []   0014 Case discussed with PATRICIA Hickey, and she agrees to admit the patient to Dr. Verma.  Pertinent history, exam findings, test results, ED course, and diagnoses were discussed with her.  I also informed her that I have consulted and spoken with Dr. Beasley, urology. []      ED Course User Index  [SL] Jeremy Thomas MD  [] Benjamin Barrios MD                 DIAGNOSIS  Final diagnoses:   Acute UTI   Hematoma of left kidney, initial encounter         DISPOSITION  admit            Latest Documented Vital Signs:  As of 06:41 EST  BP- 117/69 HR- 117 Temp- 97.9 °F (36.6 °C) (Oral) O2 sat- 98%              --    Please note that portions of this were completed with a voice recognition program.       Note Disclaimer: At [facility], we believe that sharing information builds trust and better relationships. You are receiving this note because you are receiving care at [facility] or recently visited. It is possible you will see health information before a provider has talked with you about it. This kind of information can be easy to misunderstand. To help you fully understand what it means for your health, we urge you to discuss this note with your  provider.            Jeremy Thomas MD  12/21/23 0642      Electronically signed by Jeremy Thomas MD at 12/21/23 0642       Alix Cheng, RN at 12/20/23 2012          Pt was brought in by ems from home for fever that started yesterday. Pt had nephrostomy placed 1wk ago. Pt just finished abx for uti    Electronically signed by Alix Cheng, RN at 12/20/23 2013       Medication Administration Report for Pk May as of 12/22/23 1230     Legend:    Given Hold Not Given Due Canceled Entry Other Actions    Time Time (Time) Time Time-Action         Discontinued     Completed     Future     MAR Hold     Linked             Medications 12/21/23 12/22/23      acetaminophen (TYLENOL) tablet 650 mg  Dose: 650 mg  Freq: Every 4 Hours PRN Route: PO  PRN Reason: Mild Pain  Start: 12/21/23 0016   Admin Instructions:   If given for fever, use fever parameter: fever greater than 100.4 °F  Based on patient request - if ordered for moderate or severe pain, provider allows for administration of a medication prescribed for a lower pain scale.    Do not exceed 4 grams of acetaminophen in a 24 hr period. Max dose of 2gm for AST/ALT greater than 120 units/L.    If given for pain, use the following pain scale:   Mild Pain = Pain Score of 1-3, CPOT 1-2  Moderate Pain = Pain Score of 4-6, CPOT 3-4  Severe Pain = Pain Score of 7-10, CPOT 5-8    0659-Given     1957-Given           0354-Given              Or  acetaminophen (TYLENOL) 160 MG/5ML oral solution 650 mg  Dose: 650 mg  Freq: Every 4 Hours PRN Route: PO  PRN Reason: Mild Pain  Start: 12/21/23 0016   Admin Instructions:   If given for fever, use fever parameter: fever greater than 100.4 °F  Based on patient request - if ordered for moderate or severe pain, provider allows for administration of a medication prescribed for a lower pain scale.    Do not exceed 4 grams of acetaminophen in a 24 hr period. Max dose of 2gm for AST/ALT greater than 120 units/L.    If  given for pain, use the following pain scale:   Mild Pain = Pain Score of 1-3, CPOT 1-2  Moderate Pain = Pain Score of 4-6, CPOT 3-4  Severe Pain = Pain Score of 7-10, CPOT 5-8    0659-Not Given:  See Alt     1957-Not Given:  See Alt           0354-Not Given:  See Alt              Or  acetaminophen (TYLENOL) suppository 650 mg  Dose: 650 mg  Freq: Every 4 Hours PRN Route: RE  PRN Reason: Mild Pain  Start: 12/21/23 0016   Admin Instructions:   If given for fever, use fever parameter: fever greater than 100.4 °F  Based on patient request - if ordered for moderate or severe pain, provider allows for administration of a medication prescribed for a lower pain scale.    Do not exceed 4 grams of acetaminophen in a 24 hr period. Max dose of 2gm for AST/ALT greater than 120 units/L.    If given for pain, use the following pain scale:   Mild Pain = Pain Score of 1-3, CPOT 1-2  Moderate Pain = Pain Score of 4-6, CPOT 3-4  Severe Pain = Pain Score of 7-10, CPOT 5-8    0659-Not Given:  See Alt     1957-Not Given:  See Alt           0354-Not Given:  See Alt               amitriptyline (ELAVIL) tablet 75 mg  Dose: 75 mg  Freq: 2 Times Daily Route: PO  Start: 12/21/23 1000    0940-Given     2150-Given           0847-Given     2100              ARIPiprazole (ABILIFY) tablet 5 mg  Dose: 5 mg  Freq: Daily Route: PO  Start: 12/21/23 0900   Admin Instructions:   Caution: Look alike/sound alike drug alert. Avoid grapefruit juice.    0940-Given            0847-Given               busPIRone (BUSPAR) tablet 30 mg  Dose: 30 mg  Freq: Every Morning Route: PO  Start: 12/21/23 1000   Admin Instructions:   Caution: Look alike/sound alike drug alert. Take with food.  Avoid grapefruit juice.    0940-Given            0717-Given               calcium carbonate (TUMS) chewable tablet 500 mg (200 mg elemental)  Dose: 2 tablet  Freq: 2 Times Daily PRN Route: PO  PRN Reason: Heartburn  Start: 12/21/23 0016   Admin Instructions:   One tablet contains 200  mg elemental calcium.  Take with food.         cefTRIAXone (ROCEPHIN) 2,000 mg in sodium chloride 0.9 % 100 mL IVPB-VTB  Dose: 2,000 mg  Freq: Every 24 Hours Route: IV  Indications of Use: SEPSIS,URINARY TRACT INFECTION  Start: 12/21/23 2100   End: 12/26/23 2059   Admin Instructions:   LR should be paused and flushing of the line with NS is recommended prior to and after completion of ceftriaxone infusion due to incompatibility. Do not co-adminster with calcium-containing solutions.  Caution: Look alike/sound alike drug alert    2150-New Bag            2100               ketorolac (TORADOL) injection 15 mg  Dose: 15 mg  Freq: Every 6 Hours PRN Route: IV  PRN Reason: Moderate Pain  Start: 12/21/23 1210   End: 12/26/23 1209   Admin Instructions:   (BK) Based on patient request - if ordered for moderate or severe pain, provider allows for administration of a medication prescribed for a lower pain scale.      If given for pain, use the following pain scale:  Mild Pain = Pain Score of 1-3, CPOT 1-2  Moderate Pain = Pain Score of 4-6, CPOT 3-4  Severe Pain = Pain Score of 7-10, CPOT 5-8    1216-Given                nitroglycerin (NITROSTAT) SL tablet 0.4 mg  Dose: 0.4 mg  Freq: Every 5 Minutes PRN Route: SL  PRN Reason: Chest Pain  PRN Comment: Only if SBP Greater Than 100  Start: 12/21/23 0015   Admin Instructions:   If Pain Unrelieved After 3 Doses Notify MD  May administer up to 3 doses per episode.         ondansetron ODT (ZOFRAN-ODT) disintegrating tablet 4 mg  Dose: 4 mg  Freq: Every 6 Hours PRN Route: PO  PRN Reasons: Nausea,Vomiting  Start: 12/21/23 0016   Admin Instructions:   If BOTH ondansetron (ZOFRAN) and promethazine (PHENERGAN) are ordered use ondansetron first and THEN promethazine IF ondansetron is ineffective.  Place on tongue and allow to dissolve.        Or  ondansetron (ZOFRAN) injection 4 mg  Dose: 4 mg  Freq: Every 6 Hours PRN Route: IV  PRN Reasons: Nausea,Vomiting  Start: 12/21/23 0016   Admin  Instructions:   If BOTH ondansetron (ZOFRAN) and promethazine (PHENERGAN) are ordered use ondansetron first and THEN promethazine IF ondansetron is ineffective.         Petrolatum ointment 1 application   Dose: 1 application   Freq: Every 12 Hours Scheduled Route: TOP  Start: 12/21/23 2100   Admin Instructions:   Apply to dry, flaky skin to BLE, including feet and heels.  Avoid use between toes.    2354-Given            0847-Given     2100              sodium chloride 0.9 % flush 10 mL  Dose: 10 mL  Freq: As Needed Route: IV  PRN Reason: Line Care  Start: 12/21/23 0015         sodium chloride 0.9 % flush 10 mL  Dose: 10 mL  Freq: Every 12 Hours Scheduled Route: IV  Start: 12/21/23 0035    0150-Given     0802-Given     2007-Given          0847-Given     2100              sodium chloride 0.9 % flush 10 mL  Dose: 10 mL  Freq: As Needed Route: IV  PRN Reason: Line Care  Start: 12/20/23 2015         sodium chloride 0.9 % infusion  Rate: 100 mL/hr Dose: 100 mL/hr  Freq: Continuous Route: IV  Start: 12/21/23 0035    0148-New Bag     0802-Rate/Dose Change           0348-New Bag     0920-Rate/Dose Change     1116-New Bag             sodium chloride 0.9 % infusion 40 mL  Dose: 40 mL  Freq: As Needed Route: IV  PRN Reason: Line Care  Start: 12/21/23 0015   Admin Instructions:   Following administration of an IV intermittent medication, flush line with 40mL NS at 100mL/hr.         venlafaxine XR (EFFEXOR-XR) 24 hr capsule 150 mg  Dose: 150 mg  Freq: Daily Route: PO  Start: 12/21/23 1000   Admin Instructions:   Do not crush or chew the capsules or tablets. The drug may not work as designed if the capsule or tablet is crushed or chewed. Swallow whole.    0940-Given            0847-Given              Completed Medications  Medications 12/21/23 12/22/23       acetaminophen (TYLENOL) tablet 1,000 mg  Dose: 1,000 mg  Freq: Once Route: PO  Start: 12/20/23 2111   End: 12/20/23 2128   Admin Instructions:   If given for fever, use fever  parameter: fever greater than 100.4 °F  Based on patient request - if ordered for moderate or severe pain, provider allows for administration of a medication prescribed for a lower pain scale.    Do not exceed 4 grams of acetaminophen in a 24 hr period. Max dose of 2gm for AST/ALT greater than 120 units/L.    If given for pain, use the following pain scale:   Mild Pain = Pain Score of 1-3, CPOT 1-2  Moderate Pain = Pain Score of 4-6, CPOT 3-4  Severe Pain = Pain Score of 7-10, CPOT 5-8         cefTRIAXone (ROCEPHIN) 2,000 mg in sodium chloride 0.9 % 100 mL IVPB-VTB  Dose: 2,000 mg  Freq: Once Route: IV  Indications of Use: URINARY TRACT INFECTION  Start: 23   End: 23   Admin Instructions:   LR should be paused and flushing of the line with NS is recommended prior to and after completion of ceftriaxone infusion due to incompatibility. Do not co-adminster with calcium-containing solutions.  Caution: Look alike/sound alike drug alert         diphenhydrAMINE (BENADRYL) capsule 25 mg  Dose: 25 mg  Freq: Once Route: PO  Start: 23   End: 23   Admin Instructions:   Caution: Look alike/sound alike drug alert. This med may be ordered in other forms and routes. Before giving verify the last time the drug was given by any route/form.    -Given                iopamidol (ISOVUE-370) 76 % injection 100 mL  Dose: 100 mL  Freq: Once in Imaging Route: IV  Start: 23 1200   End: 23 1101    1101-Given                lactated ringers bolus 1,000 mL  Dose: 1,000 mL  Freq: Once Route: IV  Start: 23   End: 23         sodium chloride 0.9 % bolus 500 mL  Dose: 500 mL  Freq: Once Route: IV  Start: 23 0845   End: 23 0901    0801-New Bag                              Physician Progress Notes (last 48 hours)        Sarthak Rolon MD at 23 0909           LOS: 1 day     Name: Pk May  Age: 43 y.o.  Sex: male  :  1980  MRN:  [id number]         Primary Care Physician: Chasity Ruggiero APRN    Subjective   Subjective  Febrile overnight up to 102.5.  He received 1 unit of packed red blood cells overnight last night.  States he feels about the same as yesterday.  Denies abdominal/flank pain    Objective   Vital Signs  Temp:  [98.5 °F (36.9 °C)-102.5 °F (39.2 °C)] 98.7 °F (37.1 °C)  Heart Rate:  [106-125] 115  Resp:  [16-18] 16  BP: ()/(58-73) 115/67  Body mass index is 26.89 kg/m².    Objective:  General Appearance:  Comfortable and in no acute distress.    Vital signs: (most recent): Blood pressure 115/67, pulse 115, temperature 98.7 °F (37.1 °C), temperature source Oral, resp. rate 16, height 177.8 cm (70\"), weight 85 kg (187 lb 6.3 oz), SpO2 97%.    Lungs:  Normal effort and normal respiratory rate.  He is not in respiratory distress.  There are decreased breath sounds.    Heart: Normal rate and tachycardia.  Regular rhythm.    Abdomen: Abdomen is soft.  Bowel sounds are normal.   There is no abdominal tenderness.     Extremities: There is no dependent edema or local swelling.    Neurological: Patient is alert and oriented to person, place and time.  (Paraplegia).    Skin:  Warm and dry.                Results Review:       I reviewed the patient's new clinical results.    Results from last 7 days   Lab Units 12/22/23  0805 12/21/23  2331 12/21/23  1548 12/21/23  0537 12/20/23  2044   WBC 10*3/mm3 12.67*  --   --  17.18* 16.33*   HEMOGLOBIN g/dL 7.9*  7.9* 7.8*  7.8* 7.1* 8.2* 8.2*   PLATELETS 10*3/mm3 430  --   --  506* 463*     Results from last 7 days   Lab Units 12/22/23  0805 12/21/23  0537 12/20/23  2044   SODIUM mmol/L 137 135* 131*   POTASSIUM mmol/L 3.8 3.5 3.9   CHLORIDE mmol/L 106 101 98   CO2 mmol/L 21.4* 22.6 20.0*   BUN mg/dL 9 11 13   CREATININE mg/dL 0.78 0.85 1.04   CALCIUM mg/dL 8.0* 8.6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                               Abnormality         Status                     ---------                               -----------         ------                     CBC Auto Differential[[id number]]        Abnormal            Final result                 Please view results for these tests on the individual orders.    CBC Auto Differential [[id number]]  (Abnormal) Collected: 12/20/23 2044    Specimen: Blood Updated: 12/20/23 2054     WBC 16.33 10*3/mm3      RBC 2.96 10*6/mm3      Hemoglobin 8.2 g/dL      Hematocrit 25.5 %      MCV 86.1 fL      MCH 27.7 pg      MCHC 32.2 g/dL      RDW 15.5 %      RDW-SD 48.5 fl      MPV 8.9 fL      Platelets 463 10*3/mm3      Neutrophil % 86.3 %      Lymphocyte % 7.0 %      Monocyte % 5.9 %      Eosinophil % 0.1 %      Basophil % 0.2 %      Immature Grans % 0.5 %      Neutrophils, Absolute 14.09 10*3/mm3      Lymphocytes, Absolute 1.14 10*3/mm3      Monocytes, Absolute 0.97 10*3/mm3      Eosinophils, Absolute 0.01 10*3/mm3      Basophils, Absolute 0.04 10*3/mm3      Immature Grans, Absolute 0.08 10*3/mm3      nRBC  0.0 /100 WBC     Blood Culture - Blood, Arm, Left [[id number]] Collected: 12/20/23 2043    Specimen: Blood from Arm, Left Updated: 12/20/23 2047    Blood Culture - Blood, Arm, Left [[id number]] Collected: 12/20/23 2044    Specimen: Blood from Arm, Left Updated: 12/20/23 2047             Imaging:  Imaging Results (Last 24 Hours)       Procedure Component Value Units Date/Time    CT Abdomen Pelvis Without Contrast [[id number]] Collected: 12/20/23 2345     Updated: 12/20/23 2345    Narrative:        Patient: ZACHARY JAEGER  Time Out: 23:44  Exam(s): CT ABDOMEN + PELVIS Without Contrast     EXAM:    CT Abdomen and Pelvis Without Intravenous Contrast    CLINICAL HISTORY:     Reason for exam: flank pain.    TECHNIQUE:    Axial computed tomography images of the abdomen and pelvis without   intravenous contrast.  This CT exam was performed according to the   principle of ALARA (As Low As Reasonably Achievable) by using one or more   of the following dose reduction techniques: automated exposure control,   adjustment of the mA and or kV according to patient size, and or use of   iterative reconstruction technique.    COMPARISON:    No relevant prior studies available.    FINDINGS:    Lung bases:  Unremarkable.  No mass.  No consolidation.     ABDOMEN:    Liver:  Unremarkable.  No focal hepatic lesion.    Gallbladder and bile ducts:  Contracted gallbladder.  No calcified   stones.  No ductal dilation.    Pancreas:  Unremarkable.  No ductal dilation.    Spleen:  Unremarkable.  No splenomegaly.    Adrenals:  Unremarkable.  No mass.    Kidneys and ureters:  Left renal hematoma, which measures approximately   10.5 x 7.7 cm.  Internal hyperdensity, concerning for more acute blood   products.  Mild extension of retroperitoneal blood, which extends into   the pelvis.  Recommend CTA if there is concern for active bleed.  Note,   any underlying mass cannot be excluded without contrast.  No obstructing   stones.  No hydronephrosis.     Stomach and bowel:  Large and small bowel resections are present.     PELVIS:    Appendix:  See below.      Bladder:  Unremarkable.  No stones.    Reproductive:  Unremarkable as visualized.     ABDOMEN and PELVIS:    Intraperitoneal space:  Unremarkable.  No free air.  No significant   fluid collection.    Bones joints:  Spine removal of hardware with underlying deformity at   T11.  Correlate with surgical history of the spine.  No acute fracture.    No dislocation.    Soft tissues:  Left lower quadrant colostomy with small parastomal   hernia.  Partial colectomy.  Right lower quadrant ileostomy.    Vasculature:  Unremarkable.  No abdominal aortic aneurysm.    Lymph nodes:  Unremarkable.  No enlarged lymph nodes.    IMPRESSION:       1.  Left renal hematoma, which measures approximately 10.5 x 7.7 cm.   Internal hyperdensity, concerning for more acute blood products.      Mild extension of retroperitoneal blood, which extends into the pelvis.      Recommend CTA if there is concern for active bleed.  Note, any underlying   mass cannot be excluded without contrast.  No prior studies are available   for comparison.      Correlate with surgical history.    2.  Large and small bowel resections are present.    3.  Left lower quadrant colostomy with small parastomal hernia.  Partial   colectomy.  Right lower quadrant ileostomy.    4.  Spine removal of hardware with underlying deformity at T11.    Correlate with surgical history of the spine.      Impression:          Electronically signed by Willian Casas MD on 12-20-23 at 2344               Assessment:     Urinary tract infection  Left renal hematoma    Plan:     - No acute urologic surgical intervention recommended at this time  - Continue antibiotics  - Awaiting urine culture results  - Will continue to monitor renal function  - CT angiogram of abdomen and pelvis ordered given patient's hemoglobin trending down since nephrostomy placement on 11/20/23 and per the  recommendation of radiologist--will await results    Gifty Mandel, NELLY  12/21/23  08:39 EST              Electronically signed by Elijah Castro MD at 12/21/23 2056

## 2023-12-22 NOTE — PROGRESS NOTES
LOS: 1 day     Chief Complaint:      Interval History: Patient remains febrile overnight with Tmax of 102.5.  WBC improved to 12.  Hemoglobin fluctuating and received 1 unit of blood.  Tolerating ceftriaxone.    Vital Signs  Temp:  [98.5 °F (36.9 °C)-102.5 °F (39.2 °C)] 98.7 °F (37.1 °C)  Heart Rate:  [106-125] 115  Resp:  [16-18] 16  BP: ()/(58-73) 115/67    Physical Exam:  General: In no acute distress  HEENT: Oropharynx clear, moist mucous membranes  Respiratory: Normal work of breathing  GI: Soft, nondistended  Skin: No rashes or lesions in exposed areas  Access: Peripheral IV    Antibiotics:  Anti-Infectives (From admission, onward)      Ordered     Dose/Rate Route Frequency Start Stop    12/21/23 0019  cefTRIAXone (ROCEPHIN) 2,000 mg in sodium chloride 0.9 % 100 mL IVPB-VTB        Ordering Provider: Edelmira Martel APRN    2,000 mg  200 mL/hr over 30 Minutes Intravenous Every 24 Hours 12/21/23 2100 12/26/23 2059 12/20/23 2056  cefTRIAXone (ROCEPHIN) 2,000 mg in sodium chloride 0.9 % 100 mL IVPB-VTB        Ordering Provider: Jeremy Thomas MD    2,000 mg  200 mL/hr over 30 Minutes Intravenous Once 12/20/23 2112 12/20/23 2215             Results Review:     I reviewed the patient's new clinical results.    Lab Results   Component Value Date    WBC 12.67 (H) 12/22/2023    HGB 7.9 (L) 12/22/2023    HGB 7.9 (L) 12/22/2023    HCT 24.8 (L) 12/22/2023    HCT 24.8 (L) 12/22/2023    MCV 82.7 12/22/2023     12/22/2023     Lab Results   Component Value Date    GLUCOSE 106 (H) 12/22/2023    BUN 9 12/22/2023    CREATININE 0.78 12/22/2023    EGFRIFNONA 132 11/08/2021    EGFRIFAFRI >150 10/06/2021    BCR 11.5 12/22/2023    CO2 21.4 (L) 12/22/2023    CALCIUM 8.0 (L) 12/22/2023    PROTENTOTREF 7.2 07/24/2023    ALBUMIN 3.0 (L) 12/20/2023    LABIL2 1.5 07/24/2023    AST 18 12/20/2023    ALT 25 12/20/2023       Microbiology:  12/20 respiratory panel negative  12/20 urine culture in process  12/20 blood  cultures no growth to date      Assessment    #Sepsis  #UTI  #Acute left renal hematoma  #Anemia  #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    ID will follow.     Addendum:  Urine culture with Enterobacter cloacae.  Stop ceftriaxone and start cefepime 2 g every 8 hours.

## 2023-12-22 NOTE — PROGRESS NOTES
" LOS: 1 day     Name: Pk May  Age: 43 y.o.  Sex: male  :  1980  MRN: [id number]         Primary Care Physician: Chasity Ruggiero APRN    Subjective   Subjective  Febrile overnight up to 102.5.  He received 1 unit of packed red blood cells overnight last night.  States he feels about the same as yesterday.  Denies abdominal/flank pain    Objective   Vital Signs  Temp:  [98.5 °F (36.9 °C)-102.5 °F (39.2 °C)] 98.7 °F (37.1 °C)  Heart Rate:  [106-125] 115  Resp:  [16-18] 16  BP: ()/(58-73) 115/67  Body mass index is 26.89 kg/m².    Objective:  General Appearance:  Comfortable and in no acute distress.    Vital signs: (most recent): Blood pressure 115/67, pulse 115, temperature 98.7 °F (37.1 °C), temperature source Oral, resp. rate 16, height 177.8 cm (70\"), weight 85 kg (187 lb 6.3 oz), SpO2 97%.    Lungs:  Normal effort and normal respiratory rate.  He is not in respiratory distress.  There are decreased breath sounds.    Heart: Normal rate and tachycardia.  Regular rhythm.    Abdomen: Abdomen is soft.  Bowel sounds are normal.   There is no abdominal tenderness.     Extremities: There is no dependent edema or local swelling.    Neurological: Patient is alert and oriented to person, place and time.  (Paraplegia).    Skin:  Warm and dry.                Results Review:       I reviewed the patient's new clinical results.    Results from last 7 days   Lab Units 23  0805 23  2331 23  1548 23  0537 23  2044   WBC 10*3/mm3 12.67*  --   --  17.18* 16.33*   HEMOGLOBIN g/dL 7.9*  7.9* 7.8*  7.8* 7.1* 8.2* 8.2*   PLATELETS 10*3/mm3 430  --   --  506* 463*     Results from last 7 days   Lab Units 23  0805 23  0537 23  2044   SODIUM mmol/L 137 135* 131*   POTASSIUM mmol/L 3.8 3.5 3.9   CHLORIDE mmol/L 106 101 98   CO2 mmol/L 21.4* 22.6 20.0*   BUN mg/dL 9 11 13   CREATININE mg/dL 0.78 0.85 1.04   CALCIUM mg/dL 8.0* 8.6 8.5*   GLUCOSE mg/dL 106* 113* 120* "                 Scheduled Meds:   amitriptyline, 75 mg, Oral, BID  ARIPiprazole, 5 mg, Oral, Daily  busPIRone, 30 mg, Oral, QAM  cefTRIAXone, 2,000 mg, Intravenous, Q24H  Petrolatum, 1 application , Topical, Q12H  sodium chloride, 10 mL, Intravenous, Q12H  venlafaxine XR, 150 mg, Oral, Daily      PRN Meds:     acetaminophen **OR** acetaminophen **OR** acetaminophen    calcium carbonate    ketorolac    nitroglycerin    ondansetron ODT **OR** ondansetron    sodium chloride    sodium chloride    sodium chloride  Continuous Infusions:  sodium chloride, 100 mL/hr, Last Rate: 150 mL/hr (12/22/23 0348)        Assessment & Plan   Active Hospital Problems    Diagnosis  POA    **Sepsis [A41.9]  Yes    PTSD (post-traumatic stress disorder) [F43.10]  Yes    Tobacco use [Z72.0]  Yes    Colostomy in place [Z93.3]  Not Applicable    Neurogenic bowel [K59.2]  Yes    Neurogenic bladder [N31.9]  Yes    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       Assessment & Plan    -Continues on Rocephin.  Appreciate assistance from infectious disease.  Awaiting blood and urine culture results  -Blood pressure is overall improved and looking more stable.  I will reduce the rate of his IV fluids today  -Hemoglobin dropped to 7.1 yesterday evening and he received 1 unit of packed red blood cells last night.  He has had a reasonable response to this and will continue to monitor closely and transfuse as needed  -CT angiogram of the abdomen pelvis yesterday did not show any change/evidence of active hemorrhage.  Urology recommends monitoring for now with no plans for intervention at this time  -Continue wound/ostomy care.  He now has a low-air-loss mattress        Expected discharge date/ time has not been documented.     Sarthak Rolon MD  [facility]  12/22/23  09:10 EST

## 2023-12-22 NOTE — PLAN OF CARE
Problem: Adult Inpatient Plan of Care  Goal: Plan of Care Review  Outcome: Ongoing, Progressing  Flowsheets (Taken 12/22/2023 1707)  Progress: no change  Plan of Care Reviewed With: patient  Outcome Evaluation: fever x1 this shift, heart rate sinus tach, room air, wheelchair bound, IV abx changed,  Goal: Patient-Specific Goal (Individualized)  Outcome: Ongoing, Progressing  Goal: Absence of Hospital-Acquired Illness or Injury  Outcome: Ongoing, Progressing  Intervention: Identify and Manage Fall Risk  Recent Flowsheet Documentation  Taken 12/22/2023 1556 by Maylin Woodruff RN  Safety Promotion/Fall Prevention:   activity supervised   assistive device/personal items within reach   clutter free environment maintained   fall prevention program maintained   nonskid shoes/slippers when out of bed   room organization consistent   safety round/check completed  Taken 12/22/2023 1440 by Maylin Woodruff RN  Safety Promotion/Fall Prevention:   activity supervised   assistive device/personal items within reach   clutter free environment maintained   fall prevention program maintained   nonskid shoes/slippers when out of bed   room organization consistent   safety round/check completed  Taken 12/22/2023 1200 by Maylin Woodruff RN  Safety Promotion/Fall Prevention:   activity supervised   assistive device/personal items within reach   clutter free environment maintained   fall prevention program maintained   nonskid shoes/slippers when out of bed   room organization consistent   safety round/check completed  Taken 12/22/2023 1000 by Maylin Woodruff RN  Safety Promotion/Fall Prevention:   activity supervised   assistive device/personal items within reach   clutter free environment maintained   fall prevention program maintained   nonskid shoes/slippers when out of bed   room organization consistent   safety round/check completed  Taken 12/22/2023 0847 by Maylin Woodruff RN  Safety Promotion/Fall Prevention:    activity supervised   assistive device/personal items within reach   clutter free environment maintained   fall prevention program maintained   nonskid shoes/slippers when out of bed   room organization consistent   safety round/check completed  Intervention: Prevent Skin Injury  Recent Flowsheet Documentation  Taken 12/22/2023 1556 by Maylin Woodruff RN  Body Position: supine  Taken 12/22/2023 1440 by Maylin Woodruff RN  Body Position:   supine   position changed independently  Taken 12/22/2023 1200 by Maylin Woodruff RN  Body Position: (states he will turn himself)   supine   position changed independently  Taken 12/22/2023 1000 by Maylin Woodruff RN  Body Position:   left   tilted  Taken 12/22/2023 0847 by Maylin Woodruff RN  Body Position: supine, legs elevated  Goal: Optimal Comfort and Wellbeing  Outcome: Ongoing, Progressing  Goal: Readiness for Transition of Care  Outcome: Ongoing, Progressing     Problem: Adjustment to Illness (Sepsis/Septic Shock)  Goal: Optimal Coping  Outcome: Ongoing, Progressing     Problem: Bleeding (Sepsis/Septic Shock)  Goal: Absence of Bleeding  Outcome: Ongoing, Progressing     Problem: Glycemic Control Impaired (Sepsis/Septic Shock)  Goal: Blood Glucose Level Within Desired Range  Outcome: Ongoing, Progressing     Problem: Infection Progression (Sepsis/Septic Shock)  Goal: Absence of Infection Signs and Symptoms  Outcome: Ongoing, Progressing     Problem: Nutrition Impaired (Sepsis/Septic Shock)  Goal: Optimal Nutrition Intake  Outcome: Ongoing, Progressing     Problem: Skin Injury Risk Increased  Goal: Skin Health and Integrity  Outcome: Ongoing, Progressing  Intervention: Optimize Skin Protection  Recent Flowsheet Documentation  Taken 12/22/2023 1556 by Maylin Woodruff RN  Head of Bed (HOB) Positioning: HOB at 15 degrees  Taken 12/22/2023 1440 by Maylin Woodruff RN  Head of Bed (HOB) Positioning: HOB at 20-30 degrees  Taken 12/22/2023 1200 by  Maylin Woodruff RN  Head of Bed (HOB) Positioning: HOB at 15 degrees  Taken 12/22/2023 1000 by Maylin Woodruff RN  Head of Bed (HOB) Positioning: HOB at 15 degrees  Taken 12/22/2023 0847 by Maylin Woodruff RN  Pressure Reduction Techniques:   frequent weight shift encouraged   weight shift assistance provided   heels elevated off bed  Head of Bed (HOB) Positioning: HOB at 15 degrees  Pressure Reduction Devices:   specialty bed utilized   heel offloading device utilized     Problem: Fall Injury Risk  Goal: Absence of Fall and Fall-Related Injury  Outcome: Ongoing, Progressing  Intervention: Promote Injury-Free Environment  Recent Flowsheet Documentation  Taken 12/22/2023 1556 by Maylin Woodruff RN  Safety Promotion/Fall Prevention:   activity supervised   assistive device/personal items within reach   clutter free environment maintained   fall prevention program maintained   nonskid shoes/slippers when out of bed   room organization consistent   safety round/check completed  Taken 12/22/2023 1440 by Maylin Woodruff RN  Safety Promotion/Fall Prevention:   activity supervised   assistive device/personal items within reach   clutter free environment maintained   fall prevention program maintained   nonskid shoes/slippers when out of bed   room organization consistent   safety round/check completed  Taken 12/22/2023 1200 by Maylin Woodruff RN  Safety Promotion/Fall Prevention:   activity supervised   assistive device/personal items within reach   clutter free environment maintained   fall prevention program maintained   nonskid shoes/slippers when out of bed   room organization consistent   safety round/check completed  Taken 12/22/2023 1000 by Maylin Woodruff RN  Safety Promotion/Fall Prevention:   activity supervised   assistive device/personal items within reach   clutter free environment maintained   fall prevention program maintained   nonskid shoes/slippers when out of bed   room  organization consistent   safety round/check completed  Taken 12/22/2023 0847 by Maylin Woodruff RN  Safety Promotion/Fall Prevention:   activity supervised   assistive device/personal items within reach   clutter free environment maintained   fall prevention program maintained   nonskid shoes/slippers when out of bed   room organization consistent   safety round/check completed   Goal Outcome Evaluation:  Plan of Care Reviewed With: patient        Progress: no change  Outcome Evaluation: fever x1 this shift, heart rate sinus tach, room air, wheelchair bound, IV abx changed,

## 2023-12-22 NOTE — CONSULTS
Visited at bedside with patient. We discussed the series of hospitalizations he has experienced along with associated emotions. I offered to write up an advanced directive to make his wishes clear for future hospitalizations, and we completed it together.     Advanced Directive was notarized by alvaro BHATTI And copies were given to patient, patient's physical chart, and faxed to [facility].

## 2023-12-22 NOTE — PROGRESS NOTES
[facility] Progress [facility]  12/22/2023  12:52 EST      Pt reports continued fevers overnight. Currently on IV Ceftriaxone. Received 1 unit of PRBC overnight as well. Ct angio did not show evidence of active hemorrhage. Infectious disease consulted. UA preliminary+ for >100,000 CFU/mL Enterobacter cloacae complex resistant to multiple abx including Ceftriaxone. Blood cultures showing no growth. Recommend assessment by ID for antibiotic change. Denies abdominal pain or flank pain. Overall, still feeling the same as yesterday.     Urology will continue to follow    NELLY King  12/22/23  12:52 EST

## 2023-12-22 NOTE — PLAN OF CARE
Goal Outcome Evaluation:         VSS ex/ ST. RA. NS @ 150 mL/hr. Spiked temp x3, 102, 102.5 & 101.5, tylenol x2, benadryl x1. 1 unit blood transfused. Q8 H&H. RLQ urostomy to jacob bag. LLQ colostomy. Petrolatum on BLE, L foot drx CDI. Lower L back drx CDI, old drainage. Contact precx maintained.

## 2023-12-23 ENCOUNTER — APPOINTMENT (OUTPATIENT)
Dept: GENERAL RADIOLOGY | Facility: HOSPITAL | Age: 43
End: 2023-12-23
Payer: MEDICARE

## 2023-12-23 LAB
ANION GAP SERPL CALCULATED.3IONS-SCNC: 11 MMOL/L (ref 5–15)
BUN SERPL-MCNC: 8 MG/DL (ref 6–20)
BUN/CREAT SERPL: 8.7 (ref 7–25)
CALCIUM SPEC-SCNC: 7.7 MG/DL (ref 8.6–10.5)
CHLORIDE SERPL-SCNC: 106 MMOL/L (ref 98–107)
CO2 SERPL-SCNC: 18 MMOL/L (ref 22–29)
CREAT SERPL-MCNC: 0.92 MG/DL (ref 0.76–1.27)
DEPRECATED RDW RBC AUTO: 44.8 FL (ref 37–54)
EGFRCR SERPLBLD CKD-EPI 2021: 106.5 ML/MIN/1.73
ERYTHROCYTE [DISTWIDTH] IN BLOOD BY AUTOMATED COUNT: 14.9 % (ref 12.3–15.4)
GLUCOSE SERPL-MCNC: 132 MG/DL (ref 65–99)
HCT VFR BLD AUTO: 23.6 % (ref 37.5–51)
HCT VFR BLD AUTO: 24 % (ref 37.5–51)
HCT VFR BLD AUTO: 25.2 % (ref 37.5–51)
HCT VFR BLD AUTO: 25.9 % (ref 37.5–51)
HGB BLD-MCNC: 7.3 G/DL (ref 13–17.7)
HGB BLD-MCNC: 7.6 G/DL (ref 13–17.7)
HGB BLD-MCNC: 8.1 G/DL (ref 13–17.7)
HGB BLD-MCNC: 8.3 G/DL (ref 13–17.7)
MCH RBC QN AUTO: 25.7 PG (ref 26.6–33)
MCHC RBC AUTO-ENTMCNC: 30.9 G/DL (ref 31.5–35.7)
MCV RBC AUTO: 83.1 FL (ref 79–97)
PLATELET # BLD AUTO: 424 10*3/MM3 (ref 140–450)
PMV BLD AUTO: 8.5 FL (ref 6–12)
POTASSIUM SERPL-SCNC: 3.1 MMOL/L (ref 3.5–5.2)
POTASSIUM SERPL-SCNC: 4.4 MMOL/L (ref 3.5–5.2)
RBC # BLD AUTO: 2.84 10*6/MM3 (ref 4.14–5.8)
SODIUM SERPL-SCNC: 135 MMOL/L (ref 136–145)
WBC NRBC COR # BLD AUTO: 12.87 10*3/MM3 (ref 3.4–10.8)

## 2023-12-23 PROCEDURE — 25010000002 CEFEPIME PER 500 MG: Performed by: STUDENT IN AN ORGANIZED HEALTH CARE EDUCATION/TRAINING PROGRAM

## 2023-12-23 PROCEDURE — 85018 HEMOGLOBIN: CPT | Performed by: NURSE PRACTITIONER

## 2023-12-23 PROCEDURE — 80048 BASIC METABOLIC PNL TOTAL CA: CPT | Performed by: INTERNAL MEDICINE

## 2023-12-23 PROCEDURE — 85027 COMPLETE CBC AUTOMATED: CPT | Performed by: INTERNAL MEDICINE

## 2023-12-23 PROCEDURE — 84132 ASSAY OF SERUM POTASSIUM: CPT | Performed by: INTERNAL MEDICINE

## 2023-12-23 PROCEDURE — 74018 RADEX ABDOMEN 1 VIEW: CPT

## 2023-12-23 PROCEDURE — 85014 HEMATOCRIT: CPT | Performed by: NURSE PRACTITIONER

## 2023-12-23 RX ORDER — SIMETHICONE 80 MG
80 TABLET,CHEWABLE ORAL
Status: DISCONTINUED | OUTPATIENT
Start: 2023-12-23 | End: 2023-12-27 | Stop reason: HOSPADM

## 2023-12-23 RX ORDER — DOCUSATE SODIUM 100 MG/1
100 CAPSULE, LIQUID FILLED ORAL 2 TIMES DAILY
Status: DISCONTINUED | OUTPATIENT
Start: 2023-12-23 | End: 2023-12-27 | Stop reason: HOSPADM

## 2023-12-23 RX ORDER — HYDROCODONE BITARTRATE AND ACETAMINOPHEN 5; 325 MG/1; MG/1
1 TABLET ORAL EVERY 6 HOURS PRN
Status: DISCONTINUED | OUTPATIENT
Start: 2023-12-23 | End: 2023-12-24

## 2023-12-23 RX ORDER — OXYCODONE HYDROCHLORIDE 5 MG/1
5 TABLET ORAL ONCE
Status: COMPLETED | OUTPATIENT
Start: 2023-12-23 | End: 2023-12-23

## 2023-12-23 RX ORDER — DICYCLOMINE HYDROCHLORIDE 10 MG/1
20 CAPSULE ORAL 4 TIMES DAILY
Status: DISCONTINUED | OUTPATIENT
Start: 2023-12-23 | End: 2023-12-27 | Stop reason: HOSPADM

## 2023-12-23 RX ORDER — POTASSIUM CHLORIDE 750 MG/1
40 TABLET, FILM COATED, EXTENDED RELEASE ORAL EVERY 4 HOURS
Status: COMPLETED | OUTPATIENT
Start: 2023-12-23 | End: 2023-12-23

## 2023-12-23 RX ADMIN — Medication 10 ML: at 22:03

## 2023-12-23 RX ADMIN — SIMETHICONE 80 MG: 80 TABLET, CHEWABLE ORAL at 14:11

## 2023-12-23 RX ADMIN — DOCUSATE SODIUM 100 MG: 100 CAPSULE, LIQUID FILLED ORAL at 20:31

## 2023-12-23 RX ADMIN — ARIPIPRAZOLE 5 MG: 5 TABLET ORAL at 09:29

## 2023-12-23 RX ADMIN — POTASSIUM CHLORIDE 40 MEQ: 750 TABLET, EXTENDED RELEASE ORAL at 14:11

## 2023-12-23 RX ADMIN — SIMETHICONE 80 MG: 80 TABLET, CHEWABLE ORAL at 17:42

## 2023-12-23 RX ADMIN — OXYCODONE HYDROCHLORIDE 5 MG: 5 TABLET ORAL at 22:19

## 2023-12-23 RX ADMIN — ACETAMINOPHEN 650 MG: 325 TABLET, FILM COATED ORAL at 07:48

## 2023-12-23 RX ADMIN — AMITRIPTYLINE HYDROCHLORIDE 75 MG: 50 TABLET, FILM COATED ORAL at 20:31

## 2023-12-23 RX ADMIN — BUSPIRONE HYDROCHLORIDE 30 MG: 15 TABLET ORAL at 07:34

## 2023-12-23 RX ADMIN — SIMETHICONE 80 MG: 80 TABLET, CHEWABLE ORAL at 07:48

## 2023-12-23 RX ADMIN — ACETAMINOPHEN 650 MG: 325 TABLET, FILM COATED ORAL at 04:01

## 2023-12-23 RX ADMIN — CEFEPIME 2000 MG: 2 INJECTION, POWDER, FOR SOLUTION INTRAVENOUS at 15:45

## 2023-12-23 RX ADMIN — ACETAMINOPHEN 650 MG: 325 TABLET, FILM COATED ORAL at 00:00

## 2023-12-23 RX ADMIN — DICYCLOMINE HYDROCHLORIDE 20 MG: 10 CAPSULE ORAL at 20:31

## 2023-12-23 RX ADMIN — CEFEPIME 2000 MG: 2 INJECTION, POWDER, FOR SOLUTION INTRAVENOUS at 06:08

## 2023-12-23 RX ADMIN — SODIUM CHLORIDE 100 ML/HR: 9 INJECTION, SOLUTION INTRAVENOUS at 07:30

## 2023-12-23 RX ADMIN — Medication 10 ML: at 09:29

## 2023-12-23 RX ADMIN — SIMETHICONE 80 MG: 80 TABLET, CHEWABLE ORAL at 20:31

## 2023-12-23 RX ADMIN — DOCUSATE SODIUM 100 MG: 100 CAPSULE, LIQUID FILLED ORAL at 09:58

## 2023-12-23 RX ADMIN — CEFEPIME 2000 MG: 2 INJECTION, POWDER, FOR SOLUTION INTRAVENOUS at 22:20

## 2023-12-23 RX ADMIN — PETROLATUM 1 APPLICATION: 420 OINTMENT TOPICAL at 09:30

## 2023-12-23 RX ADMIN — POTASSIUM CHLORIDE 40 MEQ: 750 TABLET, EXTENDED RELEASE ORAL at 07:34

## 2023-12-23 RX ADMIN — HYDROCODONE BITARTRATE AND ACETAMINOPHEN 1 TABLET: 5; 325 TABLET ORAL at 09:58

## 2023-12-23 RX ADMIN — HYDROCODONE BITARTRATE AND ACETAMINOPHEN 1 TABLET: 5; 325 TABLET ORAL at 15:47

## 2023-12-23 RX ADMIN — ACETAMINOPHEN 650 MG: 325 TABLET, FILM COATED ORAL at 20:35

## 2023-12-23 RX ADMIN — AMITRIPTYLINE HYDROCHLORIDE 75 MG: 50 TABLET, FILM COATED ORAL at 09:29

## 2023-12-23 RX ADMIN — POTASSIUM CHLORIDE 40 MEQ: 750 TABLET, EXTENDED RELEASE ORAL at 10:00

## 2023-12-23 RX ADMIN — ACETAMINOPHEN 650 MG: 325 TABLET, FILM COATED ORAL at 15:47

## 2023-12-23 RX ADMIN — VENLAFAXINE HYDROCHLORIDE 150 MG: 150 CAPSULE, EXTENDED RELEASE ORAL at 09:29

## 2023-12-23 NOTE — PLAN OF CARE
Goal Outcome Evaluation:  Plan of Care Reviewed With: patient        Progress: no change  Outcome Evaluation: Patient alert, oriented x 4. Continues to c/o cramping, bloating abdominal pain unrelieved by PRN and scheduled medications; new medication order received to start tonight. Urostomy to BSD; colostomy in place. Wound care completed. TORIE bed in place and heel boots in place. Patient repositions self using arms. IVF and IV antibiotics continue, however, patient did have a temp of 101.8 this afternoon. Will continue to monitor.

## 2023-12-23 NOTE — PROGRESS NOTES
" LOS: 2 days     Name: Pk May  Age: 43 y.o.  Sex: male  :  1980  MRN: [id number]         Primary Care Physician: Chasity Ruggiero APRN    Subjective   Subjective  He complains of left flank pain.  States he feels bloated and his pain feels gas related.  Having ostomy output.  Fever up to 102.0    Objective   Vital Signs  Temp:  [98.3 °F (36.8 °C)-102 °F (38.9 °C)] 98.6 °F (37 °C)  Heart Rate:  [] 100  Resp:  [16-17] 16  BP: (105-126)/(66-76) 122/76  Body mass index is 26.89 kg/m².    Objective:  General Appearance:  Comfortable and in no acute distress.    Vital signs: (most recent): Blood pressure 122/76, pulse 100, temperature 98.6 °F (37 °C), temperature source Oral, resp. rate 16, height 177.8 cm (70\"), weight 85 kg (187 lb 6.3 oz), SpO2 98%.    Lungs:  Normal effort and normal respiratory rate.    Heart: Normal rate.  Regular rhythm.    Abdomen: Abdomen is soft.  Bowel sounds are normal.   There is no abdominal tenderness.     Extremities: There is no dependent edema or local swelling.    Neurological: Patient is alert and oriented to person, place and time.    Skin:  Warm and dry.                Results Review:       I reviewed the patient's new clinical results.    Results from last 7 days   Lab Units 23  0801 23  0454 23  2353 23  1549 23  0805 23  2331 23  1548 23  0537 23  2044   WBC 10*3/mm3  --  12.87*  --   --  12.67*  --   --  17.18* 16.33*   HEMOGLOBIN g/dL 8.3* 7.3* 7.6* 7.6* 7.9*  7.9* 7.8*  7.8* 7.1* 8.2* 8.2*   PLATELETS 10*3/mm3  --  424  --   --  430  --   --  506* 463*     Results from last 7 days   Lab Units 23  0454 23  0805 23  0537 23  2044   SODIUM mmol/L 135* 137 135* 131*   POTASSIUM mmol/L 3.1* 3.8 3.5 3.9   CHLORIDE mmol/L 106 106 101 98   CO2 mmol/L 18.0* 21.4* 22.6 20.0*   BUN mg/dL 8 9 11 13   CREATININE mg/dL 0.92 0.78 0.85 1.04   CALCIUM mg/dL 7.7* 8.0* 8.6 8.5*   GLUCOSE mg/dL " 132* 106* 113* 120*                 Scheduled Meds:   amitriptyline, 75 mg, Oral, BID  ARIPiprazole, 5 mg, Oral, Daily  busPIRone, 30 mg, Oral, QAM  cefepime, 2,000 mg, Intravenous, Q8H  docusate sodium, 100 mg, Oral, BID  Petrolatum, 1 application , Topical, Q12H  potassium chloride ER, 40 mEq, Oral, Q4H  simethicone, 80 mg, Oral, 4x Daily AC & at Bedtime  sodium chloride, 10 mL, Intravenous, Q12H  venlafaxine XR, 150 mg, Oral, Daily      PRN Meds:     acetaminophen **OR** acetaminophen **OR** acetaminophen    calcium carbonate    HYDROcodone-acetaminophen    ketorolac    nitroglycerin    ondansetron ODT **OR** ondansetron    Potassium Replacement - Follow Nurse / BPA Driven Protocol    sodium chloride    sodium chloride    sodium chloride  Continuous Infusions:  sodium chloride, 100 mL/hr, Last Rate: 100 mL/hr (12/23/23 0730)        Assessment & Plan   Active Hospital Problems    Diagnosis  POA    **Sepsis [A41.9]  Yes    PTSD (post-traumatic stress disorder) [F43.10]  Yes    Tobacco use [Z72.0]  Yes    Colostomy in place [Z93.3]  Not Applicable    Neurogenic bowel [K59.2]  Yes    Neurogenic bladder [N31.9]  Yes    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       Assessment & Plan      -Rocephin changed to cefepime as per ID recommendations.  Appreciate their assistance.  Urine culture noted.  Blood cultures negative at 2 days  -Blood pressure now appears much more stable.  -Hemoglobin also appearing stable and up to 8.3 today  -CT angiogram of the abdomen pelvis did not show any change/evidence of active hemorrhage.  Urology recommends monitoring for now with no plans for intervention at this time.  They have ordered a KUB given his recurrence of left flank pain  -He feels as if the pain is gas related and we will schedule some simethicone.  Added low-dose  Norco as well.  -Continue wound/ostomy care.  He now has a low-air-loss mattress      Expected discharge date/ time has not been documented.     Sarthak Rolon MD  [facility]  12/23/23  12:12 EST

## 2023-12-23 NOTE — PLAN OF CARE
Problem: Adult Inpatient Plan of Care  Goal: Plan of Care Review  Outcome: Ongoing, Progressing  Flowsheets (Taken 12/23/2023 0427)  Outcome Evaluation: VSS. ST on the monitor in the 110s. AO x 4. Pt requesting to have Tylenol q 4 to avoid fever. Pt afebrile all night. IV abx given as ordered. Minimal c/o of pain.  Goal: Patient-Specific Goal (Individualized)  Outcome: Ongoing, Progressing  Goal: Absence of Hospital-Acquired Illness or Injury  Outcome: Ongoing, Progressing  Intervention: Identify and Manage Fall Risk  Recent Flowsheet Documentation  Taken 12/23/2023 0417 by Karin Bautista, RN  Safety Promotion/Fall Prevention:   activity supervised   assistive device/personal items within reach   clutter free environment maintained   lighting adjusted   mobility aid in reach   nonskid shoes/slippers when out of bed   safety round/check completed   toileting scheduled  Taken 12/23/2023 0205 by Karin Bautista, RN  Safety Promotion/Fall Prevention:   activity supervised   assistive device/personal items within reach   clutter free environment maintained   lighting adjusted   mobility aid in reach   nonskid shoes/slippers when out of bed   safety round/check completed   toileting scheduled  Taken 12/23/2023 0020 by Karin Bautista, RN  Safety Promotion/Fall Prevention:   activity supervised   clutter free environment maintained   assistive device/personal items within reach   lighting adjusted   mobility aid in reach   nonskid shoes/slippers when out of bed   safety round/check completed   toileting scheduled  Taken 12/22/2023 2241 by Karin Bautista, RN  Safety Promotion/Fall Prevention:   activity supervised   assistive device/personal items within reach   clutter free environment maintained   lighting adjusted   mobility aid in reach   nonskid shoes/slippers when out of bed   safety round/check completed   toileting scheduled  Taken 12/22/2023 2030 by Karin Bautista, RN  Safety  Promotion/Fall Prevention:   activity supervised   assistive device/personal items within reach   clutter free environment maintained   lighting adjusted   mobility aid in reach   nonskid shoes/slippers when out of bed   safety round/check completed   toileting scheduled  Intervention: Prevent Skin Injury  Recent Flowsheet Documentation  Taken 12/22/2023 2030 by Karin Bautista, RN  Skin Protection:   adhesive use limited   tubing/devices free from skin contact  Intervention: Prevent and Manage VTE (Venous Thromboembolism) Risk  Recent Flowsheet Documentation  Taken 12/23/2023 0417 by Karin Bautista, RN  Activity Management: activity encouraged  Taken 12/23/2023 0205 by Karin Bautista, RN  Activity Management: bedrest  Taken 12/23/2023 0020 by Karin Bautista, RN  Activity Management: activity encouraged  Taken 12/22/2023 2241 by Karin Bautista, RN  Activity Management: activity encouraged  Taken 12/22/2023 2030 by Karin Bautista, RN  Activity Management: activity encouraged  Goal: Optimal Comfort and Wellbeing  Outcome: Ongoing, Progressing  Intervention: Provide Person-Centered Care  Recent Flowsheet Documentation  Taken 12/23/2023 0205 by Karin Bautista, MARCELO  Trust Relationship/Rapport:   care explained   emotional support provided   choices provided   questions answered   questions encouraged   reassurance provided   thoughts/feelings acknowledged  Taken 12/22/2023 2030 by Karin Bautista, RN  Trust Relationship/Rapport:   care explained   choices provided   emotional support provided   questions answered   questions encouraged   thoughts/feelings acknowledged   reassurance provided  Goal: Readiness for Transition of Care  Outcome: Ongoing, Progressing   Goal Outcome Evaluation:              Outcome Evaluation: VSS. ST on the monitor in the 110s. AO x 4. Pt requesting to have Tylenol q 4 to avoid fever. Pt afebrile all night. IV abx given as ordered. Minimal c/o of  pain.

## 2023-12-23 NOTE — PROGRESS NOTES
"F/U left pyelonephritis / hematoma s/p left ureteral stent placement 12/13/2023 w/spontaneous evacuation of left stent     Patient lying in bed, awake, c/o left flank pain.     AVSS; good UOP via urostomy; urine clear  Abd taught with patient report \"I feel bloated'  Last BM one day ago; passing flatus    Crit 0.92 (0.78)  Hg 8.3 (7.3)  WBC 12.87 (12.67)  Antibiotic switched from ceftriaxone to cefipeme by ID  Currently receiving IV fluids  S/P 1 unit of PRBCs    PLAN:  Will get KUB today   Continue Toradol for pain  Start Colace stool softener.  Will continue to follow    "

## 2023-12-24 LAB
ANION GAP SERPL CALCULATED.3IONS-SCNC: 10 MMOL/L (ref 5–15)
BUN SERPL-MCNC: 7 MG/DL (ref 6–20)
BUN/CREAT SERPL: 13.5 (ref 7–25)
CALCIUM SPEC-SCNC: 8.1 MG/DL (ref 8.6–10.5)
CHLORIDE SERPL-SCNC: 108 MMOL/L (ref 98–107)
CO2 SERPL-SCNC: 19 MMOL/L (ref 22–29)
CREAT SERPL-MCNC: 0.52 MG/DL (ref 0.76–1.27)
DEPRECATED RDW RBC AUTO: 45.7 FL (ref 37–54)
EGFRCR SERPLBLD CKD-EPI 2021: 129.1 ML/MIN/1.73
ERYTHROCYTE [DISTWIDTH] IN BLOOD BY AUTOMATED COUNT: 15.1 % (ref 12.3–15.4)
GLUCOSE SERPL-MCNC: 101 MG/DL (ref 65–99)
HCT VFR BLD AUTO: 24.3 % (ref 37.5–51)
HCT VFR BLD AUTO: 25 % (ref 37.5–51)
HGB BLD-MCNC: 7.6 G/DL (ref 13–17.7)
HGB BLD-MCNC: 8 G/DL (ref 13–17.7)
MCH RBC QN AUTO: 26.3 PG (ref 26.6–33)
MCHC RBC AUTO-ENTMCNC: 31.3 G/DL (ref 31.5–35.7)
MCV RBC AUTO: 84.1 FL (ref 79–97)
PLATELET # BLD AUTO: 458 10*3/MM3 (ref 140–450)
PMV BLD AUTO: 8.5 FL (ref 6–12)
POTASSIUM SERPL-SCNC: 4 MMOL/L (ref 3.5–5.2)
RBC # BLD AUTO: 2.89 10*6/MM3 (ref 4.14–5.8)
SODIUM SERPL-SCNC: 137 MMOL/L (ref 136–145)
WBC NRBC COR # BLD AUTO: 12.52 10*3/MM3 (ref 3.4–10.8)

## 2023-12-24 PROCEDURE — 80048 BASIC METABOLIC PNL TOTAL CA: CPT | Performed by: INTERNAL MEDICINE

## 2023-12-24 PROCEDURE — 25010000002 CEFEPIME PER 500 MG: Performed by: STUDENT IN AN ORGANIZED HEALTH CARE EDUCATION/TRAINING PROGRAM

## 2023-12-24 PROCEDURE — 85018 HEMOGLOBIN: CPT | Performed by: NURSE PRACTITIONER

## 2023-12-24 PROCEDURE — 36415 COLL VENOUS BLD VENIPUNCTURE: CPT | Performed by: NURSE PRACTITIONER

## 2023-12-24 PROCEDURE — 85027 COMPLETE CBC AUTOMATED: CPT | Performed by: INTERNAL MEDICINE

## 2023-12-24 PROCEDURE — 85014 HEMATOCRIT: CPT | Performed by: NURSE PRACTITIONER

## 2023-12-24 RX ORDER — OXYCODONE HYDROCHLORIDE 5 MG/1
5 TABLET ORAL EVERY 4 HOURS PRN
Status: DISCONTINUED | OUTPATIENT
Start: 2023-12-24 | End: 2023-12-27 | Stop reason: HOSPADM

## 2023-12-24 RX ADMIN — OXYCODONE HYDROCHLORIDE 5 MG: 5 TABLET ORAL at 14:12

## 2023-12-24 RX ADMIN — Medication 10 ML: at 08:52

## 2023-12-24 RX ADMIN — CEFEPIME 2000 MG: 2 INJECTION, POWDER, FOR SOLUTION INTRAVENOUS at 22:15

## 2023-12-24 RX ADMIN — CEFEPIME 2000 MG: 2 INJECTION, POWDER, FOR SOLUTION INTRAVENOUS at 14:11

## 2023-12-24 RX ADMIN — OXYCODONE HYDROCHLORIDE 5 MG: 5 TABLET ORAL at 18:16

## 2023-12-24 RX ADMIN — VENLAFAXINE HYDROCHLORIDE 150 MG: 150 CAPSULE, EXTENDED RELEASE ORAL at 08:52

## 2023-12-24 RX ADMIN — DICYCLOMINE HYDROCHLORIDE 20 MG: 10 CAPSULE ORAL at 21:08

## 2023-12-24 RX ADMIN — SIMETHICONE 80 MG: 80 TABLET, CHEWABLE ORAL at 06:39

## 2023-12-24 RX ADMIN — ARIPIPRAZOLE 5 MG: 5 TABLET ORAL at 08:52

## 2023-12-24 RX ADMIN — CEFEPIME 2000 MG: 2 INJECTION, POWDER, FOR SOLUTION INTRAVENOUS at 06:39

## 2023-12-24 RX ADMIN — DICYCLOMINE HYDROCHLORIDE 20 MG: 10 CAPSULE ORAL at 08:52

## 2023-12-24 RX ADMIN — BUSPIRONE HYDROCHLORIDE 30 MG: 15 TABLET ORAL at 06:39

## 2023-12-24 RX ADMIN — ACETAMINOPHEN 650 MG: 325 TABLET, FILM COATED ORAL at 16:06

## 2023-12-24 RX ADMIN — OXYCODONE HYDROCHLORIDE 5 MG: 5 TABLET ORAL at 22:14

## 2023-12-24 RX ADMIN — OXYCODONE HYDROCHLORIDE 5 MG: 5 TABLET ORAL at 09:43

## 2023-12-24 RX ADMIN — SIMETHICONE 80 MG: 80 TABLET, CHEWABLE ORAL at 18:16

## 2023-12-24 RX ADMIN — DOCUSATE SODIUM 100 MG: 100 CAPSULE, LIQUID FILLED ORAL at 21:08

## 2023-12-24 RX ADMIN — AMITRIPTYLINE HYDROCHLORIDE 75 MG: 50 TABLET, FILM COATED ORAL at 21:08

## 2023-12-24 RX ADMIN — AMITRIPTYLINE HYDROCHLORIDE 75 MG: 50 TABLET, FILM COATED ORAL at 08:52

## 2023-12-24 RX ADMIN — Medication 10 ML: at 21:08

## 2023-12-24 RX ADMIN — PETROLATUM 1 APPLICATION: 420 OINTMENT TOPICAL at 08:53

## 2023-12-24 RX ADMIN — PETROLATUM 1 APPLICATION: 420 OINTMENT TOPICAL at 21:08

## 2023-12-24 RX ADMIN — DOCUSATE SODIUM 100 MG: 100 CAPSULE, LIQUID FILLED ORAL at 08:52

## 2023-12-24 RX ADMIN — SIMETHICONE 80 MG: 80 TABLET, CHEWABLE ORAL at 11:43

## 2023-12-24 RX ADMIN — SIMETHICONE 80 MG: 80 TABLET, CHEWABLE ORAL at 21:08

## 2023-12-24 RX ADMIN — DICYCLOMINE HYDROCHLORIDE 20 MG: 10 CAPSULE ORAL at 18:16

## 2023-12-24 RX ADMIN — HYDROCODONE BITARTRATE AND ACETAMINOPHEN 1 TABLET: 5; 325 TABLET ORAL at 02:26

## 2023-12-24 RX ADMIN — DICYCLOMINE HYDROCHLORIDE 20 MG: 10 CAPSULE ORAL at 11:43

## 2023-12-24 NOTE — PLAN OF CARE
Problem: Adult Inpatient Plan of Care  Goal: Plan of Care Review  12/24/2023 1445 by Shira Pope, RN  Outcome: Ongoing, Progressing  Flowsheets (Taken 12/24/2023 1445)  Progress: no change  Plan of Care Reviewed With: patient  Outcome Evaluation: vss. pt aaox4. purulent and bloody drainage to previous L nephrostomy tube site, changed x2 this shift. plan for repeat CT scan. prn medicaiton provided for c/o pain  12/24/2023 1435 by Shira Pope RN  Flowsheets (Taken 12/24/2023 1435)  Progress: no change  Plan of Care Reviewed With: patient   Goal Outcome Evaluation:  Plan of Care Reviewed With: patient        Progress: no change  Outcome Evaluation: vss. pt aaox4. purulent and bloody drainage to previous L nephrostomy tube site, changed x2 this shift. plan for repeat CT scan. prn medicaiton provided for c/o pain
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
## 2023-12-24 NOTE — PROGRESS NOTES
F/U left pyelonephritis / hematoma s/p left ureteral stent placement 12/13/2023 w/spontaneous evacuation of left stent      Patient lying in bed, awake, c/o left flank pain and drainage      AVSS; good UOP via urostomy; urine clear  On assessment, there is a large amount of serosanguinous/purulent and foul smelling discharge from site of previous neph tube.      Crit 0.52 (0.92)  Hg 7.6 (8.3)  WBC 12.52  On IV cefipeme by ID and IV fluids  S/P 1 unit of PRBCs     PLAN:  Will get CT abd/pelv today  Continue pain control   Will continue to follow

## 2023-12-24 NOTE — PLAN OF CARE
"  Problem: Adult Inpatient Plan of Care  Goal: Plan of Care Review  Outcome: Ongoing, Progressing  Flowsheets (Taken 12/24/2023 0504)  Outcome Evaluation: VSS. Pt AO x 4. Upon initial assessment pt stated \" I had pain then it stopped for 20 min and now it's back\" When assessing pt's left flank for old nephrostomy site there was a large pool of serosanginous purulent drainage collected under him. Dressing was changed and then paged Dr. Castro, reviewed situation  and MD wants to monitor for drainage and address in AM. Drsg was CDI throughtout night. Pt afebrile. Abx given.  Goal: Patient-Specific Goal (Individualized)  Outcome: Ongoing, Progressing  Goal: Absence of Hospital-Acquired Illness or Injury  Outcome: Ongoing, Progressing  Intervention: Identify and Manage Fall Risk  Recent Flowsheet Documentation  Taken 12/24/2023 0459 by Karin Bautista, RN  Safety Promotion/Fall Prevention:   assistive device/personal items within reach   activity supervised   clutter free environment maintained   lighting adjusted   mobility aid in reach   nonskid shoes/slippers when out of bed   safety round/check completed   toileting scheduled  Taken 12/24/2023 0222 by Karin Bautista, RN  Safety Promotion/Fall Prevention:   assistive device/personal items within reach   activity supervised   clutter free environment maintained   lighting adjusted   mobility aid in reach   nonskid shoes/slippers when out of bed   safety round/check completed   toileting scheduled  Taken 12/24/2023 0012 by Karin Bautista, RN  Safety Promotion/Fall Prevention:   activity supervised   assistive device/personal items within reach   clutter free environment maintained   lighting adjusted   mobility aid in reach   nonskid shoes/slippers when out of bed   safety round/check completed   toileting scheduled  Taken 12/23/2023 2210 by Karin Bautista, RN  Safety Promotion/Fall Prevention:   activity supervised   assistive device/personal " items within reach   clutter free environment maintained   lighting adjusted   mobility aid in reach   nonskid shoes/slippers when out of bed   toileting scheduled   safety round/check completed  Taken 12/23/2023 2020 by Karin Bautista, RN  Safety Promotion/Fall Prevention:   activity supervised   assistive device/personal items within reach   clutter free environment maintained   lighting adjusted   mobility aid in reach   nonskid shoes/slippers when out of bed   safety round/check completed   toileting scheduled  Intervention: Prevent Skin Injury  Recent Flowsheet Documentation  Taken 12/24/2023 0145 by Karin Bautista, RN  Body Position:   turned   right  Taken 12/23/2023 2020 by Karin Bautista, RN  Skin Protection:   adhesive use limited   tubing/devices free from skin contact  Intervention: Prevent and Manage VTE (Venous Thromboembolism) Risk  Recent Flowsheet Documentation  Taken 12/24/2023 0459 by Karin Bautista, RN  Activity Management: bedrest  Taken 12/24/2023 0222 by Karin Bautista RN  Activity Management: bedrest  Taken 12/24/2023 0012 by Karin Bautista RN  Activity Management: bedrest  Taken 12/23/2023 2210 by Karin Bautista, RN  Activity Management: bedrest  Taken 12/23/2023 2020 by Karin Bautista RN  Activity Management: bedrest  Goal: Optimal Comfort and Wellbeing  Outcome: Ongoing, Progressing  Intervention: Provide Person-Centered Care  Recent Flowsheet Documentation  Taken 12/24/2023 0222 by Karin Bautista, RN  Trust Relationship/Rapport:   care explained   choices provided   emotional support provided   questions answered   questions encouraged   thoughts/feelings acknowledged   reassurance provided  Taken 12/23/2023 2020 by Karin Bautista, MARCELO  Trust Relationship/Rapport:   care explained   choices provided   emotional support provided   questions answered   questions encouraged   reassurance provided   other (see comments)  Goal:  "Readiness for Transition of Care  Outcome: Ongoing, Progressing   Goal Outcome Evaluation:              Outcome Evaluation: VSS. Pt AO x 4. Upon initial assessment pt stated \" I had pain then it stopped for 20 min and now it's back\" When assessing pt's left flank for old nephrostomy site there was a large pool of serosanginous purulent drainage collected under him. Dressing was changed and then paged Dr. Castro, reviewed situation  and MD wants to monitor for drainage and address in AM. Drsg was CDI throughtout night. Pt afebrile. Abx given.         "

## 2023-12-25 ENCOUNTER — APPOINTMENT (OUTPATIENT)
Dept: CT IMAGING | Facility: HOSPITAL | Age: 43
End: 2023-12-25
Payer: MEDICARE

## 2023-12-25 LAB
ANION GAP SERPL CALCULATED.3IONS-SCNC: 10 MMOL/L (ref 5–15)
BACTERIA SPEC AEROBE CULT: ABNORMAL
BACTERIA SPEC AEROBE CULT: NORMAL
BACTERIA SPEC AEROBE CULT: NORMAL
BH BB BLOOD EXPIRATION DATE: NORMAL
BH BB BLOOD EXPIRATION DATE: NORMAL
BH BB BLOOD TYPE BARCODE: 5100
BH BB BLOOD TYPE BARCODE: 5100
BH BB DISPENSE STATUS: NORMAL
BH BB DISPENSE STATUS: NORMAL
BH BB PRODUCT CODE: NORMAL
BH BB PRODUCT CODE: NORMAL
BH BB UNIT NUMBER: NORMAL
BH BB UNIT NUMBER: NORMAL
BUN SERPL-MCNC: 9 MG/DL (ref 6–20)
BUN/CREAT SERPL: 13.2 (ref 7–25)
CALCIUM SPEC-SCNC: 8.1 MG/DL (ref 8.6–10.5)
CHLORIDE SERPL-SCNC: 107 MMOL/L (ref 98–107)
CO2 SERPL-SCNC: 22 MMOL/L (ref 22–29)
CREAT SERPL-MCNC: 0.68 MG/DL (ref 0.76–1.27)
CROSSMATCH INTERPRETATION: NORMAL
CROSSMATCH INTERPRETATION: NORMAL
DEPRECATED RDW RBC AUTO: 47.1 FL (ref 37–54)
EGFRCR SERPLBLD CKD-EPI 2021: 119 ML/MIN/1.73
ERYTHROCYTE [DISTWIDTH] IN BLOOD BY AUTOMATED COUNT: 15.1 % (ref 12.3–15.4)
GLUCOSE SERPL-MCNC: 90 MG/DL (ref 65–99)
HCT VFR BLD AUTO: 24.2 % (ref 37.5–51)
HGB BLD-MCNC: 7.8 G/DL (ref 13–17.7)
MCH RBC QN AUTO: 27.5 PG (ref 26.6–33)
MCHC RBC AUTO-ENTMCNC: 32.2 G/DL (ref 31.5–35.7)
MCV RBC AUTO: 85.2 FL (ref 79–97)
PLATELET # BLD AUTO: 441 10*3/MM3 (ref 140–450)
PMV BLD AUTO: 8.7 FL (ref 6–12)
POTASSIUM SERPL-SCNC: 3.9 MMOL/L (ref 3.5–5.2)
RBC # BLD AUTO: 2.84 10*6/MM3 (ref 4.14–5.8)
SODIUM SERPL-SCNC: 139 MMOL/L (ref 136–145)
UNIT  ABO: NORMAL
UNIT  ABO: NORMAL
UNIT  RH: NORMAL
UNIT  RH: NORMAL
WBC NRBC COR # BLD AUTO: 6.1 10*3/MM3 (ref 3.4–10.8)

## 2023-12-25 PROCEDURE — 25010000002 CEFEPIME PER 500 MG: Performed by: STUDENT IN AN ORGANIZED HEALTH CARE EDUCATION/TRAINING PROGRAM

## 2023-12-25 PROCEDURE — 99232 SBSQ HOSP IP/OBS MODERATE 35: CPT | Performed by: STUDENT IN AN ORGANIZED HEALTH CARE EDUCATION/TRAINING PROGRAM

## 2023-12-25 PROCEDURE — 80048 BASIC METABOLIC PNL TOTAL CA: CPT | Performed by: INTERNAL MEDICINE

## 2023-12-25 PROCEDURE — 74176 CT ABD & PELVIS W/O CONTRAST: CPT

## 2023-12-25 PROCEDURE — 85027 COMPLETE CBC AUTOMATED: CPT | Performed by: INTERNAL MEDICINE

## 2023-12-25 PROCEDURE — 25810000003 SODIUM CHLORIDE 0.9 % SOLUTION: Performed by: INTERNAL MEDICINE

## 2023-12-25 RX ADMIN — DICYCLOMINE HYDROCHLORIDE 20 MG: 10 CAPSULE ORAL at 08:45

## 2023-12-25 RX ADMIN — AMITRIPTYLINE HYDROCHLORIDE 75 MG: 50 TABLET, FILM COATED ORAL at 20:47

## 2023-12-25 RX ADMIN — CEFEPIME 2000 MG: 2 INJECTION, POWDER, FOR SOLUTION INTRAVENOUS at 23:41

## 2023-12-25 RX ADMIN — SIMETHICONE 80 MG: 80 TABLET, CHEWABLE ORAL at 11:47

## 2023-12-25 RX ADMIN — AMITRIPTYLINE HYDROCHLORIDE 75 MG: 50 TABLET, FILM COATED ORAL at 08:46

## 2023-12-25 RX ADMIN — VENLAFAXINE HYDROCHLORIDE 150 MG: 150 CAPSULE, EXTENDED RELEASE ORAL at 08:46

## 2023-12-25 RX ADMIN — OXYCODONE HYDROCHLORIDE 5 MG: 5 TABLET ORAL at 15:55

## 2023-12-25 RX ADMIN — DOCUSATE SODIUM 100 MG: 100 CAPSULE, LIQUID FILLED ORAL at 08:46

## 2023-12-25 RX ADMIN — ARIPIPRAZOLE 5 MG: 5 TABLET ORAL at 08:46

## 2023-12-25 RX ADMIN — CEFEPIME 2000 MG: 2 INJECTION, POWDER, FOR SOLUTION INTRAVENOUS at 06:25

## 2023-12-25 RX ADMIN — Medication 10 ML: at 20:48

## 2023-12-25 RX ADMIN — DICYCLOMINE HYDROCHLORIDE 20 MG: 10 CAPSULE ORAL at 11:47

## 2023-12-25 RX ADMIN — DOCUSATE SODIUM 100 MG: 100 CAPSULE, LIQUID FILLED ORAL at 20:47

## 2023-12-25 RX ADMIN — DICYCLOMINE HYDROCHLORIDE 20 MG: 10 CAPSULE ORAL at 20:47

## 2023-12-25 RX ADMIN — CEFEPIME 2000 MG: 2 INJECTION, POWDER, FOR SOLUTION INTRAVENOUS at 15:49

## 2023-12-25 RX ADMIN — PETROLATUM 1 APPLICATION: 420 OINTMENT TOPICAL at 20:48

## 2023-12-25 RX ADMIN — OXYCODONE HYDROCHLORIDE 5 MG: 5 TABLET ORAL at 20:47

## 2023-12-25 RX ADMIN — PETROLATUM 1 APPLICATION: 420 OINTMENT TOPICAL at 13:35

## 2023-12-25 RX ADMIN — BUSPIRONE HYDROCHLORIDE 30 MG: 15 TABLET ORAL at 06:26

## 2023-12-25 RX ADMIN — SODIUM CHLORIDE 75 ML/HR: 9 INJECTION, SOLUTION INTRAVENOUS at 20:47

## 2023-12-25 RX ADMIN — SODIUM CHLORIDE 75 ML/HR: 9 INJECTION, SOLUTION INTRAVENOUS at 06:38

## 2023-12-25 RX ADMIN — SIMETHICONE 80 MG: 80 TABLET, CHEWABLE ORAL at 17:59

## 2023-12-25 RX ADMIN — OXYCODONE HYDROCHLORIDE 5 MG: 5 TABLET ORAL at 06:26

## 2023-12-25 RX ADMIN — OXYCODONE HYDROCHLORIDE 5 MG: 5 TABLET ORAL at 11:47

## 2023-12-25 RX ADMIN — DICYCLOMINE HYDROCHLORIDE 20 MG: 10 CAPSULE ORAL at 17:59
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
## 2023-12-25 NOTE — PLAN OF CARE
Goal Outcome Evaluation:              Outcome Evaluation: VSS. HGB STABLE. RESTED WELL IN BETWEEN PAIN PILLS.
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
## 2023-12-25 NOTE — PROGRESS NOTES
[facility] Progress Note  12/25/2023  08:46 EST      Follow-up left pyelonephritis, hematoma s/p left ureteral stent placement and spontaneous evacuation; Drainage from previous PCNL site    Patient lying in bed, feeling better than yesterday  States that's prior to neph site draining he felt a lot of abd pressure and now that spontaneously draining the pressure has significantly improved      AVSS, good UOP, urostomy urine clear  Abd benign  Neph tube site indurated with a large amount of serosanguinous fluid noted on dressing,   Pt states that dressing has been changed 3 x  Currently on IV Cefipeme     WBC - 6.10 (12.52)  Cr - 0.68  Hgb - 7.8    CT - Done this AM    Plan  Awaiting results of CT  Will likely need to have IR drain placement     NELLY King  12/25/23  08:46 EST

## 2023-12-26 LAB
ANION GAP SERPL CALCULATED.3IONS-SCNC: 10.5 MMOL/L (ref 5–15)
BASOPHILS # BLD AUTO: 0.05 10*3/MM3 (ref 0–0.2)
BASOPHILS NFR BLD AUTO: 0.8 % (ref 0–1.5)
BUN SERPL-MCNC: 10 MG/DL (ref 6–20)
BUN/CREAT SERPL: 14.9 (ref 7–25)
CALCIUM SPEC-SCNC: 8.5 MG/DL (ref 8.6–10.5)
CHLORIDE SERPL-SCNC: 105 MMOL/L (ref 98–107)
CLUMPED PLATELETS: PRESENT
CO2 SERPL-SCNC: 21.5 MMOL/L (ref 22–29)
CREAT SERPL-MCNC: 0.67 MG/DL (ref 0.76–1.27)
DEPRECATED RDW RBC AUTO: 48.8 FL (ref 37–54)
EGFRCR SERPLBLD CKD-EPI 2021: 119.6 ML/MIN/1.73
EOSINOPHIL # BLD AUTO: 0.28 10*3/MM3 (ref 0–0.4)
EOSINOPHIL NFR BLD AUTO: 4.5 % (ref 0.3–6.2)
ERYTHROCYTE [DISTWIDTH] IN BLOOD BY AUTOMATED COUNT: 15.1 % (ref 12.3–15.4)
GLUCOSE SERPL-MCNC: 74 MG/DL (ref 65–99)
HCT VFR BLD AUTO: 27.9 % (ref 37.5–51)
HGB BLD-MCNC: 8.8 G/DL (ref 13–17.7)
HYPOCHROMIA BLD QL: NORMAL
LYMPHOCYTES # BLD AUTO: 1.33 10*3/MM3 (ref 0.7–3.1)
LYMPHOCYTES NFR BLD AUTO: 21.5 % (ref 19.6–45.3)
MCH RBC QN AUTO: 27.6 PG (ref 26.6–33)
MCHC RBC AUTO-ENTMCNC: 31.5 G/DL (ref 31.5–35.7)
MCV RBC AUTO: 87.5 FL (ref 79–97)
MONOCYTES # BLD AUTO: 0.3 10*3/MM3 (ref 0.1–0.9)
MONOCYTES NFR BLD AUTO: 4.8 % (ref 5–12)
NEUTROPHILS NFR BLD AUTO: 4.2 10*3/MM3 (ref 1.7–7)
NEUTROPHILS NFR BLD AUTO: 67.8 % (ref 42.7–76)
PLATELET # BLD AUTO: 486 10*3/MM3 (ref 140–450)
PMV BLD AUTO: 9.8 FL (ref 6–12)
POTASSIUM SERPL-SCNC: 4.3 MMOL/L (ref 3.5–5.2)
RBC # BLD AUTO: 3.19 10*6/MM3 (ref 4.14–5.8)
SODIUM SERPL-SCNC: 137 MMOL/L (ref 136–145)
WBC MORPH BLD: NORMAL
WBC NRBC COR # BLD AUTO: 6.2 10*3/MM3 (ref 3.4–10.8)

## 2023-12-26 PROCEDURE — 80048 BASIC METABOLIC PNL TOTAL CA: CPT | Performed by: HOSPITALIST

## 2023-12-26 PROCEDURE — 85007 BL SMEAR W/DIFF WBC COUNT: CPT | Performed by: HOSPITALIST

## 2023-12-26 PROCEDURE — 25810000003 SODIUM CHLORIDE 0.9 % SOLUTION: Performed by: INTERNAL MEDICINE

## 2023-12-26 PROCEDURE — 25010000002 CEFEPIME PER 500 MG: Performed by: STUDENT IN AN ORGANIZED HEALTH CARE EDUCATION/TRAINING PROGRAM

## 2023-12-26 PROCEDURE — 85025 COMPLETE CBC W/AUTO DIFF WBC: CPT | Performed by: HOSPITALIST

## 2023-12-26 RX ADMIN — DICYCLOMINE HYDROCHLORIDE 20 MG: 10 CAPSULE ORAL at 20:22

## 2023-12-26 RX ADMIN — DICYCLOMINE HYDROCHLORIDE 20 MG: 10 CAPSULE ORAL at 13:00

## 2023-12-26 RX ADMIN — ARIPIPRAZOLE 5 MG: 5 TABLET ORAL at 09:10

## 2023-12-26 RX ADMIN — BUSPIRONE HYDROCHLORIDE 30 MG: 15 TABLET ORAL at 06:34

## 2023-12-26 RX ADMIN — OXYCODONE HYDROCHLORIDE 5 MG: 5 TABLET ORAL at 09:09

## 2023-12-26 RX ADMIN — OXYCODONE HYDROCHLORIDE 5 MG: 5 TABLET ORAL at 17:48

## 2023-12-26 RX ADMIN — SIMETHICONE 80 MG: 80 TABLET, CHEWABLE ORAL at 13:00

## 2023-12-26 RX ADMIN — CEFEPIME 2000 MG: 2 INJECTION, POWDER, FOR SOLUTION INTRAVENOUS at 23:19

## 2023-12-26 RX ADMIN — CEFEPIME 2000 MG: 2 INJECTION, POWDER, FOR SOLUTION INTRAVENOUS at 16:03

## 2023-12-26 RX ADMIN — PETROLATUM 1 APPLICATION: 420 OINTMENT TOPICAL at 20:22

## 2023-12-26 RX ADMIN — SIMETHICONE 80 MG: 80 TABLET, CHEWABLE ORAL at 17:48

## 2023-12-26 RX ADMIN — DICYCLOMINE HYDROCHLORIDE 20 MG: 10 CAPSULE ORAL at 09:09

## 2023-12-26 RX ADMIN — SODIUM CHLORIDE 75 ML/HR: 9 INJECTION, SOLUTION INTRAVENOUS at 22:38

## 2023-12-26 RX ADMIN — AMITRIPTYLINE HYDROCHLORIDE 75 MG: 50 TABLET, FILM COATED ORAL at 09:09

## 2023-12-26 RX ADMIN — Medication 10 ML: at 20:22

## 2023-12-26 RX ADMIN — CEFEPIME 2000 MG: 2 INJECTION, POWDER, FOR SOLUTION INTRAVENOUS at 06:34

## 2023-12-26 RX ADMIN — OXYCODONE HYDROCHLORIDE 5 MG: 5 TABLET ORAL at 05:01

## 2023-12-26 RX ADMIN — VENLAFAXINE HYDROCHLORIDE 150 MG: 150 CAPSULE, EXTENDED RELEASE ORAL at 09:09

## 2023-12-26 RX ADMIN — PETROLATUM 1 APPLICATION: 420 OINTMENT TOPICAL at 09:10

## 2023-12-26 RX ADMIN — DICYCLOMINE HYDROCHLORIDE 20 MG: 10 CAPSULE ORAL at 17:48

## 2023-12-26 RX ADMIN — OXYCODONE HYDROCHLORIDE 5 MG: 5 TABLET ORAL at 21:41

## 2023-12-26 RX ADMIN — OXYCODONE HYDROCHLORIDE 5 MG: 5 TABLET ORAL at 13:03

## 2023-12-26 RX ADMIN — AMITRIPTYLINE HYDROCHLORIDE 75 MG: 50 TABLET, FILM COATED ORAL at 20:22

## 2023-12-26 RX ADMIN — SODIUM CHLORIDE 75 ML/HR: 9 INJECTION, SOLUTION INTRAVENOUS at 09:14

## 2023-12-26 RX ADMIN — Medication 10 ML: at 09:10

## 2023-12-26 RX ADMIN — DOCUSATE SODIUM 100 MG: 100 CAPSULE, LIQUID FILLED ORAL at 09:10

## 2023-12-26 NOTE — PLAN OF CARE
Goal Outcome Evaluation:  Plan of Care Reviewed With: patient        Progress: no change  Outcome Evaluation: Monitor pain,labs,and vitals. VSS. Room air. IV ABX. Pain controlled with PRN medications per orders. Will continue to monitor.
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
## 2023-12-26 NOTE — PROGRESS NOTES
[facility] Progress Note  12/26/2023  08:44 EST      Chief complaint: No new complaints    Patient sitting  up in bed this AM. Still with left flank pain but relieved with pain medication. Not as severe as it was prior to nephrostomy site drainage. CT results show that hematoma has decreased in size likely d/t spontaneous drainage.     AVSS, good UOP, urostomy urine pale yellow  Abd benign  Neph tube site appears to be draining less today, serosanginous fluid noted on dressing   Receiving IV Cefipeme    WBC - 6.10  Cr - 0.68 (0.52)  Hgb - 7.8    CT - Interval decrease in size of the left renal hematoma. Slight/borderline left  hydronephrosis and proximal left hydroureter. Left perinephric stranding.     Plan  Continue abx as per ID recommendation  Will need repeat imaging after discharge    NELLY King  12/26/23  08:44 EST

## 2023-12-26 NOTE — CASE MANAGEMENT/SOCIAL WORK
Continued Stay Note  [facility]     Patient Name: Pk May  MRN: [id number]  Today's Date: 12/26/2023    Admit Date: 12/20/2023    Plan: Home with HH and infusion   Discharge Plan       Row Name 12/26/23 1259       Plan    Plan Home with HH and infusion    Patient/Family in Agreement with Plan yes    Plan Comments Met with pt at bedside who confirms plan to return home at discharge with [facility].  Will need IV antibiotics at home. Pt stastes he has done them in past.  Will use [facility], referral in [facility] and [facility].  Notified [facility] that pt will have IV abx.  No further needs identified. CCP will continue to follow.  NAKITA Dunn RN                   Discharge Codes    No documentation.                 Expected Discharge Date and Time       Expected Discharge Date Expected Discharge Time    Dec 26, 2023               Samantha Dunn, RN

## 2023-12-26 NOTE — DISCHARGE PLACEMENT REQUEST
"Pk Jaeger (43 y.o. Male)       Date of Birth   1980    Social Security Number       Address   [address] [address]    Home Phone   [phone]    MRN   [id number]       [facility]    Marital Status   Significant Other                            Admission Date   12/20/23    Admission Type   Emergency    Admitting Provider   Sarthak Rolon MD    Attending Provider   Carlos Enrique Thorne MD    Department, Room/Bed   [address], [address]       Discharge Date       Discharge Disposition       Discharge Destination                                 Attending Provider: Carlos Enrique Thorne MD    Allergies: Pholcodine, Codeine, Amoxicillin-pot Clavulanate, Cefuroxime, Doxycycline, Sulfamethoxazole, Sulfamethoxazole-trimethoprim    Isolation: Contact   Infection: MRSA (12/21/23)   Code Status: CPR    Ht: 177.8 cm (70\")   Wt: 85 kg (187 lb 6.3 oz)    Admission Cmt: None   Principal Problem: Sepsis [A41.9]                   Active Insurance as of 12/20/2023       Primary Coverage       Payor Plan Insurance Group Employer/Plan Group    PASSPORT MEDICARE ADVANTAGE PASSPORT ADVANTAGE N       Payor Plan Address Payor Plan Phone Number Payor Plan Fax Number Effective Dates    [address]   8/1/2023 - None Entered    [place] [id number]         Subscriber Name Subscriber Birth Date Member ID       PK JAEGER 1980 [id number]                     Emergency Contacts        (Rel.) Home Phone Work Phone Mobile Phone    BillyelisabethCarl (Significant Other) [phone] -- --    JaegerElva sparks (Grandparent) [phone] -- [phone]    David Jaeger (NOK) (Son) [phone] -- [phone]              Emergency Contact Information       Name Relation Home Work Mobile    EstherCarl Significant Other [phone]      Elva Jaeger Grandparent [phone]    David Jaeger (NOK) Son [phone]          "

## 2023-12-26 NOTE — PLAN OF CARE
Goal Outcome Evaluation:      VSS. RA. NS @ 75 mL/hr. IV antibx cefepime. RLQ urostomy to jacob bag. LLQ colostomy CDI. LL back incision CDI. BLE w/ petrolatum, heel boots on. Q2 turn, pt refused many this shift. Prn aman x2. L foot wound drx CDI.

## 2023-12-27 ENCOUNTER — READMISSION MANAGEMENT (OUTPATIENT)
Dept: CALL CENTER | Facility: HOSPITAL | Age: 43
End: 2023-12-27
Payer: MEDICARE

## 2023-12-27 VITALS
WEIGHT: 187.39 LBS | SYSTOLIC BLOOD PRESSURE: 130 MMHG | HEIGHT: 70 IN | RESPIRATION RATE: 18 BRPM | OXYGEN SATURATION: 97 % | BODY MASS INDEX: 26.83 KG/M2 | TEMPERATURE: 97.7 F | DIASTOLIC BLOOD PRESSURE: 75 MMHG | HEART RATE: 90 BPM

## 2023-12-27 LAB
ANION GAP SERPL CALCULATED.3IONS-SCNC: 9 MMOL/L (ref 5–15)
BASOPHILS # BLD AUTO: 0.06 10*3/MM3 (ref 0–0.2)
BASOPHILS NFR BLD AUTO: 0.9 % (ref 0–1.5)
BUN SERPL-MCNC: 10 MG/DL (ref 6–20)
BUN/CREAT SERPL: 15.9 (ref 7–25)
CALCIUM SPEC-SCNC: 8.5 MG/DL (ref 8.6–10.5)
CHLORIDE SERPL-SCNC: 104 MMOL/L (ref 98–107)
CO2 SERPL-SCNC: 25 MMOL/L (ref 22–29)
CREAT SERPL-MCNC: 0.63 MG/DL (ref 0.76–1.27)
DEPRECATED RDW RBC AUTO: 45.9 FL (ref 37–54)
EGFRCR SERPLBLD CKD-EPI 2021: 121.8 ML/MIN/1.73
EOSINOPHIL # BLD AUTO: 0.23 10*3/MM3 (ref 0–0.4)
EOSINOPHIL NFR BLD AUTO: 3.5 % (ref 0.3–6.2)
ERYTHROCYTE [DISTWIDTH] IN BLOOD BY AUTOMATED COUNT: 14.8 % (ref 12.3–15.4)
GLUCOSE SERPL-MCNC: 97 MG/DL (ref 65–99)
HCT VFR BLD AUTO: 28.2 % (ref 37.5–51)
HGB BLD-MCNC: 8.6 G/DL (ref 13–17.7)
IMM GRANULOCYTES # BLD AUTO: 0.08 10*3/MM3 (ref 0–0.05)
IMM GRANULOCYTES NFR BLD AUTO: 1.2 % (ref 0–0.5)
LYMPHOCYTES # BLD AUTO: 1.58 10*3/MM3 (ref 0.7–3.1)
LYMPHOCYTES NFR BLD AUTO: 24 % (ref 19.6–45.3)
MCH RBC QN AUTO: 25.9 PG (ref 26.6–33)
MCHC RBC AUTO-ENTMCNC: 30.5 G/DL (ref 31.5–35.7)
MCV RBC AUTO: 84.9 FL (ref 79–97)
MONOCYTES # BLD AUTO: 0.33 10*3/MM3 (ref 0.1–0.9)
MONOCYTES NFR BLD AUTO: 5 % (ref 5–12)
NEUTROPHILS NFR BLD AUTO: 4.31 10*3/MM3 (ref 1.7–7)
NEUTROPHILS NFR BLD AUTO: 65.4 % (ref 42.7–76)
NRBC BLD AUTO-RTO: 0 /100 WBC (ref 0–0.2)
PLATELET # BLD AUTO: 529 10*3/MM3 (ref 140–450)
PMV BLD AUTO: 8.2 FL (ref 6–12)
POTASSIUM SERPL-SCNC: 3.8 MMOL/L (ref 3.5–5.2)
RBC # BLD AUTO: 3.32 10*6/MM3 (ref 4.14–5.8)
SODIUM SERPL-SCNC: 138 MMOL/L (ref 136–145)
WBC NRBC COR # BLD AUTO: 6.59 10*3/MM3 (ref 3.4–10.8)

## 2023-12-27 PROCEDURE — 25010000002 CEFEPIME PER 500 MG: Performed by: STUDENT IN AN ORGANIZED HEALTH CARE EDUCATION/TRAINING PROGRAM

## 2023-12-27 PROCEDURE — 85025 COMPLETE CBC W/AUTO DIFF WBC: CPT | Performed by: HOSPITALIST

## 2023-12-27 PROCEDURE — 25810000003 SODIUM CHLORIDE 0.9 % SOLUTION: Performed by: INTERNAL MEDICINE

## 2023-12-27 PROCEDURE — 36410 VNPNXR 3YR/> PHY/QHP DX/THER: CPT

## 2023-12-27 PROCEDURE — C1751 CATH, INF, PER/CENT/MIDLINE: HCPCS

## 2023-12-27 PROCEDURE — 80048 BASIC METABOLIC PNL TOTAL CA: CPT | Performed by: HOSPITALIST

## 2023-12-27 RX ORDER — SODIUM CHLORIDE 0.9 % (FLUSH) 0.9 %
10 SYRINGE (ML) INJECTION EVERY 12 HOURS SCHEDULED
Status: DISCONTINUED | OUTPATIENT
Start: 2023-12-27 | End: 2023-12-27 | Stop reason: HOSPADM

## 2023-12-27 RX ORDER — SODIUM CHLORIDE 0.9 % (FLUSH) 0.9 %
10 SYRINGE (ML) INJECTION AS NEEDED
Status: DISCONTINUED | OUTPATIENT
Start: 2023-12-27 | End: 2023-12-27 | Stop reason: HOSPADM

## 2023-12-27 RX ORDER — OXYCODONE HYDROCHLORIDE 5 MG/1
5 TABLET ORAL EVERY 4 HOURS PRN
Qty: 20 TABLET | Refills: 0 | Status: SHIPPED | OUTPATIENT
Start: 2023-12-27 | End: 2023-12-31

## 2023-12-27 RX ORDER — SODIUM CHLORIDE 9 MG/ML
40 INJECTION, SOLUTION INTRAVENOUS AS NEEDED
Status: DISCONTINUED | OUTPATIENT
Start: 2023-12-27 | End: 2023-12-27 | Stop reason: HOSPADM

## 2023-12-27 RX ORDER — DICYCLOMINE HYDROCHLORIDE 10 MG/1
20 CAPSULE ORAL 4 TIMES DAILY
Qty: 40 CAPSULE | Refills: 0 | Status: SHIPPED | OUTPATIENT
Start: 2023-12-27

## 2023-12-27 RX ADMIN — ARIPIPRAZOLE 5 MG: 5 TABLET ORAL at 08:11

## 2023-12-27 RX ADMIN — OXYCODONE HYDROCHLORIDE 5 MG: 5 TABLET ORAL at 14:50

## 2023-12-27 RX ADMIN — BUSPIRONE HYDROCHLORIDE 30 MG: 15 TABLET ORAL at 06:15

## 2023-12-27 RX ADMIN — OXYCODONE HYDROCHLORIDE 5 MG: 5 TABLET ORAL at 04:29

## 2023-12-27 RX ADMIN — CEFEPIME 2000 MG: 2 INJECTION, POWDER, FOR SOLUTION INTRAVENOUS at 06:15

## 2023-12-27 RX ADMIN — Medication 10 ML: at 08:15

## 2023-12-27 RX ADMIN — AMITRIPTYLINE HYDROCHLORIDE 75 MG: 50 TABLET, FILM COATED ORAL at 08:11

## 2023-12-27 RX ADMIN — OXYCODONE HYDROCHLORIDE 5 MG: 5 TABLET ORAL at 08:14

## 2023-12-27 RX ADMIN — PETROLATUM 1 APPLICATION: 420 OINTMENT TOPICAL at 08:14

## 2023-12-27 RX ADMIN — CEFEPIME 2000 MG: 2 INJECTION, POWDER, FOR SOLUTION INTRAVENOUS at 14:50

## 2023-12-27 RX ADMIN — VENLAFAXINE HYDROCHLORIDE 150 MG: 150 CAPSULE, EXTENDED RELEASE ORAL at 08:11

## 2023-12-27 RX ADMIN — DICYCLOMINE HYDROCHLORIDE 20 MG: 10 CAPSULE ORAL at 11:20

## 2023-12-27 RX ADMIN — DICYCLOMINE HYDROCHLORIDE 20 MG: 10 CAPSULE ORAL at 08:11

## 2023-12-27 RX ADMIN — SODIUM CHLORIDE 75 ML/HR: 9 INJECTION, SOLUTION INTRAVENOUS at 11:20

## 2023-12-27 NOTE — PLAN OF CARE
"Goal Outcome Evaluation:      VSS. RA. NS @ 75 mL/hr. IV cefepime antibx. Q2 turn when pt doesn't refuse, pt says he \"shifts in bed\". PRN aman x2. LLQ colostomy CDI. RLQ urostomy to jacob bag. L flank incision drx changed, serous/tan drainage. L foot drx CDI, therahoney/ rachell hornell, kerlex cahnged EOD. Petrolatum to BLE. Plans for midline IV today.                   "

## 2023-12-27 NOTE — PROGRESS NOTES
"Nutrition Services    Patient Name:  Pk May  YOB: 1980  MRN: [id number]  Admit Date:  12/20/2023    Assessment Date:  12/27/23    Summary: Follow up. Pt reports good appetite (%), possible DC today or tomorrow. Labs, meds, skin reviewed. + stool per ostomy. Encouraged continued good nutrition.    Plan/Recommendations:  Encourage good nutrition to support healing    Will follow clinical course, nutrition needs.    CLINICAL NUTRITION ASSESSMENT      Reason for Assessment Follow-up Protocol     Diagnosis/Problem   Sepsis, UTI, Hx PTSD, traumatic spinal cord injury, paraplegia     Anthropometrics        Current Height  Current Weight  BMI kg/m2 Height: 177.8 cm (70\")  Weight: 85 kg (187 lb 6.3 oz) (12/21/23 0016)  Body mass index is 26.89 kg/m².   Adjusted BMI (if applicable)    BMI Category Overweight (25 - 29.9)   Ideal Body Weight (IBW) 73kg   Usual Body Weight (UBW) 200-210lb   Weight Trend Loss     Estimated/Assessed Needs        Current Weight  Weight: 85 kg (187 lb 6.3 oz) (12/21/23 0016)       Energy Requirements    Weight for Calculation 85 kg   Method for Estimation  25 kcal/kg   EST Needs (kcal/day) 2125       Protein Requirements    Weight for Calculation 85 kg   EST Protein Needs (g/kg) 1.2 gm/kg   EST Daily Needs (g/day) 102       Fluid Requirements     Method for Estimation 1 mL/kcal    EST Needs (mL/day) 2100     Labs       Pertinent Labs    Results from last 7 days   Lab Units 12/27/23  0623 12/26/23  0640 12/25/23  0553 12/21/23  0537 12/20/23  2044   SODIUM mmol/L 138 137 139   < > 131*   POTASSIUM mmol/L 3.8 4.3 3.9   < > 3.9   CHLORIDE mmol/L 104 105 107   < > 98   CO2 mmol/L 25.0 21.5* 22.0   < > 20.0*   BUN mg/dL 10 10 9   < > 13   CREATININE mg/dL 0.63* 0.67* 0.68*   < > 1.04   CALCIUM mg/dL 8.5* 8.5* 8.1*   < > 8.5*   BILIRUBIN mg/dL  --   --   --   --  0.3   ALK PHOS U/L  --   --   --   --  208*   ALT (SGPT) U/L  --   --   --   --  25   AST (SGOT) U/L  --   --   -- "   --  18   GLUCOSE mg/dL 97 74 90   < > 120*    < > = values in this interval not displayed.     Results from last 7 days   Lab Units 12/27/23  0623 12/21/23  0537 12/20/23  2044   HEMOGLOBIN g/dL 8.6*   < > 8.2*   HEMATOCRIT % 28.2*   < > 25.5*   WBC 10*3/mm3 6.59   < > 16.33*   ALBUMIN g/dL  --   --  3.0*    < > = values in this interval not displayed.     Results from last 7 days   Lab Units 12/27/23  0623 12/26/23  0640 12/25/23  0553 12/24/23  0412 12/23/23  0454   PLATELETS 10*3/mm3 529* 486* 441 458* 424     COVID19   Date Value Ref Range Status   12/20/2023 Not Detected Not Detected - Ref. Range Final     Lab Results   Component Value Date    HGBA1C 5.04 11/08/2021          Medications           Scheduled Medications amitriptyline, 75 mg, Oral, BID  ARIPiprazole, 5 mg, Oral, Daily  busPIRone, 30 mg, Oral, QAM  cefepime, 2,000 mg, Intravenous, Q8H  dicyclomine, 20 mg, Oral, 4x Daily  docusate sodium, 100 mg, Oral, BID  Petrolatum, 1 application , Topical, Q12H  simethicone, 80 mg, Oral, 4x Daily AC & at Bedtime  sodium chloride, 10 mL, Intravenous, Q12H  venlafaxine XR, 150 mg, Oral, Daily       Infusions sodium chloride, 75 mL/hr, Last Rate: 75 mL/hr (12/27/23 1120)       PRN Medications   acetaminophen **OR** acetaminophen **OR** acetaminophen    calcium carbonate    nitroglycerin    ondansetron ODT **OR** ondansetron    oxyCODONE    Potassium Replacement - Follow Nurse / BPA Driven Protocol    sodium chloride    sodium chloride    sodium chloride     Physical Findings          General Findings alert, oriented, paraplegia   Oral/Mouth Cavity tooth or teeth missing   Edema  1+ (trace), 2+ (mild)   Gastrointestinal colostomy   Skin  pressure injury: heel, toes, foot    Tubes/Drains/Lines colostomy, urostomy    NFPE No clinical signs of muscle wasting or fat loss   --  Current Nutrition Orders & Evaluation of Intake       Oral Nutrition     Food Allergies NKFA   Current PO Diet Diet: Regular/House Diet;  Texture: Regular Texture (IDDSI 7); Fluid Consistency: Thin (IDDSI 0)   Supplement n/a   PO Evaluation     % PO Intake %    Factors Affecting Intake: No factors at this time   --  PES STATEMENT / NUTRITION DIAGNOSIS      Nutrition Dx Problem  Problem: Predicted Suboptimal Intake  Etiology: Medical Diagnosis -  Sepsis, UTI Hx PTSD, traumatic spinal cord injury, paraplegia    Signs/Symptoms: Unintended Weight Change     NUTRITION INTERVENTION / PLAN OF CARE      Intervention Goal(s) Maintain nutrition status, Reduce/improve symptoms, Meet estimated needs, Disease management/therapy, Tolerate PO , and Maintain weight         RD Intervention/Action Interview for preferences, Encourage intake, Continue to monitor, and Care plan reviewed   --      Prescription/Orders:       PO Diet       Supplements       Enteral Nutrition       Parenteral Nutrition    New Prescription Ordered? No changes at this time   --      Monitor/Evaluation Per protocol   Discharge Plan/Needs Pending clinical course   --    RD to follow per protocol.      Electronically signed by:  Kendy Morrow RD  12/27/23 15:05 EST
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
## 2023-12-27 NOTE — SIGNIFICANT NOTE
"   12/27/23 1625   Midline Catheter - Single Lumen 12/27/23 Left Cephalic   Placement date: If unknown, DO NOT use \"Add Comment\" note/Placement time: If unknown, DO NOT use \"Add Comment\" note: 12/27/23 1624   Hand Hygiene Completed: Yes  Site Prep: Chlorhexidine isopropyl alcohol  All 5 Sterile Barriers Used (Gloves, Gown, Ca...   Site Assessment Clean;Dry;Intact   Line Status Blood return noted;Saline locked;Flushed   Length lukasz (cm) 15 cm   Extremity Circumference (cm) 36 cm   Dressing Type Border Dressing;Transparent;Securing device;Antimicrobial dressing/disc   Dressing Status Clean;Dry;Intact   Dressing Change Due 01/03/24   Indication/Daily Review of Necessity long-term IV access >7 days     3 needles, 2 guidewires, 1 scalpel properly disposed post procedure.  Midline lot# [id number], Exp: 2025-02-28  "

## 2023-12-27 NOTE — DISCHARGE SUMMARY
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 decreased if patient taking Biotin.      Iron Profile [[id number]]  (Abnormal) Collected: 12/21/23 0537    Specimen: Blood Updated: 12/21/23 0817     Iron 14 mcg/dL      Iron Saturation (TSAT) 7 %      Transferrin 136 mg/dL      TIBC 203 mcg/dL     Basic Metabolic Panel [[id number]]  (Abnormal) Collected: 12/21/23 0537    Specimen: Blood Updated: 12/21/23 0619     Glucose 113 mg/dL      BUN 11 mg/dL      Creatinine 0.85 mg/dL      Sodium 135 mmol/L      Potassium 3.5 mmol/L      Chloride 101 mmol/L      CO2 22.6 mmol/L      Calcium 8.6 mg/dL      BUN/Creatinine Ratio 12.9     Anion Gap 11.4 mmol/L      eGFR 111.3 mL/min/1.73     Narrative:      GFR Normal >60  Chronic Kidney Disease <60  Kidney Failure <15      CBC (No Diff) [[id number]]  (Abnormal) Collected: 12/21/23 0537    Specimen: Blood Updated: 12/21/23 0609     WBC 17.18 10*3/mm3      RBC 3.01 10*6/mm3      Hemoglobin 8.2 g/dL      Hematocrit 25.6 %      MCV 85.0 fL      MCH 27.2 pg      MCHC 32.0 g/dL      RDW 15.5 %      RDW-SD 48.5 fl      MPV 8.8 fL      Platelets 506 10*3/mm3     Urine Drug Screen - Urine, Clean Catch [[id number]]  (Abnormal) Collected: 12/20/23 2131    Specimen: Urine, Clean Catch Updated: 12/21/23 0104     Amphet/Methamphet, Screen Negative     Barbiturates Screen, Urine Negative     Benzodiazepine Screen,  Urine Negative     Cocaine Screen, Urine Negative     Opiate Screen Positive     THC, Screen, Urine Positive     Methadone Screen, Urine Negative     Oxycodone Screen, Urine Negative     Fentanyl, Urine Negative    Narrative:      Negative Thresholds Per Drugs Screened:    Amphetamines                 500 ng/ml  Barbiturates                 200 ng/ml  Benzodiazepines              100 ng/ml  Cocaine                      300 ng/ml  Methadone                    300 ng/ml  Opiates                      300 ng/ml  Oxycodone                    100 ng/ml  THC                           50 ng/ml  Fentanyl                       5 ng/ml      The Normal Value for all drugs tested is negative. This report includes final unconfirmed screening results to be used for medical treatment purposes only. Unconfirmed results must not be used for non-medical purposes such as employment or legal testing. Clinical consideration should be applied to any drug of abuse test, particularly when unconfirmed results are used.            Respiratory Panel PCR w/COVID-19(SARS-CoV-2) [facility]/MACKENZIE/JACKIE/PAD/COR/LOUIE In-House, NP Swab in UTM/VTM, 2 HR TAT - Swab, Nasopharynx [[id number]]  (Normal) Collected: 12/20/23 2131    Specimen: Swab from Nasopharynx Updated: 12/20/23 2241     ADENOVIRUS, PCR Not Detected     Coronavirus 229E Not Detected     Coronavirus HKU1 Not Detected     Coronavirus NL63 Not Detected     Coronavirus OC43 Not Detected     COVID19 Not Detected     Human Metapneumovirus Not Detected     Human Rhinovirus/Enterovirus Not Detected     Influenza A PCR Not Detected     Influenza B PCR Not Detected     Parainfluenza Virus 1 Not Detected     Parainfluenza Virus 2 Not Detected     Parainfluenza Virus 3 Not Detected     Parainfluenza Virus 4 Not Detected     RSV, PCR Not Detected     Bordetella pertussis pcr Not Detected     Bordetella parapertussis PCR Not Detected     Chlamydophila pneumoniae PCR Not Detected     Mycoplasma pneumo by PCR Not  Detected    Narrative:      In the setting of a positive respiratory panel with a viral infection PLUS a negative procalcitonin without other underlying concern for bacterial infection, consider observing off antibiotics or discontinuation of antibiotics and continue supportive care. If the respiratory panel is positive for atypical bacterial infection (Bordetella pertussis, Chlamydophila pneumoniae, or Mycoplasma pneumoniae), consider antibiotic de-escalation to target atypical bacterial infection.    Urinalysis, Microscopic Only - Urine, Clean Catch [[id number]]  (Abnormal) Collected: 12/20/23 2131    Specimen: Urine, Clean Catch Updated: 12/20/23 2214     RBC, UA 3-5 /HPF      WBC, UA Too Numerous to Count /HPF      Bacteria, UA 4+ /HPF      Squamous Epithelial Cells, UA 0-2 /HPF      Hyaline Casts, UA None Seen /LPF      Methodology Manual Light Microscopy    Urinalysis With Culture If Indicated - Urine, Clean Catch [[id number]]  (Abnormal) Collected: 12/20/23 2131    Specimen: Urine, Clean Catch Updated: 12/20/23 2203     Color, UA Dark Yellow     Appearance, UA Cloudy     pH, UA 7.0     Specific Gravity, UA 1.016     Glucose, UA Negative     Ketones, UA Trace     Bilirubin, UA Negative     Blood, UA Trace     Protein,  mg/dL (2+)     Leuk Esterase, UA Moderate (2+)     Nitrite, UA Negative     Urobilinogen, UA 1.0 E.U./dL    Narrative:      In absence of clinical symptoms, the presence of pyuria, bacteria, and/or nitrites on the urinalysis result does not correlate with infection.    [facility] Draw [[id number]] Collected: 12/20/23 2044    Specimen: Blood Updated: 12/20/23 2145    Narrative:      The following orders were created for panel order [facility] Draw.  Procedure                               Abnormality         Status                     ---------                               -----------         ------                     Green Top (Gel)[[id number]]                                  Final result                Lavender Top[[id number]]                                     Final result               Gold Top - SST[[id number]]                                   Final result               Light Blue Top[[id number]]                                   Final result                 Please view results for these tests on the individual orders.    Green Top (Gel) [[id number]] Collected: 12/20/23 2044    Specimen: Blood Updated: 12/20/23 2145     Extra Tube Hold for add-ons.     Comment: Auto resulted.       Lavender Top [[id number]] Collected: 12/20/23 2044    Specimen: Blood Updated: 12/20/23 2145     Extra Tube hold for add-on     Comment: Auto resulted       Gold Top - SST [[id number]] Collected: 12/20/23 2044    Specimen: Blood Updated: 12/20/23 2145     Extra Tube Hold for add-ons.     Comment: Auto resulted.       Light Blue Top [[id number]] Collected: 12/20/23 2044    Specimen: Blood Updated: 12/20/23 2145     Extra Tube Hold for add-ons.     Comment: Auto resulted       Comprehensive Metabolic Panel [[id number]]  (Abnormal) Collected: 12/20/23 2044    Specimen: Blood Updated: 12/20/23 2112     Glucose 120 mg/dL      BUN 13 mg/dL      Creatinine 1.04 mg/dL      Sodium 131 mmol/L      Potassium 3.9 mmol/L      Chloride 98 mmol/L      CO2 20.0 mmol/L      Calcium 8.5 mg/dL      Total Protein 8.0 g/dL      Albumin 3.0 g/dL      ALT (SGPT) 25 U/L      AST (SGOT) 18 U/L      Alkaline Phosphatase 208 U/L      Total Bilirubin 0.3 mg/dL      Globulin 5.0 gm/dL      A/G Ratio 0.6 g/dL      BUN/Creatinine Ratio 12.5     Anion Gap 13.0 mmol/L      eGFR 91.9 mL/min/1.73     Narrative:      GFR Normal >60  Chronic Kidney Disease <60  Kidney Failure <15      Lactic Acid, Plasma [[id number]]  (Normal) Collected: 12/20/23 2044    Specimen: Blood Updated: 12/20/23 2110     Lactate 1.1 mmol/L     CBC & Differential [[id number]]  (Abnormal) Collected: 12/20/23 2044    Specimen: Blood Updated: 12/20/23 2054    Narrative:      The following orders were  "created for panel order CBC & Differential.  Procedure                               Abnormality         Status                     ---------                               -----------         ------                     CBC Auto Differential[[id number]]        Abnormal            Final result                 Please view results for these tests on the individual orders.    CBC Auto Differential [[id number]]  (Abnormal) Collected: 12/20/23 2044    Specimen: Blood Updated: 12/20/23 2054     WBC 16.33 10*3/mm3      RBC 2.96 10*6/mm3      Hemoglobin 8.2 g/dL      Hematocrit 25.5 %      MCV 86.1 fL      MCH 27.7 pg      MCHC 32.2 g/dL      RDW 15.5 %      RDW-SD 48.5 fl      MPV 8.9 fL      Platelets 463 10*3/mm3      Neutrophil % 86.3 %      Lymphocyte % 7.0 %      Monocyte % 5.9 %      Eosinophil % 0.1 %      Basophil % 0.2 %      Immature Grans % 0.5 %      Neutrophils, Absolute 14.09 10*3/mm3      Lymphocytes, Absolute 1.14 10*3/mm3      Monocytes, Absolute 0.97 10*3/mm3      Eosinophils, Absolute 0.01 10*3/mm3      Basophils, Absolute 0.04 10*3/mm3      Immature Grans, Absolute 0.08 10*3/mm3      nRBC 0.0 /100 WBC           /75 (BP Location: Left arm, Patient Position: Lying)   Pulse 90   Temp 97.7 °F (36.5 °C) (Oral)   Resp 18   Ht 177.8 cm (70\")   Wt 85 kg (187 lb 6.3 oz)   SpO2 97%   BMI 26.89 kg/m²     Discharge Exam:  General Appearance:    Alert, cooperative, no distress                          Head:    Normocephalic, without obvious abnormality, atraumatic                          Eyes:                            Throat:   Lips, tongue, gums normal                          Neck:   Supple, symmetrical, trachea midline, no JVD                        Lungs:     Clear to auscultation bilaterally, respirations unlabored                Chest Wall:    No tenderness or deformity                        Heart:    Regular rate and rhythm, S1 and S2 normal, no murmur,no  Rub  or gallop                  Abdomen: "     Soft, non-tender, bowel sounds active, no masses, no organomegaly                  Extremities:   Extremities normal, atraumatic, no cyanosis or edema                             Skin:   Skin is warm and dry,  no rashes or palpable lesions                  Neurologic: Paraplegic  Disposition:  Home with home health    Activity as tolerated    Diet as tolerated  Diet Order   Procedures    Diet: Regular/House Diet; Texture: Regular Texture (IDDSI 7); Fluid Consistency: Thin (IDDSI 0)       Patient Instructions:      Discharge Medications        New Medications        Instructions Start Date   cefepime 2000 mg IVPB in 100 ml NS (VTB)   2,000 mg, Intravenous, Every 8 Hours      dicyclomine 10 MG capsule  Commonly known as: BENTYL   20 mg, Oral, 4 Times Daily      oxyCODONE 5 MG immediate release tablet  Commonly known as: ROXICODONE   5 mg, Oral, Every 4 Hours PRN             Continue These Medications        Instructions Start Date   acetaminophen 650 MG 8 hr tablet  Commonly known as: Tylenol 8 Hour   650 mg, Oral, Every 4 Hours PRN      amitriptyline 75 MG tablet  Commonly known as: ELAVIL   75 mg, Oral, 2 Times Daily      ARIPiprazole 5 MG tablet  Commonly known as: ABILIFY   5 mg, Oral, Daily      baclofen 10 MG tablet  Commonly known as: LIORESAL   TAKE 1 TABLET BY MOUTH THREE TIMES DAILY      busPIRone 30 MG tablet  Commonly known as: BUSPAR   TAKE 0.5 TABLETS BY MOUTH EVERY MORNING AND 1 TABLET EVERY MORNING      venlafaxine  MG 24 hr capsule  Commonly known as: EFFEXOR-XR   150 mg, Oral, Daily             Future Appointments   Date Time Provider Department Center   7/23/2024  9:00 AM [facility]   7/29/2024  2:45 PM Chasity Ruggiero APRN [facility]     Additional Instructions for the Follow-ups that You Need to Schedule       Ambulatory Referral to Home Health (Hospital)   As directed      Face to Face Visit Date: 12/27/2023   Follow-up provider for Plan of Care?: I  treated the patient in an acute care facility and will not continue treatment after discharge.   Follow-up provider: RASHIDA CALDERON [[id number]]   Reason/Clinical Findings: weak   Describe mobility limitations that make leaving home difficult: needs iv antibiotics   Nursing/Therapeutic Services Requested: Skilled Nursing   Skilled nursing orders: Infusion therapy   Frequency: 1 Week 1               Follow-up Information       Rashida Calderon, NELLY Follow up in 1 week(s).    Specialty: Family Medicine  Contact information:  [address] [facility] [address]  [facility] [id number] [phone]               Shawn Beasley Jr., MD Follow up.    Specialty: Urology  Contact information:  [address] [place] [id number] [phone]                           Discharge Order (From admission, onward)       Start     Ordered    12/27/23 1527  Discharge patient  Once        Expected Discharge Date: 12/27/23   Discharge Disposition: [place]Health Care [facility]   Physician of Record for Attribution - Please select from Treatment Team: ADAMARIS THORNE [[id number]]   Review needed by CMO to determine Physician of Record: No      Question Answer Comment   Physician of Record for Attribution - Please select from Treatment Team ADAMARIS THORNE    Review needed by CMO to determine Physician of Record No        12/27/23 1527                    Total time spent discharging patient including evaluation,post hospitalization follow up,  medication and post hospitalization instructions and education total time exceeds 30 minutes.    Signed:  Adamaris Thorne MD  12/27/2023  15:27 EST

## 2023-12-27 NOTE — OUTREACH NOTE
Prep Survey      Flowsheet Row Responses   [facility] facility patient discharged from? [place]   Is LACE score < 7 ? No   Eligibility [facility]   Date of Admission 12/20/23   Date of Discharge 12/27/23   Discharge Disposition Home-Health Care Sv   Discharge diagnosis sepsis, UTI, Hx traumatic injury of spinal cord/paraplegia, colostomy in place   Does the patient have one of the following disease processes/diagnoses(primary or secondary)? Sepsis   Does the patient have Home health ordered? Yes   What is the Home health agency?  [facility]   Is there a DME ordered? No   Prep survey completed? Yes            Jovana BOSTON - Registered Nurse

## 2023-12-28 ENCOUNTER — TRANSITIONAL CARE MANAGEMENT TELEPHONE ENCOUNTER (OUTPATIENT)
Dept: CALL CENTER | Facility: HOSPITAL | Age: 43
End: 2023-12-28
Payer: MEDICARE

## 2023-12-28 NOTE — OUTREACH NOTE
Call Center TCM Note      Flowsheet Row Responses   [facility] patient discharged from? [place]   Does the patient have one of the following disease processes/diagnoses(primary or secondary)? Sepsis   TCM attempt successful? Yes   Call start time 1513   Call end time 1516   General alerts for this patient paraplegia   Discharge diagnosis sepsis, UTI, Hx traumatic injury of spinal cord/paraplegia, colostomy in place   Person spoke with today (if not patient) and relationship pt   Meds reviewed with patient/caregiver? Yes   Is the patient having any side effects they believe may be caused by any medication additions or changes? No   Does the patient have all medications related to this admission filled (includes all antibiotics, inhalers, nebulizers,steroids,etc.) Yes   Is the patient taking all medications as directed (includes completed medication regime)? Yes   Comments Advised to make a Urology fu appt   Does the patient have an appointment with their PCP within 7-14 days of discharge? Yes  [1/8/2024 at 11:15 AM]   What is the Home health agency?  [facility]   Has home health visited the patient within 72 hours of discharge? Yes   Psychosocial issues? No   Did the patient receive a copy of their discharge instructions? Yes   Nursing interventions Reviewed instructions with patient   What is the patient's perception of their health status since discharge? Improving   Nursing interventions Nurse provided patient education   Is the patient/caregiver able to teach back TIME? T emperature - higher or lower than normal, I nfection - may have signs and symptoms of an infection, M ental Decline - confused, sleepy, difficult to arouse, E xtremely Ill - severe pain, discomfort, shortness of breath   Nursing interventions Nurse provided patient education   Is patient/caregiver able to teach back steps to recovery at home? Rest and regain strength, Learn about sepsis(sepsis.org)   Is the  patient/caregiver able to teach back signs and symptoms of worsening condition: Fever, Shortness of breath/rapid respiratory rate, Altered mental status(confusion/coma)   TCM call completed? Yes   Wrap up additional comments Pt states he is doing better, and denies any urinary issues. Reviewed AVS/meds with pt. Pt verified PCP fu appt, and advised to make a urologist fu appt for 1 week.   Call end time 1516   Would this patient benefit from a Referral to [facility] Social Work? No   Is the patient interested in additional calls from an ambulatory ? No            Pearl Kate RN    12/28/2023, 15:22 EST

## 2023-12-28 NOTE — CASE MANAGEMENT/SOCIAL WORK
Case Management Discharge Note      Final Note: Disharged home with [facility] HH and  infusion for IV infusion.  S[facility]cha [facility] provided transport home.         Selected Continued Care - Discharged on 12/27/2023 Admission date: 12/20/2023 - Discharge disposition: Home-Health Care Svc      Destination    No services have been selected for the patient.                Durable Medical Equipment    No services have been selected for the patient.                Dialysis/Infusion       Service Provider Selected Services Address Phone Fax Patient Preferred    [facility] Infusion and IV Therapy [address], [place] [id number] [phone] --              Home Medical Care       Service Provider Selected Services Address Phone Fax Patient Preferred    [facility] [address] [place] [address] [phone] --              Therapy    No services have been selected for the patient.                Community Resources    No services have been selected for the patient.                Community & DME    No services have been selected for the patient.                    Selected Continued Care - Prior Encounters Includes continued care and service providers with selected services from prior encounters from 9/21/2023 to 12/27/2023      Discharged on 12/1/2023 Admission date: 11/20/2023 - Discharge disposition: Home or Self Care      Home Medical Care       Service Provider Selected Services Address Phone Fax Patient Preferred    [facility] [address] [place] [id number] [phone] --       Internal Comment last updated by Hali Paul RN 12/2/2023 1100    Left a [facility] for [facility] confirming they will follow pt for HH                                     Transportation Services  W[facility]: [facility]    Final Discharge Disposition Code: 06 -  home with home health care

## 2024-01-03 NOTE — PAYOR COMM NOTE
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    Specimen: Blood Updated: 12/23/23 0516     WBC 12.87 10*3/mm3      RBC 2.84 10*6/mm3      Hemoglobin 7.3 g/dL      Hematocrit 23.6 %      MCV 83.1 fL      MCH 25.7 pg      MCHC 30.9 g/dL      RDW 14.9 %      RDW-SD 44.8 fl      MPV 8.5 fL      Platelets 424 10*3/mm3     Hemoglobin & Hematocrit, Blood [[id number]]  (Abnormal) Collected: 12/22/23 2353    Specimen: Blood Updated: 12/23/23 0015     Hemoglobin 7.6 g/dL      Hematocrit 24.0 %     Hemoglobin & Hematocrit, Blood [[id number]]  (Abnormal) Collected: 12/22/23 1549    Specimen: Blood Updated: 12/22/23 1613     Hemoglobin 7.6 g/dL      Hematocrit 23.4 %     Vitamin B12 [[id number]]  (Normal) Collected: 12/22/23 0805    Specimen: Blood Updated: 12/22/23 0908     Vitamin B-12 534 pg/mL     Narrative:      Results may be falsely increased if patient taking Biotin.      Folate [[id number]]  (Normal) Collected: 12/22/23 0805    Specimen: Blood Updated: 12/22/23 0908     Folate 11.70 ng/mL     Narrative:      Results may be falsely increased if patient taking Biotin.      Basic Metabolic Panel [[id number]]  (Abnormal) Collected: 12/22/23 0805    Specimen: Blood Updated: 12/22/23 0852     Glucose 106 mg/dL      BUN 9 mg/dL      Creatinine 0.78 mg/dL      Sodium 137 mmol/L      Potassium 3.8  mmol/L      Chloride 106 mmol/L      CO2 21.4 mmol/L      Calcium 8.0 mg/dL      BUN/Creatinine Ratio 11.5     Anion Gap 9.6 mmol/L      eGFR 114.2 mL/min/1.73     Narrative:      GFR Normal >60  Chronic Kidney Disease <60  Kidney Failure <15      CBC (No Diff) [[id number]]  (Abnormal) Collected: 12/22/23 0805    Specimen: Blood Updated: 12/22/23 0832     WBC 12.67 10*3/mm3      RBC 3.00 10*6/mm3      Hemoglobin 7.9 g/dL      Hematocrit 24.8 %      MCV 82.7 fL      MCH 26.3 pg      MCHC 31.9 g/dL      RDW 14.9 %      RDW-SD 43.9 fl      MPV 8.4 fL      Platelets 430 10*3/mm3     Hemoglobin & Hematocrit, Blood [[id number]]  (Abnormal) Collected: 12/22/23 0805    Specimen: Blood Updated: 12/22/23 0832     Hemoglobin 7.9 g/dL      Hematocrit 24.8 %     Hemoglobin & Hematocrit, Blood [[id number]]  (Abnormal) Collected: 12/21/23 2331    Specimen: Blood Updated: 12/22/23 0025     Hemoglobin 7.8 g/dL      Hematocrit 23.8 %     Hemoglobin & Hematocrit, Blood [[id number]]  (Abnormal) Collected: 12/21/23 2331    Specimen: Blood Updated: 12/22/23 0025     Hemoglobin 7.8 g/dL      Hematocrit 23.7 %     Hemoglobin & Hematocrit, Blood [[id number]]  (Abnormal) Collected: 12/21/23 1548    Specimen: Blood Updated: 12/21/23 1604     Hemoglobin 7.1 g/dL      Hematocrit 21.6 %     Ferritin [[id number]]  (Abnormal) Collected: 12/21/23 0537    Specimen: Blood Updated: 12/21/23 0823     Ferritin 648.00 ng/mL     Narrative:      Results may be falsely decreased if patient taking Biotin.      Iron Profile [[id number]]  (Abnormal) Collected: 12/21/23 0537    Specimen: Blood Updated: 12/21/23 0817     Iron 14 mcg/dL      Iron Saturation (TSAT) 7 %      Transferrin 136 mg/dL      TIBC 203 mcg/dL     Basic Metabolic Panel [[id number]]  (Abnormal) Collected: 12/21/23 0537    Specimen: Blood Updated: 12/21/23 0619     Glucose 113 mg/dL      BUN 11 mg/dL      Creatinine 0.85 mg/dL      Sodium 135 mmol/L      Potassium 3.5 mmol/L      Chloride 101 mmol/L       CO2 22.6 mmol/L      Calcium 8.6 mg/dL      BUN/Creatinine Ratio 12.9     Anion Gap 11.4 mmol/L      eGFR 111.3 mL/min/1.73     Narrative:      GFR Normal >60  Chronic Kidney Disease <60  Kidney Failure <15      CBC (No Diff) [[id number]]  (Abnormal) Collected: 12/21/23 0537    Specimen: Blood Updated: 12/21/23 0609     WBC 17.18 10*3/mm3      RBC 3.01 10*6/mm3      Hemoglobin 8.2 g/dL      Hematocrit 25.6 %      MCV 85.0 fL      MCH 27.2 pg      MCHC 32.0 g/dL      RDW 15.5 %      RDW-SD 48.5 fl      MPV 8.8 fL      Platelets 506 10*3/mm3     Urine Drug Screen - Urine, Clean Catch [[id number]]  (Abnormal) Collected: 12/20/23 2131    Specimen: Urine, Clean Catch Updated: 12/21/23 0104     Amphet/Methamphet, Screen Negative     Barbiturates Screen, Urine Negative     Benzodiazepine Screen, Urine Negative     Cocaine Screen, Urine Negative     Opiate Screen Positive     THC, Screen, Urine Positive     Methadone Screen, Urine Negative     Oxycodone Screen, Urine Negative     Fentanyl, Urine Negative    Narrative:      Negative Thresholds Per Drugs Screened:    Amphetamines                 500 ng/ml  Barbiturates                 200 ng/ml  Benzodiazepines              100 ng/ml  Cocaine                      300 ng/ml  Methadone                    300 ng/ml  Opiates                      300 ng/ml  Oxycodone                    100 ng/ml  THC                           50 ng/ml  Fentanyl                       5 ng/ml      The Normal Value for all drugs tested is negative. This report includes final unconfirmed screening results to be used for medical treatment purposes only. Unconfirmed results must not be used for non-medical purposes such as employment or legal testing. Clinical consideration should be applied to any drug of abuse test, particularly when unconfirmed results are used.            Respiratory Panel PCR w/COVID-19(SARS-CoV-2) [facility]/MACKENZIE/JACKIE/PAD/COR/LOUIE In-House, NP Swab in UTM/VTM, 2 HR TAT - Swab, Nasopharynx  [[id number]]  (Normal) Collected: 12/20/23 2131    Specimen: Swab from Nasopharynx Updated: 12/20/23 2241     ADENOVIRUS, PCR Not Detected     Coronavirus 229E Not Detected     Coronavirus HKU1 Not Detected     Coronavirus NL63 Not Detected     Coronavirus OC43 Not Detected     COVID19 Not Detected     Human Metapneumovirus Not Detected     Human Rhinovirus/Enterovirus Not Detected     Influenza A PCR Not Detected     Influenza B PCR Not Detected     Parainfluenza Virus 1 Not Detected     Parainfluenza Virus 2 Not Detected     Parainfluenza Virus 3 Not Detected     Parainfluenza Virus 4 Not Detected     RSV, PCR Not Detected     Bordetella pertussis pcr Not Detected     Bordetella parapertussis PCR Not Detected     Chlamydophila pneumoniae PCR Not Detected     Mycoplasma pneumo by PCR Not Detected    Narrative:      In the setting of a positive respiratory panel with a viral infection PLUS a negative procalcitonin without other underlying concern for bacterial infection, consider observing off antibiotics or discontinuation of antibiotics and continue supportive care. If the respiratory panel is positive for atypical bacterial infection (Bordetella pertussis, Chlamydophila pneumoniae, or Mycoplasma pneumoniae), consider antibiotic de-escalation to target atypical bacterial infection.    Urinalysis, Microscopic Only - Urine, Clean Catch [[id number]]  (Abnormal) Collected: 12/20/23 2131    Specimen: Urine, Clean Catch Updated: 12/20/23 2214     RBC, UA 3-5 /HPF      WBC, UA Too Numerous to Count /HPF      Bacteria, UA 4+ /HPF      Squamous Epithelial Cells, UA 0-2 /HPF      Hyaline Casts, UA None Seen /LPF      Methodology Manual Light Microscopy    Urinalysis With Culture If Indicated - Urine, Clean Catch [[id number]]  (Abnormal) Collected: 12/20/23 2131    Specimen: Urine, Clean Catch Updated: 12/20/23 2203     Color, UA Dark Yellow     Appearance, UA Cloudy     pH, UA 7.0     Specific Gravity, UA 1.016     Glucose, UA  Negative     Ketones, UA Trace     Bilirubin, UA Negative     Blood, UA Trace     Protein,  mg/dL (2+)     Leuk Esterase, UA Moderate (2+)     Nitrite, UA Negative     Urobilinogen, UA 1.0 E.U./dL    Narrative:      In absence of clinical symptoms, the presence of pyuria, bacteria, and/or nitrites on the urinalysis result does not correlate with infection.    [place] Draw [[id number]] Collected: 12/20/23 2044    Specimen: Blood Updated: 12/20/23 2145    Narrative:      The following orders were created for panel order [place] Draw.  Procedure                               Abnormality         Status                     ---------                               -----------         ------                     Green Top (Gel)[[id number]]                                  Final result               Lavender Top[[id number]]                                     Final result               Gold Top - SST[[id number]]                                   Final result               Light Blue Top[[id number]]                                   Final result                 Please view results for these tests on the individual orders.    Green Top (Gel) [[id number]] Collected: 12/20/23 2044    Specimen: Blood Updated: 12/20/23 2145     Extra Tube Hold for add-ons.     Comment: Auto resulted.       Lavender Top [[id number]] Collected: 12/20/23 2044    Specimen: Blood Updated: 12/20/23 2145     Extra Tube hold for add-on     Comment: Auto resulted       Gold Top - SST [[id number]] Collected: 12/20/23 2044    Specimen: Blood Updated: 12/20/23 2145     Extra Tube Hold for add-ons.     Comment: Auto resulted.       Light Blue Top [[id number]] Collected: 12/20/23 2044    Specimen: Blood Updated: 12/20/23 2145     Extra Tube Hold for add-ons.     Comment: Auto resulted       Comprehensive Metabolic Panel [[id number]]  (Abnormal) Collected: 12/20/23 2044    Specimen: Blood Updated: 12/20/23 2112     Glucose 120 mg/dL      BUN 13 mg/dL      Creatinine  1.04 mg/dL      Sodium 131 mmol/L      Potassium 3.9 mmol/L      Chloride 98 mmol/L      CO2 20.0 mmol/L      Calcium 8.5 mg/dL      Total Protein 8.0 g/dL      Albumin 3.0 g/dL      ALT (SGPT) 25 U/L      AST (SGOT) 18 U/L      Alkaline Phosphatase 208 U/L      Total Bilirubin 0.3 mg/dL      Globulin 5.0 gm/dL      A/G Ratio 0.6 g/dL      BUN/Creatinine Ratio 12.5     Anion Gap 13.0 mmol/L      eGFR 91.9 mL/min/1.73     Narrative:      GFR Normal >60  Chronic Kidney Disease <60  Kidney Failure <15      Lactic Acid, Plasma [[id number]]  (Normal) Collected: 12/20/23 2044    Specimen: Blood Updated: 12/20/23 2110     Lactate 1.1 mmol/L     CBC & Differential [[id number]]  (Abnormal) Collected: 12/20/23 2044    Specimen: Blood Updated: 12/20/23 2054    Narrative:      The following orders were created for panel order CBC & Differential.  Procedure                               Abnormality         Status                     ---------                               -----------         ------                     CBC Auto Differential[[id number]]        Abnormal            Final result                 Please view results for these tests on the individual orders.    CBC Auto Differential [[id number]]  (Abnormal) Collected: 12/20/23 2044    Specimen: Blood Updated: 12/20/23 2054     WBC 16.33 10*3/mm3      RBC 2.96 10*6/mm3      Hemoglobin 8.2 g/dL      Hematocrit 25.5 %      MCV 86.1 fL      MCH 27.7 pg      MCHC 32.2 g/dL      RDW 15.5 %      RDW-SD 48.5 fl      MPV 8.9 fL      Platelets 463 10*3/mm3      Neutrophil % 86.3 %      Lymphocyte % 7.0 %      Monocyte % 5.9 %      Eosinophil % 0.1 %      Basophil % 0.2 %      Immature Grans % 0.5 %      Neutrophils, Absolute 14.09 10*3/mm3      Lymphocytes, Absolute 1.14 10*3/mm3      Monocytes, Absolute 0.97 10*3/mm3      Eosinophils, Absolute 0.01 10*3/mm3      Basophils, Absolute 0.04 10*3/mm3      Immature Grans, Absolute 0.08 10*3/mm3      nRBC 0.0 /100 WBC           /75  "(BP Location: Left arm, Patient Position: Lying)   Pulse 90   Temp 97.7 °F (36.5 °C) (Oral)   Resp 18   Ht 177.8 cm (70\")   Wt 85 kg (187 lb 6.3 oz)   SpO2 97%   BMI 26.89 kg/m²     Discharge Exam:  General Appearance:    Alert, cooperative, no distress                          Head:    Normocephalic, without obvious abnormality, atraumatic                          Eyes:                            Throat:   Lips, tongue, gums normal                          Neck:   Supple, symmetrical, trachea midline, no JVD                        Lungs:     Clear to auscultation bilaterally, respirations unlabored                Chest Wall:    No tenderness or deformity                        Heart:    Regular rate and rhythm, S1 and S2 normal, no murmur,no  Rub  or gallop                  Abdomen:     Soft, non-tender, bowel sounds active, no masses, no organomegaly                  Extremities:   Extremities normal, atraumatic, no cyanosis or edema                             Skin:   Skin is warm and dry,  no rashes or palpable lesions                  Neurologic: Paraplegic  Disposition:  Home with home health    Activity as tolerated    Diet as tolerated  Diet Order   Procedures    Diet: Regular/House Diet; Texture: Regular Texture (IDDSI 7); Fluid Consistency: Thin (IDDSI 0)       Patient Instructions:      Discharge Medications        New Medications        Instructions Start Date   cefepime 2000 mg IVPB in 100 ml NS (VTB)   2,000 mg, Intravenous, Every 8 Hours      dicyclomine 10 MG capsule  Commonly known as: BENTYL   20 mg, Oral, 4 Times Daily      oxyCODONE 5 MG immediate release tablet  Commonly known as: ROXICODONE   5 mg, Oral, Every 4 Hours PRN             Continue These Medications        Instructions Start Date   acetaminophen 650 MG 8 hr tablet  Commonly known as: Tylenol 8 Hour   650 mg, Oral, Every 4 Hours PRN      amitriptyline 75 MG tablet  Commonly known as: ELAVIL   75 mg, Oral, 2 Times Daily    "   ARIPiprazole 5 MG tablet  Commonly known as: ABILIFY   5 mg, Oral, Daily      baclofen 10 MG tablet  Commonly known as: LIORESAL   TAKE 1 TABLET BY MOUTH THREE TIMES DAILY      busPIRone 30 MG tablet  Commonly known as: BUSPAR   TAKE 0.5 TABLETS BY MOUTH EVERY MORNING AND 1 TABLET EVERY MORNING      venlafaxine  MG 24 hr capsule  Commonly known as: EFFEXOR-XR   150 mg, Oral, Daily             Future Appointments   Date Time Provider Department Center   7/23/2024  9:00 AM [facility]   7/29/2024  2:45 PM Rashida Calderon, NELLY [facility]     Additional Instructions for the Follow-ups that You Need to Schedule       Ambulatory Referral to Home Health (Hospital)   As directed      Face to Face Visit Date: 12/27/2023   Follow-up provider for Plan of Care?: I treated the patient in an acute care facility and will not continue treatment after discharge.   Follow-up provider: RASHIDA CALDERON [[id number]]   Reason/Clinical Findings: weak   Describe mobility limitations that make leaving home difficult: needs iv antibiotics   Nursing/Therapeutic Services Requested: Skilled Nursing   Skilled nursing orders: Infusion therapy   Frequency: 1 Week 1               Follow-up Information       Rashida Calderon APRN Follow up in 1 week(s).    Specialty: Family Medicine  Contact information:  [address] [address] [phone]               Shawn Beasley Jr., MD Follow up.    Specialty: Urology  Contact information:  [address] [id number] [phone]                           Discharge Order (From admission, onward)       Start     Ordered    12/27/23 1527  Discharge patient  Once        Expected Discharge Date: 12/27/23   [place] [facility] [place]   Physician of Record for Attribution - Please select from Treatment Team: ADAMARIS AKBAR [[id number]]   Review needed by CMO to determine Physician of Record: No      Question Answer  Comment   Physician of Record for Attribution - Please select from Treatment Team CARLOS ENRIQUE THORNE    Review needed by CMO to determine Physician of Record No        12/27/23 1527                    Total time spent discharging patient including evaluation,post hospitalization follow up,  medication and post hospitalization instructions and education total time exceeds 30 minutes.    Signed:  Carlos Enrique Thorne MD  12/27/2023  15:27 EST     Electronically signed by Carlos Enrique Thorne MD at 12/30/23 1721       Discharge Order (From admission, onward)       Start     Ordered    12/27/23 1527  Discharge patient  Once        Expected Discharge Date: 12/27/23   Discharge Disposition: [place] [facility] [place]   Physician of Record for Attribution - Please select from Treatment Team: CARLOS ENRIQUE THORNE [[id number]]   Review needed by CMO to determine Physician of Record: No      Question Answer Comment   Physician of Record for Attribution - Please select from Treatment Team CARLOS ENRIQUE THORNE    Review needed by CMO to determine Physician of Record No        12/27/23 1527

## 2024-01-04 RX ORDER — BUSPIRONE HYDROCHLORIDE 30 MG/1
TABLET ORAL
OUTPATIENT
Start: 2024-01-04

## 2024-01-04 RX ORDER — BACLOFEN 10 MG/1
10 TABLET ORAL 3 TIMES DAILY
Qty: 14 TABLET | Refills: 0 | Status: SHIPPED | OUTPATIENT
Start: 2024-01-04

## 2024-01-04 RX ORDER — ARIPIPRAZOLE 5 MG/1
5 TABLET ORAL DAILY
OUTPATIENT
Start: 2024-01-04

## 2024-01-04 RX ORDER — VENLAFAXINE HYDROCHLORIDE 150 MG/1
150 CAPSULE, EXTENDED RELEASE ORAL DAILY
Qty: 90 CAPSULE | Refills: 1 | OUTPATIENT
Start: 2024-01-04

## 2024-01-04 RX ORDER — AMITRIPTYLINE HYDROCHLORIDE 75 MG/1
75 TABLET ORAL 2 TIMES DAILY
Qty: 180 TABLET | Refills: 3 | OUTPATIENT
Start: 2024-01-04

## 2024-01-08 ENCOUNTER — OFFICE VISIT (OUTPATIENT)
Dept: INTERNAL MEDICINE | Facility: CLINIC | Age: 44
End: 2024-01-08
Payer: MEDICARE

## 2024-01-08 VITALS
WEIGHT: 220 LBS | BODY MASS INDEX: 31.5 KG/M2 | HEART RATE: 122 BPM | HEIGHT: 70 IN | OXYGEN SATURATION: 98 % | SYSTOLIC BLOOD PRESSURE: 118 MMHG | RESPIRATION RATE: 16 BRPM | DIASTOLIC BLOOD PRESSURE: 76 MMHG

## 2024-01-08 DIAGNOSIS — L60.8 TOENAIL DEFORMITY: ICD-10-CM

## 2024-01-08 DIAGNOSIS — N31.9 NEUROGENIC BLADDER: ICD-10-CM

## 2024-01-08 DIAGNOSIS — F43.10 PTSD (POST-TRAUMATIC STRESS DISORDER): ICD-10-CM

## 2024-01-08 DIAGNOSIS — F32.5 MAJOR DEPRESSIVE DISORDER WITH SINGLE EPISODE, IN REMISSION: ICD-10-CM

## 2024-01-08 DIAGNOSIS — A41.9 SEPSIS, DUE TO UNSPECIFIED ORGANISM, UNSPECIFIED WHETHER ACUTE ORGAN DYSFUNCTION PRESENT: Primary | ICD-10-CM

## 2024-01-08 DIAGNOSIS — G82.20 PARALYSIS OF BOTH LOWER LIMBS: ICD-10-CM

## 2024-01-08 PROCEDURE — 1159F MED LIST DOCD IN RCRD: CPT | Performed by: NURSE PRACTITIONER

## 2024-01-08 PROCEDURE — 99214 OFFICE O/P EST MOD 30 MIN: CPT | Performed by: NURSE PRACTITIONER

## 2024-01-08 PROCEDURE — 1160F RVW MEDS BY RX/DR IN RCRD: CPT | Performed by: NURSE PRACTITIONER

## 2024-01-08 PROCEDURE — 1111F DSCHRG MED/CURRENT MED MERGE: CPT | Performed by: NURSE PRACTITIONER

## 2024-01-08 RX ORDER — VENLAFAXINE HYDROCHLORIDE 150 MG/1
150 CAPSULE, EXTENDED RELEASE ORAL DAILY
Qty: 90 CAPSULE | Refills: 1 | Status: SHIPPED | OUTPATIENT
Start: 2024-01-08

## 2024-01-08 RX ORDER — AMITRIPTYLINE HYDROCHLORIDE 75 MG/1
75 TABLET ORAL 2 TIMES DAILY
Qty: 180 TABLET | Refills: 3 | Status: SHIPPED | OUTPATIENT
Start: 2024-01-08

## 2024-01-08 NOTE — PROGRESS NOTES
Transitional Care Follow Up Visit  Subjective     Pk May is a 43 y.o. male who presents for a transitional care management visit.    Within 48 business hours after discharge our office contacted him via telephone to coordinate his care and needs.      I reviewed and discussed the details of that call along with the discharge summary, hospital problems, inpatient lab results, inpatient diagnostic studies, and consultation reports with Pk.     Current outpatient and discharge medications have been reconciled for the patient.  Reviewed by: NELLY Velasquez          12/27/2023     6:21 PM   Date of TCM Phone Call   [facility]   Date of Admission 12/20/2023   Date of Discharge 12/27/2023   Discharge Disposition Home-Health Care [facility] [place]     Risk for Readmission (LACE) Score: 13 (12/27/2023  6:01 AM)      History of Present Illness  Pk is s/p left nephrostomy tube that was converted to a internal ureteral stent on 12/13/23.  He presented back into [facility] emergency room with complaints of fever and left flank pain.  CT scan in the emergency room demonstrated left renal hematoma with concern for some additional bleeding.  At the time of presentation his temperature is 102.6 with heart rate 128 and blood pressure 108/63.   He was started on Rocephin and IV fluids and admitted .  Urology and infectious disease were consulted.   Patient was treated with broad-spectrum IV antibiotics and was doing better after being in the hospital for couple of days.  he was discharged home and given IV antibiotics by home health. He still has his picc line in. He has a follow up with urology tomorrow. He denies fever and chills. He reports he needs a new referral to home health as he changed insurance companies.   He is requesting a referral to podiatry for toenail discoloration.  He has a follow up with wound care this week.   [facility], [place] and [place]. He is still on abilify and and  venlafaxine             The following portions of the patient's history were reviewed and updated as appropriate: allergies, current medications, past family history, past medical history, past social history, past surgical history, and problem list.    Review of Systems   Constitutional:  Negative for fatigue and fever.   Respiratory: Negative.     Cardiovascular: Negative.    Gastrointestinal:  Negative for abdominal pain and nausea.       Objective   Physical Exam  Vitals and nursing note reviewed.   Constitutional:       General: He is not in acute distress.  Cardiovascular:      Rate and Rhythm: Tachycardia present.   Pulmonary:      Effort: Pulmonary effort is normal.   Musculoskeletal:      Comments: Pt seated in a wheelchair    Skin:     General: Skin is warm and dry.      Coloration: Skin is pale.      Comments: PICC line in place in left upper arm. It is covered with a dressing  No redness or swelling noted    Neurological:      Mental Status: He is alert.         Assessment & Plan   Diagnoses and all orders for this visit:    1. Sepsis, due to unspecified organism, unspecified whether acute organ dysfunction present (Primary)  -     Ambulatory Referral to Home Health    2. Toenail deformity  -     Ambulatory Referral to Podiatry    3. Paralysis of both lower limbs    4. Neurogenic bladder    5. Major depressive disorder with single episode, in remission    6. PTSD (post-traumatic stress disorder)    Other orders  -     venlafaxine XR (EFFEXOR-XR) 150 MG 24 hr capsule; Take 1 capsule by mouth Daily.  Dispense: 90 capsule; Refill: 1  -     amitriptyline (ELAVIL) 75 MG tablet; Take 1 tablet by mouth 2 (Two) Times a Day.  Dispense: 180 tablet; Refill: 3      He will follow up with urology tomorrow as scheduled  Will keep picc line in place in case he needs more antibiotics. If no further abx will have [facility] d/c   Referred to [facility] as previous  company is out of network  Will refer to  podiatry for discolored toenails.   He will follow up with wound care as scheduled   Follow up as schedule in 6 months for AWV or sooner if needed

## 2024-01-12 ENCOUNTER — TRANSCRIBE ORDERS (OUTPATIENT)
Dept: ADMINISTRATIVE | Facility: HOSPITAL | Age: 44
End: 2024-01-12
Payer: MEDICARE

## 2024-01-12 DIAGNOSIS — N39.0 URINARY TRACT INFECTION WITHOUT HEMATURIA, SITE UNSPECIFIED: Primary | ICD-10-CM

## 2024-01-18 ENCOUNTER — OFFICE VISIT (OUTPATIENT)
Dept: WOUND CARE | Facility: HOSPITAL | Age: 44
End: 2024-01-18
Payer: MEDICARE

## 2024-01-18 RX ORDER — AMITRIPTYLINE HYDROCHLORIDE 75 MG/1
75 TABLET ORAL 2 TIMES DAILY
Qty: 180 TABLET | Refills: 3 | OUTPATIENT
Start: 2024-01-18

## 2024-01-26 ENCOUNTER — TRANSCRIBE ORDERS (OUTPATIENT)
Dept: ADMINISTRATIVE | Facility: HOSPITAL | Age: 44
End: 2024-01-26
Payer: MEDICARE

## 2024-01-26 DIAGNOSIS — I70.235 ATHEROSCLEROSIS OF NATIVE ARTERY OF RIGHT LOWER EXTREMITY WITH ULCERATION OF OTHER PART OF FOOT: Primary | ICD-10-CM

## 2024-06-10 ENCOUNTER — TELEPHONE (OUTPATIENT)
Dept: INTERNAL MEDICINE | Facility: CLINIC | Age: 44
End: 2024-06-10
Payer: MEDICARE

## 2024-06-10 RX ORDER — AMITRIPTYLINE HYDROCHLORIDE 75 MG/1
75 TABLET ORAL 2 TIMES DAILY
Qty: 180 TABLET | Refills: 1 | Status: SHIPPED | OUTPATIENT
Start: 2024-06-10

## 2024-06-10 RX ORDER — VENLAFAXINE HYDROCHLORIDE 150 MG/1
150 CAPSULE, EXTENDED RELEASE ORAL DAILY
Qty: 90 CAPSULE | Refills: 1 | Status: SHIPPED | OUTPATIENT
Start: 2024-06-10

## 2024-06-10 RX ORDER — BACLOFEN 10 MG/1
10 TABLET ORAL 3 TIMES DAILY
Qty: 180 TABLET | Refills: 1 | Status: SHIPPED | OUTPATIENT
Start: 2024-06-10

## 2024-06-10 NOTE — TELEPHONE ENCOUNTER
Chloe JUAREZ, called the patient and he is unable to make the July follow up since he lost his insurance , he was last seen in January 2024.   Will refill his medication through January . He was advised [place] [facility] [place]

## 2024-06-10 NOTE — TELEPHONE ENCOUNTER
Caller: Carl Santana    Relationship: Emergency Contact    Best call back number: [phone]    Who are you requesting to speak with (clinical staff, provider,  specific staff member): CLINICAL     What was the call regarding: PATIENT HAS LOST INSURANCE TO COVER MEDICATION, ASKS IF PROVIDER CAN RECOMMEND ANY PROGRAMS OR CAN HE GET SAMPLES OF MEDICATION UNTIL HE GETS INSURANCE BACK?  PLEASE ADVISE

## 2024-06-10 NOTE — TELEPHONE ENCOUNTER
He is on all generic medication and we do not have any samples   He can use [facility] to see where he can get the best price   Or he can see if the pharmacy he uses has a discount program

## 2024-07-17 DIAGNOSIS — Z13.220 LIPID SCREENING: Primary | ICD-10-CM

## 2024-07-17 DIAGNOSIS — Z13.29 THYROID DISORDER SCREEN: ICD-10-CM

## 2024-08-22 LAB
ALBUMIN SERPL-MCNC: 3.9 G/DL (ref 3.5–5.2)
ALBUMIN/GLOB SERPL: 1.1 G/DL
ALP SERPL-CCNC: 109 U/L (ref 39–117)
ALT SERPL-CCNC: 14 U/L (ref 1–41)
AST SERPL-CCNC: 11 U/L (ref 1–40)
BASOPHILS # BLD AUTO: 0.06 10*3/MM3 (ref 0–0.2)
BASOPHILS NFR BLD AUTO: 0.9 % (ref 0–1.5)
BILIRUB SERPL-MCNC: <0.2 MG/DL (ref 0–1.2)
BUN SERPL-MCNC: 13 MG/DL (ref 6–20)
BUN/CREAT SERPL: 17.6 (ref 7–25)
CALCIUM SERPL-MCNC: 9.2 MG/DL (ref 8.6–10.5)
CHLORIDE SERPL-SCNC: 103 MMOL/L (ref 98–107)
CHOLEST SERPL-MCNC: 227 MG/DL (ref 0–200)
CO2 SERPL-SCNC: 25.1 MMOL/L (ref 22–29)
CREAT SERPL-MCNC: 0.74 MG/DL (ref 0.76–1.27)
EGFRCR SERPLBLD CKD-EPI 2021: 116 ML/MIN/1.73
EOSINOPHIL # BLD AUTO: 0.23 10*3/MM3 (ref 0–0.4)
EOSINOPHIL NFR BLD AUTO: 3.6 % (ref 0.3–6.2)
ERYTHROCYTE [DISTWIDTH] IN BLOOD BY AUTOMATED COUNT: 12.8 % (ref 12.3–15.4)
GLOBULIN SER CALC-MCNC: 3.4 GM/DL
GLUCOSE SERPL-MCNC: 63 MG/DL (ref 65–99)
HCT VFR BLD AUTO: 35.4 % (ref 37.5–51)
HDLC SERPL-MCNC: 53 MG/DL (ref 40–60)
HGB BLD-MCNC: 11.7 G/DL (ref 13–17.7)
IMM GRANULOCYTES # BLD AUTO: 0.03 10*3/MM3 (ref 0–0.05)
IMM GRANULOCYTES NFR BLD AUTO: 0.5 % (ref 0–0.5)
LDLC SERPL CALC-MCNC: 152 MG/DL (ref 0–100)
LDLC/HDLC SERPL: 2.81 {RATIO}
LYMPHOCYTES # BLD AUTO: 1.23 10*3/MM3 (ref 0.7–3.1)
LYMPHOCYTES NFR BLD AUTO: 19 % (ref 19.6–45.3)
MCH RBC QN AUTO: 30.1 PG (ref 26.6–33)
MCHC RBC AUTO-ENTMCNC: 33.1 G/DL (ref 31.5–35.7)
MCV RBC AUTO: 91 FL (ref 79–97)
MONOCYTES # BLD AUTO: 0.52 10*3/MM3 (ref 0.1–0.9)
MONOCYTES NFR BLD AUTO: 8 % (ref 5–12)
NEUTROPHILS # BLD AUTO: 4.4 10*3/MM3 (ref 1.7–7)
NEUTROPHILS NFR BLD AUTO: 68 % (ref 42.7–76)
NRBC BLD AUTO-RTO: 0 /100 WBC (ref 0–0.2)
PLATELET # BLD AUTO: 290 10*3/MM3 (ref 140–450)
POTASSIUM SERPL-SCNC: 4.2 MMOL/L (ref 3.5–5.2)
PROT SERPL-MCNC: 7.3 G/DL (ref 6–8.5)
RBC # BLD AUTO: 3.89 10*6/MM3 (ref 4.14–5.8)
SODIUM SERPL-SCNC: 138 MMOL/L (ref 136–145)
TRIGL SERPL-MCNC: 125 MG/DL (ref 0–150)
TSH SERPL DL<=0.005 MIU/L-ACNC: 1.83 UIU/ML (ref 0.27–4.2)
VLDLC SERPL CALC-MCNC: 22 MG/DL (ref 5–40)
WBC # BLD AUTO: 6.47 10*3/MM3 (ref 3.4–10.8)

## 2024-08-26 ENCOUNTER — OFFICE VISIT (OUTPATIENT)
Dept: WOUND CARE | Facility: HOSPITAL | Age: 44
End: 2024-08-26
Payer: MEDICARE

## 2024-08-26 PROCEDURE — G0463 HOSPITAL OUTPT CLINIC VISIT: HCPCS

## 2024-08-30 ENCOUNTER — OFFICE VISIT (OUTPATIENT)
Dept: INTERNAL MEDICINE | Facility: CLINIC | Age: 44
End: 2024-08-30
Payer: MEDICARE

## 2024-08-30 VITALS
HEIGHT: 70 IN | SYSTOLIC BLOOD PRESSURE: 120 MMHG | BODY MASS INDEX: 31.5 KG/M2 | DIASTOLIC BLOOD PRESSURE: 68 MMHG | OXYGEN SATURATION: 96 % | RESPIRATION RATE: 16 BRPM | WEIGHT: 220 LBS | HEART RATE: 120 BPM

## 2024-08-30 DIAGNOSIS — F32.5 MAJOR DEPRESSIVE DISORDER WITH SINGLE EPISODE, IN REMISSION: ICD-10-CM

## 2024-08-30 DIAGNOSIS — F43.10 PTSD (POST-TRAUMATIC STRESS DISORDER): ICD-10-CM

## 2024-08-30 DIAGNOSIS — E78.2 MODERATE MIXED HYPERLIPIDEMIA NOT REQUIRING STATIN THERAPY: ICD-10-CM

## 2024-08-30 DIAGNOSIS — Z00.00 MEDICARE ANNUAL WELLNESS VISIT, SUBSEQUENT: Primary | ICD-10-CM

## 2024-08-30 RX ORDER — AMITRIPTYLINE HYDROCHLORIDE 75 MG/1
75 TABLET ORAL 2 TIMES DAILY
Qty: 180 TABLET | Refills: 1 | Status: SHIPPED | OUTPATIENT
Start: 2024-08-30

## 2024-08-30 RX ORDER — VENLAFAXINE HYDROCHLORIDE 150 MG/1
150 CAPSULE, EXTENDED RELEASE ORAL DAILY
Qty: 90 CAPSULE | Refills: 1 | Status: SHIPPED | OUTPATIENT
Start: 2024-08-30

## 2024-08-30 RX ORDER — BACLOFEN 10 MG/1
10 TABLET ORAL 3 TIMES DAILY
Qty: 180 TABLET | Refills: 1 | Status: SHIPPED | OUTPATIENT
Start: 2024-08-30

## 2024-08-30 NOTE — ASSESSMENT & PLAN NOTE
Lipid abnormalities are newly identified    Plan:      Counseled patient on lifestyle modifications to help control hyperlipidemia.   Cholesterol lowering dietary information shared with patient.  Weight Loss encouraged  Stop tobacco use encouraged    Patient Treatment Goals:   LDL goal is less than 70  LDL goal is under 100    Followup in 1 year.
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
## 2024-12-19 ENCOUNTER — APPOINTMENT (OUTPATIENT)
Dept: CT IMAGING | Facility: HOSPITAL | Age: 44
End: 2024-12-19
Payer: MEDICARE

## 2024-12-19 ENCOUNTER — HOSPITAL ENCOUNTER (EMERGENCY)
Facility: HOSPITAL | Age: 44
Discharge: HOME | End: 2024-12-19
Attending: EMERGENCY MEDICINE
Payer: MEDICARE

## 2024-12-19 VITALS
HEART RATE: 105 BPM | HEIGHT: 70 IN | RESPIRATION RATE: 16 BRPM | SYSTOLIC BLOOD PRESSURE: 141 MMHG | BODY MASS INDEX: 31.21 KG/M2 | OXYGEN SATURATION: 97 % | TEMPERATURE: 98.3 F | DIASTOLIC BLOOD PRESSURE: 81 MMHG | WEIGHT: 218 LBS

## 2024-12-19 DIAGNOSIS — N39.0 URINARY TRACT INFECTION WITHOUT HEMATURIA, SITE UNSPECIFIED: Primary | ICD-10-CM

## 2024-12-19 LAB
ALBUMIN SERPL-MCNC: 4.2 G/DL (ref 3.5–5.2)
ALBUMIN/GLOB SERPL: 1.1 G/DL
ALP SERPL-CCNC: 118 U/L (ref 39–117)
ALT SERPL W P-5'-P-CCNC: 12 U/L (ref 1–41)
ANION GAP SERPL CALCULATED.3IONS-SCNC: 11.1 MMOL/L (ref 5–15)
AST SERPL-CCNC: 19 U/L (ref 1–40)
BACTERIA UR QL AUTO: ABNORMAL /HPF
BASOPHILS # BLD AUTO: 0.04 10*3/MM3 (ref 0–0.2)
BASOPHILS NFR BLD AUTO: 0.5 % (ref 0–1.5)
BILIRUB SERPL-MCNC: 0.3 MG/DL (ref 0–1.2)
BILIRUB UR QL STRIP: NEGATIVE
BUN SERPL-MCNC: 12 MG/DL (ref 6–20)
BUN/CREAT SERPL: 19.4 (ref 7–25)
CALCIUM SPEC-SCNC: 9.4 MG/DL (ref 8.6–10.5)
CHLORIDE SERPL-SCNC: 103 MMOL/L (ref 98–107)
CLARITY UR: ABNORMAL
CO2 SERPL-SCNC: 25.9 MMOL/L (ref 22–29)
COLOR UR: YELLOW
CREAT SERPL-MCNC: 0.62 MG/DL (ref 0.76–1.27)
DEPRECATED RDW RBC AUTO: 44.2 FL (ref 37–54)
EGFRCR SERPLBLD CKD-EPI 2021: 121.6 ML/MIN/1.73
EOSINOPHIL # BLD AUTO: 0.06 10*3/MM3 (ref 0–0.4)
EOSINOPHIL NFR BLD AUTO: 0.8 % (ref 0.3–6.2)
ERYTHROCYTE [DISTWIDTH] IN BLOOD BY AUTOMATED COUNT: 13.3 % (ref 12.3–15.4)
GLOBULIN UR ELPH-MCNC: 3.7 GM/DL
GLUCOSE SERPL-MCNC: 83 MG/DL (ref 65–99)
GLUCOSE UR STRIP-MCNC: NEGATIVE MG/DL
HCT VFR BLD AUTO: 42.7 % (ref 37.5–51)
HGB BLD-MCNC: 14.1 G/DL (ref 13–17.7)
HGB UR QL STRIP.AUTO: ABNORMAL
HYALINE CASTS UR QL AUTO: ABNORMAL /LPF
IMM GRANULOCYTES # BLD AUTO: 0.03 10*3/MM3 (ref 0–0.05)
IMM GRANULOCYTES NFR BLD AUTO: 0.4 % (ref 0–0.5)
KETONES UR QL STRIP: NEGATIVE
LEUKOCYTE ESTERASE UR QL STRIP.AUTO: ABNORMAL
LIPASE SERPL-CCNC: 20 U/L (ref 13–60)
LYMPHOCYTES # BLD AUTO: 1.16 10*3/MM3 (ref 0.7–3.1)
LYMPHOCYTES NFR BLD AUTO: 14.7 % (ref 19.6–45.3)
MCH RBC QN AUTO: 29.9 PG (ref 26.6–33)
MCHC RBC AUTO-ENTMCNC: 33 G/DL (ref 31.5–35.7)
MCV RBC AUTO: 90.5 FL (ref 79–97)
MONOCYTES # BLD AUTO: 0.37 10*3/MM3 (ref 0.1–0.9)
MONOCYTES NFR BLD AUTO: 4.7 % (ref 5–12)
NEUTROPHILS NFR BLD AUTO: 6.24 10*3/MM3 (ref 1.7–7)
NEUTROPHILS NFR BLD AUTO: 78.9 % (ref 42.7–76)
NITRITE UR QL STRIP: POSITIVE
NRBC BLD AUTO-RTO: 0 /100 WBC (ref 0–0.2)
PH UR STRIP.AUTO: 8 [PH] (ref 5–8)
PLATELET # BLD AUTO: 283 10*3/MM3 (ref 140–450)
PMV BLD AUTO: 9.3 FL (ref 6–12)
POTASSIUM SERPL-SCNC: 3.9 MMOL/L (ref 3.5–5.2)
PROT SERPL-MCNC: 7.9 G/DL (ref 6–8.5)
PROT UR QL STRIP: ABNORMAL
RBC # BLD AUTO: 4.72 10*6/MM3 (ref 4.14–5.8)
RBC # UR STRIP: ABNORMAL /HPF
REF LAB TEST METHOD: ABNORMAL
SODIUM SERPL-SCNC: 140 MMOL/L (ref 136–145)
SP GR UR STRIP: 1.01 (ref 1–1.03)
SQUAMOUS #/AREA URNS HPF: ABNORMAL /HPF
UROBILINOGEN UR QL STRIP: ABNORMAL
WBC # UR STRIP: ABNORMAL /HPF
WBC NRBC COR # BLD AUTO: 7.9 10*3/MM3 (ref 3.4–10.8)

## 2024-12-19 PROCEDURE — 87186 SC STD MICRODIL/AGAR DIL: CPT | Performed by: EMERGENCY MEDICINE

## 2024-12-19 PROCEDURE — C1751 CATH, INF, PER/CENT/MIDLINE: HCPCS

## 2024-12-19 PROCEDURE — 36415 COLL VENOUS BLD VENIPUNCTURE: CPT | Performed by: EMERGENCY MEDICINE

## 2024-12-19 PROCEDURE — 25010000002 HYDROMORPHONE 1 MG/ML SOLUTION: Performed by: EMERGENCY MEDICINE

## 2024-12-19 PROCEDURE — 25010000002 ONDANSETRON PER 1 MG: Performed by: EMERGENCY MEDICINE

## 2024-12-19 PROCEDURE — 87086 URINE CULTURE/COLONY COUNT: CPT | Performed by: EMERGENCY MEDICINE

## 2024-12-19 PROCEDURE — 87077 CULTURE AEROBIC IDENTIFY: CPT | Performed by: EMERGENCY MEDICINE

## 2024-12-19 PROCEDURE — 80053 COMPREHEN METABOLIC PANEL: CPT | Performed by: EMERGENCY MEDICINE

## 2024-12-19 PROCEDURE — 83690 ASSAY OF LIPASE: CPT | Performed by: EMERGENCY MEDICINE

## 2024-12-19 PROCEDURE — 74176 CT ABD & PELVIS W/O CONTRAST: CPT

## 2024-12-19 PROCEDURE — 87186 SC STD MICRODIL/AGAR DIL: CPT

## 2024-12-19 PROCEDURE — 81001 URINALYSIS AUTO W/SCOPE: CPT | Performed by: EMERGENCY MEDICINE

## 2024-12-19 PROCEDURE — 85025 COMPLETE CBC W/AUTO DIFF WBC: CPT | Performed by: EMERGENCY MEDICINE

## 2024-12-19 PROCEDURE — 96365 THER/PROPH/DIAG IV INF INIT: CPT

## 2024-12-19 PROCEDURE — 25010000002 CEFTRIAXONE PER 250 MG: Performed by: EMERGENCY MEDICINE

## 2024-12-19 PROCEDURE — 99284 EMERGENCY DEPT VISIT MOD MDM: CPT

## 2024-12-19 PROCEDURE — 96376 TX/PRO/DX INJ SAME DRUG ADON: CPT

## 2024-12-19 PROCEDURE — 96375 TX/PRO/DX INJ NEW DRUG ADDON: CPT

## 2024-12-19 RX ORDER — SODIUM CHLORIDE 0.9 % (FLUSH) 0.9 %
10 SYRINGE (ML) INJECTION AS NEEDED
Status: DISCONTINUED | OUTPATIENT
Start: 2024-12-19 | End: 2024-12-19 | Stop reason: HOSPADM

## 2024-12-19 RX ORDER — NITROFURANTOIN 25; 75 MG/1; MG/1
100 CAPSULE ORAL 2 TIMES DAILY
Qty: 14 CAPSULE | Refills: 0 | Status: SHIPPED | OUTPATIENT
Start: 2024-12-19 | End: 2024-12-22

## 2024-12-19 RX ORDER — ONDANSETRON 2 MG/ML
4 INJECTION INTRAMUSCULAR; INTRAVENOUS ONCE
Status: COMPLETED | OUTPATIENT
Start: 2024-12-19 | End: 2024-12-19

## 2024-12-19 RX ADMIN — CEFTRIAXONE 2000 MG: 2 INJECTION, POWDER, FOR SOLUTION INTRAMUSCULAR; INTRAVENOUS at 16:42

## 2024-12-19 RX ADMIN — HYDROMORPHONE HYDROCHLORIDE 1 MG: 1 INJECTION, SOLUTION INTRAMUSCULAR; INTRAVENOUS; SUBCUTANEOUS at 14:05

## 2024-12-19 RX ADMIN — HYDROMORPHONE HYDROCHLORIDE 0.5 MG: 1 INJECTION, SOLUTION INTRAMUSCULAR; INTRAVENOUS; SUBCUTANEOUS at 15:01

## 2024-12-19 RX ADMIN — ONDANSETRON 4 MG: 2 INJECTION INTRAMUSCULAR; INTRAVENOUS at 14:05

## 2024-12-19 NOTE — NURSING NOTE
Spoke with [facility] M[facility] team regarding home transportation for pt. They will contact [facility] ambulance svc and discuss possibility of this svc transporting pt's w/c since it is of small size and folds up. Wheels also remove with a single button.
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
## 2024-12-19 NOTE — ED NOTES
Radha called back (wrong name put in last not - it was Radha, not Dominique) from lab.  The phlebotomist is covering someone else's lunch, then she will come draw labs.
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
## 2024-12-19 NOTE — DISCHARGE INSTRUCTIONS
Follow-up with your primary provider regarding the abnormal finding of the pancreas on CT.  Return to the emergency room for any new or worsening symptoms or if you have any other questions or concerns.  Take antibiotic as prescribed.
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
## 2024-12-20 NOTE — CASE MANAGEMENT/SOCIAL WORK
Case Management/Social Work    Patient Name:  Pk May  YOB: 1980  MRN: [id number]  Admit Date:  12/19/2024      0198 Received Phone call from Jenny Palacio about patient needing transportation home.  Jenny informed me our EMS may not be able to transport patient's wheelchair.  I told Jenny I would call to discuss.  I contacted our EMS and was told they normally do not transport patient's wheelchairs due to safety concerns if EMS got into an accident the w/c would be a be

## 2024-12-22 RX ORDER — ONDANSETRON 8 MG/1
4 TABLET, FILM COATED ORAL EVERY 8 HOURS PRN
Qty: 10 TABLET | Refills: 0 | Status: ON HOLD | OUTPATIENT
Start: 2024-12-22

## 2024-12-22 RX ORDER — CEFPODOXIME PROXETIL 200 MG/1
200 TABLET, FILM COATED ORAL EVERY 12 HOURS
Qty: 10 TABLET | Refills: 0 | Status: SHIPPED | OUTPATIENT
Start: 2024-12-22 | End: 2024-12-27

## 2024-12-22 NOTE — PROGRESS NOTES
Patient was seen in the ED on 12/19 for flank pain. Patient has a h/o paraplegia. Patient urine culture resulted with P. mirabilis. Susceptible to Cephalosporins (3rd gen). Patient was given Rx for nitrofurantoin. Therapy is insufficient coverage. Discussed with Dr. Palacio. New prescription for cefpodoxime called to the patient's preferred pharmacy: [facility]. Spoke with patient who agreed with treatment plan.. Zofran also called in due to patient's intolerance of other cephalosporins.     Microbiology Results (last 10 days)       Procedure Component Value - Date/Time    Urine Culture - Urine, Urine, Clean Catch [[id number]]  (Abnormal)  (Susceptibility) Collected: 12/19/24 1219    Lab Status: Preliminary result Specimen: Urine, Clean Catch Updated: 12/22/24 1219     Urine Culture >100,000 CFU/mL Proteus mirabilis    Narrative:            Colonization of the urinary tract without infection is common. Treatment is discouraged unless the patient is symptomatic, pregnant, or undergoing an invasive urologic procedure.    Susceptibility        Proteus mirabilis      DIOGO      Amoxicillin + Clavulanate 16 ug/ml Intermediate      Ampicillin >=32 ug/ml Resistant      Ampicillin + Sulbactam 16 ug/ml Intermediate      Cefazolin (Urine) 8 ug/ml Susceptible      Cefepime <=0.12 ug/ml Susceptible      Ceftazidime <=0.5 ug/ml Susceptible      Ceftriaxone <=0.25 ug/ml Susceptible      Gentamicin <=1 ug/ml Susceptible      Levofloxacin 0.25 ug/ml Susceptible      Nitrofurantoin 256 ug/ml Resistant      Piperacillin + Tazobactam <=4 ug/ml Susceptible      Trimethoprim + Sulfamethoxazole >=320 ug/ml Resistant                                   Mena Jacome RPH  12/22/2024 16:59 EST

## 2024-12-28 LAB
BACTERIA SPEC AEROBE CULT: ABNORMAL
BACTERIA SPEC AEROBE CULT: ABNORMAL

## 2024-12-28 NOTE — PROGRESS NOTES
12/28: Morganella susceptibilities have resulted, no oral options available. Spoke with Kathy Cortez in ID, if patient was not having anymore symptoms okay to not treat, if patient still having symptoms would need to come into ED to get IV abx. Dr. Joy agreed with plan. Spoke with patient who stated that symptoms have not resolved and stated he would come in for IV abx 12/29.     12/24: Spoke with lab, they are waiting for sensitivities. Spoke with Lizet regarding calling in new script, agreed that since we just changed to cefpodoxime we would wait for sensitivies to come back. Pt had no WBC and 1+ bacteria.   12/23: Culture has grown out Morganella, ceftriaxone is drug of choice for Morganella. Pt abx was changed to cefpodoxime which should cover Morganella. Will wait for updated sensitivities.     Patient was seen in the ED on 12/19 for flank pain. Patient has a h/o paraplegia. Patient urine culture resulted with P. mirabilis. Susceptible to Cephalosporins (3rd gen). Patient was given Rx for nitrofurantoin. Therapy is insufficient coverage. Discussed with Dr. Palacio. New prescription for cefpodoxime called to the patient's preferred pharmacy: [facility]. Spoke with patient who agreed with treatment plan.. Zofran also called in due to patient's intolerance of other cephalosporins.     Microbiology Results (last 10 days)       Procedure Component Value - Date/Time    Urine Culture - Urine, Urine, Clean Catch [[id number]]  (Abnormal)  (Susceptibility) Collected: 12/19/24 1219    Lab Status: Final result Specimen: Urine, Clean Catch Updated: 12/28/24 1255     Urine Culture >100,000 CFU/mL Proteus mirabilis      >100,000 CFU/mL Morganella morganii ssp morganii     Comment: Susceptibilities performed at [facility].  See scanned report.       Narrative:            Colonization of the urinary tract without infection is common. Treatment is discouraged unless the patient is symptomatic, pregnant, or undergoing an  invasive urologic procedure.    Susceptibility        Proteus mirabilis      DIOGO      Amoxicillin + Clavulanate 16 ug/ml Intermediate      Ampicillin >=32 ug/ml Resistant      Ampicillin + Sulbactam 16 ug/ml Intermediate      Cefazolin (Urine) 8 ug/ml Susceptible      Cefepime <=0.12 ug/ml Susceptible      Ceftazidime <=0.5 ug/ml Susceptible      Ceftriaxone <=0.25 ug/ml Susceptible      Gentamicin <=1 ug/ml Susceptible      Levofloxacin 0.25 ug/ml Susceptible      Nitrofurantoin 256 ug/ml Resistant      Piperacillin + Tazobactam <=4 ug/ml Susceptible      Trimethoprim + Sulfamethoxazole >=320 ug/ml Resistant                                   Mena Jacome [facility]  12/28/2024 16:30 EST

## 2024-12-29 ENCOUNTER — APPOINTMENT (OUTPATIENT)
Dept: CT IMAGING | Facility: HOSPITAL | Age: 44
End: 2024-12-29
Payer: MEDICARE

## 2024-12-29 ENCOUNTER — HOSPITAL ENCOUNTER (INPATIENT)
Facility: HOSPITAL | Age: 44
LOS: 2 days | Discharge: HOME | End: 2025-01-01
Attending: INTERNAL MEDICINE | Admitting: STUDENT IN AN ORGANIZED HEALTH CARE EDUCATION/TRAINING PROGRAM
Payer: MEDICARE

## 2024-12-29 DIAGNOSIS — N39.0 URINARY TRACT INFECTION WITH HEMATURIA, SITE UNSPECIFIED: Primary | ICD-10-CM

## 2024-12-29 DIAGNOSIS — R31.9 URINARY TRACT INFECTION WITH HEMATURIA, SITE UNSPECIFIED: Primary | ICD-10-CM

## 2024-12-29 DIAGNOSIS — G82.20 PARALYSIS OF BOTH LOWER LIMBS: ICD-10-CM

## 2024-12-29 LAB
ALBUMIN SERPL-MCNC: 4.2 G/DL (ref 3.5–5.2)
ALBUMIN/GLOB SERPL: 1.1 G/DL
ALP SERPL-CCNC: 105 U/L (ref 39–117)
ALT SERPL W P-5'-P-CCNC: 13 U/L (ref 1–41)
ANION GAP SERPL CALCULATED.3IONS-SCNC: 11.2 MMOL/L (ref 5–15)
AST SERPL-CCNC: 20 U/L (ref 1–40)
B PARAPERT DNA SPEC QL NAA+PROBE: NOT DETECTED
B PERT DNA SPEC QL NAA+PROBE: NOT DETECTED
BACTERIA UR QL AUTO: ABNORMAL /HPF
BASOPHILS # BLD AUTO: 0.03 10*3/MM3 (ref 0–0.2)
BASOPHILS NFR BLD AUTO: 0.5 % (ref 0–1.5)
BILIRUB SERPL-MCNC: 0.3 MG/DL (ref 0–1.2)
BILIRUB UR QL STRIP: NEGATIVE
BUN SERPL-MCNC: 12 MG/DL (ref 6–20)
BUN/CREAT SERPL: 19.4 (ref 7–25)
C PNEUM DNA NPH QL NAA+NON-PROBE: NOT DETECTED
CALCIUM SPEC-SCNC: 9.4 MG/DL (ref 8.6–10.5)
CHLORIDE SERPL-SCNC: 105 MMOL/L (ref 98–107)
CLARITY UR: ABNORMAL
CO2 SERPL-SCNC: 24.8 MMOL/L (ref 22–29)
COLOR UR: YELLOW
CREAT SERPL-MCNC: 0.62 MG/DL (ref 0.76–1.27)
D-LACTATE SERPL-SCNC: 0.5 MMOL/L (ref 0.3–2)
DEPRECATED RDW RBC AUTO: 45.6 FL (ref 37–54)
EGFRCR SERPLBLD CKD-EPI 2021: 121.6 ML/MIN/1.73
EOSINOPHIL # BLD AUTO: 0.07 10*3/MM3 (ref 0–0.4)
EOSINOPHIL NFR BLD AUTO: 1.1 % (ref 0.3–6.2)
ERYTHROCYTE [DISTWIDTH] IN BLOOD BY AUTOMATED COUNT: 13.4 % (ref 12.3–15.4)
FLUAV SUBTYP SPEC NAA+PROBE: NOT DETECTED
FLUBV RNA ISLT QL NAA+PROBE: NOT DETECTED
GLOBULIN UR ELPH-MCNC: 3.7 GM/DL
GLUCOSE SERPL-MCNC: 97 MG/DL (ref 65–99)
GLUCOSE UR STRIP-MCNC: NEGATIVE MG/DL
HADV DNA SPEC NAA+PROBE: NOT DETECTED
HCOV 229E RNA SPEC QL NAA+PROBE: NOT DETECTED
HCOV HKU1 RNA SPEC QL NAA+PROBE: NOT DETECTED
HCOV NL63 RNA SPEC QL NAA+PROBE: NOT DETECTED
HCOV OC43 RNA SPEC QL NAA+PROBE: NOT DETECTED
HCT VFR BLD AUTO: 40.9 % (ref 37.5–51)
HGB BLD-MCNC: 13.3 G/DL (ref 13–17.7)
HGB UR QL STRIP.AUTO: ABNORMAL
HMPV RNA NPH QL NAA+NON-PROBE: NOT DETECTED
HPIV1 RNA ISLT QL NAA+PROBE: NOT DETECTED
HPIV2 RNA SPEC QL NAA+PROBE: NOT DETECTED
HPIV3 RNA NPH QL NAA+PROBE: NOT DETECTED
HPIV4 P GENE NPH QL NAA+PROBE: NOT DETECTED
HYALINE CASTS UR QL AUTO: ABNORMAL /LPF
IMM GRANULOCYTES # BLD AUTO: 0.01 10*3/MM3 (ref 0–0.05)
IMM GRANULOCYTES NFR BLD AUTO: 0.2 % (ref 0–0.5)
KETONES UR QL STRIP: NEGATIVE
LEUKOCYTE ESTERASE UR QL STRIP.AUTO: ABNORMAL
LIPASE SERPL-CCNC: 15 U/L (ref 13–60)
LYMPHOCYTES # BLD AUTO: 1.21 10*3/MM3 (ref 0.7–3.1)
LYMPHOCYTES NFR BLD AUTO: 18.7 % (ref 19.6–45.3)
M PNEUMO IGG SER IA-ACNC: NOT DETECTED
MCH RBC QN AUTO: 29.9 PG (ref 26.6–33)
MCHC RBC AUTO-ENTMCNC: 32.5 G/DL (ref 31.5–35.7)
MCV RBC AUTO: 91.9 FL (ref 79–97)
MONOCYTES # BLD AUTO: 0.51 10*3/MM3 (ref 0.1–0.9)
MONOCYTES NFR BLD AUTO: 7.9 % (ref 5–12)
NEUTROPHILS NFR BLD AUTO: 4.65 10*3/MM3 (ref 1.7–7)
NEUTROPHILS NFR BLD AUTO: 71.6 % (ref 42.7–76)
NITRITE UR QL STRIP: NEGATIVE
NRBC BLD AUTO-RTO: 0 /100 WBC (ref 0–0.2)
PH UR STRIP.AUTO: 7 [PH] (ref 5–8)
PLATELET # BLD AUTO: 295 10*3/MM3 (ref 140–450)
PMV BLD AUTO: 9.1 FL (ref 6–12)
POTASSIUM SERPL-SCNC: 3.9 MMOL/L (ref 3.5–5.2)
PROT SERPL-MCNC: 7.9 G/DL (ref 6–8.5)
PROT UR QL STRIP: ABNORMAL
RBC # BLD AUTO: 4.45 10*6/MM3 (ref 4.14–5.8)
RBC # UR STRIP: ABNORMAL /HPF
REF LAB TEST METHOD: ABNORMAL
RHINOVIRUS RNA SPEC NAA+PROBE: NOT DETECTED
RSV RNA NPH QL NAA+NON-PROBE: NOT DETECTED
SARS-COV-2 RNA RESP QL NAA+PROBE: NOT DETECTED
SODIUM SERPL-SCNC: 141 MMOL/L (ref 136–145)
SP GR UR STRIP: 1.01 (ref 1–1.03)
SQUAMOUS #/AREA URNS HPF: ABNORMAL /HPF
UROBILINOGEN UR QL STRIP: ABNORMAL
WBC # UR STRIP: ABNORMAL /HPF
WBC NRBC COR # BLD AUTO: 6.48 10*3/MM3 (ref 3.4–10.8)

## 2024-12-29 PROCEDURE — 85025 COMPLETE CBC W/AUTO DIFF WBC: CPT | Performed by: OCCUPATIONAL THERAPIST

## 2024-12-29 PROCEDURE — 25010000002 HYDROMORPHONE 1 MG/ML SOLUTION: Performed by: OCCUPATIONAL THERAPIST

## 2024-12-29 PROCEDURE — 99285 EMERGENCY DEPT VISIT HI MDM: CPT

## 2024-12-29 PROCEDURE — G0378 HOSPITAL OBSERVATION PER HR: HCPCS

## 2024-12-29 PROCEDURE — 87086 URINE CULTURE/COLONY COUNT: CPT | Performed by: OCCUPATIONAL THERAPIST

## 2024-12-29 PROCEDURE — 0202U NFCT DS 22 TRGT SARS-COV-2: CPT | Performed by: OCCUPATIONAL THERAPIST

## 2024-12-29 PROCEDURE — 81001 URINALYSIS AUTO W/SCOPE: CPT | Performed by: OCCUPATIONAL THERAPIST

## 2024-12-29 PROCEDURE — 83605 ASSAY OF LACTIC ACID: CPT

## 2024-12-29 PROCEDURE — 25010000002 HYDROMORPHONE PER 4 MG

## 2024-12-29 PROCEDURE — 36415 COLL VENOUS BLD VENIPUNCTURE: CPT

## 2024-12-29 PROCEDURE — 74176 CT ABD & PELVIS W/O CONTRAST: CPT

## 2024-12-29 PROCEDURE — 87040 BLOOD CULTURE FOR BACTERIA: CPT | Performed by: OCCUPATIONAL THERAPIST

## 2024-12-29 PROCEDURE — P9612 CATHETERIZE FOR URINE SPEC: HCPCS

## 2024-12-29 PROCEDURE — 25010000002 CEFTRIAXONE PER 250 MG: Performed by: OCCUPATIONAL THERAPIST

## 2024-12-29 PROCEDURE — 25010000002 HYDROMORPHONE 1 MG/ML SOLUTION: Performed by: FAMILY MEDICINE

## 2024-12-29 PROCEDURE — 80053 COMPREHEN METABOLIC PANEL: CPT | Performed by: OCCUPATIONAL THERAPIST

## 2024-12-29 PROCEDURE — 83690 ASSAY OF LIPASE: CPT | Performed by: OCCUPATIONAL THERAPIST

## 2024-12-29 RX ORDER — HYDROMORPHONE HYDROCHLORIDE 1 MG/ML
0.5 INJECTION, SOLUTION INTRAMUSCULAR; INTRAVENOUS; SUBCUTANEOUS
Status: DISCONTINUED | OUTPATIENT
Start: 2024-12-29 | End: 2024-12-29

## 2024-12-29 RX ORDER — ONDANSETRON 2 MG/ML
4 INJECTION INTRAMUSCULAR; INTRAVENOUS EVERY 6 HOURS PRN
Status: DISCONTINUED | OUTPATIENT
Start: 2024-12-29 | End: 2025-01-01 | Stop reason: HOSPADM

## 2024-12-29 RX ORDER — SODIUM CHLORIDE 0.9 % (FLUSH) 0.9 %
10 SYRINGE (ML) INJECTION AS NEEDED
Status: DISCONTINUED | OUTPATIENT
Start: 2024-12-29 | End: 2025-01-01 | Stop reason: HOSPADM

## 2024-12-29 RX ORDER — BUSPIRONE HYDROCHLORIDE 30 MG/1
30 TABLET ORAL EVERY EVENING
COMMUNITY

## 2024-12-29 RX ORDER — ACETAMINOPHEN 325 MG/1
650 TABLET ORAL EVERY 4 HOURS PRN
Status: DISCONTINUED | OUTPATIENT
Start: 2024-12-29 | End: 2025-01-01 | Stop reason: HOSPADM

## 2024-12-29 RX ORDER — BACLOFEN 10 MG/1
10 TABLET ORAL 3 TIMES DAILY
Status: DISCONTINUED | OUTPATIENT
Start: 2024-12-29 | End: 2025-01-01 | Stop reason: HOSPADM

## 2024-12-29 RX ORDER — ARIPIPRAZOLE 10 MG/1
5 TABLET ORAL DAILY
Status: DISCONTINUED | OUTPATIENT
Start: 2024-12-29 | End: 2025-01-01 | Stop reason: HOSPADM

## 2024-12-29 RX ORDER — ACETAMINOPHEN 650 MG/1
650 SUPPOSITORY RECTAL EVERY 4 HOURS PRN
Status: DISCONTINUED | OUTPATIENT
Start: 2024-12-29 | End: 2025-01-01 | Stop reason: HOSPADM

## 2024-12-29 RX ORDER — BUSPIRONE HYDROCHLORIDE 15 MG/1
15 TABLET ORAL EVERY MORNING
Status: DISCONTINUED | OUTPATIENT
Start: 2024-12-30 | End: 2025-01-01 | Stop reason: HOSPADM

## 2024-12-29 RX ORDER — SODIUM CHLORIDE 9 MG/ML
40 INJECTION, SOLUTION INTRAVENOUS AS NEEDED
Status: DISCONTINUED | OUTPATIENT
Start: 2024-12-29 | End: 2025-01-01 | Stop reason: HOSPADM

## 2024-12-29 RX ORDER — NICOTINE 21 MG/24HR
1 PATCH, TRANSDERMAL 24 HOURS TRANSDERMAL
Status: DISCONTINUED | OUTPATIENT
Start: 2024-12-29 | End: 2025-01-01 | Stop reason: HOSPADM

## 2024-12-29 RX ORDER — AMOXICILLIN 500 MG/1
500 CAPSULE ORAL 3 TIMES DAILY
COMMUNITY
Start: 2024-12-27 | End: 2025-01-01 | Stop reason: HOSPADM

## 2024-12-29 RX ORDER — ACETAMINOPHEN 160 MG/5ML
650 SOLUTION ORAL EVERY 4 HOURS PRN
Status: DISCONTINUED | OUTPATIENT
Start: 2024-12-29 | End: 2025-01-01 | Stop reason: HOSPADM

## 2024-12-29 RX ORDER — BUSPIRONE HYDROCHLORIDE 15 MG/1
30 TABLET ORAL EVERY EVENING
Status: DISCONTINUED | OUTPATIENT
Start: 2024-12-29 | End: 2025-01-01 | Stop reason: HOSPADM

## 2024-12-29 RX ORDER — VENLAFAXINE HYDROCHLORIDE 75 MG/1
150 CAPSULE, EXTENDED RELEASE ORAL DAILY
Status: DISCONTINUED | OUTPATIENT
Start: 2024-12-29 | End: 2025-01-01 | Stop reason: HOSPADM

## 2024-12-29 RX ORDER — SODIUM CHLORIDE 0.9 % (FLUSH) 0.9 %
10 SYRINGE (ML) INJECTION EVERY 12 HOURS SCHEDULED
Status: DISCONTINUED | OUTPATIENT
Start: 2024-12-29 | End: 2025-01-01 | Stop reason: HOSPADM

## 2024-12-29 RX ORDER — ONDANSETRON 4 MG/1
4 TABLET, ORALLY DISINTEGRATING ORAL EVERY 6 HOURS PRN
Status: DISCONTINUED | OUTPATIENT
Start: 2024-12-29 | End: 2025-01-01 | Stop reason: HOSPADM

## 2024-12-29 RX ADMIN — HYDROMORPHONE HYDROCHLORIDE 0.5 MG: 1 INJECTION, SOLUTION INTRAMUSCULAR; INTRAVENOUS; SUBCUTANEOUS at 13:45

## 2024-12-29 RX ADMIN — Medication 10 ML: at 20:47

## 2024-12-29 RX ADMIN — CEFTRIAXONE SODIUM 1000 MG: 1 INJECTION, POWDER, FOR SOLUTION INTRAMUSCULAR; INTRAVENOUS at 12:06

## 2024-12-29 RX ADMIN — BUSPIRONE HYDROCHLORIDE 30 MG: 15 TABLET ORAL at 20:45

## 2024-12-29 RX ADMIN — AMITRIPTYLINE HYDROCHLORIDE 75 MG: 50 TABLET, FILM COATED ORAL at 20:45

## 2024-12-29 RX ADMIN — HYDROMORPHONE HYDROCHLORIDE 0.5 MG: 1 INJECTION, SOLUTION INTRAMUSCULAR; INTRAVENOUS; SUBCUTANEOUS at 18:04

## 2024-12-29 RX ADMIN — HYDROMORPHONE HYDROCHLORIDE 1 MG: 1 INJECTION, SOLUTION INTRAMUSCULAR; INTRAVENOUS; SUBCUTANEOUS at 21:24

## 2024-12-29 RX ADMIN — NICOTINE 1 PATCH: 21 PATCH TRANSDERMAL at 18:06

## 2024-12-29 RX ADMIN — HYDROMORPHONE HYDROCHLORIDE 0.5 MG: 1 INJECTION, SOLUTION INTRAMUSCULAR; INTRAVENOUS; SUBCUTANEOUS at 11:37

## 2024-12-29 RX ADMIN — VENLAFAXINE HYDROCHLORIDE 150 MG: 75 CAPSULE, EXTENDED RELEASE ORAL at 20:44

## 2024-12-29 RX ADMIN — ARIPIPRAZOLE 5 MG: 10 TABLET ORAL at 20:44

## 2024-12-29 RX ADMIN — BACLOFEN 10 MG: 10 TABLET ORAL at 20:45

## 2024-12-29 RX ADMIN — Medication 10 ML: at 14:02

## 2024-12-29 NOTE — Clinical Note
Level of Care: Telemetry [5]   Admitting Physician: SHAYNA LANDON [[id number]]   Attending Physician: SHAYNA LANDON [[id number]]
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
## 2024-12-30 ENCOUNTER — APPOINTMENT (OUTPATIENT)
Dept: MRI IMAGING | Facility: HOSPITAL | Age: 44
End: 2024-12-30
Payer: MEDICARE

## 2024-12-30 PROBLEM — N39.0 RECURRENT UTI: Status: ACTIVE | Noted: 2024-12-30

## 2024-12-30 LAB
ANION GAP SERPL CALCULATED.3IONS-SCNC: 9.5 MMOL/L (ref 5–15)
BACTERIA SPEC AEROBE CULT: NORMAL
BASOPHILS # BLD AUTO: 0.03 10*3/MM3 (ref 0–0.2)
BASOPHILS NFR BLD AUTO: 0.4 % (ref 0–1.5)
BUN SERPL-MCNC: 10 MG/DL (ref 6–20)
BUN/CREAT SERPL: 17.5 (ref 7–25)
CALCIUM SPEC-SCNC: 9.2 MG/DL (ref 8.6–10.5)
CHLORIDE SERPL-SCNC: 106 MMOL/L (ref 98–107)
CO2 SERPL-SCNC: 24.5 MMOL/L (ref 22–29)
CREAT SERPL-MCNC: 0.57 MG/DL (ref 0.76–1.27)
DEPRECATED RDW RBC AUTO: 45.6 FL (ref 37–54)
EGFRCR SERPLBLD CKD-EPI 2021: 124.8 ML/MIN/1.73
EOSINOPHIL # BLD AUTO: 0.09 10*3/MM3 (ref 0–0.4)
EOSINOPHIL NFR BLD AUTO: 1.1 % (ref 0.3–6.2)
ERYTHROCYTE [DISTWIDTH] IN BLOOD BY AUTOMATED COUNT: 13.5 % (ref 12.3–15.4)
GLUCOSE SERPL-MCNC: 112 MG/DL (ref 65–99)
HCT VFR BLD AUTO: 38.8 % (ref 37.5–51)
HGB BLD-MCNC: 12.3 G/DL (ref 13–17.7)
IMM GRANULOCYTES # BLD AUTO: 0.03 10*3/MM3 (ref 0–0.05)
IMM GRANULOCYTES NFR BLD AUTO: 0.4 % (ref 0–0.5)
LYMPHOCYTES # BLD AUTO: 1.29 10*3/MM3 (ref 0.7–3.1)
LYMPHOCYTES NFR BLD AUTO: 16.5 % (ref 19.6–45.3)
MCH RBC QN AUTO: 29.1 PG (ref 26.6–33)
MCHC RBC AUTO-ENTMCNC: 31.7 G/DL (ref 31.5–35.7)
MCV RBC AUTO: 91.7 FL (ref 79–97)
MONOCYTES # BLD AUTO: 0.55 10*3/MM3 (ref 0.1–0.9)
MONOCYTES NFR BLD AUTO: 7 % (ref 5–12)
NEUTROPHILS NFR BLD AUTO: 5.85 10*3/MM3 (ref 1.7–7)
NEUTROPHILS NFR BLD AUTO: 74.6 % (ref 42.7–76)
NRBC BLD AUTO-RTO: 0 /100 WBC (ref 0–0.2)
PLATELET # BLD AUTO: 291 10*3/MM3 (ref 140–450)
PMV BLD AUTO: 9.2 FL (ref 6–12)
POTASSIUM SERPL-SCNC: 3.6 MMOL/L (ref 3.5–5.2)
RBC # BLD AUTO: 4.23 10*6/MM3 (ref 4.14–5.8)
SODIUM SERPL-SCNC: 140 MMOL/L (ref 136–145)
WBC NRBC COR # BLD AUTO: 7.84 10*3/MM3 (ref 3.4–10.8)

## 2024-12-30 PROCEDURE — 25010000002 CEFTRIAXONE PER 250 MG

## 2024-12-30 PROCEDURE — 25010000002 GADOTERIDOL PER 1 ML: Performed by: STUDENT IN AN ORGANIZED HEALTH CARE EDUCATION/TRAINING PROGRAM

## 2024-12-30 PROCEDURE — 74183 MRI ABD W/O CNTR FLWD CNTR: CPT

## 2024-12-30 PROCEDURE — 85025 COMPLETE CBC W/AUTO DIFF WBC: CPT

## 2024-12-30 PROCEDURE — A9579 GAD-BASE MR CONTRAST NOS,1ML: HCPCS | Performed by: STUDENT IN AN ORGANIZED HEALTH CARE EDUCATION/TRAINING PROGRAM

## 2024-12-30 PROCEDURE — 25010000002 HYDROMORPHONE 1 MG/ML SOLUTION: Performed by: FAMILY MEDICINE

## 2024-12-30 PROCEDURE — 80048 BASIC METABOLIC PNL TOTAL CA: CPT

## 2024-12-30 PROCEDURE — 25010000002 HYDROMORPHONE 1 MG/ML SOLUTION: Performed by: STUDENT IN AN ORGANIZED HEALTH CARE EDUCATION/TRAINING PROGRAM

## 2024-12-30 RX ADMIN — NICOTINE 1 PATCH: 21 PATCH TRANSDERMAL at 08:17

## 2024-12-30 RX ADMIN — BACLOFEN 10 MG: 10 TABLET ORAL at 08:15

## 2024-12-30 RX ADMIN — BACLOFEN 10 MG: 10 TABLET ORAL at 16:45

## 2024-12-30 RX ADMIN — Medication 10 ML: at 21:36

## 2024-12-30 RX ADMIN — HYDROMORPHONE HYDROCHLORIDE 1 MG: 1 INJECTION, SOLUTION INTRAMUSCULAR; INTRAVENOUS; SUBCUTANEOUS at 00:30

## 2024-12-30 RX ADMIN — HYDROMORPHONE HYDROCHLORIDE 1 MG: 1 INJECTION, SOLUTION INTRAMUSCULAR; INTRAVENOUS; SUBCUTANEOUS at 06:34

## 2024-12-30 RX ADMIN — HYDROMORPHONE HYDROCHLORIDE 1 MG: 1 INJECTION, SOLUTION INTRAMUSCULAR; INTRAVENOUS; SUBCUTANEOUS at 18:36

## 2024-12-30 RX ADMIN — VENLAFAXINE HYDROCHLORIDE 150 MG: 75 CAPSULE, EXTENDED RELEASE ORAL at 08:15

## 2024-12-30 RX ADMIN — HYDROMORPHONE HYDROCHLORIDE 1 MG: 1 INJECTION, SOLUTION INTRAMUSCULAR; INTRAVENOUS; SUBCUTANEOUS at 21:37

## 2024-12-30 RX ADMIN — GADOTERIDOL 20 ML: 279.3 INJECTION, SOLUTION INTRAVENOUS at 15:32

## 2024-12-30 RX ADMIN — AMITRIPTYLINE HYDROCHLORIDE 75 MG: 50 TABLET, FILM COATED ORAL at 21:37

## 2024-12-30 RX ADMIN — ARIPIPRAZOLE 5 MG: 10 TABLET ORAL at 08:15

## 2024-12-30 RX ADMIN — BUSPIRONE HYDROCHLORIDE 15 MG: 15 TABLET ORAL at 06:31

## 2024-12-30 RX ADMIN — AMITRIPTYLINE HYDROCHLORIDE 75 MG: 50 TABLET, FILM COATED ORAL at 08:15

## 2024-12-30 RX ADMIN — Medication 10 ML: at 08:15

## 2024-12-30 RX ADMIN — CEFTRIAXONE SODIUM 1000 MG: 1 INJECTION, POWDER, FOR SOLUTION INTRAMUSCULAR; INTRAVENOUS at 08:16

## 2024-12-30 RX ADMIN — BUSPIRONE HYDROCHLORIDE 30 MG: 15 TABLET ORAL at 16:45

## 2024-12-30 RX ADMIN — HYDROMORPHONE HYDROCHLORIDE 1 MG: 1 INJECTION, SOLUTION INTRAMUSCULAR; INTRAVENOUS; SUBCUTANEOUS at 13:29

## 2024-12-30 RX ADMIN — BACLOFEN 10 MG: 10 TABLET ORAL at 21:37
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
## 2024-12-30 NOTE — SIGNIFICANT NOTE
12/30/24 1401   OTHER   Discipline physical therapist   Rehab Time/Intention   Session Not Performed patient/family declined, not feeling well  (Pt would like to hold eval for today due to pain. Will f/u 12/31.)   Recommendation   PT - Next Appointment 12/31/24       
no deviation/no discharge

## 2024-12-30 NOTE — PLAN OF CARE
Problem: Adult Inpatient Plan of Care  Goal: Plan of Care Review  Outcome: Progressing  Goal: Patient-Specific Goal (Individualized)  Outcome: Progressing  Goal: Absence of Hospital-Acquired Illness or Injury  Outcome: Progressing  Intervention: Identify and Manage Fall Risk  Recent Flowsheet Documentation  Taken 12/30/2024 1200 by Katharine Ricci RN  Safety Promotion/Fall Prevention: safety round/check completed  Taken 12/30/2024 1000 by Katharine Ricci RN  Safety Promotion/Fall Prevention: safety round/check completed  Taken 12/30/2024 0810 by Katharine Ricci RN  Safety Promotion/Fall Prevention: safety round/check completed  Goal: Optimal Comfort and Wellbeing  Outcome: Progressing  Goal: Readiness for Transition of Care  Outcome: Progressing     Problem: Skin Injury Risk Increased  Goal: Skin Health and Integrity  Outcome: Progressing  Intervention: Optimize Skin Protection  Recent Flowsheet Documentation  Taken 12/30/2024 0810 by Katharine Ricci RN  Pressure Reduction Techniques: frequent weight shift encouraged  Pressure Reduction Devices: pressure-redistributing mattress utilized     Problem: Fall Injury Risk  Goal: Absence of Fall and Fall-Related Injury  Outcome: Progressing  Intervention: Promote Injury-Free Environment  Recent Flowsheet Documentation  Taken 12/30/2024 1200 by Katharine Ricci RN  Safety Promotion/Fall Prevention: safety round/check completed  Taken 12/30/2024 1000 by Katharine Ricci RN  Safety Promotion/Fall Prevention: safety round/check completed  Taken 12/30/2024 0810 by Katharine Ricci RN  Safety Promotion/Fall Prevention: safety round/check completed   Goal Outcome Evaluation:

## 2024-12-30 NOTE — PLAN OF CARE
Goal Outcome Evaluation:  Plan of Care Reviewed With: patient      Pt. Aox4. Requested and given Dilaudid PRN x2 c/o right flank pain. Dilaudid effective after dose increased by Dr. Mcmullen. Fluids encouraged. Continues on Contact Isolation/Hx: MRSA of a wound. Urostomy and colostomy managed by pt. Speciality bed in place d/t pt. being a paraplegic. Skin intact.

## 2024-12-30 NOTE — CASE MANAGEMENT/SOCIAL WORK
Discharge Planning Assessment   [facility]     Patient Name: Pk May  MRN: [id number]  Today's Date: 12/30/2024    Admit Date: 12/29/2024    Plan: Return home with girlfriend.   Discharge Needs Assessment       Row Name 12/30/24 1024       Living Environment    People in Home significant other    Name(s) of People in Home Girlfriend-Carl    Current Living Arrangements apartment    Potentially Unsafe Housing Conditions none    In the past 12 months has the electric, gas, oil, or water company threatened to shut off services in your home? No    Primary Care Provided by self    Provides Primary Care For no one    Family Caregiver if Needed significant other    Family Caregiver Names Carl    Quality of Family Relationships helpful;involved;supportive    Able to Return to Prior Arrangements yes       Resource/Environmental Concerns    Resource/Environmental Concerns none    Transportation Concerns rides, unreliable from others  Pt does not have a license. Relies on Medicaid transport       Transportation Needs    In the past 12 months, has lack of transportation kept you from medical appointments or from getting medications? yes    In the past 12 months, has lack of transportation kept you from meetings, work, or from getting things needed for daily living? Yes       Food Insecurity    Within the past 12 months, you worried that your food would run out before you got the money to buy more. Never true    Within the past 12 months, the food you bought just didn't last and you didn't have money to get more. Never true       Transition Planning    Patient/Family Anticipates Transition to home;home with family    Patient/Family Anticipated Services at Transition none    Transportation Anticipated health plan transportation       Discharge Needs Assessment    Readmission Within the Last 30 Days no previous admission in last 30 days    Equipment Currently Used at Home wheelchair    Concerns to be Addressed denies  needs/concerns at this time;no discharge needs identified    Do you want help finding or keeping work or a job? Patient declined    Do you want help with school or training? For example, starting or completing job training or getting a high school diploma, GED or equivalent No    Anticipated Changes Related to Illness none    Equipment Needed After Discharge none    Provided Post Acute Provider List? N/A    Provided Post Acute Provider Quality & Resource List? N/A                   Discharge Plan       Row Name 12/30/24 1027       Plan    Plan Return home with girlfriend.    Patient/Family in Agreement with Plan yes    Plan Comments CM met with patient at bedside. Pt lives at home with girlfriend Carl. He reports he can drive but does not have a license and has to use [employer] for transportation. States that several trips have been cancelled. PCP and pharmacy confirmed- agreeable to use [facility]. Denies issues affording medications. Only DME used is a transfer w/c. CM/SW to set up Medicaid transportation at time of discharge.              Demographic Summary       Row Name 12/30/24 1022       General Information    Admission Type observation    Arrived From emergency department    Referral Source admission list    Reason for Consult care coordination/care conference;discharge planning    Preferred Language English       Contact Information    Permission Granted to Share Info With                    Functional Status       Row Name 12/30/24 1023       Functional Status    Usual Activity Tolerance moderate    Current Activity Tolerance moderate       Functional Status, IADL    Medications independent    Meal Preparation independent    Housekeeping independent    Laundry independent    Shopping independent    If for any reason you need help with day-to-day activities such as bathing, preparing meals, shopping, managing finances, etc., do you get the help you need? I don't need any help              Megan Naegele, RN     Office Phone: [phone]  Office Cell: [phone]

## 2024-12-31 PROCEDURE — 25010000002 HYDROMORPHONE 1 MG/ML SOLUTION: Performed by: STUDENT IN AN ORGANIZED HEALTH CARE EDUCATION/TRAINING PROGRAM

## 2024-12-31 RX ADMIN — Medication 10 ML: at 08:05

## 2024-12-31 RX ADMIN — AMITRIPTYLINE HYDROCHLORIDE 75 MG: 50 TABLET, FILM COATED ORAL at 08:04

## 2024-12-31 RX ADMIN — HYDROMORPHONE HYDROCHLORIDE 1 MG: 1 INJECTION, SOLUTION INTRAMUSCULAR; INTRAVENOUS; SUBCUTANEOUS at 17:52

## 2024-12-31 RX ADMIN — AMITRIPTYLINE HYDROCHLORIDE 75 MG: 50 TABLET, FILM COATED ORAL at 21:23

## 2024-12-31 RX ADMIN — NICOTINE 1 PATCH: 21 PATCH TRANSDERMAL at 08:06

## 2024-12-31 RX ADMIN — BACLOFEN 10 MG: 10 TABLET ORAL at 08:04

## 2024-12-31 RX ADMIN — HYDROMORPHONE HYDROCHLORIDE 1 MG: 1 INJECTION, SOLUTION INTRAMUSCULAR; INTRAVENOUS; SUBCUTANEOUS at 14:32

## 2024-12-31 RX ADMIN — BACLOFEN 10 MG: 10 TABLET ORAL at 16:32

## 2024-12-31 RX ADMIN — BUSPIRONE HYDROCHLORIDE 30 MG: 15 TABLET ORAL at 16:32

## 2024-12-31 RX ADMIN — ARIPIPRAZOLE 5 MG: 10 TABLET ORAL at 08:04

## 2024-12-31 RX ADMIN — HYDROMORPHONE HYDROCHLORIDE 1 MG: 1 INJECTION, SOLUTION INTRAMUSCULAR; INTRAVENOUS; SUBCUTANEOUS at 21:23

## 2024-12-31 RX ADMIN — Medication 10 ML: at 21:23

## 2024-12-31 RX ADMIN — BUSPIRONE HYDROCHLORIDE 15 MG: 15 TABLET ORAL at 06:24

## 2024-12-31 RX ADMIN — HYDROMORPHONE HYDROCHLORIDE 1 MG: 1 INJECTION, SOLUTION INTRAMUSCULAR; INTRAVENOUS; SUBCUTANEOUS at 04:17

## 2024-12-31 RX ADMIN — VENLAFAXINE HYDROCHLORIDE 150 MG: 75 CAPSULE, EXTENDED RELEASE ORAL at 08:04

## 2024-12-31 RX ADMIN — BACLOFEN 10 MG: 10 TABLET ORAL at 21:23

## 2024-12-31 RX ADMIN — HYDROMORPHONE HYDROCHLORIDE 1 MG: 1 INJECTION, SOLUTION INTRAMUSCULAR; INTRAVENOUS; SUBCUTANEOUS at 08:05

## 2024-12-31 NOTE — PROGRESS NOTES
[facility] MEDICINE SERVICE  DAILY PROGRESS NOTE    NAME: Pk May  : 1980  MRN: [id number]      LOS: 1 day     PROVIDER OF SERVICE: aHrman Hsu MD    Chief Complaint: Recurrent UTI (urinary tract infection)    Subjective:     Interval History:  History taken from: patient    Still with intermittent 10/10 flank pain.        Review of Systems:   Review of Systems    Objective:     Vital Signs  Temp:  [97.3 °F (36.3 °C)-98.7 °F (37.1 °C)] 97.9 °F (36.6 °C)  Heart Rate:  [] 79  Resp:  [12-19] 19  BP: (113-136)/(72-79) 127/72   Body mass index is 28.7 kg/m².    Physical Exam  Physical Exam  Constitutional:       Appearance: Normal appearance.   Cardiovascular:      Rate and Rhythm: Normal rate and regular rhythm.      Pulses: Normal pulses.      Heart sounds: Normal heart sounds.   Pulmonary:      Effort: Pulmonary effort is normal.      Breath sounds: Normal breath sounds.   Abdominal:      General: Abdomen is flat.      Palpations: Abdomen is soft.   Neurological:      Mental Status: He is alert. Mental status is at baseline.            Diagnostic Data    Results from last 7 days   Lab Units 24  0045 24  1150   WBC 10*3/mm3 7.84 6.48   HEMOGLOBIN g/dL 12.3* 13.3   HEMATOCRIT % 38.8 40.9   PLATELETS 10*3/mm3 291 295   GLUCOSE mg/dL 112* 97   CREATININE mg/dL 0.57* 0.62*   BUN mg/dL 10 12   SODIUM mmol/L 140 141   POTASSIUM mmol/L 3.6 3.9   AST (SGOT) U/L  --  20   ALT (SGPT) U/L  --  13   ALK PHOS U/L  --  105   BILIRUBIN mg/dL  --  0.3   ANION GAP mmol/L 9.5 11.2       MRI abdomen w wo contrast mrcp    Result Date: 2024  Impression: 1. 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 2. Cholelithiasis without findings of acute cholecystitis or biliary obstruction. 3. Other ancillary findings as above. Electronically Signed: Neto River MD  2024 3:52 PM EST  Workstation ID: [id number]       I reviewed the patient's new clinical  results.    Assessment/Plan:     Active and Resolved Problems  Active Hospital Problems    Diagnosis  POA    **Recurrent UTI (urinary tract infection) [N39.0]  Yes    Recurrent UTI [N39.0]  Yes      Resolved Hospital Problems   No resolved problems to display.       Acute Left flank pain  Recurrent UTI  History of urostomy  Neurogenic bladder  - UA: 2+ hematuria, 3+ leukocytes, WBC too numerous to count.  This sample was taken from the urostomy bag.  - Urine cx 12/19/24: +P. Mirabilis; culture 12/29 mxied  - Blood cx pending  -No clinical sign of infection in this patient.  We do not need to treat the urostomy culture.  - Pain management: dilaudid 0.5 mg Q3H PRN   - Unclear cause of his flank pain but it is at times quite debilitating. Urology has seen and evaluated the patient, no acute management from their end.    The pain is somewhat improved from yesterday but patient is still requiring IV pain meds. His overall exam is unchanged. His labs are reassuring. His imaging is reassuring from both the CT and MRI.    The situation is complicated that the patient has an incomplete spinal cord injury and his sensory level is above the level of his abdomen. He has some degree of visceral sensation given his symptoms. I will ask general surgery to evaluate the abdomen from their perspective.    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  Paraplegia  Neurogenic bowel and bladder  - Urostomy and colostomy   - Personal wheelchair at bedside  - Air mattress ordered     Anxiety  Depression  PTSD  - Continue home meds  - Supportive care      Mixed hyperlipidemia  - Not on statin therapy     Nicotine dependence  - Current use of nicotine via vape pen  - Nicotine patch ordered  - Tobacco cessation education      Prominent nodular area with air adjacent to the tail of the pancreas  - Incidental finding during previous hospitalization  -Obtain MRI pancreas with and without    VTE Prophylaxis:  Mechanical VTE prophylaxis orders are  present.             Disposition Planning:     Barriers to Discharge: Urology eval, MRI pancreas  Anticipated Date of Discharge: 12/31/2024  Place of Discharge: Home      Time: 45 minutes     Code Status and Medical Interventions: CPR (Attempt to Resuscitate); Full Support   Ordered at: 12/29/24 1907     Level Of Support Discussed With:    Patient     Code Status (Patient has no pulse and is not breathing):    CPR (Attempt to Resuscitate)     Medical Interventions (Patient has pulse or is breathing):    Full Support       Signature: Electronically signed by Harman Hsu MD, 12/31/24, 17:53 EST.  [facility] Hospitalist Team

## 2024-12-31 NOTE — PLAN OF CARE
Goal Outcome Evaluation:              Outcome Evaluation: patient has been resting thorughout this shift with complaints of flank pain - see MAR. ongoing plan of care

## 2024-12-31 NOTE — PLAN OF CARE
"Spoke with pt this AM, he expressed he has no PT needs at this time. He stated \"I've been doing this for 9 years\" referring to mobility with his dx of paraplegia. PT will sign off at this time.                                             "

## 2025-01-01 ENCOUNTER — READMISSION MANAGEMENT (OUTPATIENT)
Dept: CALL CENTER | Facility: HOSPITAL | Age: 45
End: 2025-01-01
Payer: MEDICARE

## 2025-01-01 VITALS
DIASTOLIC BLOOD PRESSURE: 79 MMHG | TEMPERATURE: 98.7 F | HEIGHT: 70 IN | HEART RATE: 91 BPM | OXYGEN SATURATION: 98 % | WEIGHT: 200 LBS | SYSTOLIC BLOOD PRESSURE: 133 MMHG | BODY MASS INDEX: 28.63 KG/M2 | RESPIRATION RATE: 17 BRPM

## 2025-01-01 LAB
DEPRECATED RDW RBC AUTO: 44.7 FL (ref 37–54)
ERYTHROCYTE [DISTWIDTH] IN BLOOD BY AUTOMATED COUNT: 13.2 % (ref 12.3–15.4)
HCT VFR BLD AUTO: 38.1 % (ref 37.5–51)
HGB BLD-MCNC: 12.5 G/DL (ref 13–17.7)
MCH RBC QN AUTO: 30.3 PG (ref 26.6–33)
MCHC RBC AUTO-ENTMCNC: 32.8 G/DL (ref 31.5–35.7)
MCV RBC AUTO: 92.3 FL (ref 79–97)
PLATELET # BLD AUTO: 277 10*3/MM3 (ref 140–450)
PMV BLD AUTO: 9.1 FL (ref 6–12)
RBC # BLD AUTO: 4.13 10*6/MM3 (ref 4.14–5.8)
WBC NRBC COR # BLD AUTO: 5.01 10*3/MM3 (ref 3.4–10.8)

## 2025-01-01 PROCEDURE — 85027 COMPLETE CBC AUTOMATED: CPT | Performed by: STUDENT IN AN ORGANIZED HEALTH CARE EDUCATION/TRAINING PROGRAM

## 2025-01-01 PROCEDURE — 25010000002 HYDROMORPHONE 1 MG/ML SOLUTION: Performed by: STUDENT IN AN ORGANIZED HEALTH CARE EDUCATION/TRAINING PROGRAM

## 2025-01-01 PROCEDURE — 25010000002 HYDROMORPHONE 1 MG/ML SOLUTION: Performed by: INTERNAL MEDICINE

## 2025-01-01 RX ADMIN — BACLOFEN 10 MG: 10 TABLET ORAL at 08:17

## 2025-01-01 RX ADMIN — AMITRIPTYLINE HYDROCHLORIDE 75 MG: 50 TABLET, FILM COATED ORAL at 08:17

## 2025-01-01 RX ADMIN — HYDROMORPHONE HYDROCHLORIDE 1 MG: 1 INJECTION, SOLUTION INTRAMUSCULAR; INTRAVENOUS; SUBCUTANEOUS at 14:26

## 2025-01-01 RX ADMIN — BUSPIRONE HYDROCHLORIDE 30 MG: 15 TABLET ORAL at 17:30

## 2025-01-01 RX ADMIN — NICOTINE 1 PATCH: 21 PATCH TRANSDERMAL at 08:17

## 2025-01-01 RX ADMIN — HYDROMORPHONE HYDROCHLORIDE 1 MG: 1 INJECTION, SOLUTION INTRAMUSCULAR; INTRAVENOUS; SUBCUTANEOUS at 11:21

## 2025-01-01 RX ADMIN — BACLOFEN 10 MG: 10 TABLET ORAL at 17:30

## 2025-01-01 RX ADMIN — Medication 10 ML: at 08:19

## 2025-01-01 RX ADMIN — BUSPIRONE HYDROCHLORIDE 15 MG: 15 TABLET ORAL at 06:04

## 2025-01-01 RX ADMIN — HYDROMORPHONE HYDROCHLORIDE 1 MG: 1 INJECTION, SOLUTION INTRAMUSCULAR; INTRAVENOUS; SUBCUTANEOUS at 17:29

## 2025-01-01 RX ADMIN — VENLAFAXINE HYDROCHLORIDE 150 MG: 75 CAPSULE, EXTENDED RELEASE ORAL at 08:17

## 2025-01-01 RX ADMIN — HYDROMORPHONE HYDROCHLORIDE 1 MG: 1 INJECTION, SOLUTION INTRAMUSCULAR; INTRAVENOUS; SUBCUTANEOUS at 03:13

## 2025-01-01 RX ADMIN — HYDROMORPHONE HYDROCHLORIDE 1 MG: 1 INJECTION, SOLUTION INTRAMUSCULAR; INTRAVENOUS; SUBCUTANEOUS at 08:17

## 2025-01-01 RX ADMIN — ARIPIPRAZOLE 5 MG: 10 TABLET ORAL at 08:17

## 2025-01-01 NOTE — PLAN OF CARE
Goal Outcome Evaluation:         Pt still having right flank pain overnight.
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
## 2025-01-02 ENCOUNTER — TRANSITIONAL CARE MANAGEMENT TELEPHONE ENCOUNTER (OUTPATIENT)
Dept: CALL CENTER | Facility: HOSPITAL | Age: 45
End: 2025-01-02
Payer: MEDICAID

## 2025-01-02 NOTE — OUTREACH NOTE
Call Center TCM Note      Flowsheet Row Responses   [facility] patient discharged from? [facility]   Does the patient have one of the following disease processes/diagnoses(primary or secondary)? Other   TCM attempt successful? Yes  [vr for ANTHONY Henry]   Call start time 1022   Call end time 1024   Discharge diagnosis Recurrent UTI (urinary tract infection)   Person spoke with today (if not patient) and relationship Carl, SO   Does the patient have all medications ordered at discharge? N/A   Prescription comments no new medications, SO has no questions regarding regular medications   Is the patient taking all medications as directed (includes completed medication regime)? Yes   Comments Hospital f/u 1/9/25@1115am   Does the patient have an appointment with their PCP within 7-14 days of discharge? Yes   Has home health visited the patient within 72 hours of discharge? N/A   Psychosocial issues? No   Did the patient receive a copy of their discharge instructions? Yes   Nursing interventions Reviewed instructions with patient   What is the patient's perception of their health status since discharge? Improving  [SO reports improved and aware to monitor for return s/s of UTI, reviewed.  No questions today, appt scheduled.]   Is the patient/caregiver able to teach back signs and symptoms related to disease process for when to call PCP? Yes   Is the patient/caregiver able to teach back signs and symptoms related to disease process for when to call 911? Yes   TCM call completed? Yes   Call end time 1024            Minda Sue RN    1/2/2025, 10:26 EST
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
## 2025-01-02 NOTE — OUTREACH NOTE
Prep Survey      Flowsheet Row Responses   [facility] patient discharged from? [facility]   Is LACE score < 7 ? No   Eligibility [facility]   Date of Admission 12/29/24   Date of Discharge 01/01/25   Discharge Disposition Home or Self Care   Discharge diagnosis Recurrent UTI (urinary tract infection)   Does the patient have one of the following disease processes/diagnoses(primary or secondary)? Other   Does the patient have Home health ordered? No   Is there a DME ordered? No   Prep survey completed? Yes            Jovana BOSTON - Registered Nurse

## 2025-01-02 NOTE — CASE MANAGEMENT/SOCIAL WORK
Case Management Discharge Note      Final Note: Home.      Selected Continued Care - Discharged on 1/1/2025 Admission date: 12/29/2024 - Discharge disposition: Home or Self Care       Transportation Services  Private: Car    Final Discharge Disposition Code: 01 - home or self-care

## 2025-01-03 LAB
BACTERIA SPEC AEROBE CULT: NORMAL
BACTERIA SPEC AEROBE CULT: NORMAL

## 2025-01-16 ENCOUNTER — OFFICE VISIT (OUTPATIENT)
Dept: INTERNAL MEDICINE | Facility: CLINIC | Age: 45
End: 2025-01-16
Payer: MEDICARE

## 2025-01-16 VITALS
HEART RATE: 94 BPM | SYSTOLIC BLOOD PRESSURE: 116 MMHG | HEIGHT: 70 IN | RESPIRATION RATE: 16 BRPM | WEIGHT: 200 LBS | BODY MASS INDEX: 28.63 KG/M2 | DIASTOLIC BLOOD PRESSURE: 70 MMHG | OXYGEN SATURATION: 99 %

## 2025-01-16 DIAGNOSIS — K80.20 GALLSTONES: ICD-10-CM

## 2025-01-16 DIAGNOSIS — F32.5 MAJOR DEPRESSIVE DISORDER WITH SINGLE EPISODE, IN REMISSION: ICD-10-CM

## 2025-01-16 DIAGNOSIS — Z09 HOSPITAL DISCHARGE FOLLOW-UP: Primary | ICD-10-CM

## 2025-01-16 DIAGNOSIS — F33.1 RECURRENT MAJOR DEPRESSIVE EPISODES, MODERATE: ICD-10-CM

## 2025-01-16 DIAGNOSIS — F43.10 PTSD (POST-TRAUMATIC STRESS DISORDER): ICD-10-CM

## 2025-01-16 DIAGNOSIS — K86.9 PANCREATIC LESION: ICD-10-CM

## 2025-01-16 DIAGNOSIS — R10.10 UPPER ABDOMINAL PAIN: ICD-10-CM

## 2025-01-16 DIAGNOSIS — N39.0 RECURRENT UTI: ICD-10-CM

## 2025-01-16 PROCEDURE — 1126F AMNT PAIN NOTED NONE PRSNT: CPT | Performed by: NURSE PRACTITIONER

## 2025-01-16 PROCEDURE — 99214 OFFICE O/P EST MOD 30 MIN: CPT | Performed by: NURSE PRACTITIONER

## 2025-01-16 RX ORDER — VENLAFAXINE HYDROCHLORIDE 150 MG/1
150 CAPSULE, EXTENDED RELEASE ORAL DAILY
Qty: 90 CAPSULE | Refills: 1 | Status: SHIPPED | OUTPATIENT
Start: 2025-01-16

## 2025-01-16 RX ORDER — AMITRIPTYLINE HYDROCHLORIDE 75 MG/1
75 TABLET ORAL 2 TIMES DAILY
Qty: 180 TABLET | Refills: 1 | Status: SHIPPED | OUTPATIENT
Start: 2025-01-16

## 2025-02-11 ENCOUNTER — TELEPHONE (OUTPATIENT)
Dept: INTERNAL MEDICINE | Facility: CLINIC | Age: 45
End: 2025-02-11

## 2025-02-11 NOTE — TELEPHONE ENCOUNTER
Called and spoke with Carl.  Notified her that he No Showed and cancelled with Psychiatrist that Chasity referred to and they will no longer see patient.   Advised that he received a call on 2/4/25 at 3:01 pm to call insurance and find psychiatrist in network, get set up and we will place a referral after.     She understood.

## 2025-02-11 NOTE — TELEPHONE ENCOUNTER
Caller: Carl Santana    Relationship: Emergency Contact          Best call back number: [phone]      What was the call regarding: PATIENTS GIRLFRIEND CALLING WANTED TO FOLLOW UP WITH REFERRAL FOR MENTAL HEALTH STATES THEY HAVE NOT HEARD FROM ANY OFFICE ABOUT SCHEDULING AN APPOINTMENT     PLEASE GIVE CALLBACK

## 2025-02-14 NOTE — ED TRIAGE NOTES
Pt was brought in by ems from home for bilateral flank pain that started 3days ago. Around 1200 today pain became worse with n/v. Decreased output in urostomy   Benefits, risks, and possible complications of procedure explained to patient/caregiver who verbalized understanding and gave written consent.

## 2025-02-27 ENCOUNTER — PREP FOR SURGERY (OUTPATIENT)
Dept: OTHER | Facility: HOSPITAL | Age: 45
End: 2025-02-27
Payer: MEDICARE

## 2025-02-27 ENCOUNTER — OFFICE VISIT (OUTPATIENT)
Dept: SURGERY | Facility: CLINIC | Age: 45
End: 2025-02-27
Payer: MEDICARE

## 2025-02-27 VITALS
BODY MASS INDEX: 28.7 KG/M2 | HEART RATE: 86 BPM | SYSTOLIC BLOOD PRESSURE: 111 MMHG | DIASTOLIC BLOOD PRESSURE: 73 MMHG | TEMPERATURE: 98 F | HEIGHT: 70 IN | OXYGEN SATURATION: 100 %

## 2025-02-27 DIAGNOSIS — K80.20 SYMPTOMATIC CHOLELITHIASIS: Primary | ICD-10-CM

## 2025-02-27 PROCEDURE — 99214 OFFICE O/P EST MOD 30 MIN: CPT | Performed by: STUDENT IN AN ORGANIZED HEALTH CARE EDUCATION/TRAINING PROGRAM

## 2025-02-27 PROCEDURE — 1159F MED LIST DOCD IN RCRD: CPT | Performed by: STUDENT IN AN ORGANIZED HEALTH CARE EDUCATION/TRAINING PROGRAM

## 2025-02-27 PROCEDURE — 1160F RVW MEDS BY RX/DR IN RCRD: CPT | Performed by: STUDENT IN AN ORGANIZED HEALTH CARE EDUCATION/TRAINING PROGRAM

## 2025-02-27 RX ORDER — INDOCYANINE GREEN AND WATER 25 MG
2.5 KIT INJECTION ONCE
OUTPATIENT
Start: 2025-02-27 | End: 2025-02-27

## 2025-02-28 ENCOUNTER — OFFICE VISIT (OUTPATIENT)
Dept: INTERNAL MEDICINE | Facility: CLINIC | Age: 45
End: 2025-02-28
Payer: MEDICARE

## 2025-02-28 VITALS — SYSTOLIC BLOOD PRESSURE: 128 MMHG | OXYGEN SATURATION: 98 % | HEART RATE: 96 BPM | DIASTOLIC BLOOD PRESSURE: 66 MMHG

## 2025-02-28 DIAGNOSIS — R10.10 UPPER ABDOMINAL PAIN: ICD-10-CM

## 2025-02-28 DIAGNOSIS — F43.10 PTSD (POST-TRAUMATIC STRESS DISORDER): ICD-10-CM

## 2025-02-28 DIAGNOSIS — K80.20 GALLSTONES: Primary | ICD-10-CM

## 2025-02-28 DIAGNOSIS — F41.9 ANXIETY: ICD-10-CM

## 2025-02-28 DIAGNOSIS — F33.1 RECURRENT MAJOR DEPRESSIVE EPISODES, MODERATE: ICD-10-CM

## 2025-02-28 DIAGNOSIS — K80.20 SYMPTOMATIC CHOLELITHIASIS: ICD-10-CM

## 2025-02-28 DIAGNOSIS — F32.5 MAJOR DEPRESSIVE DISORDER WITH SINGLE EPISODE, IN REMISSION: ICD-10-CM

## 2025-02-28 RX ORDER — BUSPIRONE HYDROCHLORIDE 30 MG/1
TABLET ORAL
Qty: 180 TABLET | Refills: 1 | Status: SHIPPED | OUTPATIENT
Start: 2025-02-28

## 2025-02-28 RX ORDER — ARIPIPRAZOLE 5 MG/1
5 TABLET ORAL DAILY
Qty: 90 TABLET | Refills: 1 | Status: SHIPPED | OUTPATIENT
Start: 2025-02-28
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
## 2025-03-03 ENCOUNTER — LAB (OUTPATIENT)
Dept: LAB | Facility: HOSPITAL | Age: 45
End: 2025-03-03
Payer: MEDICARE

## 2025-03-03 ENCOUNTER — HOSPITAL ENCOUNTER (OUTPATIENT)
Dept: CARDIOLOGY | Facility: HOSPITAL | Age: 45
Discharge: HOME | End: 2025-03-03
Payer: MEDICARE

## 2025-03-03 ENCOUNTER — HOSPITAL ENCOUNTER (OUTPATIENT)
Dept: GENERAL RADIOLOGY | Facility: HOSPITAL | Age: 45
Discharge: HOME | End: 2025-03-03
Payer: MEDICARE

## 2025-03-03 DIAGNOSIS — K80.20 SYMPTOMATIC CHOLELITHIASIS: ICD-10-CM

## 2025-03-03 LAB
ABO GROUP BLD: NORMAL
ANION GAP SERPL CALCULATED.3IONS-SCNC: 11.1 MMOL/L (ref 5–15)
BASOPHILS # BLD AUTO: 0.04 10*3/MM3 (ref 0–0.2)
BASOPHILS NFR BLD AUTO: 0.7 % (ref 0–1.5)
BLD GP AB SCN SERPL QL: NEGATIVE
BUN SERPL-MCNC: 12 MG/DL (ref 6–20)
BUN/CREAT SERPL: 15.4 (ref 7–25)
CALCIUM SPEC-SCNC: 9.4 MG/DL (ref 8.6–10.5)
CHLORIDE SERPL-SCNC: 103 MMOL/L (ref 98–107)
CO2 SERPL-SCNC: 23.9 MMOL/L (ref 22–29)
CREAT SERPL-MCNC: 0.78 MG/DL (ref 0.76–1.27)
DEPRECATED RDW RBC AUTO: 45 FL (ref 37–54)
EGFRCR SERPLBLD CKD-EPI 2021: 113.5 ML/MIN/1.73
EOSINOPHIL # BLD AUTO: 0.04 10*3/MM3 (ref 0–0.4)
EOSINOPHIL NFR BLD AUTO: 0.7 % (ref 0.3–6.2)
ERYTHROCYTE [DISTWIDTH] IN BLOOD BY AUTOMATED COUNT: 13.4 % (ref 12.3–15.4)
GLUCOSE SERPL-MCNC: 116 MG/DL (ref 65–99)
HBA1C MFR BLD: 5.14 % (ref 4.8–5.6)
HCT VFR BLD AUTO: 44.6 % (ref 37.5–51)
HGB BLD-MCNC: 14.4 G/DL (ref 13–17.7)
IMM GRANULOCYTES # BLD AUTO: 0.02 10*3/MM3 (ref 0–0.05)
IMM GRANULOCYTES NFR BLD AUTO: 0.4 % (ref 0–0.5)
LYMPHOCYTES # BLD AUTO: 1.33 10*3/MM3 (ref 0.7–3.1)
LYMPHOCYTES NFR BLD AUTO: 24 % (ref 19.6–45.3)
MCH RBC QN AUTO: 29.5 PG (ref 26.6–33)
MCHC RBC AUTO-ENTMCNC: 32.3 G/DL (ref 31.5–35.7)
MCV RBC AUTO: 91.4 FL (ref 79–97)
MONOCYTES # BLD AUTO: 0.28 10*3/MM3 (ref 0.1–0.9)
MONOCYTES NFR BLD AUTO: 5.1 % (ref 5–12)
NEUTROPHILS NFR BLD AUTO: 3.83 10*3/MM3 (ref 1.7–7)
NEUTROPHILS NFR BLD AUTO: 69.1 % (ref 42.7–76)
NRBC BLD AUTO-RTO: 0 /100 WBC (ref 0–0.2)
PLATELET # BLD AUTO: 283 10*3/MM3 (ref 140–450)
PMV BLD AUTO: 9.5 FL (ref 6–12)
POTASSIUM SERPL-SCNC: 3.8 MMOL/L (ref 3.5–5.2)
QT INTERVAL: 352 MS
QTC INTERVAL: 428 MS
RBC # BLD AUTO: 4.88 10*6/MM3 (ref 4.14–5.8)
RH BLD: POSITIVE
SODIUM SERPL-SCNC: 138 MMOL/L (ref 136–145)
T&S EXPIRATION DATE: NORMAL
WBC NRBC COR # BLD AUTO: 5.54 10*3/MM3 (ref 3.4–10.8)

## 2025-03-03 PROCEDURE — 85025 COMPLETE CBC W/AUTO DIFF WBC: CPT | Performed by: STUDENT IN AN ORGANIZED HEALTH CARE EDUCATION/TRAINING PROGRAM

## 2025-03-03 PROCEDURE — 36415 COLL VENOUS BLD VENIPUNCTURE: CPT | Performed by: STUDENT IN AN ORGANIZED HEALTH CARE EDUCATION/TRAINING PROGRAM

## 2025-03-03 PROCEDURE — 80048 BASIC METABOLIC PNL TOTAL CA: CPT | Performed by: STUDENT IN AN ORGANIZED HEALTH CARE EDUCATION/TRAINING PROGRAM

## 2025-03-03 PROCEDURE — 83036 HEMOGLOBIN GLYCOSYLATED A1C: CPT | Performed by: STUDENT IN AN ORGANIZED HEALTH CARE EDUCATION/TRAINING PROGRAM

## 2025-03-03 PROCEDURE — 86901 BLOOD TYPING SEROLOGIC RH(D): CPT

## 2025-03-03 PROCEDURE — 93010 ELECTROCARDIOGRAM REPORT: CPT | Performed by: INTERNAL MEDICINE

## 2025-03-03 PROCEDURE — 93005 ELECTROCARDIOGRAM TRACING: CPT | Performed by: STUDENT IN AN ORGANIZED HEALTH CARE EDUCATION/TRAINING PROGRAM

## 2025-03-03 PROCEDURE — 86900 BLOOD TYPING SEROLOGIC ABO: CPT

## 2025-03-03 PROCEDURE — 86850 RBC ANTIBODY SCREEN: CPT

## 2025-03-03 PROCEDURE — 71046 X-RAY EXAM CHEST 2 VIEWS: CPT

## 2025-03-10 ENCOUNTER — ANESTHESIA EVENT (OUTPATIENT)
Dept: PERIOP | Facility: HOSPITAL | Age: 45
End: 2025-03-10
Payer: MEDICARE

## 2025-03-11 ENCOUNTER — ANESTHESIA (OUTPATIENT)
Dept: PERIOP | Facility: HOSPITAL | Age: 45
End: 2025-03-11
Payer: MEDICARE

## 2025-03-11 ENCOUNTER — HOSPITAL ENCOUNTER (OUTPATIENT)
Facility: HOSPITAL | Age: 45
Discharge: HOME | End: 2025-03-12
Attending: STUDENT IN AN ORGANIZED HEALTH CARE EDUCATION/TRAINING PROGRAM | Admitting: STUDENT IN AN ORGANIZED HEALTH CARE EDUCATION/TRAINING PROGRAM
Payer: MEDICARE

## 2025-03-11 DIAGNOSIS — K80.20 SYMPTOMATIC CHOLELITHIASIS: Primary | ICD-10-CM

## 2025-03-11 PROCEDURE — G0378 HOSPITAL OBSERVATION PER HR: HCPCS

## 2025-03-11 PROCEDURE — 25810000003 LACTATED RINGERS PER 1000 ML

## 2025-03-11 PROCEDURE — 63710000001 AMITRIPTYLINE 25 MG TABLET: Performed by: STUDENT IN AN ORGANIZED HEALTH CARE EDUCATION/TRAINING PROGRAM

## 2025-03-11 PROCEDURE — 25010000002 FENTANYL CITRATE (PF) 100 MCG/2ML SOLUTION

## 2025-03-11 PROCEDURE — 63710000001 OXYCODONE 5 MG TABLET: Performed by: STUDENT IN AN ORGANIZED HEALTH CARE EDUCATION/TRAINING PROGRAM

## 2025-03-11 PROCEDURE — 25010000002 HYDROMORPHONE 1 MG/ML SOLUTION

## 2025-03-11 PROCEDURE — 25010000002 KETOROLAC TROMETHAMINE PER 15 MG: Performed by: ANESTHESIOLOGY

## 2025-03-11 PROCEDURE — 63710000001 ACETAMINOPHEN 500 MG TABLET: Performed by: STUDENT IN AN ORGANIZED HEALTH CARE EDUCATION/TRAINING PROGRAM

## 2025-03-11 PROCEDURE — 25010000002 CEFAZOLIN PER 500 MG: Performed by: STUDENT IN AN ORGANIZED HEALTH CARE EDUCATION/TRAINING PROGRAM

## 2025-03-11 PROCEDURE — 47562 LAPAROSCOPIC CHOLECYSTECTOMY: CPT | Performed by: STUDENT IN AN ORGANIZED HEALTH CARE EDUCATION/TRAINING PROGRAM

## 2025-03-11 PROCEDURE — A9270 NON-COVERED ITEM OR SERVICE: HCPCS | Performed by: STUDENT IN AN ORGANIZED HEALTH CARE EDUCATION/TRAINING PROGRAM

## 2025-03-11 PROCEDURE — 25010000002 BUPIVACAINE (PF) 0.25 % SOLUTION: Performed by: STUDENT IN AN ORGANIZED HEALTH CARE EDUCATION/TRAINING PROGRAM

## 2025-03-11 PROCEDURE — 25010000002 PROPOFOL 10 MG/ML EMULSION

## 2025-03-11 PROCEDURE — 25010000002 INDOCYANINE GREEN 25 MG RECONSTITUTED SOLUTION: Performed by: STUDENT IN AN ORGANIZED HEALTH CARE EDUCATION/TRAINING PROGRAM

## 2025-03-11 PROCEDURE — 63710000001 BUSPIRONE 15 MG TABLET: Performed by: STUDENT IN AN ORGANIZED HEALTH CARE EDUCATION/TRAINING PROGRAM

## 2025-03-11 PROCEDURE — 63710000001 BACLOFEN 10 MG TABLET: Performed by: STUDENT IN AN ORGANIZED HEALTH CARE EDUCATION/TRAINING PROGRAM

## 2025-03-11 PROCEDURE — 47562 LAPAROSCOPIC CHOLECYSTECTOMY: CPT

## 2025-03-11 PROCEDURE — 25010000002 LIDOCAINE PF 2% 2 % SOLUTION

## 2025-03-11 PROCEDURE — 25010000002 MIDAZOLAM PER 1 MG: Performed by: ANESTHESIOLOGY

## 2025-03-11 PROCEDURE — 25010000002 SUGAMMADEX 200 MG/2ML SOLUTION

## 2025-03-11 PROCEDURE — 25010000002 ONDANSETRON PER 1 MG

## 2025-03-11 PROCEDURE — 63710000001 AMITRIPTYLINE 50 MG TABLET: Performed by: STUDENT IN AN ORGANIZED HEALTH CARE EDUCATION/TRAINING PROGRAM

## 2025-03-11 PROCEDURE — 25010000002 ESMOLOL 100 MG/10ML SOLUTION

## 2025-03-11 PROCEDURE — 25010000002 FENTANYL CITRATE (PF) 50 MCG/ML SOLUTION

## 2025-03-11 PROCEDURE — 88304 TISSUE EXAM BY PATHOLOGIST: CPT | Performed by: STUDENT IN AN ORGANIZED HEALTH CARE EDUCATION/TRAINING PROGRAM

## 2025-03-11 PROCEDURE — 25810000003 LACTATED RINGERS PER 1000 ML: Performed by: STUDENT IN AN ORGANIZED HEALTH CARE EDUCATION/TRAINING PROGRAM

## 2025-03-11 PROCEDURE — 25010000002 DEXAMETHASONE PER 1 MG

## 2025-03-11 PROCEDURE — 25010000002 MAGNESIUM SULFATE PER 500 MG OF MAGNESIUM

## 2025-03-11 DEVICE — LARGE LIGATION CLIPS 6 CLIPS/CART
Type: IMPLANTABLE DEVICE | Site: ABDOMEN | Status: FUNCTIONAL
Brand: VAS-Q-CLIP

## 2025-03-11 RX ORDER — SODIUM CHLORIDE 0.9 % (FLUSH) 0.9 %
10 SYRINGE (ML) INJECTION AS NEEDED
Status: DISCONTINUED | OUTPATIENT
Start: 2025-03-11 | End: 2025-03-11 | Stop reason: HOSPADM

## 2025-03-11 RX ORDER — DIPHENHYDRAMINE HYDROCHLORIDE 50 MG/ML
12.5 INJECTION, SOLUTION INTRAMUSCULAR; INTRAVENOUS
Status: DISCONTINUED | OUTPATIENT
Start: 2025-03-11 | End: 2025-03-11 | Stop reason: HOSPADM

## 2025-03-11 RX ORDER — ARIPIPRAZOLE 5 MG/1
5 TABLET ORAL DAILY
Status: DISCONTINUED | OUTPATIENT
Start: 2025-03-12 | End: 2025-03-12 | Stop reason: HOSPADM

## 2025-03-11 RX ORDER — ENOXAPARIN SODIUM 100 MG/ML
40 INJECTION SUBCUTANEOUS EVERY 24 HOURS
Status: DISCONTINUED | OUTPATIENT
Start: 2025-03-12 | End: 2025-03-12 | Stop reason: HOSPADM

## 2025-03-11 RX ORDER — POLYETHYLENE GLYCOL 3350 17 G/17G
17 POWDER, FOR SOLUTION ORAL DAILY
Status: DISCONTINUED | OUTPATIENT
Start: 2025-03-11 | End: 2025-03-12 | Stop reason: HOSPADM

## 2025-03-11 RX ORDER — OXYCODONE HYDROCHLORIDE 5 MG/1
5 TABLET ORAL EVERY 4 HOURS PRN
Refills: 0 | Status: DISCONTINUED | OUTPATIENT
Start: 2025-03-11 | End: 2025-03-11

## 2025-03-11 RX ORDER — SODIUM CHLORIDE, SODIUM LACTATE, POTASSIUM CHLORIDE, CALCIUM CHLORIDE 600; 310; 30; 20 MG/100ML; MG/100ML; MG/100ML; MG/100ML
1000 INJECTION, SOLUTION INTRAVENOUS ONCE
Status: DISCONTINUED | OUTPATIENT
Start: 2025-03-11 | End: 2025-03-11 | Stop reason: HOSPADM

## 2025-03-11 RX ORDER — EPHEDRINE SULFATE 5 MG/ML
5 INJECTION INTRAVENOUS ONCE AS NEEDED
Status: DISCONTINUED | OUTPATIENT
Start: 2025-03-11 | End: 2025-03-11 | Stop reason: HOSPADM

## 2025-03-11 RX ORDER — ONDANSETRON 2 MG/ML
4 INJECTION INTRAMUSCULAR; INTRAVENOUS EVERY 6 HOURS
Status: DISCONTINUED | OUTPATIENT
Start: 2025-03-11 | End: 2025-03-12 | Stop reason: HOSPADM

## 2025-03-11 RX ORDER — HYDRALAZINE HYDROCHLORIDE 20 MG/ML
5 INJECTION INTRAMUSCULAR; INTRAVENOUS
Status: DISCONTINUED | OUTPATIENT
Start: 2025-03-11 | End: 2025-03-11 | Stop reason: HOSPADM

## 2025-03-11 RX ORDER — OXYCODONE HYDROCHLORIDE 5 MG/1
10 TABLET ORAL EVERY 4 HOURS PRN
Refills: 0 | Status: COMPLETED | OUTPATIENT
Start: 2025-03-11 | End: 2025-03-11

## 2025-03-11 RX ORDER — IPRATROPIUM BROMIDE AND ALBUTEROL SULFATE 2.5; .5 MG/3ML; MG/3ML
3 SOLUTION RESPIRATORY (INHALATION) ONCE AS NEEDED
Status: DISCONTINUED | OUTPATIENT
Start: 2025-03-11 | End: 2025-03-11 | Stop reason: HOSPADM

## 2025-03-11 RX ORDER — ONDANSETRON 2 MG/ML
4 INJECTION INTRAMUSCULAR; INTRAVENOUS ONCE AS NEEDED
Status: DISCONTINUED | OUTPATIENT
Start: 2025-03-11 | End: 2025-03-11 | Stop reason: HOSPADM

## 2025-03-11 RX ORDER — MIDAZOLAM HYDROCHLORIDE 1 MG/ML
2 INJECTION, SOLUTION INTRAMUSCULAR; INTRAVENOUS ONCE
Status: COMPLETED | OUTPATIENT
Start: 2025-03-11 | End: 2025-03-11

## 2025-03-11 RX ORDER — KETOROLAC TROMETHAMINE 30 MG/ML
30 INJECTION, SOLUTION INTRAMUSCULAR; INTRAVENOUS ONCE
Status: COMPLETED | OUTPATIENT
Start: 2025-03-11 | End: 2025-03-11

## 2025-03-11 RX ORDER — FENTANYL CITRATE 50 UG/ML
INJECTION, SOLUTION INTRAMUSCULAR; INTRAVENOUS AS NEEDED
Status: DISCONTINUED | OUTPATIENT
Start: 2025-03-11 | End: 2025-03-11 | Stop reason: SURG

## 2025-03-11 RX ORDER — BUSPIRONE HYDROCHLORIDE 5 MG/1
10 TABLET ORAL EVERY 8 HOURS SCHEDULED
Status: DISCONTINUED | OUTPATIENT
Start: 2025-03-11 | End: 2025-03-11

## 2025-03-11 RX ORDER — NALOXONE HCL 0.4 MG/ML
0.4 VIAL (ML) INJECTION AS NEEDED
Status: DISCONTINUED | OUTPATIENT
Start: 2025-03-11 | End: 2025-03-11 | Stop reason: HOSPADM

## 2025-03-11 RX ORDER — ACETAMINOPHEN 500 MG
1000 TABLET ORAL EVERY 8 HOURS
Status: DISCONTINUED | OUTPATIENT
Start: 2025-03-11 | End: 2025-03-12 | Stop reason: HOSPADM

## 2025-03-11 RX ORDER — LIDOCAINE HYDROCHLORIDE 10 MG/ML
0.5 INJECTION, SOLUTION EPIDURAL; INFILTRATION; INTRACAUDAL; PERINEURAL ONCE AS NEEDED
Status: DISCONTINUED | OUTPATIENT
Start: 2025-03-11 | End: 2025-03-11 | Stop reason: HOSPADM

## 2025-03-11 RX ORDER — INDOCYANINE GREEN AND WATER 25 MG
2.5 KIT INJECTION ONCE
Status: COMPLETED | OUTPATIENT
Start: 2025-03-11 | End: 2025-03-11

## 2025-03-11 RX ORDER — FLUMAZENIL 0.1 MG/ML
0.2 INJECTION INTRAVENOUS AS NEEDED
Status: DISCONTINUED | OUTPATIENT
Start: 2025-03-11 | End: 2025-03-11 | Stop reason: HOSPADM

## 2025-03-11 RX ORDER — ONDANSETRON 2 MG/ML
INJECTION INTRAMUSCULAR; INTRAVENOUS AS NEEDED
Status: DISCONTINUED | OUTPATIENT
Start: 2025-03-11 | End: 2025-03-11 | Stop reason: SURG

## 2025-03-11 RX ORDER — DEXAMETHASONE SODIUM PHOSPHATE 4 MG/ML
INJECTION, SOLUTION INTRA-ARTICULAR; INTRALESIONAL; INTRAMUSCULAR; INTRAVENOUS; SOFT TISSUE AS NEEDED
Status: DISCONTINUED | OUTPATIENT
Start: 2025-03-11 | End: 2025-03-11 | Stop reason: SURG

## 2025-03-11 RX ORDER — BUSPIRONE HYDROCHLORIDE 15 MG/1
15 TABLET ORAL DAILY
Status: DISCONTINUED | OUTPATIENT
Start: 2025-03-12 | End: 2025-03-12 | Stop reason: HOSPADM

## 2025-03-11 RX ORDER — LIDOCAINE HYDROCHLORIDE 20 MG/ML
INJECTION, SOLUTION EPIDURAL; INFILTRATION; INTRACAUDAL; PERINEURAL AS NEEDED
Status: DISCONTINUED | OUTPATIENT
Start: 2025-03-11 | End: 2025-03-11 | Stop reason: SURG

## 2025-03-11 RX ORDER — BACLOFEN 10 MG/1
10 TABLET ORAL 3 TIMES DAILY
Status: DISCONTINUED | OUTPATIENT
Start: 2025-03-11 | End: 2025-03-12 | Stop reason: HOSPADM

## 2025-03-11 RX ORDER — MEPERIDINE HYDROCHLORIDE 25 MG/ML
12.5 INJECTION INTRAMUSCULAR; INTRAVENOUS; SUBCUTANEOUS
Status: DISCONTINUED | OUTPATIENT
Start: 2025-03-11 | End: 2025-03-11 | Stop reason: HOSPADM

## 2025-03-11 RX ORDER — LABETALOL HYDROCHLORIDE 5 MG/ML
5 INJECTION, SOLUTION INTRAVENOUS
Status: DISCONTINUED | OUTPATIENT
Start: 2025-03-11 | End: 2025-03-11 | Stop reason: HOSPADM

## 2025-03-11 RX ORDER — ROCURONIUM BROMIDE 10 MG/ML
INJECTION, SOLUTION INTRAVENOUS AS NEEDED
Status: DISCONTINUED | OUTPATIENT
Start: 2025-03-11 | End: 2025-03-11 | Stop reason: SURG

## 2025-03-11 RX ORDER — ESMOLOL HYDROCHLORIDE 10 MG/ML
INJECTION INTRAVENOUS AS NEEDED
Status: DISCONTINUED | OUTPATIENT
Start: 2025-03-11 | End: 2025-03-11 | Stop reason: SURG

## 2025-03-11 RX ORDER — MAGNESIUM SULFATE HEPTAHYDRATE 500 MG/ML
INJECTION, SOLUTION INTRAMUSCULAR; INTRAVENOUS AS NEEDED
Status: DISCONTINUED | OUTPATIENT
Start: 2025-03-11 | End: 2025-03-11 | Stop reason: SURG

## 2025-03-11 RX ORDER — DEXMEDETOMIDINE HYDROCHLORIDE 100 UG/ML
INJECTION, SOLUTION INTRAVENOUS AS NEEDED
Status: DISCONTINUED | OUTPATIENT
Start: 2025-03-11 | End: 2025-03-11 | Stop reason: SURG

## 2025-03-11 RX ORDER — BUPIVACAINE HYDROCHLORIDE 2.5 MG/ML
INJECTION, SOLUTION EPIDURAL; INFILTRATION; INTRACAUDAL; PERINEURAL AS NEEDED
Status: DISCONTINUED | OUTPATIENT
Start: 2025-03-11 | End: 2025-03-11 | Stop reason: HOSPADM

## 2025-03-11 RX ORDER — SODIUM CHLORIDE, SODIUM LACTATE, POTASSIUM CHLORIDE, CALCIUM CHLORIDE 600; 310; 30; 20 MG/100ML; MG/100ML; MG/100ML; MG/100ML
1000 INJECTION, SOLUTION INTRAVENOUS ONCE
Status: COMPLETED | OUTPATIENT
Start: 2025-03-11 | End: 2025-03-11

## 2025-03-11 RX ORDER — FENTANYL CITRATE 50 UG/ML
50 INJECTION, SOLUTION INTRAMUSCULAR; INTRAVENOUS
Status: DISCONTINUED | OUTPATIENT
Start: 2025-03-11 | End: 2025-03-11 | Stop reason: HOSPADM

## 2025-03-11 RX ORDER — DIPHENHYDRAMINE HYDROCHLORIDE 50 MG/ML
12.5 INJECTION, SOLUTION INTRAMUSCULAR; INTRAVENOUS ONCE AS NEEDED
Status: DISCONTINUED | OUTPATIENT
Start: 2025-03-11 | End: 2025-03-11 | Stop reason: HOSPADM

## 2025-03-11 RX ORDER — BUSPIRONE HYDROCHLORIDE 15 MG/1
30 TABLET ORAL NIGHTLY
Status: DISCONTINUED | OUTPATIENT
Start: 2025-03-11 | End: 2025-03-12 | Stop reason: HOSPADM

## 2025-03-11 RX ORDER — VENLAFAXINE HYDROCHLORIDE 75 MG/1
150 CAPSULE, EXTENDED RELEASE ORAL DAILY
Status: DISCONTINUED | OUTPATIENT
Start: 2025-03-12 | End: 2025-03-12 | Stop reason: HOSPADM

## 2025-03-11 RX ORDER — SODIUM CHLORIDE, SODIUM LACTATE, POTASSIUM CHLORIDE, CALCIUM CHLORIDE 600; 310; 30; 20 MG/100ML; MG/100ML; MG/100ML; MG/100ML
INJECTION, SOLUTION INTRAVENOUS CONTINUOUS PRN
Status: DISCONTINUED | OUTPATIENT
Start: 2025-03-11 | End: 2025-03-11 | Stop reason: SURG

## 2025-03-11 RX ORDER — OXYCODONE HYDROCHLORIDE 5 MG/1
5 TABLET ORAL ONCE AS NEEDED
Refills: 0 | Status: DISCONTINUED | OUTPATIENT
Start: 2025-03-11 | End: 2025-03-11 | Stop reason: HOSPADM

## 2025-03-11 RX ORDER — PROPOFOL 10 MG/ML
VIAL (ML) INTRAVENOUS AS NEEDED
Status: DISCONTINUED | OUTPATIENT
Start: 2025-03-11 | End: 2025-03-11 | Stop reason: SURG

## 2025-03-11 RX ADMIN — HYDROMORPHONE HYDROCHLORIDE 0.5 MG: 1 INJECTION, SOLUTION INTRAMUSCULAR; INTRAVENOUS; SUBCUTANEOUS at 13:00

## 2025-03-11 RX ADMIN — LIDOCAINE HYDROCHLORIDE 100 MG: 20 INJECTION, SOLUTION EPIDURAL; INFILTRATION; INTRACAUDAL; PERINEURAL at 09:24

## 2025-03-11 RX ADMIN — KETOROLAC TROMETHAMINE 30 MG: 30 INJECTION, SOLUTION INTRAMUSCULAR at 14:59

## 2025-03-11 RX ADMIN — ESMOLOL HYDROCHLORIDE 5 MG: 100 INJECTION, SOLUTION INTRAVENOUS at 10:36

## 2025-03-11 RX ADMIN — FENTANYL CITRATE 50 MCG: 50 INJECTION, SOLUTION INTRAMUSCULAR; INTRAVENOUS at 10:30

## 2025-03-11 RX ADMIN — MIDAZOLAM 2 MG: 1 INJECTION INTRAMUSCULAR; INTRAVENOUS at 08:58

## 2025-03-11 RX ADMIN — FENTANYL CITRATE 50 MCG: 50 INJECTION, SOLUTION INTRAMUSCULAR; INTRAVENOUS at 12:11

## 2025-03-11 RX ADMIN — BACLOFEN 10 MG: 10 TABLET ORAL at 21:56

## 2025-03-11 RX ADMIN — HYDROMORPHONE HYDROCHLORIDE 0.5 MG: 1 INJECTION, SOLUTION INTRAMUSCULAR; INTRAVENOUS; SUBCUTANEOUS at 12:27

## 2025-03-11 RX ADMIN — OXYCODONE HYDROCHLORIDE 10 MG: 5 TABLET ORAL at 21:55

## 2025-03-11 RX ADMIN — PROPOFOL 200 MG: 10 INJECTION, EMULSION INTRAVENOUS at 09:24

## 2025-03-11 RX ADMIN — HYDROMORPHONE HYDROCHLORIDE 0.5 MG: 1 INJECTION, SOLUTION INTRAMUSCULAR; INTRAVENOUS; SUBCUTANEOUS at 12:43

## 2025-03-11 RX ADMIN — DEXMEDETOMIDINE HYDROCHLORIDE 10 MCG: 100 INJECTION, SOLUTION INTRAVENOUS at 09:42

## 2025-03-11 RX ADMIN — SUGAMMADEX 200 MG: 100 INJECTION, SOLUTION INTRAVENOUS at 10:50

## 2025-03-11 RX ADMIN — FENTANYL CITRATE 50 MCG: 50 INJECTION, SOLUTION INTRAMUSCULAR; INTRAVENOUS at 11:41

## 2025-03-11 RX ADMIN — DEXMEDETOMIDINE HYDROCHLORIDE 10 MCG: 100 INJECTION, SOLUTION INTRAVENOUS at 10:15

## 2025-03-11 RX ADMIN — AMITRIPTYLINE HYDROCHLORIDE 75 MG: 50 TABLET, FILM COATED ORAL at 21:55

## 2025-03-11 RX ADMIN — ROCURONIUM BROMIDE 10 MG: 10 INJECTION, SOLUTION INTRAVENOUS at 10:07

## 2025-03-11 RX ADMIN — SODIUM CHLORIDE, SODIUM LACTATE, POTASSIUM CHLORIDE, AND CALCIUM CHLORIDE: .6; .31; .03; .02 INJECTION, SOLUTION INTRAVENOUS at 09:19

## 2025-03-11 RX ADMIN — ONDANSETRON 4 MG: 2 INJECTION, SOLUTION INTRAMUSCULAR; INTRAVENOUS at 10:38

## 2025-03-11 RX ADMIN — OXYCODONE HYDROCHLORIDE 5 MG: 5 TABLET ORAL at 17:43

## 2025-03-11 RX ADMIN — ROCURONIUM BROMIDE 20 MG: 10 INJECTION, SOLUTION INTRAVENOUS at 10:18

## 2025-03-11 RX ADMIN — OXYCODONE 10 MG: 5 TABLET ORAL at 12:42

## 2025-03-11 RX ADMIN — SODIUM CHLORIDE, SODIUM LACTATE, POTASSIUM CHLORIDE, AND CALCIUM CHLORIDE 1000 ML: 600; 310; 30; 20 INJECTION, SOLUTION INTRAVENOUS at 08:45

## 2025-03-11 RX ADMIN — INDOCYANINE GREEN AND WATER 2.5 MG: KIT at 08:53

## 2025-03-11 RX ADMIN — ROCURONIUM BROMIDE 50 MG: 10 INJECTION, SOLUTION INTRAVENOUS at 09:24

## 2025-03-11 RX ADMIN — FENTANYL CITRATE 50 MCG: 50 INJECTION, SOLUTION INTRAMUSCULAR; INTRAVENOUS at 10:21

## 2025-03-11 RX ADMIN — MAGNESIUM SULFATE HEPTAHYDRATE 2 G: 500 INJECTION, SOLUTION INTRAMUSCULAR; INTRAVENOUS at 09:53

## 2025-03-11 RX ADMIN — HYDROMORPHONE HYDROCHLORIDE 0.5 MG: 1 INJECTION, SOLUTION INTRAMUSCULAR; INTRAVENOUS; SUBCUTANEOUS at 11:58

## 2025-03-11 RX ADMIN — ROCURONIUM BROMIDE 20 MG: 10 INJECTION, SOLUTION INTRAVENOUS at 09:30

## 2025-03-11 RX ADMIN — BUSPIRONE HYDROCHLORIDE 30 MG: 15 TABLET ORAL at 21:56

## 2025-03-11 RX ADMIN — PROPOFOL 20 MG: 10 INJECTION, EMULSION INTRAVENOUS at 11:07

## 2025-03-11 RX ADMIN — FENTANYL CITRATE 50 MCG: 50 INJECTION, SOLUTION INTRAMUSCULAR; INTRAVENOUS at 09:21

## 2025-03-11 RX ADMIN — FENTANYL CITRATE 50 MCG: 50 INJECTION, SOLUTION INTRAMUSCULAR; INTRAVENOUS at 09:31

## 2025-03-11 RX ADMIN — DEXMEDETOMIDINE HYDROCHLORIDE 10 MCG: 100 INJECTION, SOLUTION INTRAVENOUS at 10:39

## 2025-03-11 RX ADMIN — CEFAZOLIN 2000 MG: 2 INJECTION, POWDER, FOR SOLUTION INTRAMUSCULAR; INTRAVENOUS at 09:13

## 2025-03-11 RX ADMIN — ACETAMINOPHEN 1000 MG: 500 TABLET, FILM COATED ORAL at 21:56

## 2025-03-11 RX ADMIN — HYDROMORPHONE HYDROCHLORIDE 0.5 MG: 1 INJECTION, SOLUTION INTRAMUSCULAR; INTRAVENOUS; SUBCUTANEOUS at 09:48

## 2025-03-11 RX ADMIN — DEXAMETHASONE SODIUM PHOSPHATE 8 MG: 4 INJECTION, SOLUTION INTRAMUSCULAR; INTRAVENOUS at 09:24

## 2025-03-11 RX ADMIN — HYDROMORPHONE HYDROCHLORIDE 0.5 MG: 1 INJECTION, SOLUTION INTRAMUSCULAR; INTRAVENOUS; SUBCUTANEOUS at 09:37

## 2025-03-11 NOTE — SIGNIFICANT NOTE
Removed system for surgery, cleaned with ling-wipes, then prepped with betadine spray, then sterile gauze and ioban placed over site for surgery.
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
## 2025-03-11 NOTE — ANESTHESIA POSTPROCEDURE EVALUATION
Patient: Pk May    Procedure Summary       Date: 03/11/25 Room / Location: [facility] OR 08 / [facility] MAIN OR    Anesthesia Start: 0919 Anesthesia Stop: 1119    Procedure: CHOLECYSTECTOMY LAPAROSCOPIC WITH DAVINCI ROBOT (Abdomen) Diagnosis:       Symptomatic cholelithiasis      (Symptomatic cholelithiasis [K80.20])    Surgeons: Silvestre Mota MD Provider: Katja Mendosa MD    Anesthesia Type: general ASA Status: 2            Anesthesia Type: general    Vitals  Vitals Value Taken Time   /95 03/11/25 12:46   Temp 97.6 °F (36.4 °C) 03/11/25 11:13   Pulse 108 03/11/25 12:54   Resp 10 03/11/25 12:43   SpO2 94 % 03/11/25 12:54   Vitals shown include unfiled device data.        Post Anesthesia Care and Evaluation    Patient location during evaluation: PACU  Patient participation: complete - patient participated  Level of consciousness: awake and alert  Pain management: satisfactory to patient    Airway patency: patent  Anesthetic complications: No anesthetic complications  PONV Status: none  Cardiovascular status: acceptable  Respiratory status: acceptable  Hydration status: acceptable
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
## 2025-03-11 NOTE — PLAN OF CARE
Goal Outcome Evaluation:      Patient a&ox4. Call light within reach, bed in low position.

## 2025-03-11 NOTE — ANESTHESIA PREPROCEDURE EVALUATION
Anesthesia Evaluation     Patient summary reviewed   no history of anesthetic complications:   NPO Solid Status: > 8 hours  NPO Liquid Status: > 8 hours           Airway   Dental      Pulmonary    (+) a smoker Former,  Cardiovascular     (+) hyperlipidemia      Neuro/Psych  GI/Hepatic/Renal/Endo      Musculoskeletal     Abdominal    Substance History      OB/GYN          Other                      Anesthesia Plan    ASA 2     general     (Pre  op midaz)  intravenous induction     Anesthetic plan, risks, benefits, and alternatives have been provided, discussed and informed consent has been obtained with: patient.    Plan discussed with CRNA.    CODE STATUS:

## 2025-03-11 NOTE — ANESTHESIA PROCEDURE NOTES
Airway  Reason: elective    Date/Time: 3/11/2025 9:26 AM    General Information and Staff    Patient location during procedure: OR  CRNA/CAA: Irvin Rajput CRNA    Indications and Patient Condition  Indications for airway management: airway protection    Preoxygenated: yes    Mask difficulty assessment: 1 - vent by mask    Final Airway Details    Final airway type: endotracheal airway      Successful airway: ETT   Successful intubation technique: direct laryngoscopy  Adjuncts used in placement: intubating stylet  Endotracheal tube insertion site: oral  Blade: Cassandra  Blade size: 4  ETT size (mm): 7.5  Cormack-Lehane Classification: grade IIa - partial view of glottis  Placement verified by: capnometry   Measured from: lips  ETT/EBT  to lips (cm): 21  Number of attempts at approach: 1  Assessment: lips, teeth, and gum same as pre-op and atraumatic intubation

## 2025-03-12 VITALS
RESPIRATION RATE: 16 BRPM | WEIGHT: 200 LBS | HEART RATE: 92 BPM | DIASTOLIC BLOOD PRESSURE: 72 MMHG | BODY MASS INDEX: 28.63 KG/M2 | TEMPERATURE: 97.9 F | OXYGEN SATURATION: 95 % | HEIGHT: 70 IN | SYSTOLIC BLOOD PRESSURE: 125 MMHG

## 2025-03-12 LAB
LAB AP CASE REPORT: NORMAL
PATH REPORT.FINAL DX SPEC: NORMAL
PATH REPORT.GROSS SPEC: NORMAL

## 2025-03-12 PROCEDURE — 63710000001 OXYCODONE 5 MG TABLET: Performed by: STUDENT IN AN ORGANIZED HEALTH CARE EDUCATION/TRAINING PROGRAM

## 2025-03-12 PROCEDURE — A9270 NON-COVERED ITEM OR SERVICE: HCPCS | Performed by: STUDENT IN AN ORGANIZED HEALTH CARE EDUCATION/TRAINING PROGRAM

## 2025-03-12 PROCEDURE — 63710000001 VENLAFAXINE XR 75 MG CAPSULE SUSTAINED-RELEASE 24 HR: Performed by: STUDENT IN AN ORGANIZED HEALTH CARE EDUCATION/TRAINING PROGRAM

## 2025-03-12 PROCEDURE — 63710000001 BUSPIRONE 15 MG TABLET: Performed by: STUDENT IN AN ORGANIZED HEALTH CARE EDUCATION/TRAINING PROGRAM

## 2025-03-12 PROCEDURE — 63710000001 AMITRIPTYLINE 25 MG TABLET: Performed by: STUDENT IN AN ORGANIZED HEALTH CARE EDUCATION/TRAINING PROGRAM

## 2025-03-12 PROCEDURE — 63710000001 BACLOFEN 10 MG TABLET: Performed by: STUDENT IN AN ORGANIZED HEALTH CARE EDUCATION/TRAINING PROGRAM

## 2025-03-12 PROCEDURE — 25010000002 ONDANSETRON PER 1 MG: Performed by: STUDENT IN AN ORGANIZED HEALTH CARE EDUCATION/TRAINING PROGRAM

## 2025-03-12 PROCEDURE — 63710000001 POLYETHYLENE GLYCOL 17 G PACK: Performed by: STUDENT IN AN ORGANIZED HEALTH CARE EDUCATION/TRAINING PROGRAM

## 2025-03-12 PROCEDURE — 63710000001 AMITRIPTYLINE 50 MG TABLET: Performed by: STUDENT IN AN ORGANIZED HEALTH CARE EDUCATION/TRAINING PROGRAM

## 2025-03-12 PROCEDURE — 63710000001 ARIPIPRAZOLE 5 MG TABLET: Performed by: STUDENT IN AN ORGANIZED HEALTH CARE EDUCATION/TRAINING PROGRAM

## 2025-03-12 RX ORDER — POLYETHYLENE GLYCOL 3350 17 G/17G
17 POWDER, FOR SOLUTION ORAL DAILY
Qty: 238 G | Refills: 0 | Status: SHIPPED | OUTPATIENT
Start: 2025-03-12 | End: 2025-03-27

## 2025-03-12 RX ORDER — OXYCODONE HYDROCHLORIDE 5 MG/1
10 TABLET ORAL EVERY 4 HOURS PRN
Refills: 0 | Status: DISCONTINUED | OUTPATIENT
Start: 2025-03-12 | End: 2025-03-12 | Stop reason: HOSPADM

## 2025-03-12 RX ORDER — ONDANSETRON 4 MG/1
4 TABLET, FILM COATED ORAL EVERY 8 HOURS PRN
Qty: 30 TABLET | Refills: 1 | Status: SHIPPED | OUTPATIENT
Start: 2025-03-12 | End: 2026-03-12

## 2025-03-12 RX ORDER — OXYCODONE HYDROCHLORIDE 10 MG/1
10 TABLET ORAL EVERY 6 HOURS PRN
Qty: 15 TABLET | Refills: 0 | Status: SHIPPED | OUTPATIENT
Start: 2025-03-12 | End: 2025-03-17

## 2025-03-12 RX ADMIN — OXYCODONE HYDROCHLORIDE 10 MG: 5 TABLET ORAL at 08:51

## 2025-03-12 RX ADMIN — BUSPIRONE HYDROCHLORIDE 15 MG: 15 TABLET ORAL at 08:51

## 2025-03-12 RX ADMIN — BACLOFEN 10 MG: 10 TABLET ORAL at 08:51

## 2025-03-12 RX ADMIN — OXYCODONE HYDROCHLORIDE 10 MG: 5 TABLET ORAL at 05:08

## 2025-03-12 RX ADMIN — AMITRIPTYLINE HYDROCHLORIDE 75 MG: 50 TABLET, FILM COATED ORAL at 09:11

## 2025-03-12 RX ADMIN — POLYETHYLENE GLYCOL 3350 17 G: 17 POWDER, FOR SOLUTION ORAL at 08:52

## 2025-03-12 RX ADMIN — ARIPIPRAZOLE 5 MG: 5 TABLET ORAL at 08:51

## 2025-03-12 RX ADMIN — VENLAFAXINE HYDROCHLORIDE 150 MG: 75 CAPSULE, EXTENDED RELEASE ORAL at 08:51

## 2025-03-12 RX ADMIN — ONDANSETRON 4 MG: 2 INJECTION, SOLUTION INTRAMUSCULAR; INTRAVENOUS at 08:51

## 2025-03-12 NOTE — PLAN OF CARE
Problem: Adult Inpatient Plan of Care  Goal: Plan of Care Review  Outcome: Progressing  Goal: Patient-Specific Goal (Individualized)  Outcome: Progressing  Goal: Absence of Hospital-Acquired Illness or Injury  Outcome: Progressing  Intervention: Identify and Manage Fall Risk  Recent Flowsheet Documentation  Taken 3/12/2025 0200 by Trinity Becker LPN  Safety Promotion/Fall Prevention:   activity supervised   clutter free environment maintained   assistive device/personal items within reach   fall prevention program maintained   gait belt   nonskid shoes/slippers when out of bed   safety round/check completed  Intervention: Prevent Infection  Recent Flowsheet Documentation  Taken 3/12/2025 0200 by Trinity Becker LPN  Infection Prevention:   hand hygiene promoted   rest/sleep promoted   single patient room provided  Goal: Optimal Comfort and Wellbeing  Outcome: Progressing  Goal: Readiness for Transition of Care  Outcome: Progressing     Problem: Fall Injury Risk  Goal: Absence of Fall and Fall-Related Injury  Outcome: Progressing  Intervention: Identify and Manage Contributors  Recent Flowsheet Documentation  Taken 3/12/2025 0200 by Trinity Becker LPN  Medication Review/Management: medications reviewed  Intervention: Promote Injury-Free Environment  Recent Flowsheet Documentation  Taken 3/12/2025 0200 by Trinity Becker LPN  Safety Promotion/Fall Prevention:   activity supervised   clutter free environment maintained   assistive device/personal items within reach   fall prevention program maintained   gait belt   nonskid shoes/slippers when out of bed   safety round/check completed     Problem: Skin Injury Risk Increased  Goal: Skin Health and Integrity  Outcome: Progressing   Goal Outcome Evaluation:

## 2025-03-12 NOTE — PLAN OF CARE
Problem: Adult Inpatient Plan of Care  Goal: Absence of Hospital-Acquired Illness or Injury  Outcome: Progressing  Intervention: Identify and Manage Fall Risk  Recent Flowsheet Documentation  Taken 3/12/2025 0800 by Adela Singleton RN  Safety Promotion/Fall Prevention:   nonskid shoes/slippers when out of bed   fall prevention program maintained  Intervention: Prevent Infection  Recent Flowsheet Documentation  Taken 3/12/2025 0800 by Adela Singleton, RN  Infection Prevention:   single patient room provided   hand hygiene promoted   Goal Outcome Evaluation:  Plan of Care Reviewed With: patient        Progress: improving

## 2025-03-13 NOTE — CASE MANAGEMENT/SOCIAL WORK
Case Management Discharge Note      Final Note: HOME  DISCHARGED PRIOR TO CASE MANAGEMENT SEEING PATIENT         Selected Continued Care - Discharged on 3/12/2025 Admission date: 3/11/2025 - Discharge disposition: Home or Self Care                  Transportation Services  Private: Car    Final Discharge Disposition Code: 01 - home or self-care   Normal for race

## 2025-03-14 ENCOUNTER — TELEPHONE (OUTPATIENT)
Dept: SURGERY | Facility: CLINIC | Age: 45
End: 2025-03-14
Payer: MEDICARE

## 2025-03-17 ENCOUNTER — TELEPHONE (OUTPATIENT)
Dept: SURGERY | Facility: CLINIC | Age: 45
End: 2025-03-17
Payer: MEDICARE

## 2025-03-17 DIAGNOSIS — K81.9 CHOLECYSTITIS: Primary | ICD-10-CM

## 2025-03-17 RX ORDER — HYDROCODONE BITARTRATE AND ACETAMINOPHEN 5; 325 MG/1; MG/1
1 TABLET ORAL EVERY 6 HOURS PRN
Qty: 15 TABLET | Refills: 0 | Status: SHIPPED | OUTPATIENT
Start: 2025-03-17

## 2025-03-17 NOTE — TELEPHONE ENCOUNTER
Caller: EstherCarl    Relationship: Emergency Contact    Best call back number: [phone]     Requested Prescriptions:   Requested Prescriptions      No prescriptions requested or ordered in this encounter      oxyCODONE (ROXICODONE) 10 MG tablet     Pharmacy where request should be sent:      [facility] [place] - [address] [place] [address] - [phone] [place] [phone]    [address] [place] [address] [place] [id number]     Last office visit with prescribing clinician: 2/27/2025   Last telemedicine visit with prescribing clinician: Visit date not found   Next office visit with prescribing clinician: Visit date not found     Additional details provided by patient: IN PAIN    Does the patient have less than a 3 day supply:  [x] Yes  [] No    Would you like a call back once the refill request has been completed: [x] Yes [] No    If the office needs to give you a call back, can they leave a voicemail: [x] Yes [] No    Surendra Umanzor   03/17/25 08:28 EDT

## 2025-03-27 ENCOUNTER — OFFICE VISIT (OUTPATIENT)
Dept: SURGERY | Facility: CLINIC | Age: 45
End: 2025-03-27
Payer: MEDICARE

## 2025-03-27 VITALS
HEIGHT: 70 IN | SYSTOLIC BLOOD PRESSURE: 115 MMHG | DIASTOLIC BLOOD PRESSURE: 75 MMHG | TEMPERATURE: 98 F | BODY MASS INDEX: 28.7 KG/M2 | HEART RATE: 98 BPM | OXYGEN SATURATION: 99 %

## 2025-03-27 DIAGNOSIS — K80.20 SYMPTOMATIC CHOLELITHIASIS: Primary | ICD-10-CM

## 2025-03-27 PROCEDURE — 99024 POSTOP FOLLOW-UP VISIT: CPT

## 2025-03-27 NOTE — PROGRESS NOTES
"Chief Complaint  Post-op Follow-up (Post op 3/11/2025 Regina Noyola, Dr. Mota)    Subjective        Pk May presents to [facility] GENERAL SURGERY status post robotic cholecystectomy with Dr. Mota for 3/11/2025.  Overall doing well, no complaints.  Denies pain.  Denies fevers and chills.  Tolerating regular diet without nausea or vomiting.  Having regular bowel function.      Objective   Vital Signs:  /75 (BP Location: Right arm, Patient Position: Sitting, Cuff Size: Large Adult)   Pulse 98   Temp 98 °F (36.7 °C) (Infrared)   Ht 177.8 cm (70\")   SpO2 99%   BMI 28.70 kg/m²   Estimated body mass index is 28.7 kg/m² as calculated from the following:    Height as of this encounter: 177.8 cm (70\").    Weight as of 3/11/25: 90.7 kg (200 lb).            Physical Exam  Constitutional:       General: He is not in acute distress.  Cardiovascular:      Rate and Rhythm: Normal rate.   Pulmonary:      Effort: Pulmonary effort is normal. No respiratory distress.   Abdominal:      General: There is no distension.      Palpations: Abdomen is soft.      Tenderness: There is no abdominal tenderness. There is no guarding.      Comments: Incision appear to be healing well.   Neurological:      Mental Status: He is alert. Mental status is at baseline.   Psychiatric:         Mood and Affect: Mood normal.         Behavior: Behavior normal.        Result Review :                Assessment and Plan   Diagnoses and all orders for this visit:    1. Symptomatic cholelithiasis (Primary)    status post robotic cholecystectomy with Dr. Mota for 3/11/2025.  Overall doing well, no complaints.  Denies pain.  Denies fevers and chills.  Tolerating regular diet without nausea or vomiting.  Having regular bowel function.  Pathology discussed with patient.  No further physical restrictions.  No dietary restrictions.  Can follow-up as needed         Follow Up   No follow-ups on file.  Patient was given instructions and " counseling regarding his condition or for health maintenance advice. Please see specific information pulled into the AVS if appropriate.

## 2025-03-27 NOTE — OP NOTE
Operative Report:    Patient Name:  Pk May  YOB: 1980    Date of Surgery:  3/11/2025     Indications:    His symptoms, including postprandial discomfort, align with the diagnosis of cholelithiasis. The presence of gallstones has been confirmed through MRI. Given his extensive surgical history, the potential for open cholecystectomy was discussed. The possibility of a robotic or laparoscopic approach was considered, contingent on the extent of internal scar tissue. If feasible, this would allow for same-day discharge. However, if an open incision is required, a hospital stay of 1 to 2 days for pain management would be necessary. Postoperative restrictions include avoiding lifting more than 10 to 15 pounds for 2 weeks following a robotic procedure, and for 6 weeks after an open procedure. The risk of hernia development at the incision site was also discussed. He will be provided with pain medication postoperatively. He will be scheduled for surgery in mid-March 2024.     Pre-op Diagnosis:   Symptomatic cholelithiasis [K80.20]       Post-Op Diagnosis Codes:     * Symptomatic cholelithiasis [K80.20]    Procedure/CPT® Codes:  VT LAPAROSCOPY SURG CHOLECYSTECTOMY [05260]    Procedure(s):  CHOLECYSTECTOMY LAPAROSCOPIC WITH DAVINCI ROBOT    Staff:  Surgeon(s):  Silvestre Mota MD    Circulator: Minda Casillas RN  Scrub Person: Dominique Clemens RN  Vendor Representative: Timothy Vega  Assistant: Anaid Skaggs CSA  was responsible for performing the following activities: Retraction, Suction, Irrigation, Suturing, Placing Dressing, and Held/Positioned Camera and their skilled assistance was necessary for the success of this case.        Anesthesia: General    Estimated Blood Loss: minimal    Implants:    Implant Name Type Inv. Item Serial No.  Lot No. LRB No. Used Action   CARTRDG CLIP LIGAT VASQCLIP [id number] LG STRL - [id number] Implant CARTRDG CLIP LIGAT VASQCLIP [id number] LG STRL   [employer] [id number] N/A 1 Implanted       Specimen:          Specimens       ID Source Type Tests Collected By Collected At Frozen?    A Gallbladder Tissue TISSUE PATHOLOGY EXAM   Silvestre Mota MD 3/11/25 1003 No                Findings: Chronically inflamed appearing gallbladder    Complications: None    Description of Procedure:   After risk benefits and alternatives were discussed with patient informed sent was obtained.  Patient was transported the operating room placed supine operating table underwent general anesthesia.  The abdomen is prepped and draped in sterile fashion surgical timeout completed.  I inserted a 5 mm trocar in the left upper quadrant under laparoscopic visualization.  Insufflate the peritoneal cavity inspected the area below my entry site with no injury identified.  Patient was placed in reverse Trendelenburg position.  I inserted an 8 mm trocar in the right lower quadrant 8 mm trocar through the right rectus muscle 12 mm trocar through the left rectus muscle and upsized my entry trocar to an 8 mm robotic trocar.  The robot was brought in and docked and I proceeded to the robotic console.    I turned attention the right upper quadrant.  Patient had a chronically inflamed.  Gallbladder.  I grasped the dome of the gallbladder retracted distal to the right upper quadrant.  I was able to identify the infundibulum open the peritoneum along the anterior and posterior surface of the infundibulum of the inferior one third of the gallbladder.  I cleared the triangle between the cystic duct and artery of all fat and fibrous tissue in the triangle above the cystic artery and the inferior one third of the gallbladder of all fat and fibrous tissue until there were only 2 structures remaining entering directly to the gallbladder.  Once a critical view of safety was obtained I placed 3 clips on the cystic duct and 2 clips on the cystic artery.  I divided the cystic duct between the clips  electrocautery on cut mode and the cystic artery above the clips electrocautery.  I used electrocautery to dissect the gallbladder off the gallbladder fossa.  I check for hemostasis there is no bleeding identified.  I placed the gallbladder in an Endo Catch bag removed this from the 12 mm port site.  The fascia at the 12 mm port site was closed with a 0 Vicryl suture using laparoscopic suture passer.  The abdomen was desufflated the remaining trocars were removed.  Skin was reapproximated with 4-0 Vicryl suture and covered surgical glue.  Patient was woken from general anesthesia and transferred recovery unit without incident.       Silvestre Mota MD     Date: 3/25/2025  Time: 17:03 EDT    This note was created using Dragon Voice Recognition software.    [Family History Reviewed and Updated] : family history reviewed and updated

## 2025-06-20 ENCOUNTER — OFFICE VISIT (OUTPATIENT)
Dept: WOUND CARE | Facility: HOSPITAL | Age: 45
End: 2025-06-20
Payer: MEDICARE

## 2025-06-20 PROCEDURE — G0463 HOSPITAL OUTPT CLINIC VISIT: HCPCS

## 2025-06-20 PROCEDURE — 97602 WOUND(S) CARE NON-SELECTIVE: CPT

## 2025-06-26 ENCOUNTER — DOCUMENTATION (OUTPATIENT)
Dept: WOUND CARE | Facility: HOSPITAL | Age: 45
End: 2025-06-26
Payer: MEDICARE

## (undated) DEVICE — 3M™ IOBAN™ 2 ANTIMICROBIAL INCISE DRAPE 6650EZ: Brand: IOBAN™ 2

## (undated) DEVICE — THE STERILE CAMERA HANDLE COVER IS FOR USE WITH THE STERIS SURGICAL LIGHTING AND VISUALIZATION SYSTEMS.

## (undated) DEVICE — PASS SUT PRO BARIATRIC XL W/TROC SWABS

## (undated) DEVICE — BLADELESS OBTURATOR: Brand: WECK VISTA

## (undated) DEVICE — SEAL

## (undated) DEVICE — BLANKT WARM UPPR/BDY ARM/OUT 57X196CM

## (undated) DEVICE — ENDOPATH XCEL WITH OPTIVIEW TECHNOLOGY BLADELESS TROCARS WITH STABILITY SLEEVES: Brand: ENDOPATH XCEL OPTIVIEW

## (undated) DEVICE — SUCTION IRRIGATOR: Brand: ENDOWRIST

## (undated) DEVICE — UNDERGLV SURG BIOGEL INDICATOR LF PF 7.5

## (undated) DEVICE — THE STERILE LIGHT HANDLE COVER IS USED WITH STERIS SURGICAL LIGHTING AND VISUALIZATION SYSTEMS.

## (undated) DEVICE — LAPAROVUE VISIBILITY SYSTEM LAPAROSCOPIC SOLUTIONS: Brand: LAPAROVUE

## (undated) DEVICE — 2-PIECE DRAINABLE OSTOMY POUCH: Brand: NEW IMAGE

## (undated) DEVICE — TROC BLADLES AIRSEAL/OPTI THRD 8X120MM 1P/U

## (undated) DEVICE — GLV SURG BIOGEL LTX PF 7

## (undated) DEVICE — SOLUTION,WATER,IRRIGATION,1000ML,STERILE: Brand: MEDLINE

## (undated) DEVICE — KT SURG TURNOVER 050

## (undated) DEVICE — ANTIBACTERIAL UNDYED BRAIDED (POLYGLACTIN 910), SYNTHETIC ABSORBABLE SUTURE: Brand: COATED VICRYL

## (undated) DEVICE — 2-PIECE OSTOMY SKIN BARRIER, CONVEX, FLEXTEND: Brand: NEW IMAGE

## (undated) DEVICE — CANNULA SEAL

## (undated) DEVICE — SOL IRR NACL 0.9PCT BO 1000ML

## (undated) DEVICE — COLUMN DRAPE

## (undated) DEVICE — ARM DRAPE

## (undated) DEVICE — SUT VIC 0 UR6 27IN VCP603H

## (undated) DEVICE — SUT ETHLN 2/0 PS 18IN 585H

## (undated) DEVICE — GENERAL LAPAROSCOPY CDS: Brand: MEDLINE INDUSTRIES, INC.

## (undated) DEVICE — BG RETRV TISS SUPERBAG INTRO RIP/STOP NLY 10MM 240ML MD

## (undated) DEVICE — ST TBG AIRSEAL BIF FLTR W/ACT/CHARCOAL/FLTR

## (undated) DEVICE — 2-PIECE OSTOMY SKIN BARRIER, FORMAFLEX: Brand: NEW IMAGE

## (undated) DEVICE — REDUCER: Brand: ENDOWRIST

## (undated) DEVICE — SYR LUERLOK 30CC

## (undated) DEVICE — CFF SCD HEMFRCE SEQ CLF STD XL

## (undated) DEVICE — 3M™ STERI-STRIP™ REINFORCED ADHESIVE SKIN CLOSURES, R1547, 1/2 IN X 4 IN (12 MM X 100 MM), 6 STRIPS/ENVELOPE: Brand: 3M™ STERI-STRIP™